# Patient Record
Sex: MALE | Race: WHITE | NOT HISPANIC OR LATINO | Employment: OTHER | URBAN - METROPOLITAN AREA
[De-identification: names, ages, dates, MRNs, and addresses within clinical notes are randomized per-mention and may not be internally consistent; named-entity substitution may affect disease eponyms.]

---

## 2017-01-06 PROBLEM — I10 HYPERTENSION: Status: ACTIVE | Noted: 2017-01-06

## 2017-03-31 ENCOUNTER — OFFICE VISIT (OUTPATIENT)
Dept: NEUROLOGY | Facility: CLINIC | Age: 82
End: 2017-03-31

## 2017-03-31 ENCOUNTER — DOCUMENTATION (OUTPATIENT)
Dept: NEUROLOGY | Facility: CLINIC | Age: 82
End: 2017-03-31

## 2017-03-31 VITALS
BODY MASS INDEX: 30.07 KG/M2 | WEIGHT: 222 LBS | SYSTOLIC BLOOD PRESSURE: 185 MMHG | HEIGHT: 72 IN | DIASTOLIC BLOOD PRESSURE: 86 MMHG

## 2017-03-31 DIAGNOSIS — G31.84 MILD COGNITIVE IMPAIRMENT: Primary | ICD-10-CM

## 2017-03-31 PROCEDURE — 99214 OFFICE O/P EST MOD 30 MIN: CPT | Performed by: PHYSICIAN ASSISTANT

## 2017-03-31 NOTE — PROGRESS NOTES
Faxed over a request to Dr. Goodman's office at [phone] for the pt's most recent labs to be faxed to our office

## 2017-03-31 NOTE — PROGRESS NOTES
"Subjective     History of Present Illness   Leobardo Araujo is a 82 y.o. male who returns to clinic today for evaluation of cognitive impairment. He was previously followed by Dr. Snell. He has noted symptoms for some time marked initially by mild forgetfulness  and word-finding difficulties. This has remained static  over time. He denies any additional impairments. There have been no associated  symptoms of anxiety  and depression . He denies  impairments in ADL's. He manages his medications  and finances. He continues to drive.      Prior evaluation has included screening blood work through his PCP. We do not currently have these results.     He has a history of YSABEL and currently sees Dr. Snell for management of his CPAP machine.     Since his last visit in 6/16, he feels essentially unchanged cognitively. It is noted that he is unaccompanied today.      The following portions of the patient's history were reviewed and updated as appropriate: allergies, current medications, past family history, past medical history, past social history, past surgical history and problem list.    Review of Systems   Constitutional: Negative.    HENT: Negative.    Eyes: Negative.    Respiratory: Negative.    Cardiovascular: Negative.    Gastrointestinal: Negative.    Endocrine: Negative.    Genitourinary: Negative.    Musculoskeletal: Negative.    Skin: Negative.    Allergic/Immunologic: Negative.    Hematological: Negative.    Psychiatric/Behavioral: Negative.        Objective     BP (!) 185/86  Ht 72\" (182.9 cm)  Wt 222 lb (101 kg)  BMI 30.11 kg/m2    General appearance today is normal.       Physical Exam   Constitutional: He is oriented to person, place, and time.   Neurological: He is oriented to person, place, and time. He has normal strength. He has a normal Finger-Nose-Finger Test.   Psychiatric: His speech is normal.        Neurologic Exam     Mental Status   Oriented to person, place, and time.   (Disoriented to city " and [facility]. )  Registration: recalls 3 of 3 objects. Recall of objects at 5 minutes: 0/3 recall. Follows 3 step commands.   Attention: normal.   Speech: speech is normal   Level of consciousness: alert  Able to name object. Able to read. Able to repeat. Able to write. Normal comprehension.     Cranial Nerves   Cranial nerves II through XII intact.     Motor Exam   Muscle bulk: normal  Overall muscle tone: normal    Strength   Strength 5/5 throughout.     Sensory Exam   Light touch normal.     Gait, Coordination, and Reflexes     Coordination   Finger to nose coordination: normal    Tremor   Resting tremor: absent        Results  MMSE=25 (28 in 6/16)      Assessment/Plan   Leobardo was seen today for neurologic problem.    Diagnoses and all orders for this visit:    Mild cognitive impairment  -     CT Head Without Contrast          Discussion/Summary   Leobardo Araujo returns to clinic today with a history and examination concerning for Mild Cognitive Impairment (MCI) . I again reviewed his current status and treatment options. It was elected to obtain his bloodwork results from his PCP. It was also elected to obtain a head CT to complete his workup. We discussed potential treatment options such as adding donepezil. However, after some discussion, it was elected to simply follow his course. He will then follow up in 6 months, or sooner if needed.       I spent 20 minutes out of 25 minutes face to face with the patient and discussing diagnosis, prognosis, diagnostic testing, evaluation, current status, treatment options and management.    As part of this visit I discussed the history with the patient .      Shelly Johnson PA-C

## 2017-05-08 RX ORDER — VILAZODONE HYDROCHLORIDE 40 MG/1
TABLET ORAL
Qty: 30 TABLET | Refills: 3 | Status: SHIPPED | OUTPATIENT
Start: 2017-05-08 | End: 2017-09-19 | Stop reason: SDUPTHER

## 2017-06-28 ENCOUNTER — OFFICE VISIT (OUTPATIENT)
Dept: NEUROLOGY | Facility: CLINIC | Age: 82
End: 2017-06-28

## 2017-06-28 ENCOUNTER — TELEPHONE (OUTPATIENT)
Dept: NEUROLOGY | Facility: CLINIC | Age: 82
End: 2017-06-28

## 2017-06-28 DIAGNOSIS — G31.84 MILD COGNITIVE IMPAIRMENT: ICD-10-CM

## 2017-06-28 DIAGNOSIS — G47.33 OBSTRUCTIVE SLEEP APNEA SYNDROME: Primary | ICD-10-CM

## 2017-06-28 PROCEDURE — 99213 OFFICE O/P EST LOW 20 MIN: CPT | Performed by: PSYCHIATRY & NEUROLOGY

## 2017-06-28 NOTE — PROGRESS NOTES
Subjective   Leobardo Araujo is a 82 y.o. male.     History of Present Illness     The following portions of the patient's history were reviewed and updated as appropriate:  allergies, current medications, past family history, past medical history, past social history, past surgical history and problem list.      Chief complaint is sleep problems, memory loss, the patient is seen today in follow-up.  the patient sleep apnea has controlled with CPAP and he is sleeping every night with it he has no complaints.  He is now followed in our memory disorders clinic for his cognitive impairment.  He has never had his machine serviced.  He uses his CPAP machine every night and he recently replaced the mask and that her current mask fits well      Review of Systems   Constitutional: Negative for appetite change, chills and fatigue.   HENT: Negative for congestion, ear pain, facial swelling and sinus pressure.    Eyes: Negative for pain and redness.   Respiratory: Negative for shortness of breath.    Cardiovascular: Negative for chest pain.   Gastrointestinal: Negative for abdominal pain.   Endocrine: Negative for cold intolerance and heat intolerance.   Genitourinary: Negative for dysuria.   Musculoskeletal: Negative for arthralgias.   Skin: Negative for rash.   Allergic/Immunologic: Negative for immunocompromised state.   Neurological:        Poor sleep and memory   Hematological: Negative for adenopathy.   Psychiatric/Behavioral: Negative for hallucinations.       Objective      vitals were not taken for this visit.    Carotid pulses were palpable bilaterally  The patient's general appearance today was normal  Ophthalmological exam was not performed today    Neurologic Exam     Mental Status   Oriented to person.   Oriented to place. Oriented to country, city, area and street.   Oriented to time. Oriented to year, month, date, day and season.   Registration: recalls 3 of 3 objects. Recall at 5 minutes: recalls 3 of 3 objects.  Follows 3 step commands.   Attention: normal.   Speech: speech is normal   Level of consciousness: alert  Knowledge: consistent with education.   Able to name object. Able to read. Able to repeat. Able to write. Normal comprehension.        MMSE is 28/30     Cranial Nerves     CN II   Visual fields full to confrontation.     CN III, IV, VI   Right pupil: Shape: regular. Consensual response: intact. Accommodation: intact.   Left pupil: Shape: regular. Consensual response: intact.   CN III: no CN III palsy  CN VI: no CN VI palsy  Nystagmus: none   Diplopia: none  Ophthalmoparesis: none  Upgaze: normal  Downgaze: normal  Conjugate gaze: present  Vestibulo-ocular reflex: present    CN V   Facial sensation intact.     CN VII   Facial expression full, symmetric.     CN VIII   CN VIII normal.     CN IX, X   CN IX normal.     CN XI   CN XI normal.     CN XII   CN XII normal.     Motor Exam   Muscle bulk: normal  Overall muscle tone: normal  Right arm pronator drift: absent  Left arm pronator drift: absent    Strength   Strength 5/5 throughout.     Sensory Exam   Light touch normal.     Gait, Coordination, and Reflexes     Gait  Gait: normal    Coordination   Romberg: negative  Finger to nose coordination: normal  Heel to shin coordination: normal  Tandem walking coordination: normal    Tremor   Resting tremor: absent  Intention tremor: absent  Action tremor: absent    Reflexes   Right brachioradialis: 2+  Left brachioradialis: 2+  Right biceps: 2+  Left biceps: 2+  Right triceps: 2+  Left triceps: 2+  Right patellar: 2+  Left patellar: 2+  Right achilles: 2+  Left achilles: 2+  Right : 2+  Left : 2+      Assessment/Plan     Leobardo was seen today for follow-up.    Diagnoses and all orders for this visit:    Obstructive sleep apnea syndrome    Mild cognitive impairment          Discussion/Summary:    He will follow-up in our memory disorder clinic for his mild cognitive impairment.  We will get the printout from his  CPAP machine to make sure that is still functioning optimally and I provided him with a prescription to get his CPAP machine serviced.

## 2017-07-21 RX ORDER — ATORVASTATIN CALCIUM 20 MG/1
TABLET, FILM COATED ORAL
Qty: 90 TABLET | Refills: 1 | Status: SHIPPED | OUTPATIENT
Start: 2017-07-21 | End: 2017-11-29 | Stop reason: SDUPTHER

## 2017-07-24 RX ORDER — HYDROCHLOROTHIAZIDE 25 MG/1
25 TABLET ORAL DAILY
Qty: 90 TABLET | Refills: 0 | Status: SHIPPED | OUTPATIENT
Start: 2017-07-24 | End: 2017-10-26 | Stop reason: SDUPTHER

## 2017-07-27 RX ORDER — LOSARTAN POTASSIUM 100 MG/1
100 TABLET ORAL DAILY
Qty: 90 TABLET | Refills: 1 | Status: SHIPPED | OUTPATIENT
Start: 2017-07-27 | End: 2017-11-29 | Stop reason: SDUPTHER

## 2017-09-20 RX ORDER — VILAZODONE HYDROCHLORIDE 40 MG/1
TABLET ORAL
Qty: 30 TABLET | Refills: 3 | Status: SHIPPED | OUTPATIENT
Start: 2017-09-20 | End: 2019-07-22 | Stop reason: SDUPTHER

## 2017-10-27 RX ORDER — HYDROCHLOROTHIAZIDE 25 MG/1
TABLET ORAL
Qty: 90 TABLET | Refills: 1 | Status: SHIPPED | OUTPATIENT
Start: 2017-10-27 | End: 2017-11-29 | Stop reason: SDUPTHER

## 2017-11-29 ENCOUNTER — OFFICE VISIT (OUTPATIENT)
Dept: CARDIOLOGY | Facility: CLINIC | Age: 82
End: 2017-11-29

## 2017-11-29 VITALS
WEIGHT: 231.8 LBS | SYSTOLIC BLOOD PRESSURE: 140 MMHG | BODY MASS INDEX: 31.4 KG/M2 | DIASTOLIC BLOOD PRESSURE: 80 MMHG | HEIGHT: 72 IN | HEART RATE: 71 BPM

## 2017-11-29 DIAGNOSIS — E78.00 HYPERCHOLESTEREMIA: ICD-10-CM

## 2017-11-29 DIAGNOSIS — Z86.79 HISTORY OF AORTIC VALVE STENOSIS: ICD-10-CM

## 2017-11-29 DIAGNOSIS — I38 VALVULAR HEART DISEASE: ICD-10-CM

## 2017-11-29 DIAGNOSIS — I48.91 ATRIAL FIBRILLATION, UNSPECIFIED TYPE (HCC): Primary | ICD-10-CM

## 2017-11-29 DIAGNOSIS — I10 HYPERTENSION, UNSPECIFIED TYPE: ICD-10-CM

## 2017-11-29 PROCEDURE — 93000 ELECTROCARDIOGRAM COMPLETE: CPT | Performed by: INTERNAL MEDICINE

## 2017-11-29 PROCEDURE — 99214 OFFICE O/P EST MOD 30 MIN: CPT | Performed by: INTERNAL MEDICINE

## 2017-11-29 RX ORDER — TAMSULOSIN HYDROCHLORIDE 0.4 MG/1
1 CAPSULE ORAL NIGHTLY
COMMUNITY
End: 2017-11-29

## 2017-11-29 RX ORDER — AMIODARONE HYDROCHLORIDE 200 MG/1
200 TABLET ORAL DAILY
Qty: 90 TABLET | Refills: 1 | Status: SHIPPED | OUTPATIENT
Start: 2017-11-29 | End: 2018-05-23 | Stop reason: SDUPTHER

## 2017-11-29 RX ORDER — TERAZOSIN 1 MG/1
1 CAPSULE ORAL NIGHTLY
Qty: 90 CAPSULE | Refills: 3 | Status: SHIPPED | OUTPATIENT
Start: 2017-11-29 | End: 2018-10-03

## 2017-11-29 RX ORDER — HYDROCHLOROTHIAZIDE 25 MG/1
25 TABLET ORAL DAILY
Qty: 90 TABLET | Refills: 1 | Status: SHIPPED | OUTPATIENT
Start: 2017-11-29 | End: 2018-12-03 | Stop reason: SDUPTHER

## 2017-11-29 RX ORDER — LOSARTAN POTASSIUM 100 MG/1
100 TABLET ORAL DAILY
Qty: 90 TABLET | Refills: 1 | Status: SHIPPED | OUTPATIENT
Start: 2017-11-29 | End: 2018-10-25 | Stop reason: SDUPTHER

## 2017-11-29 RX ORDER — ATORVASTATIN CALCIUM 20 MG/1
20 TABLET, FILM COATED ORAL NIGHTLY
Qty: 90 TABLET | Refills: 1 | Status: SHIPPED | OUTPATIENT
Start: 2017-11-29 | End: 2019-02-22 | Stop reason: SDUPTHER

## 2017-12-26 ENCOUNTER — APPOINTMENT (OUTPATIENT)
Dept: CARDIOLOGY | Facility: HOSPITAL | Age: 82
End: 2017-12-26

## 2017-12-29 ENCOUNTER — HOSPITAL ENCOUNTER (OUTPATIENT)
Dept: CARDIOLOGY | Facility: HOSPITAL | Age: 82
Discharge: HOME | End: 2017-12-29
Admitting: NURSE PRACTITIONER

## 2017-12-29 VITALS — BODY MASS INDEX: 31.29 KG/M2 | WEIGHT: 231 LBS | HEIGHT: 72 IN

## 2017-12-29 DIAGNOSIS — I48.91 ATRIAL FIBRILLATION, UNSPECIFIED TYPE (HCC): ICD-10-CM

## 2017-12-29 DIAGNOSIS — I38 VALVULAR HEART DISEASE: ICD-10-CM

## 2017-12-29 PROCEDURE — 93306 TTE W/DOPPLER COMPLETE: CPT

## 2017-12-29 PROCEDURE — 93306 TTE W/DOPPLER COMPLETE: CPT | Performed by: INTERNAL MEDICINE

## 2018-01-03 LAB
BH CV ECHO MEAS - AO MAX PG (FULL): 10.4 MMHG
BH CV ECHO MEAS - AO MAX PG: 17 MMHG
BH CV ECHO MEAS - AO MEAN PG (FULL): 5.5 MMHG
BH CV ECHO MEAS - AO MEAN PG: 8.8 MMHG
BH CV ECHO MEAS - AO ROOT AREA (BSA CORRECTED): 1.1
BH CV ECHO MEAS - AO ROOT AREA: 5.3 CM^2
BH CV ECHO MEAS - AO ROOT DIAM: 2.6 CM
BH CV ECHO MEAS - AO V2 MAX: 204.4 CM/SEC
BH CV ECHO MEAS - AO V2 MEAN: 135.6 CM/SEC
BH CV ECHO MEAS - AO V2 VTI: 41.5 CM
BH CV ECHO MEAS - AVA(I,A): 2.1 CM^2
BH CV ECHO MEAS - AVA(I,D): 2.1 CM^2
BH CV ECHO MEAS - AVA(V,A): 1.8 CM^2
BH CV ECHO MEAS - AVA(V,D): 1.8 CM^2
BH CV ECHO MEAS - BSA(HAYCOCK): 2.3 M^2
BH CV ECHO MEAS - BSA: 2.3 M^2
BH CV ECHO MEAS - BZI_BMI: 31.3 KILOGRAMS/M^2
BH CV ECHO MEAS - BZI_METRIC_HEIGHT: 182.9 CM
BH CV ECHO MEAS - BZI_METRIC_WEIGHT: 104.8 KG
BH CV ECHO MEAS - CONTRAST EF (2CH): 68.3 ML/M^2
BH CV ECHO MEAS - CONTRAST EF 4CH: 69.3 ML/M^2
BH CV ECHO MEAS - EDV(CUBED): 121.3 ML
BH CV ECHO MEAS - EDV(MOD-SP2): 82 ML
BH CV ECHO MEAS - EDV(MOD-SP4): 88 ML
BH CV ECHO MEAS - EDV(TEICH): 115.5 ML
BH CV ECHO MEAS - EF(CUBED): 68.2 %
BH CV ECHO MEAS - EF(MOD-SP2): 68.3 %
BH CV ECHO MEAS - EF(MOD-SP4): 69.3 %
BH CV ECHO MEAS - EF(TEICH): 59.5 %
BH CV ECHO MEAS - ESV(CUBED): 38.6 ML
BH CV ECHO MEAS - ESV(MOD-SP2): 26 ML
BH CV ECHO MEAS - ESV(MOD-SP4): 27 ML
BH CV ECHO MEAS - ESV(TEICH): 46.8 ML
BH CV ECHO MEAS - FS: 31.7 %
BH CV ECHO MEAS - IVS/LVPW: 1.1
BH CV ECHO MEAS - IVSD: 1.3 CM
BH CV ECHO MEAS - LA DIMENSION: 4 CM
BH CV ECHO MEAS - LA/AO: 1.5
BH CV ECHO MEAS - LAT PEAK E' VEL: 8.4 CM/SEC
BH CV ECHO MEAS - LV DIASTOLIC VOL/BSA (35-75): 38.9 ML/M^2
BH CV ECHO MEAS - LV MASS(C)D: 275.1 GRAMS
BH CV ECHO MEAS - LV MASS(C)DI: 121.5 GRAMS/M^2
BH CV ECHO MEAS - LV MAX PG: 6.6 MMHG
BH CV ECHO MEAS - LV MEAN PG: 3.3 MMHG
BH CV ECHO MEAS - LV SYSTOLIC VOL/BSA (12-30): 11.9 ML/M^2
BH CV ECHO MEAS - LV V1 MAX: 128.7 CM/SEC
BH CV ECHO MEAS - LV V1 MEAN: 84.1 CM/SEC
BH CV ECHO MEAS - LV V1 VTI: 30.2 CM
BH CV ECHO MEAS - LVIDD: 5 CM
BH CV ECHO MEAS - LVIDS: 3.4 CM
BH CV ECHO MEAS - LVLD AP2: 7.7 CM
BH CV ECHO MEAS - LVLD AP4: 7.8 CM
BH CV ECHO MEAS - LVLS AP2: 5.8 CM
BH CV ECHO MEAS - LVLS AP4: 6 CM
BH CV ECHO MEAS - LVOT AREA (M): 2.8 CM^2
BH CV ECHO MEAS - LVOT AREA: 2.8 CM^2
BH CV ECHO MEAS - LVOT DIAM: 1.9 CM
BH CV ECHO MEAS - LVPWD: 1.3 CM
BH CV ECHO MEAS - MED PEAK E' VEL: 6.84 CM/SEC
BH CV ECHO MEAS - MV A MAX VEL: 143 CM/SEC
BH CV ECHO MEAS - MV DEC SLOPE: 337.3 CM/SEC^2
BH CV ECHO MEAS - MV E MAX VEL: 123 CM/SEC
BH CV ECHO MEAS - MV E/A: 0.86
BH CV ECHO MEAS - MV MAX PG: 8.5 MMHG
BH CV ECHO MEAS - MV MEAN PG: 4 MMHG
BH CV ECHO MEAS - MV P1/2T MAX VEL: 141.8 CM/SEC
BH CV ECHO MEAS - MV P1/2T: 123.1 MSEC
BH CV ECHO MEAS - MV V2 MAX: 145.5 CM/SEC
BH CV ECHO MEAS - MV V2 MEAN: 92.8 CM/SEC
BH CV ECHO MEAS - MV V2 VTI: 51.1 CM
BH CV ECHO MEAS - MVA P1/2T LCG: 1.6 CM^2
BH CV ECHO MEAS - MVA(P1/2T): 1.8 CM^2
BH CV ECHO MEAS - MVA(VTI): 1.7 CM^2
BH CV ECHO MEAS - PA ACC SLOPE: 1060 CM/SEC^2
BH CV ECHO MEAS - PA ACC TIME: 0.09 SEC
BH CV ECHO MEAS - PA PR(ACCEL): 39.4 MMHG
BH CV ECHO MEAS - PI END-D VEL: 124 CM/SEC
BH CV ECHO MEAS - RAP SYSTOLE: 3 MMHG
BH CV ECHO MEAS - RVSP: 30 MMHG
BH CV ECHO MEAS - SI(AO): 97.2 ML/M^2
BH CV ECHO MEAS - SI(CUBED): 36.5 ML/M^2
BH CV ECHO MEAS - SI(LVOT): 37.8 ML/M^2
BH CV ECHO MEAS - SI(MOD-SP2): 24.7 ML/M^2
BH CV ECHO MEAS - SI(MOD-SP4): 26.9 ML/M^2
BH CV ECHO MEAS - SI(TEICH): 30.4 ML/M^2
BH CV ECHO MEAS - SV(AO): 220.1 ML
BH CV ECHO MEAS - SV(CUBED): 82.7 ML
BH CV ECHO MEAS - SV(LVOT): 85.7 ML
BH CV ECHO MEAS - SV(MOD-SP2): 56 ML
BH CV ECHO MEAS - SV(MOD-SP4): 61 ML
BH CV ECHO MEAS - SV(TEICH): 68.7 ML
BH CV ECHO MEAS - TAPSE (>1.6): 1.9 CM2
BH CV ECHO MEAS - TR MAX VEL: 259 CM/SEC
BH CV VAS BP LEFT ARM: NORMAL MMHG
BH CV XLRA - RV BASE: 4 CM
BH CV XLRA - RV LENGTH: 7.9 CM
BH CV XLRA - RV MID: 3.3 CM
BH CV XLRA - TDI S': 13.8 CM/SEC
E/E' RATIO: 16.3
LEFT ATRIUM VOLUME INDEX: 37.2 ML/M2
LEFT ATRIUM VOLUME: 84 CM3
LV EF 2D ECHO EST: 60 %
MAXIMAL PREDICTED HEART RATE: 137 BPM
STRESS TARGET HR: 116 BPM

## 2018-04-23 ENCOUNTER — APPOINTMENT (OUTPATIENT)
Dept: GENERAL RADIOLOGY | Facility: HOSPITAL | Age: 83
End: 2018-04-23

## 2018-04-23 ENCOUNTER — APPOINTMENT (OUTPATIENT)
Dept: CT IMAGING | Facility: HOSPITAL | Age: 83
End: 2018-04-23

## 2018-04-23 ENCOUNTER — HOSPITAL ENCOUNTER (INPATIENT)
Facility: HOSPITAL | Age: 83
LOS: 13 days | Discharge: HOME | End: 2018-05-07
Attending: EMERGENCY MEDICINE | Admitting: HOSPITALIST

## 2018-04-23 DIAGNOSIS — R29.6 FALLS FREQUENTLY: ICD-10-CM

## 2018-04-23 DIAGNOSIS — N39.0 URINARY TRACT INFECTION WITHOUT HEMATURIA, SITE UNSPECIFIED: ICD-10-CM

## 2018-04-23 DIAGNOSIS — R19.7 DIARRHEA, UNSPECIFIED TYPE: ICD-10-CM

## 2018-04-23 DIAGNOSIS — R41.82 ALTERED MENTAL STATUS, UNSPECIFIED ALTERED MENTAL STATUS TYPE: Primary | ICD-10-CM

## 2018-04-23 DIAGNOSIS — E87.6 HYPOKALEMIA: ICD-10-CM

## 2018-04-23 DIAGNOSIS — N39.0 URINARY TRACT INFECTION IN MALE: ICD-10-CM

## 2018-04-23 LAB
ALBUMIN SERPL-MCNC: 4.1 G/DL (ref 3.2–4.8)
ALBUMIN/GLOB SERPL: 1.4 G/DL (ref 1.5–2.5)
ALP SERPL-CCNC: 49 U/L (ref 25–100)
ALT SERPL W P-5'-P-CCNC: 44 U/L (ref 7–40)
ANION GAP SERPL CALCULATED.3IONS-SCNC: 10 MMOL/L (ref 3–11)
AST SERPL-CCNC: 42 U/L (ref 0–33)
BACTERIA UR QL AUTO: ABNORMAL /HPF
BASOPHILS # BLD AUTO: 0.01 10*3/MM3 (ref 0–0.2)
BASOPHILS NFR BLD AUTO: 0.1 % (ref 0–1)
BILIRUB SERPL-MCNC: 0.4 MG/DL (ref 0.3–1.2)
BILIRUB UR QL STRIP: NEGATIVE
BUN BLD-MCNC: 26 MG/DL (ref 9–23)
BUN/CREAT SERPL: 26 (ref 7–25)
CALCIUM SPEC-SCNC: 7.9 MG/DL (ref 8.7–10.4)
CHLORIDE SERPL-SCNC: 102 MMOL/L (ref 99–109)
CLARITY UR: CLEAR
CO2 SERPL-SCNC: 24 MMOL/L (ref 20–31)
COLOR UR: YELLOW
CREAT BLD-MCNC: 1 MG/DL (ref 0.6–1.3)
D-LACTATE SERPL-SCNC: 1.1 MMOL/L (ref 0.5–2)
DEPRECATED RDW RBC AUTO: 49.6 FL (ref 37–54)
EOSINOPHIL # BLD AUTO: 0 10*3/MM3 (ref 0–0.3)
EOSINOPHIL NFR BLD AUTO: 0 % (ref 0–3)
ERYTHROCYTE [DISTWIDTH] IN BLOOD BY AUTOMATED COUNT: 15.2 % (ref 11.3–14.5)
GFR SERPL CREATININE-BSD FRML MDRD: 71 ML/MIN/1.73
GLOBULIN UR ELPH-MCNC: 2.9 GM/DL
GLUCOSE BLD-MCNC: 148 MG/DL (ref 70–100)
GLUCOSE UR STRIP-MCNC: NEGATIVE MG/DL
HCT VFR BLD AUTO: 39.7 % (ref 38.9–50.9)
HGB BLD-MCNC: 13.1 G/DL (ref 13.1–17.5)
HGB UR QL STRIP.AUTO: ABNORMAL
HOLD SPECIMEN: NORMAL
HYALINE CASTS UR QL AUTO: ABNORMAL /LPF
IMM GRANULOCYTES # BLD: 0.04 10*3/MM3 (ref 0–0.03)
IMM GRANULOCYTES NFR BLD: 0.3 % (ref 0–0.6)
KETONES UR QL STRIP: NEGATIVE
LEUKOCYTE ESTERASE UR QL STRIP.AUTO: ABNORMAL
LIPASE SERPL-CCNC: 24 U/L (ref 6–51)
LYMPHOCYTES # BLD AUTO: 0.85 10*3/MM3 (ref 0.6–4.8)
LYMPHOCYTES NFR BLD AUTO: 5.4 % (ref 24–44)
MCH RBC QN AUTO: 29 PG (ref 27–31)
MCHC RBC AUTO-ENTMCNC: 33 G/DL (ref 32–36)
MCV RBC AUTO: 87.8 FL (ref 80–99)
MONOCYTES # BLD AUTO: 0.95 10*3/MM3 (ref 0–1)
MONOCYTES NFR BLD AUTO: 6 % (ref 0–12)
NEUTROPHILS # BLD AUTO: 13.87 10*3/MM3 (ref 1.5–8.3)
NEUTROPHILS NFR BLD AUTO: 88.2 % (ref 41–71)
NITRITE UR QL STRIP: NEGATIVE
PH UR STRIP.AUTO: 5.5 [PH] (ref 5–8)
PLATELET # BLD AUTO: 212 10*3/MM3 (ref 150–450)
PMV BLD AUTO: 10.8 FL (ref 6–12)
POTASSIUM BLD-SCNC: 3 MMOL/L (ref 3.5–5.5)
PROCALCITONIN SERPL-MCNC: 0.05 NG/ML
PROT SERPL-MCNC: 7 G/DL (ref 5.7–8.2)
PROT UR QL STRIP: ABNORMAL
RBC # BLD AUTO: 4.52 10*6/MM3 (ref 4.2–5.76)
RBC # UR: ABNORMAL /HPF
REF LAB TEST METHOD: ABNORMAL
SODIUM BLD-SCNC: 136 MMOL/L (ref 132–146)
SP GR UR STRIP: 1.06 (ref 1–1.03)
SQUAMOUS #/AREA URNS HPF: ABNORMAL /HPF
TROPONIN I SERPL-MCNC: 0 NG/ML (ref 0–0.07)
UROBILINOGEN UR QL STRIP: ABNORMAL
WBC NRBC COR # BLD: 15.72 10*3/MM3 (ref 3.5–10.8)
WBC UR QL AUTO: ABNORMAL /HPF
WHOLE BLOOD HOLD SPECIMEN: NORMAL
WHOLE BLOOD HOLD SPECIMEN: NORMAL

## 2018-04-23 PROCEDURE — 87040 BLOOD CULTURE FOR BACTERIA: CPT | Performed by: NURSE PRACTITIONER

## 2018-04-23 PROCEDURE — 93005 ELECTROCARDIOGRAM TRACING: CPT | Performed by: EMERGENCY MEDICINE

## 2018-04-23 PROCEDURE — 71045 X-RAY EXAM CHEST 1 VIEW: CPT

## 2018-04-23 PROCEDURE — 83605 ASSAY OF LACTIC ACID: CPT | Performed by: NURSE PRACTITIONER

## 2018-04-23 PROCEDURE — 87077 CULTURE AEROBIC IDENTIFY: CPT | Performed by: INTERNAL MEDICINE

## 2018-04-23 PROCEDURE — 87186 SC STD MICRODIL/AGAR DIL: CPT | Performed by: INTERNAL MEDICINE

## 2018-04-23 PROCEDURE — 99285 EMERGENCY DEPT VISIT HI MDM: CPT

## 2018-04-23 PROCEDURE — 80053 COMPREHEN METABOLIC PANEL: CPT

## 2018-04-23 PROCEDURE — 84484 ASSAY OF TROPONIN QUANT: CPT

## 2018-04-23 PROCEDURE — 70450 CT HEAD/BRAIN W/O DYE: CPT

## 2018-04-23 PROCEDURE — 74177 CT ABD & PELVIS W/CONTRAST: CPT

## 2018-04-23 PROCEDURE — 85025 COMPLETE CBC W/AUTO DIFF WBC: CPT

## 2018-04-23 PROCEDURE — 83690 ASSAY OF LIPASE: CPT

## 2018-04-23 PROCEDURE — 84145 PROCALCITONIN (PCT): CPT | Performed by: NURSE PRACTITIONER

## 2018-04-23 PROCEDURE — 25010000002 IOPAMIDOL 61 % SOLUTION: Performed by: EMERGENCY MEDICINE

## 2018-04-23 PROCEDURE — 87086 URINE CULTURE/COLONY COUNT: CPT | Performed by: INTERNAL MEDICINE

## 2018-04-23 PROCEDURE — 81001 URINALYSIS AUTO W/SCOPE: CPT | Performed by: EMERGENCY MEDICINE

## 2018-04-23 PROCEDURE — 93005 ELECTROCARDIOGRAM TRACING: CPT

## 2018-04-23 RX ORDER — ACETAMINOPHEN 500 MG
1000 TABLET ORAL ONCE
Status: COMPLETED | OUTPATIENT
Start: 2018-04-23 | End: 2018-04-23

## 2018-04-23 RX ORDER — POTASSIUM CHLORIDE 750 MG/1
40 CAPSULE, EXTENDED RELEASE ORAL ONCE
Status: COMPLETED | OUTPATIENT
Start: 2018-04-23 | End: 2018-04-23

## 2018-04-23 RX ORDER — SODIUM CHLORIDE 0.9 % (FLUSH) 0.9 %
10 SYRINGE (ML) INJECTION AS NEEDED
Status: DISCONTINUED | OUTPATIENT
Start: 2018-04-23 | End: 2018-05-07 | Stop reason: HOSPADM

## 2018-04-23 RX ORDER — CEFTRIAXONE SODIUM 1 G/50ML
1 INJECTION, SOLUTION INTRAVENOUS ONCE
Status: COMPLETED | OUTPATIENT
Start: 2018-04-23 | End: 2018-04-24

## 2018-04-23 RX ADMIN — ACETAMINOPHEN 1000 MG: 500 TABLET, FILM COATED ORAL at 20:59

## 2018-04-23 RX ADMIN — POTASSIUM CHLORIDE 40 MEQ: 750 CAPSULE, EXTENDED RELEASE ORAL at 21:03

## 2018-04-23 RX ADMIN — SODIUM CHLORIDE 1000 ML: 9 INJECTION, SOLUTION INTRAVENOUS at 20:59

## 2018-04-23 RX ADMIN — IOPAMIDOL 95 ML: 612 INJECTION, SOLUTION INTRAVENOUS at 21:18

## 2018-04-24 PROBLEM — R82.90 URINE ABNORMALITY: Status: ACTIVE | Noted: 2018-04-24

## 2018-04-24 PROBLEM — R19.7 NAUSEA, VOMITING, AND DIARRHEA: Status: ACTIVE | Noted: 2018-04-24

## 2018-04-24 PROBLEM — A41.9 SEPSIS (HCC): Status: ACTIVE | Noted: 2018-04-24

## 2018-04-24 PROBLEM — N39.0 UTI (URINARY TRACT INFECTION): Status: ACTIVE | Noted: 2018-04-24

## 2018-04-24 PROBLEM — R91.8 ABNORMAL FINDINGS ON DIAGNOSTIC IMAGING OF LUNG: Status: ACTIVE | Noted: 2018-04-24

## 2018-04-24 PROBLEM — N28.9 RENAL INSUFFICIENCY: Status: ACTIVE | Noted: 2018-04-24

## 2018-04-24 PROBLEM — R41.82 ALTERED MENTAL STATUS: Status: ACTIVE | Noted: 2018-04-24

## 2018-04-24 PROBLEM — G93.41 METABOLIC ENCEPHALOPATHY: Status: ACTIVE | Noted: 2018-04-24

## 2018-04-24 PROBLEM — I38 VALVULAR HEART DISEASE: Chronic | Status: ACTIVE | Noted: 2018-04-24

## 2018-04-24 PROBLEM — R11.2 NAUSEA, VOMITING, AND DIARRHEA: Status: ACTIVE | Noted: 2018-04-24

## 2018-04-24 PROBLEM — E87.6 HYPOKALEMIA: Status: ACTIVE | Noted: 2018-04-24

## 2018-04-24 PROBLEM — G31.84 MILD COGNITIVE IMPAIRMENT: Chronic | Status: ACTIVE | Noted: 2017-03-31

## 2018-04-24 LAB
ALBUMIN SERPL-MCNC: 4.1 G/DL (ref 3.2–4.8)
ALBUMIN/GLOB SERPL: 1.5 G/DL (ref 1.5–2.5)
ALP SERPL-CCNC: 48 U/L (ref 25–100)
ALT SERPL W P-5'-P-CCNC: 44 U/L (ref 7–40)
AMMONIA BLD-SCNC: 30 UMOL/L (ref 19–60)
ANION GAP SERPL CALCULATED.3IONS-SCNC: 11 MMOL/L (ref 3–11)
AST SERPL-CCNC: 40 U/L (ref 0–33)
BILIRUB SERPL-MCNC: 0.4 MG/DL (ref 0.3–1.2)
BNP SERPL-MCNC: 85 PG/ML (ref 0–100)
BUN BLD-MCNC: 23 MG/DL (ref 9–23)
BUN/CREAT SERPL: 25.6 (ref 7–25)
C DIFF TOX GENS STL QL NAA+PROBE: NOT DETECTED
CALCIUM SPEC-SCNC: 7.6 MG/DL (ref 8.7–10.4)
CHLORIDE SERPL-SCNC: 103 MMOL/L (ref 99–109)
CO2 SERPL-SCNC: 23 MMOL/L (ref 20–31)
CREAT BLD-MCNC: 0.9 MG/DL (ref 0.6–1.3)
DEPRECATED RDW RBC AUTO: 50.7 FL (ref 37–54)
ERYTHROCYTE [DISTWIDTH] IN BLOOD BY AUTOMATED COUNT: 15.7 % (ref 11.3–14.5)
GFR SERPL CREATININE-BSD FRML MDRD: 81 ML/MIN/1.73
GLOBULIN UR ELPH-MCNC: 2.8 GM/DL
GLUCOSE BLD-MCNC: 150 MG/DL (ref 70–100)
GLUCOSE BLDC GLUCOMTR-MCNC: 102 MG/DL (ref 70–130)
GLUCOSE BLDC GLUCOMTR-MCNC: 117 MG/DL (ref 70–130)
GLUCOSE BLDC GLUCOMTR-MCNC: 150 MG/DL (ref 70–130)
GLUCOSE BLDC GLUCOMTR-MCNC: 221 MG/DL (ref 70–130)
HAV IGM SERPL QL IA: NORMAL
HBA1C MFR BLD: 7.8 % (ref 4.8–5.6)
HBV CORE IGM SERPL QL IA: NORMAL
HBV SURFACE AG SERPL QL IA: NORMAL
HCT VFR BLD AUTO: 40.5 % (ref 38.9–50.9)
HCV AB SER DONR QL: NORMAL
HEMOCCULT STL QL: NEGATIVE
HGB BLD-MCNC: 13.1 G/DL (ref 13.1–17.5)
MAGNESIUM SERPL-MCNC: 1.9 MG/DL (ref 1.3–2.7)
MCH RBC QN AUTO: 28.9 PG (ref 27–31)
MCHC RBC AUTO-ENTMCNC: 32.3 G/DL (ref 32–36)
MCV RBC AUTO: 89.4 FL (ref 80–99)
PLATELET # BLD AUTO: 231 10*3/MM3 (ref 150–450)
PMV BLD AUTO: 11.6 FL (ref 6–12)
POTASSIUM BLD-SCNC: 2.8 MMOL/L (ref 3.5–5.5)
PROT SERPL-MCNC: 6.9 G/DL (ref 5.7–8.2)
RBC # BLD AUTO: 4.53 10*6/MM3 (ref 4.2–5.76)
SODIUM BLD-SCNC: 137 MMOL/L (ref 132–146)
TROPONIN I SERPL-MCNC: 0.02 NG/ML
TSH SERPL DL<=0.05 MIU/L-ACNC: 2.56 MIU/ML (ref 0.35–5.35)
WBC NRBC COR # BLD: 12.6 10*3/MM3 (ref 3.5–10.8)

## 2018-04-24 PROCEDURE — 84484 ASSAY OF TROPONIN QUANT: CPT | Performed by: NURSE PRACTITIONER

## 2018-04-24 PROCEDURE — 93005 ELECTROCARDIOGRAM TRACING: CPT | Performed by: NURSE PRACTITIONER

## 2018-04-24 PROCEDURE — 87046 STOOL CULTR AEROBIC BACT EA: CPT | Performed by: NURSE PRACTITIONER

## 2018-04-24 PROCEDURE — 25810000003 SODIUM CHLORIDE 0.9 % WITH KCL 20 MEQ 20-0.9 MEQ/L-% SOLUTION: Performed by: NURSE PRACTITIONER

## 2018-04-24 PROCEDURE — 84443 ASSAY THYROID STIM HORMONE: CPT | Performed by: NURSE PRACTITIONER

## 2018-04-24 PROCEDURE — 80074 ACUTE HEPATITIS PANEL: CPT | Performed by: NURSE PRACTITIONER

## 2018-04-24 PROCEDURE — 63710000001 PROMETHAZINE PER 12.5 MG: Performed by: NURSE PRACTITIONER

## 2018-04-24 PROCEDURE — 87493 C DIFF AMPLIFIED PROBE: CPT | Performed by: NURSE PRACTITIONER

## 2018-04-24 PROCEDURE — 87045 FECES CULTURE AEROBIC BACT: CPT | Performed by: NURSE PRACTITIONER

## 2018-04-24 PROCEDURE — 97161 PT EVAL LOW COMPLEX 20 MIN: CPT

## 2018-04-24 PROCEDURE — 25010000002 CEFTRIAXONE PER 250 MG: Performed by: NURSE PRACTITIONER

## 2018-04-24 PROCEDURE — 82140 ASSAY OF AMMONIA: CPT | Performed by: NURSE PRACTITIONER

## 2018-04-24 PROCEDURE — 82962 GLUCOSE BLOOD TEST: CPT

## 2018-04-24 PROCEDURE — 80053 COMPREHEN METABOLIC PANEL: CPT | Performed by: NURSE PRACTITIONER

## 2018-04-24 PROCEDURE — 83735 ASSAY OF MAGNESIUM: CPT | Performed by: NURSE PRACTITIONER

## 2018-04-24 PROCEDURE — 85027 COMPLETE CBC AUTOMATED: CPT | Performed by: NURSE PRACTITIONER

## 2018-04-24 PROCEDURE — 97165 OT EVAL LOW COMPLEX 30 MIN: CPT

## 2018-04-24 PROCEDURE — 93010 ELECTROCARDIOGRAM REPORT: CPT | Performed by: INTERNAL MEDICINE

## 2018-04-24 PROCEDURE — 25010000002 HEPARIN (PORCINE) PER 1000 UNITS: Performed by: NURSE PRACTITIONER

## 2018-04-24 PROCEDURE — 83036 HEMOGLOBIN GLYCOSYLATED A1C: CPT | Performed by: NURSE PRACTITIONER

## 2018-04-24 PROCEDURE — 83880 ASSAY OF NATRIURETIC PEPTIDE: CPT | Performed by: NURSE PRACTITIONER

## 2018-04-24 PROCEDURE — 63710000001 INSULIN LISPRO (HUMAN) PER 5 UNITS: Performed by: NURSE PRACTITIONER

## 2018-04-24 PROCEDURE — 63710000001 INSULIN DETEMIR PER 5 UNITS: Performed by: INTERNAL MEDICINE

## 2018-04-24 PROCEDURE — 82272 OCCULT BLD FECES 1-3 TESTS: CPT | Performed by: NURSE PRACTITIONER

## 2018-04-24 PROCEDURE — 99223 1ST HOSP IP/OBS HIGH 75: CPT | Performed by: INTERNAL MEDICINE

## 2018-04-24 RX ORDER — MAGNESIUM SULFATE HEPTAHYDRATE 40 MG/ML
2 INJECTION, SOLUTION INTRAVENOUS AS NEEDED
Status: DISCONTINUED | OUTPATIENT
Start: 2018-04-24 | End: 2018-05-07 | Stop reason: HOSPADM

## 2018-04-24 RX ORDER — POTASSIUM CHLORIDE 7.45 MG/ML
10 INJECTION INTRAVENOUS
Status: DISCONTINUED | OUTPATIENT
Start: 2018-04-24 | End: 2018-05-07 | Stop reason: HOSPADM

## 2018-04-24 RX ORDER — CEFTRIAXONE SODIUM 1 G/50ML
1 INJECTION, SOLUTION INTRAVENOUS EVERY 24 HOURS
Status: DISCONTINUED | OUTPATIENT
Start: 2018-04-24 | End: 2018-04-26

## 2018-04-24 RX ORDER — ASPIRIN 325 MG
325 TABLET ORAL DAILY
Status: DISCONTINUED | OUTPATIENT
Start: 2018-04-24 | End: 2018-05-07 | Stop reason: HOSPADM

## 2018-04-24 RX ORDER — AMIODARONE HYDROCHLORIDE 200 MG/1
200 TABLET ORAL DAILY
Status: DISCONTINUED | OUTPATIENT
Start: 2018-04-24 | End: 2018-05-07 | Stop reason: HOSPADM

## 2018-04-24 RX ORDER — MAGNESIUM SULFATE HEPTAHYDRATE 40 MG/ML
4 INJECTION, SOLUTION INTRAVENOUS AS NEEDED
Status: DISCONTINUED | OUTPATIENT
Start: 2018-04-24 | End: 2018-05-07 | Stop reason: HOSPADM

## 2018-04-24 RX ORDER — ATORVASTATIN CALCIUM 20 MG/1
20 TABLET, FILM COATED ORAL NIGHTLY
Status: DISCONTINUED | OUTPATIENT
Start: 2018-04-24 | End: 2018-05-07 | Stop reason: HOSPADM

## 2018-04-24 RX ORDER — NICOTINE POLACRILEX 4 MG
15 LOZENGE BUCCAL
Status: DISCONTINUED | OUTPATIENT
Start: 2018-04-24 | End: 2018-05-07 | Stop reason: HOSPADM

## 2018-04-24 RX ORDER — LOSARTAN POTASSIUM 50 MG/1
100 TABLET ORAL DAILY
Status: DISCONTINUED | OUTPATIENT
Start: 2018-04-24 | End: 2018-05-07 | Stop reason: HOSPADM

## 2018-04-24 RX ORDER — TERAZOSIN 1 MG/1
1 CAPSULE ORAL NIGHTLY
Status: DISCONTINUED | OUTPATIENT
Start: 2018-04-24 | End: 2018-05-07 | Stop reason: HOSPADM

## 2018-04-24 RX ORDER — HEPARIN SODIUM 5000 [USP'U]/ML
5000 INJECTION, SOLUTION INTRAVENOUS; SUBCUTANEOUS EVERY 12 HOURS SCHEDULED
Status: DISCONTINUED | OUTPATIENT
Start: 2018-04-24 | End: 2018-05-07 | Stop reason: HOSPADM

## 2018-04-24 RX ORDER — PROMETHAZINE HYDROCHLORIDE 12.5 MG/1
12.5 TABLET ORAL EVERY 4 HOURS PRN
Status: DISCONTINUED | OUTPATIENT
Start: 2018-04-24 | End: 2018-05-07 | Stop reason: HOSPADM

## 2018-04-24 RX ORDER — PROMETHAZINE HYDROCHLORIDE 25 MG/ML
12.5 INJECTION, SOLUTION INTRAMUSCULAR; INTRAVENOUS EVERY 4 HOURS PRN
Status: DISCONTINUED | OUTPATIENT
Start: 2018-04-24 | End: 2018-05-07 | Stop reason: HOSPADM

## 2018-04-24 RX ORDER — DEXTROSE MONOHYDRATE 25 G/50ML
25 INJECTION, SOLUTION INTRAVENOUS
Status: DISCONTINUED | OUTPATIENT
Start: 2018-04-24 | End: 2018-05-07 | Stop reason: HOSPADM

## 2018-04-24 RX ORDER — POTASSIUM CHLORIDE 1.5 G/1.77G
40 POWDER, FOR SOLUTION ORAL AS NEEDED
Status: DISCONTINUED | OUTPATIENT
Start: 2018-04-24 | End: 2018-05-07 | Stop reason: HOSPADM

## 2018-04-24 RX ORDER — SODIUM CHLORIDE AND POTASSIUM CHLORIDE 150; 900 MG/100ML; MG/100ML
75 INJECTION, SOLUTION INTRAVENOUS CONTINUOUS
Status: DISCONTINUED | OUTPATIENT
Start: 2018-04-24 | End: 2018-04-25

## 2018-04-24 RX ORDER — PROMETHAZINE HYDROCHLORIDE 12.5 MG/1
12.5 SUPPOSITORY RECTAL EVERY 4 HOURS PRN
Status: DISCONTINUED | OUTPATIENT
Start: 2018-04-24 | End: 2018-05-07 | Stop reason: HOSPADM

## 2018-04-24 RX ORDER — L.ACID,PARA/B.BIFIDUM/S.THERM 8B CELL
1 CAPSULE ORAL DAILY
Status: DISCONTINUED | OUTPATIENT
Start: 2018-04-24 | End: 2018-05-07 | Stop reason: HOSPADM

## 2018-04-24 RX ORDER — POTASSIUM CHLORIDE 750 MG/1
40 CAPSULE, EXTENDED RELEASE ORAL AS NEEDED
Status: DISCONTINUED | OUTPATIENT
Start: 2018-04-24 | End: 2018-05-07 | Stop reason: HOSPADM

## 2018-04-24 RX ORDER — ACETAMINOPHEN 500 MG
500 TABLET ORAL 4 TIMES DAILY PRN
Status: DISCONTINUED | OUTPATIENT
Start: 2018-04-24 | End: 2018-05-07 | Stop reason: HOSPADM

## 2018-04-24 RX ORDER — SODIUM CHLORIDE 0.9 % (FLUSH) 0.9 %
1-10 SYRINGE (ML) INJECTION AS NEEDED
Status: DISCONTINUED | OUTPATIENT
Start: 2018-04-24 | End: 2018-05-07 | Stop reason: HOSPADM

## 2018-04-24 RX ORDER — LEVOTHYROXINE SODIUM 0.05 MG/1
50 TABLET ORAL
Status: DISCONTINUED | OUTPATIENT
Start: 2018-04-24 | End: 2018-05-07 | Stop reason: HOSPADM

## 2018-04-24 RX ADMIN — Medication 1 CAPSULE: at 09:32

## 2018-04-24 RX ADMIN — HEPARIN SODIUM 5000 UNITS: 5000 INJECTION, SOLUTION INTRAVENOUS; SUBCUTANEOUS at 22:39

## 2018-04-24 RX ADMIN — VANCOMYCIN 250 MG: KIT at 09:33

## 2018-04-24 RX ADMIN — INSULIN LISPRO 3 UNITS: 100 INJECTION, SOLUTION INTRAVENOUS; SUBCUTANEOUS at 12:11

## 2018-04-24 RX ADMIN — POTASSIUM CHLORIDE AND SODIUM CHLORIDE 75 ML/HR: 900; 150 INJECTION, SOLUTION INTRAVENOUS at 04:51

## 2018-04-24 RX ADMIN — LEVOTHYROXINE SODIUM 50 MCG: 50 TABLET ORAL at 04:50

## 2018-04-24 RX ADMIN — LOSARTAN POTASSIUM 100 MG: 50 TABLET ORAL at 09:32

## 2018-04-24 RX ADMIN — ATORVASTATIN CALCIUM 20 MG: 20 TABLET, FILM COATED ORAL at 22:38

## 2018-04-24 RX ADMIN — ASPIRIN 325 MG ORAL TABLET 325 MG: 325 PILL ORAL at 09:32

## 2018-04-24 RX ADMIN — INSULIN DETEMIR 10 UNITS: 100 INJECTION, SOLUTION SUBCUTANEOUS at 22:40

## 2018-04-24 RX ADMIN — CEFTRIAXONE SODIUM 1 G: 1 INJECTION, SOLUTION INTRAVENOUS at 00:37

## 2018-04-24 RX ADMIN — AMIODARONE HYDROCHLORIDE 200 MG: 200 TABLET ORAL at 09:32

## 2018-04-24 RX ADMIN — INSULIN LISPRO 2 UNITS: 100 INJECTION, SOLUTION INTRAVENOUS; SUBCUTANEOUS at 09:31

## 2018-04-24 RX ADMIN — VANCOMYCIN 250 MG: KIT at 12:12

## 2018-04-24 RX ADMIN — VANCOMYCIN 250 MG: KIT at 16:57

## 2018-04-24 RX ADMIN — INSULIN DETEMIR 10 UNITS: 100 INJECTION, SOLUTION SUBCUTANEOUS at 09:35

## 2018-04-24 RX ADMIN — POTASSIUM CHLORIDE AND SODIUM CHLORIDE 75 ML/HR: 900; 150 INJECTION, SOLUTION INTRAVENOUS at 16:57

## 2018-04-24 RX ADMIN — HEPARIN SODIUM 5000 UNITS: 5000 INJECTION, SOLUTION INTRAVENOUS; SUBCUTANEOUS at 09:31

## 2018-04-24 RX ADMIN — TERAZOSIN HYDROCHLORIDE 1 MG: 1 CAPSULE ORAL at 22:38

## 2018-04-24 RX ADMIN — PROMETHAZINE HYDROCHLORIDE 12.5 MG: 12.5 TABLET ORAL at 04:50

## 2018-04-25 LAB
GLUCOSE BLDC GLUCOMTR-MCNC: 110 MG/DL (ref 70–130)
GLUCOSE BLDC GLUCOMTR-MCNC: 113 MG/DL (ref 70–130)
GLUCOSE BLDC GLUCOMTR-MCNC: 131 MG/DL (ref 70–130)
GLUCOSE BLDC GLUCOMTR-MCNC: 134 MG/DL (ref 70–130)
POTASSIUM BLD-SCNC: 3.2 MMOL/L (ref 3.5–5.5)

## 2018-04-25 PROCEDURE — 25010000002 CEFTRIAXONE PER 250 MG: Performed by: INTERNAL MEDICINE

## 2018-04-25 PROCEDURE — 94799 UNLISTED PULMONARY SVC/PX: CPT

## 2018-04-25 PROCEDURE — 25010000002 HEPARIN (PORCINE) PER 1000 UNITS: Performed by: NURSE PRACTITIONER

## 2018-04-25 PROCEDURE — 63710000001 INSULIN DETEMIR PER 5 UNITS: Performed by: INTERNAL MEDICINE

## 2018-04-25 PROCEDURE — 97116 GAIT TRAINING THERAPY: CPT

## 2018-04-25 PROCEDURE — 25810000003 SODIUM CHLORIDE 0.9 % WITH KCL 20 MEQ 20-0.9 MEQ/L-% SOLUTION: Performed by: NURSE PRACTITIONER

## 2018-04-25 PROCEDURE — 84132 ASSAY OF SERUM POTASSIUM: CPT | Performed by: INTERNAL MEDICINE

## 2018-04-25 PROCEDURE — 82962 GLUCOSE BLOOD TEST: CPT

## 2018-04-25 PROCEDURE — 63710000001 PROMETHAZINE PER 12.5 MG: Performed by: NURSE PRACTITIONER

## 2018-04-25 PROCEDURE — 97110 THERAPEUTIC EXERCISES: CPT

## 2018-04-25 PROCEDURE — 94660 CPAP INITIATION&MGMT: CPT

## 2018-04-25 PROCEDURE — 99233 SBSQ HOSP IP/OBS HIGH 50: CPT | Performed by: INTERNAL MEDICINE

## 2018-04-25 RX ADMIN — ASPIRIN 325 MG ORAL TABLET 325 MG: 325 PILL ORAL at 09:37

## 2018-04-25 RX ADMIN — CEFTRIAXONE SODIUM 1 G: 1 INJECTION, SOLUTION INTRAVENOUS at 21:25

## 2018-04-25 RX ADMIN — PROMETHAZINE HYDROCHLORIDE 12.5 MG: 12.5 TABLET ORAL at 21:30

## 2018-04-25 RX ADMIN — INSULIN DETEMIR 10 UNITS: 100 INJECTION, SOLUTION SUBCUTANEOUS at 21:26

## 2018-04-25 RX ADMIN — HEPARIN SODIUM 5000 UNITS: 5000 INJECTION, SOLUTION INTRAVENOUS; SUBCUTANEOUS at 21:25

## 2018-04-25 RX ADMIN — Medication 1 CAPSULE: at 09:36

## 2018-04-25 RX ADMIN — POTASSIUM CHLORIDE AND SODIUM CHLORIDE 75 ML/HR: 900; 150 INJECTION, SOLUTION INTRAVENOUS at 04:04

## 2018-04-25 RX ADMIN — INSULIN DETEMIR 10 UNITS: 100 INJECTION, SOLUTION SUBCUTANEOUS at 09:37

## 2018-04-25 RX ADMIN — HEPARIN SODIUM 5000 UNITS: 5000 INJECTION, SOLUTION INTRAVENOUS; SUBCUTANEOUS at 09:37

## 2018-04-25 RX ADMIN — CEFTRIAXONE SODIUM 1 G: 1 INJECTION, SOLUTION INTRAVENOUS at 04:02

## 2018-04-25 RX ADMIN — AMIODARONE HYDROCHLORIDE 200 MG: 200 TABLET ORAL at 09:37

## 2018-04-25 RX ADMIN — LEVOTHYROXINE SODIUM 50 MCG: 50 TABLET ORAL at 05:59

## 2018-04-25 RX ADMIN — POTASSIUM CHLORIDE 40 MEQ: 750 CAPSULE, EXTENDED RELEASE ORAL at 09:37

## 2018-04-25 RX ADMIN — TERAZOSIN HYDROCHLORIDE 1 MG: 1 CAPSULE ORAL at 21:25

## 2018-04-25 RX ADMIN — ATORVASTATIN CALCIUM 20 MG: 20 TABLET, FILM COATED ORAL at 21:25

## 2018-04-25 RX ADMIN — LOSARTAN POTASSIUM 100 MG: 50 TABLET ORAL at 09:36

## 2018-04-26 LAB
BACTERIA STL CULT: NORMAL
GLUCOSE BLDC GLUCOMTR-MCNC: 178 MG/DL (ref 70–130)
GLUCOSE BLDC GLUCOMTR-MCNC: 188 MG/DL (ref 70–130)
GLUCOSE BLDC GLUCOMTR-MCNC: 227 MG/DL (ref 70–130)
GLUCOSE BLDC GLUCOMTR-MCNC: 93 MG/DL (ref 70–130)
POTASSIUM BLD-SCNC: 3.2 MMOL/L (ref 3.5–5.5)

## 2018-04-26 PROCEDURE — 82962 GLUCOSE BLOOD TEST: CPT

## 2018-04-26 PROCEDURE — 94799 UNLISTED PULMONARY SVC/PX: CPT

## 2018-04-26 PROCEDURE — 94660 CPAP INITIATION&MGMT: CPT

## 2018-04-26 PROCEDURE — 99233 SBSQ HOSP IP/OBS HIGH 50: CPT | Performed by: INTERNAL MEDICINE

## 2018-04-26 PROCEDURE — 63710000001 INSULIN DETEMIR PER 5 UNITS: Performed by: INTERNAL MEDICINE

## 2018-04-26 PROCEDURE — 84132 ASSAY OF SERUM POTASSIUM: CPT | Performed by: INTERNAL MEDICINE

## 2018-04-26 PROCEDURE — 25010000002 HEPARIN (PORCINE) PER 1000 UNITS: Performed by: NURSE PRACTITIONER

## 2018-04-26 PROCEDURE — 25010000002 CEFEPIME: Performed by: INTERNAL MEDICINE

## 2018-04-26 RX ORDER — QUETIAPINE FUMARATE 25 MG/1
25 TABLET, FILM COATED ORAL EVERY 8 HOURS SCHEDULED
Status: DISCONTINUED | OUTPATIENT
Start: 2018-04-26 | End: 2018-05-01

## 2018-04-26 RX ORDER — AMOXICILLIN AND CLAVULANATE POTASSIUM 875; 125 MG/1; MG/1
1 TABLET, FILM COATED ORAL EVERY 12 HOURS SCHEDULED
Qty: 4 TABLET | Refills: 0 | Status: SHIPPED | OUTPATIENT
Start: 2018-04-26 | End: 2018-05-07 | Stop reason: HOSPADM

## 2018-04-26 RX ORDER — VILAZODONE HYDROCHLORIDE 10 MG/1
40 TABLET ORAL DAILY
Status: DISCONTINUED | OUTPATIENT
Start: 2018-04-26 | End: 2018-04-27

## 2018-04-26 RX ADMIN — POTASSIUM CHLORIDE 40 MEQ: 750 CAPSULE, EXTENDED RELEASE ORAL at 13:16

## 2018-04-26 RX ADMIN — INSULIN LISPRO 2 UNITS: 100 INJECTION, SOLUTION INTRAVENOUS; SUBCUTANEOUS at 21:32

## 2018-04-26 RX ADMIN — VILAZODONE HYDROCHLORIDE 40 MG: 10 TABLET ORAL at 11:40

## 2018-04-26 RX ADMIN — LOSARTAN POTASSIUM 100 MG: 50 TABLET ORAL at 08:45

## 2018-04-26 RX ADMIN — HEPARIN SODIUM 5000 UNITS: 5000 INJECTION, SOLUTION INTRAVENOUS; SUBCUTANEOUS at 08:45

## 2018-04-26 RX ADMIN — QUETIAPINE FUMARATE 25 MG: 25 TABLET ORAL at 16:33

## 2018-04-26 RX ADMIN — ATORVASTATIN CALCIUM 20 MG: 20 TABLET, FILM COATED ORAL at 21:31

## 2018-04-26 RX ADMIN — INSULIN LISPRO 3 UNITS: 100 INJECTION, SOLUTION INTRAVENOUS; SUBCUTANEOUS at 16:34

## 2018-04-26 RX ADMIN — AMIODARONE HYDROCHLORIDE 200 MG: 200 TABLET ORAL at 08:46

## 2018-04-26 RX ADMIN — TERAZOSIN HYDROCHLORIDE 1 MG: 1 CAPSULE ORAL at 21:31

## 2018-04-26 RX ADMIN — QUETIAPINE FUMARATE 25 MG: 25 TABLET ORAL at 21:31

## 2018-04-26 RX ADMIN — CEFEPIME 2 G: 2 INJECTION, POWDER, FOR SOLUTION INTRAVENOUS at 18:02

## 2018-04-26 RX ADMIN — HEPARIN SODIUM 5000 UNITS: 5000 INJECTION, SOLUTION INTRAVENOUS; SUBCUTANEOUS at 21:31

## 2018-04-26 RX ADMIN — INSULIN DETEMIR 10 UNITS: 100 INJECTION, SOLUTION SUBCUTANEOUS at 21:32

## 2018-04-26 RX ADMIN — POTASSIUM CHLORIDE 40 MEQ: 750 CAPSULE, EXTENDED RELEASE ORAL at 08:45

## 2018-04-26 RX ADMIN — INSULIN DETEMIR 10 UNITS: 100 INJECTION, SOLUTION SUBCUTANEOUS at 08:44

## 2018-04-26 RX ADMIN — Medication 1 CAPSULE: at 08:45

## 2018-04-26 RX ADMIN — ASPIRIN 325 MG ORAL TABLET 325 MG: 325 PILL ORAL at 08:46

## 2018-04-26 RX ADMIN — LEVOTHYROXINE SODIUM 50 MCG: 50 TABLET ORAL at 05:36

## 2018-04-26 RX ADMIN — INSULIN LISPRO 2 UNITS: 100 INJECTION, SOLUTION INTRAVENOUS; SUBCUTANEOUS at 13:16

## 2018-04-27 LAB
BACTERIA SPEC AEROBE CULT: ABNORMAL
GLUCOSE BLDC GLUCOMTR-MCNC: 119 MG/DL (ref 70–130)
GLUCOSE BLDC GLUCOMTR-MCNC: 169 MG/DL (ref 70–130)
GLUCOSE BLDC GLUCOMTR-MCNC: 203 MG/DL (ref 70–130)
GLUCOSE BLDC GLUCOMTR-MCNC: 220 MG/DL (ref 70–130)

## 2018-04-27 PROCEDURE — 94799 UNLISTED PULMONARY SVC/PX: CPT

## 2018-04-27 PROCEDURE — 99233 SBSQ HOSP IP/OBS HIGH 50: CPT | Performed by: INTERNAL MEDICINE

## 2018-04-27 PROCEDURE — 25010000002 CEFEPIME: Performed by: INTERNAL MEDICINE

## 2018-04-27 PROCEDURE — 63710000001 INSULIN DETEMIR PER 5 UNITS: Performed by: INTERNAL MEDICINE

## 2018-04-27 PROCEDURE — 25010000002 HEPARIN (PORCINE) PER 1000 UNITS: Performed by: NURSE PRACTITIONER

## 2018-04-27 PROCEDURE — 25010000002 MEROPENEM: Performed by: INTERNAL MEDICINE

## 2018-04-27 PROCEDURE — 94660 CPAP INITIATION&MGMT: CPT

## 2018-04-27 PROCEDURE — 82962 GLUCOSE BLOOD TEST: CPT

## 2018-04-27 RX ORDER — VILAZODONE HYDROCHLORIDE 40 MG/1
40 TABLET ORAL NIGHTLY
Status: DISCONTINUED | OUTPATIENT
Start: 2018-04-27 | End: 2018-05-07 | Stop reason: HOSPADM

## 2018-04-27 RX ADMIN — LOSARTAN POTASSIUM 100 MG: 50 TABLET ORAL at 09:18

## 2018-04-27 RX ADMIN — INSULIN DETEMIR 10 UNITS: 100 INJECTION, SOLUTION SUBCUTANEOUS at 09:36

## 2018-04-27 RX ADMIN — MEROPENEM 1 G: 1 INJECTION, POWDER, FOR SOLUTION INTRAVENOUS at 16:30

## 2018-04-27 RX ADMIN — VILAZODONE HYDROCHLORIDE 40 MG: 40 TABLET ORAL at 20:52

## 2018-04-27 RX ADMIN — INSULIN DETEMIR 10 UNITS: 100 INJECTION, SOLUTION SUBCUTANEOUS at 20:49

## 2018-04-27 RX ADMIN — LEVOTHYROXINE SODIUM 50 MCG: 50 TABLET ORAL at 06:21

## 2018-04-27 RX ADMIN — HEPARIN SODIUM 5000 UNITS: 5000 INJECTION, SOLUTION INTRAVENOUS; SUBCUTANEOUS at 20:48

## 2018-04-27 RX ADMIN — AMIODARONE HYDROCHLORIDE 200 MG: 200 TABLET ORAL at 09:18

## 2018-04-27 RX ADMIN — INSULIN LISPRO 2 UNITS: 100 INJECTION, SOLUTION INTRAVENOUS; SUBCUTANEOUS at 20:48

## 2018-04-27 RX ADMIN — CEFEPIME 2 G: 2 INJECTION, POWDER, FOR SOLUTION INTRAVENOUS at 09:36

## 2018-04-27 RX ADMIN — ASPIRIN 325 MG ORAL TABLET 325 MG: 325 PILL ORAL at 09:18

## 2018-04-27 RX ADMIN — VILAZODONE HYDROCHLORIDE 40 MG: 10 TABLET ORAL at 09:40

## 2018-04-27 RX ADMIN — Medication 1 CAPSULE: at 09:18

## 2018-04-27 RX ADMIN — CEFEPIME 2 G: 2 INJECTION, POWDER, FOR SOLUTION INTRAVENOUS at 02:19

## 2018-04-27 RX ADMIN — HEPARIN SODIUM 5000 UNITS: 5000 INJECTION, SOLUTION INTRAVENOUS; SUBCUTANEOUS at 09:20

## 2018-04-27 RX ADMIN — ATORVASTATIN CALCIUM 20 MG: 20 TABLET, FILM COATED ORAL at 20:46

## 2018-04-27 RX ADMIN — QUETIAPINE FUMARATE 25 MG: 25 TABLET ORAL at 20:46

## 2018-04-27 RX ADMIN — QUETIAPINE FUMARATE 25 MG: 25 TABLET ORAL at 16:29

## 2018-04-27 RX ADMIN — MEROPENEM 1 G: 1 INJECTION, POWDER, FOR SOLUTION INTRAVENOUS at 21:30

## 2018-04-27 RX ADMIN — TERAZOSIN HYDROCHLORIDE 1 MG: 1 CAPSULE ORAL at 20:45

## 2018-04-27 RX ADMIN — INSULIN LISPRO 3 UNITS: 100 INJECTION, SOLUTION INTRAVENOUS; SUBCUTANEOUS at 16:23

## 2018-04-28 LAB
ALBUMIN SERPL-MCNC: 3.6 G/DL (ref 3.2–4.8)
ALBUMIN/GLOB SERPL: 1.4 G/DL (ref 1.5–2.5)
ALP SERPL-CCNC: 59 U/L (ref 25–100)
ALT SERPL W P-5'-P-CCNC: 53 U/L (ref 7–40)
ANION GAP SERPL CALCULATED.3IONS-SCNC: 5 MMOL/L (ref 3–11)
AST SERPL-CCNC: 32 U/L (ref 0–33)
BACTERIA SPEC AEROBE CULT: NORMAL
BACTERIA SPEC AEROBE CULT: NORMAL
BILIRUB SERPL-MCNC: 0.4 MG/DL (ref 0.3–1.2)
BUN BLD-MCNC: 13 MG/DL (ref 9–23)
BUN/CREAT SERPL: 14.4 (ref 7–25)
CALCIUM SPEC-SCNC: 8.3 MG/DL (ref 8.7–10.4)
CHLORIDE SERPL-SCNC: 103 MMOL/L (ref 99–109)
CO2 SERPL-SCNC: 29 MMOL/L (ref 20–31)
CREAT BLD-MCNC: 0.9 MG/DL (ref 0.6–1.3)
DEPRECATED RDW RBC AUTO: 46.1 FL (ref 37–54)
ERYTHROCYTE [DISTWIDTH] IN BLOOD BY AUTOMATED COUNT: 14.4 % (ref 11.3–14.5)
GFR SERPL CREATININE-BSD FRML MDRD: 81 ML/MIN/1.73
GLOBULIN UR ELPH-MCNC: 2.6 GM/DL
GLUCOSE BLD-MCNC: 170 MG/DL (ref 70–100)
GLUCOSE BLDC GLUCOMTR-MCNC: 156 MG/DL (ref 70–130)
GLUCOSE BLDC GLUCOMTR-MCNC: 180 MG/DL (ref 70–130)
GLUCOSE BLDC GLUCOMTR-MCNC: 213 MG/DL (ref 70–130)
GLUCOSE BLDC GLUCOMTR-MCNC: 234 MG/DL (ref 70–130)
HCT VFR BLD AUTO: 34.4 % (ref 38.9–50.9)
HGB BLD-MCNC: 11.4 G/DL (ref 13.1–17.5)
MCH RBC QN AUTO: 29 PG (ref 27–31)
MCHC RBC AUTO-ENTMCNC: 33.1 G/DL (ref 32–36)
MCV RBC AUTO: 87.5 FL (ref 80–99)
PLATELET # BLD AUTO: 236 10*3/MM3 (ref 150–450)
PMV BLD AUTO: 11.5 FL (ref 6–12)
POTASSIUM BLD-SCNC: 3.7 MMOL/L (ref 3.5–5.5)
PROT SERPL-MCNC: 6.2 G/DL (ref 5.7–8.2)
RBC # BLD AUTO: 3.93 10*6/MM3 (ref 4.2–5.76)
SODIUM BLD-SCNC: 137 MMOL/L (ref 132–146)
WBC NRBC COR # BLD: 8.03 10*3/MM3 (ref 3.5–10.8)

## 2018-04-28 PROCEDURE — 82962 GLUCOSE BLOOD TEST: CPT

## 2018-04-28 PROCEDURE — 85027 COMPLETE CBC AUTOMATED: CPT | Performed by: INTERNAL MEDICINE

## 2018-04-28 PROCEDURE — 94660 CPAP INITIATION&MGMT: CPT

## 2018-04-28 PROCEDURE — 25010000002 HEPARIN (PORCINE) PER 1000 UNITS: Performed by: NURSE PRACTITIONER

## 2018-04-28 PROCEDURE — 63710000001 INSULIN DETEMIR PER 5 UNITS: Performed by: INTERNAL MEDICINE

## 2018-04-28 PROCEDURE — 99232 SBSQ HOSP IP/OBS MODERATE 35: CPT | Performed by: INTERNAL MEDICINE

## 2018-04-28 PROCEDURE — 94799 UNLISTED PULMONARY SVC/PX: CPT

## 2018-04-28 PROCEDURE — 25010000002 MEROPENEM: Performed by: INTERNAL MEDICINE

## 2018-04-28 PROCEDURE — 80053 COMPREHEN METABOLIC PANEL: CPT | Performed by: INTERNAL MEDICINE

## 2018-04-28 RX ADMIN — INSULIN LISPRO 3 UNITS: 100 INJECTION, SOLUTION INTRAVENOUS; SUBCUTANEOUS at 20:31

## 2018-04-28 RX ADMIN — INSULIN DETEMIR 10 UNITS: 100 INJECTION, SOLUTION SUBCUTANEOUS at 20:35

## 2018-04-28 RX ADMIN — HEPARIN SODIUM 5000 UNITS: 5000 INJECTION, SOLUTION INTRAVENOUS; SUBCUTANEOUS at 20:30

## 2018-04-28 RX ADMIN — QUETIAPINE FUMARATE 25 MG: 25 TABLET ORAL at 20:29

## 2018-04-28 RX ADMIN — ATORVASTATIN CALCIUM 20 MG: 20 TABLET, FILM COATED ORAL at 20:28

## 2018-04-28 RX ADMIN — LOSARTAN POTASSIUM 100 MG: 50 TABLET ORAL at 08:10

## 2018-04-28 RX ADMIN — HEPARIN SODIUM 5000 UNITS: 5000 INJECTION, SOLUTION INTRAVENOUS; SUBCUTANEOUS at 08:09

## 2018-04-28 RX ADMIN — LEVOTHYROXINE SODIUM 50 MCG: 50 TABLET ORAL at 05:15

## 2018-04-28 RX ADMIN — MEROPENEM 1 G: 1 INJECTION, POWDER, FOR SOLUTION INTRAVENOUS at 20:33

## 2018-04-28 RX ADMIN — TERAZOSIN HYDROCHLORIDE 1 MG: 1 CAPSULE ORAL at 20:29

## 2018-04-28 RX ADMIN — Medication 1 CAPSULE: at 08:10

## 2018-04-28 RX ADMIN — VILAZODONE HYDROCHLORIDE 40 MG: 40 TABLET ORAL at 20:40

## 2018-04-28 RX ADMIN — ASPIRIN 325 MG ORAL TABLET 325 MG: 325 PILL ORAL at 08:10

## 2018-04-28 RX ADMIN — INSULIN LISPRO 2 UNITS: 100 INJECTION, SOLUTION INTRAVENOUS; SUBCUTANEOUS at 17:19

## 2018-04-28 RX ADMIN — INSULIN DETEMIR 10 UNITS: 100 INJECTION, SOLUTION SUBCUTANEOUS at 08:09

## 2018-04-28 RX ADMIN — MEROPENEM 1 G: 1 INJECTION, POWDER, FOR SOLUTION INTRAVENOUS at 03:26

## 2018-04-28 RX ADMIN — MEROPENEM 1 G: 1 INJECTION, POWDER, FOR SOLUTION INTRAVENOUS at 12:10

## 2018-04-28 RX ADMIN — AMIODARONE HYDROCHLORIDE 200 MG: 200 TABLET ORAL at 08:11

## 2018-04-28 RX ADMIN — QUETIAPINE FUMARATE 25 MG: 25 TABLET ORAL at 05:15

## 2018-04-28 RX ADMIN — QUETIAPINE FUMARATE 25 MG: 25 TABLET ORAL at 17:19

## 2018-04-28 RX ADMIN — INSULIN LISPRO 2 UNITS: 100 INJECTION, SOLUTION INTRAVENOUS; SUBCUTANEOUS at 08:10

## 2018-04-28 RX ADMIN — INSULIN LISPRO 3 UNITS: 100 INJECTION, SOLUTION INTRAVENOUS; SUBCUTANEOUS at 12:10

## 2018-04-29 LAB
GLUCOSE BLDC GLUCOMTR-MCNC: 193 MG/DL (ref 70–130)
GLUCOSE BLDC GLUCOMTR-MCNC: 226 MG/DL (ref 70–130)
GLUCOSE BLDC GLUCOMTR-MCNC: 256 MG/DL (ref 70–130)
GLUCOSE BLDC GLUCOMTR-MCNC: 263 MG/DL (ref 70–130)

## 2018-04-29 PROCEDURE — 94799 UNLISTED PULMONARY SVC/PX: CPT

## 2018-04-29 PROCEDURE — 82962 GLUCOSE BLOOD TEST: CPT

## 2018-04-29 PROCEDURE — 94660 CPAP INITIATION&MGMT: CPT

## 2018-04-29 PROCEDURE — 63710000001 INSULIN DETEMIR PER 5 UNITS: Performed by: INTERNAL MEDICINE

## 2018-04-29 PROCEDURE — 99233 SBSQ HOSP IP/OBS HIGH 50: CPT | Performed by: INTERNAL MEDICINE

## 2018-04-29 PROCEDURE — 25010000002 HEPARIN (PORCINE) PER 1000 UNITS: Performed by: NURSE PRACTITIONER

## 2018-04-29 PROCEDURE — 97530 THERAPEUTIC ACTIVITIES: CPT

## 2018-04-29 PROCEDURE — 25010000002 MEROPENEM: Performed by: INTERNAL MEDICINE

## 2018-04-29 RX ORDER — AMLODIPINE BESYLATE 5 MG/1
5 TABLET ORAL
Status: DISCONTINUED | OUTPATIENT
Start: 2018-04-29 | End: 2018-04-30

## 2018-04-29 RX ADMIN — MEROPENEM 1 G: 1 INJECTION, POWDER, FOR SOLUTION INTRAVENOUS at 02:49

## 2018-04-29 RX ADMIN — AMLODIPINE BESYLATE 5 MG: 5 TABLET ORAL at 20:50

## 2018-04-29 RX ADMIN — INSULIN LISPRO 3 UNITS: 100 INJECTION, SOLUTION INTRAVENOUS; SUBCUTANEOUS at 20:51

## 2018-04-29 RX ADMIN — QUETIAPINE FUMARATE 25 MG: 25 TABLET ORAL at 05:37

## 2018-04-29 RX ADMIN — HEPARIN SODIUM 5000 UNITS: 5000 INJECTION, SOLUTION INTRAVENOUS; SUBCUTANEOUS at 20:51

## 2018-04-29 RX ADMIN — INSULIN LISPRO 4 UNITS: 100 INJECTION, SOLUTION INTRAVENOUS; SUBCUTANEOUS at 11:42

## 2018-04-29 RX ADMIN — AMIODARONE HYDROCHLORIDE 200 MG: 200 TABLET ORAL at 09:51

## 2018-04-29 RX ADMIN — VILAZODONE HYDROCHLORIDE 40 MG: 40 TABLET ORAL at 21:00

## 2018-04-29 RX ADMIN — ATORVASTATIN CALCIUM 20 MG: 20 TABLET, FILM COATED ORAL at 20:50

## 2018-04-29 RX ADMIN — QUETIAPINE FUMARATE 25 MG: 25 TABLET ORAL at 20:51

## 2018-04-29 RX ADMIN — Medication 1 CAPSULE: at 09:51

## 2018-04-29 RX ADMIN — LOSARTAN POTASSIUM 100 MG: 50 TABLET ORAL at 09:50

## 2018-04-29 RX ADMIN — MEROPENEM 1 G: 1 INJECTION, POWDER, FOR SOLUTION INTRAVENOUS at 19:42

## 2018-04-29 RX ADMIN — INSULIN DETEMIR 10 UNITS: 100 INJECTION, SOLUTION SUBCUTANEOUS at 09:50

## 2018-04-29 RX ADMIN — INSULIN LISPRO 2 UNITS: 100 INJECTION, SOLUTION INTRAVENOUS; SUBCUTANEOUS at 09:50

## 2018-04-29 RX ADMIN — INSULIN LISPRO 4 UNITS: 100 INJECTION, SOLUTION INTRAVENOUS; SUBCUTANEOUS at 16:46

## 2018-04-29 RX ADMIN — ASPIRIN 325 MG ORAL TABLET 325 MG: 325 PILL ORAL at 09:50

## 2018-04-29 RX ADMIN — HEPARIN SODIUM 5000 UNITS: 5000 INJECTION, SOLUTION INTRAVENOUS; SUBCUTANEOUS at 09:50

## 2018-04-29 RX ADMIN — MEROPENEM 1 G: 1 INJECTION, POWDER, FOR SOLUTION INTRAVENOUS at 11:43

## 2018-04-29 RX ADMIN — INSULIN DETEMIR 10 UNITS: 100 INJECTION, SOLUTION SUBCUTANEOUS at 20:51

## 2018-04-29 RX ADMIN — TERAZOSIN HYDROCHLORIDE 1 MG: 1 CAPSULE ORAL at 20:50

## 2018-04-29 RX ADMIN — LEVOTHYROXINE SODIUM 50 MCG: 50 TABLET ORAL at 05:37

## 2018-04-30 LAB
GLUCOSE BLDC GLUCOMTR-MCNC: 130 MG/DL (ref 70–130)
GLUCOSE BLDC GLUCOMTR-MCNC: 192 MG/DL (ref 70–130)
GLUCOSE BLDC GLUCOMTR-MCNC: 216 MG/DL (ref 70–130)
GLUCOSE BLDC GLUCOMTR-MCNC: 257 MG/DL (ref 70–130)
GLUCOSE BLDC GLUCOMTR-MCNC: 487 MG/DL (ref 70–130)

## 2018-04-30 PROCEDURE — 94660 CPAP INITIATION&MGMT: CPT

## 2018-04-30 PROCEDURE — 63710000001 INSULIN DETEMIR PER 5 UNITS: Performed by: NURSE PRACTITIONER

## 2018-04-30 PROCEDURE — 99233 SBSQ HOSP IP/OBS HIGH 50: CPT | Performed by: NURSE PRACTITIONER

## 2018-04-30 PROCEDURE — 25010000002 MEROPENEM: Performed by: INTERNAL MEDICINE

## 2018-04-30 PROCEDURE — 25010000002 HEPARIN (PORCINE) PER 1000 UNITS: Performed by: NURSE PRACTITIONER

## 2018-04-30 PROCEDURE — 94799 UNLISTED PULMONARY SVC/PX: CPT

## 2018-04-30 PROCEDURE — 82962 GLUCOSE BLOOD TEST: CPT

## 2018-04-30 RX ORDER — AMLODIPINE BESYLATE 10 MG/1
10 TABLET ORAL
Status: DISCONTINUED | OUTPATIENT
Start: 2018-04-30 | End: 2018-05-07 | Stop reason: HOSPADM

## 2018-04-30 RX ADMIN — QUETIAPINE FUMARATE 25 MG: 25 TABLET ORAL at 04:34

## 2018-04-30 RX ADMIN — INSULIN DETEMIR 14 UNITS: 100 INJECTION, SOLUTION SUBCUTANEOUS at 20:33

## 2018-04-30 RX ADMIN — INSULIN LISPRO 3 UNITS: 100 INJECTION, SOLUTION INTRAVENOUS; SUBCUTANEOUS at 09:07

## 2018-04-30 RX ADMIN — MEROPENEM 1 G: 1 INJECTION, POWDER, FOR SOLUTION INTRAVENOUS at 20:30

## 2018-04-30 RX ADMIN — INSULIN LISPRO 2 UNITS: 100 INJECTION, SOLUTION INTRAVENOUS; SUBCUTANEOUS at 20:32

## 2018-04-30 RX ADMIN — LOSARTAN POTASSIUM 100 MG: 50 TABLET ORAL at 09:08

## 2018-04-30 RX ADMIN — LEVOTHYROXINE SODIUM 50 MCG: 50 TABLET ORAL at 04:34

## 2018-04-30 RX ADMIN — HEPARIN SODIUM 5000 UNITS: 5000 INJECTION, SOLUTION INTRAVENOUS; SUBCUTANEOUS at 20:30

## 2018-04-30 RX ADMIN — TERAZOSIN HYDROCHLORIDE 1 MG: 1 CAPSULE ORAL at 20:30

## 2018-04-30 RX ADMIN — AMLODIPINE BESYLATE 10 MG: 10 TABLET ORAL at 09:08

## 2018-04-30 RX ADMIN — MEROPENEM 1 G: 1 INJECTION, POWDER, FOR SOLUTION INTRAVENOUS at 11:48

## 2018-04-30 RX ADMIN — QUETIAPINE FUMARATE 25 MG: 25 TABLET ORAL at 20:30

## 2018-04-30 RX ADMIN — HEPARIN SODIUM 5000 UNITS: 5000 INJECTION, SOLUTION INTRAVENOUS; SUBCUTANEOUS at 09:07

## 2018-04-30 RX ADMIN — ATORVASTATIN CALCIUM 20 MG: 20 TABLET, FILM COATED ORAL at 20:30

## 2018-04-30 RX ADMIN — Medication 1 CAPSULE: at 09:07

## 2018-04-30 RX ADMIN — AMIODARONE HYDROCHLORIDE 200 MG: 200 TABLET ORAL at 09:08

## 2018-04-30 RX ADMIN — ASPIRIN 325 MG ORAL TABLET 325 MG: 325 PILL ORAL at 09:08

## 2018-04-30 RX ADMIN — INSULIN LISPRO 4 UNITS: 100 INJECTION, SOLUTION INTRAVENOUS; SUBCUTANEOUS at 11:48

## 2018-04-30 RX ADMIN — VILAZODONE HYDROCHLORIDE 40 MG: 40 TABLET ORAL at 22:00

## 2018-04-30 RX ADMIN — QUETIAPINE FUMARATE 25 MG: 25 TABLET ORAL at 15:40

## 2018-04-30 RX ADMIN — INSULIN DETEMIR 14 UNITS: 100 INJECTION, SOLUTION SUBCUTANEOUS at 10:04

## 2018-04-30 RX ADMIN — MEROPENEM 1 G: 1 INJECTION, POWDER, FOR SOLUTION INTRAVENOUS at 04:34

## 2018-05-01 LAB
GLUCOSE BLDC GLUCOMTR-MCNC: 165 MG/DL (ref 70–130)
GLUCOSE BLDC GLUCOMTR-MCNC: 203 MG/DL (ref 70–130)
GLUCOSE BLDC GLUCOMTR-MCNC: 221 MG/DL (ref 70–130)

## 2018-05-01 PROCEDURE — 99233 SBSQ HOSP IP/OBS HIGH 50: CPT | Performed by: NURSE PRACTITIONER

## 2018-05-01 PROCEDURE — 25010000002 MEROPENEM: Performed by: INTERNAL MEDICINE

## 2018-05-01 PROCEDURE — 94660 CPAP INITIATION&MGMT: CPT

## 2018-05-01 PROCEDURE — 25010000002 HEPARIN (PORCINE) PER 1000 UNITS: Performed by: NURSE PRACTITIONER

## 2018-05-01 PROCEDURE — 63710000001 INSULIN DETEMIR PER 5 UNITS: Performed by: NURSE PRACTITIONER

## 2018-05-01 PROCEDURE — 94799 UNLISTED PULMONARY SVC/PX: CPT

## 2018-05-01 PROCEDURE — 82962 GLUCOSE BLOOD TEST: CPT

## 2018-05-01 RX ORDER — QUETIAPINE FUMARATE 25 MG/1
12.5 TABLET, FILM COATED ORAL 2 TIMES DAILY PRN
Status: DISCONTINUED | OUTPATIENT
Start: 2018-05-01 | End: 2018-05-07 | Stop reason: HOSPADM

## 2018-05-01 RX ORDER — QUETIAPINE FUMARATE 25 MG/1
25 TABLET, FILM COATED ORAL NIGHTLY
Status: DISCONTINUED | OUTPATIENT
Start: 2018-05-01 | End: 2018-05-07 | Stop reason: HOSPADM

## 2018-05-01 RX ADMIN — HEPARIN SODIUM 5000 UNITS: 5000 INJECTION, SOLUTION INTRAVENOUS; SUBCUTANEOUS at 19:56

## 2018-05-01 RX ADMIN — MEROPENEM 1 G: 1 INJECTION, POWDER, FOR SOLUTION INTRAVENOUS at 14:38

## 2018-05-01 RX ADMIN — ACETAMINOPHEN 500 MG: 500 TABLET, FILM COATED ORAL at 14:38

## 2018-05-01 RX ADMIN — ASPIRIN 325 MG ORAL TABLET 325 MG: 325 PILL ORAL at 08:53

## 2018-05-01 RX ADMIN — INSULIN DETEMIR 14 UNITS: 100 INJECTION, SOLUTION SUBCUTANEOUS at 20:58

## 2018-05-01 RX ADMIN — INSULIN LISPRO 2 UNITS: 100 INJECTION, SOLUTION INTRAVENOUS; SUBCUTANEOUS at 12:30

## 2018-05-01 RX ADMIN — VILAZODONE HYDROCHLORIDE 40 MG: 40 TABLET ORAL at 20:57

## 2018-05-01 RX ADMIN — INSULIN LISPRO 3 UNITS: 100 INJECTION, SOLUTION INTRAVENOUS; SUBCUTANEOUS at 20:56

## 2018-05-01 RX ADMIN — QUETIAPINE FUMARATE 25 MG: 25 TABLET ORAL at 05:24

## 2018-05-01 RX ADMIN — Medication 5 MG: at 19:56

## 2018-05-01 RX ADMIN — AMLODIPINE BESYLATE 10 MG: 10 TABLET ORAL at 08:53

## 2018-05-01 RX ADMIN — MEROPENEM 1 G: 1 INJECTION, POWDER, FOR SOLUTION INTRAVENOUS at 02:45

## 2018-05-01 RX ADMIN — INSULIN LISPRO 2 UNITS: 100 INJECTION, SOLUTION INTRAVENOUS; SUBCUTANEOUS at 17:36

## 2018-05-01 RX ADMIN — MEROPENEM 1 G: 1 INJECTION, POWDER, FOR SOLUTION INTRAVENOUS at 19:55

## 2018-05-01 RX ADMIN — TERAZOSIN HYDROCHLORIDE 1 MG: 1 CAPSULE ORAL at 19:55

## 2018-05-01 RX ADMIN — INSULIN LISPRO 2 UNITS: 100 INJECTION, SOLUTION INTRAVENOUS; SUBCUTANEOUS at 08:54

## 2018-05-01 RX ADMIN — ATORVASTATIN CALCIUM 20 MG: 20 TABLET, FILM COATED ORAL at 19:56

## 2018-05-01 RX ADMIN — QUETIAPINE FUMARATE 25 MG: 25 TABLET ORAL at 19:56

## 2018-05-01 RX ADMIN — Medication 1 CAPSULE: at 08:53

## 2018-05-01 RX ADMIN — AMIODARONE HYDROCHLORIDE 200 MG: 200 TABLET ORAL at 08:53

## 2018-05-01 RX ADMIN — ACETAMINOPHEN 500 MG: 500 TABLET, FILM COATED ORAL at 19:55

## 2018-05-01 RX ADMIN — LOSARTAN POTASSIUM 100 MG: 50 TABLET ORAL at 08:53

## 2018-05-01 RX ADMIN — LEVOTHYROXINE SODIUM 50 MCG: 50 TABLET ORAL at 05:24

## 2018-05-01 RX ADMIN — INSULIN DETEMIR 14 UNITS: 100 INJECTION, SOLUTION SUBCUTANEOUS at 08:55

## 2018-05-01 RX ADMIN — HEPARIN SODIUM 5000 UNITS: 5000 INJECTION, SOLUTION INTRAVENOUS; SUBCUTANEOUS at 08:53

## 2018-05-01 NOTE — PROGRESS NOTES
Continued Stay Note  [facility]     Patient Name: Leobardo Araujo  MRN: [id number]  Today's Date: 5/1/2018    Admit Date: 4/23/2018          Discharge Plan     Row Name 05/01/18 1410       Plan    Plan Transfer to [facility] pending insurance pre-cert    Plan Comments Spoke with Opal at [facility] this morning and insurance pre-cert is still pending. Anticipate decision Wednesday or Thursday. Darlin Cottrell RN [phone]              Discharge Codes    No documentation.       Expected Discharge Date and Time     Expected Discharge Date Expected Discharge Time    May 3, 2018             Darlin Cottrell RN

## 2018-05-01 NOTE — PROGRESS NOTES
Continued Stay Note  [facility]     Patient Name: Leboardo Araujo  MRN: [id number]  Today's Date: 5/1/2018    Admit Date: 4/23/2018    Consent obtained for the participation in the [facility] Transitions Program.  Kristi Mata RN        Discharge Plan    No documentation.             Discharge Codes    No documentation.       Expected Discharge Date and Time     Expected Discharge Date Expected Discharge Time    May 3, 2018             Kristi Mata RN

## 2018-05-01 NOTE — PROGRESS NOTES
[facility] Progress Note        Antibiotics:  Anti-Infectives     Ordered     Dose/Rate Route Frequency Start Stop    04/27/18 1153  meropenem (MERREM) 1 g/100 mL 0.9% NS VTB (mbp)     Ordering Provider:  Hamzah Broussard MD    1 g  over 3 Hours Intravenous Every 8 Hours 04/27/18 1930 05/07/18 1929    04/27/18 1153  meropenem (MERREM) 1 g/100 mL 0.9% NS VTB (mbp)     Ordering Provider:  Hamzah Broussard MD    1 g  over 30 Minutes Intravenous Once 04/27/18 1315 04/27/18 1700    04/26/18 0814  amoxicillin-clavulanate (AUGMENTIN) 875-125 MG per tablet     Ordering Provider:  Jas Newby MD    1 tablet Oral Every 12 Hours Scheduled 04/26/18 0000 04/28/18 2359    04/23/18 2333  cefTRIAXone (ROCEPHIN) IVPB 1 g     Ordering Provider:  SOLOMON Delatorre    1 g  100 mL/hr over 30 Minutes Intravenous Once 04/23/18 2335 04/24/18 0109          CC: UTI    HPI:  Patient is a 83 y.o. male, known to Dr. Yaniv Jules, with h/o Abnormal LFTs, afib, T2DM, HTN, obesity, and VHD/AVR who presented to [facility] 4/23 by EMS for altered mental status for 2 to 3 days prior to admission.  Per chart, his daughter noted that his face seemed swollen and abdomen bloated along with confusion and anorexia.  He had a fever 101.  Blood cultures and Cdiff are negative.  He had urinary incontinence/frequency and Urine culture is positive for Pseudomonas aeruginosa x 2 sensitivities but both sensitive to Merrem. WBC 16,000 with 88% neutrophils, PCT 0.05, lactic acid 1.1, Hgb  A1C 7.8, and K 3.2. His CT scan of abdomen and pelvis showed mild diffuse urinary bladder wall thickening which may be chronic given the associated urinary bladder diverticuli and a 2.4 cm oval area of soft tissue density in the right lung base just above the hemidiaphragm.  He was started on Cefepime.  ID was asked to evaluate and manage his antibiotic therapy.  Changed to Merrem on April 27.    5/1/18 Case management looking into options; generally fatigued; otherwise, no new  "focal symptoms per nursing;  No new pain or problems;  No new urinary symptoms     No headache photophobia or neck stiffness.  No shortness of breath cough or hemoptysis.  No nausea vomiting diarrhea.  No skin rash    ROS:      5/1/18 No f/c/s. No n/v/d. No rash. No new ADR to Abx.     PE:   /70 (BP Location: Right arm, Patient Position: Lying)   Pulse 74   Temp 99.8 °F (37.7 °C) (Oral)   Resp 18   Ht 182.9 cm (72\")   Wt 102 kg (224 lb)   SpO2 93%   BMI 30.38 kg/m²     GENERAL:  sleepy, in no acute distress.   HEENT: Normocephalic, atraumatic.  PERRL. EOMI. No conjunctival injection. No icterus. Oropharynx clear without evidence of thrush or exudate. No evidence of peridontal disease.    NECK: Supple without nuchal rigidity. No mass.  LYMPH: No cervical, axillary or inguinal lymphadenopathy.  HEART: RRR; No murmur, rubs, gallops.   LUNGS: Clear to auscultation bilaterally without wheezing, rales, rhonchi. Normal respiratory effort. Nonlabored. No dullness.  ABDOMEN: Soft, nontender, nondistended. Positive bowel sounds. No rebound or guarding. NO mass or HSM.  EXT:  No cyanosis, clubbing or edema. No cord.  MSK: FROM without joint effusions noted arms/legs.    SKIN: Warm and dry without cutaneous eruptions on Inspection/palpation.       Peripheral stigmata/phenomena of endocarditis     No CVA tenderness       Laboratory Data      Results from last 7 days  Lab Units 04/28/18  0532   WBC 10*3/mm3 8.03   HEMOGLOBIN g/dL 11.4*   HEMATOCRIT % 34.4*   PLATELETS 10*3/mm3 236       Results from last 7 days  Lab Units 04/28/18  0532   SODIUM mmol/L 137   POTASSIUM mmol/L 3.7   CHLORIDE mmol/L 103   CO2 mmol/L 29.0   BUN mg/dL 13   CREATININE mg/dL 0.90   GLUCOSE mg/dL 170*   CALCIUM mg/dL 8.3*       Results from last 7 days  Lab Units 04/28/18  0532   ALK PHOS U/L 59   BILIRUBIN mg/dL 0.4   ALT (SGPT) U/L 53*   AST (SGOT) U/L 32               Estimated Creatinine Clearance: 76.9 mL/min (by C-G formula based on " SCr of 0.9 mg/dL).      Microbiology:      Radiology:  Imaging Results (last 72 hours)     ** No results found for the last 72 hours. **            Impression:     --Acute sepsis ; likely urinary source  --Acute Complicated cystitis with recurrent MDR Pseudomonas x 2 UTI.  Last positive cultures was 7/15/17 with Pseudomonas fluorescens/putida sens to Cefepime, Zosyn, Levaquin, and carbapenems.   Cx now positive for Pseudomonas aeruginosa x 2 sens to merrem.   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  --Elevated liver transaminases, mild  --Fever, resolved  --Hypothyroidism  --T2DM, insulin dependent  --VHD/AVR  --Obesity  --Afib/on amiodarone/aspirin  --HTN  --Cipro allergy    PLAN:     --Monitor cultures, labs, PHYLICIA  --Continue Merrem IV  --Length of antibiotics will depend on clinical course, study results and response to therapy  --Monitor IV and IV lines for infections  --D/w patient that antibiotics do not guarantee a cure. Patient understands.  --D/w pharmacy/nursing/Dr. Burnett and with family and case management  --Highly complex set of issues with high risk for further serious morbidity and other serious sequela  --His insight is generally poor,he has seen Dr Pacheco before per Dr Newby and he is likely to benefit from further outpatient evaluation by them to optimize any treatment for functional/anatomic issues etc.  --Case management considering options to finish IV Merrem and rehab       Hamzah Broussard MD  5/1/2018

## 2018-05-01 NOTE — PROGRESS NOTES
[facility]  PROGRESS NOTE    Patient Name: Leobardo Araujo  : 1934  MRN: [id number]    Date of Admission: 2018  Length of Stay: 7  Primary Care Physician: Edy Goodman MD    Subjective   Subjective     CC:  Confusion, UTI    HPI:  Feeling well. Sitting up in bs chair. Ate well this morning. States he's ready to get to [facility] to complete antibiotics. No diarrhea and rested better last night.    Review of Systems  Unreliable due to poor historian    Otherwise ROS is negative except as mentioned in the HPI.    Objective   Objective     Vital Signs:   Temp:  [99.8 °F (37.7 °C)-99.9 °F (37.7 °C)] 99.8 °F (37.7 °C)  Heart Rate:  [68-79] 74  Resp:  [18] 18  BP: (142-154)/(70-74) 142/70  FiO2 (%):  [30 %] 30 %        Physical Exam:  Constitutional: No acute distress, awake, alert, sitting up in chair  HENT: NCAT, mucous membranes moist  Respiratory: Clear to auscultation bilaterally, respiratory effort normal   Cardiovascular: RRR, + systolic murmur, rubs, or gallops, palpable pedal pulses bilaterally  Gastrointestinal: obese, Positive bowel sounds, soft, nontender, nondistended  Musculoskeletal: No bilateral ankle edema  Psychiatric: Appropriate affect, cooperative  Neurologic: Oriented x himeself and being in hospital but not year or month, strength symmetric in all extremities, Cranial Nerves grossly intact to confrontation, speech clear  Skin: No rashes      Results Reviewed:  I have personally reviewed current lab, radiology, and data and agree.      Results from last 7 days  Lab Units 18  0532   WBC 10*3/mm3 8.03   HEMOGLOBIN g/dL 11.4*   HEMATOCRIT % 34.4*   PLATELETS 10*3/mm3 236       Results from last 7 days  Lab Units 18  0532 18  0535 18  0354   SODIUM mmol/L 137  --   --    POTASSIUM mmol/L 3.7 3.2* 3.2*   CHLORIDE mmol/L 103  --   --    CO2 mmol/L 29.0  --   --    BUN mg/dL 13  --   --    CREATININE mg/dL 0.90  --    --    GLUCOSE mg/dL 170*  --   --    CALCIUM mg/dL 8.3*  --   --    ALT (SGPT) U/L 53*  --   --    AST (SGOT) U/L 32  --   --      Estimated Creatinine Clearance: 76.9 mL/min (by C-G formula based on SCr of 0.9 mg/dL).  No results found for: BNP  No results found for: PHART    Microbiology Results Abnormal     Procedure Component Value - Date/Time    Blood Culture - Blood, [[id number]]  (Normal) Collected:  04/23/18 2050    Lab Status:  Final result Specimen:  Blood from Arm, Right Updated:  04/28/18 2116     Blood Culture No growth at 5 days    Blood Culture - Blood, [[id number]]  (Normal) Collected:  04/23/18 1939    Lab Status:  Final result Specimen:  Blood from Hand, Right Updated:  04/28/18 2116     Blood Culture No growth at 5 days    Urine Culture - Urine, Urine, Clean Catch [[id number]]  (Abnormal)  (Susceptibility) Collected:  04/23/18 2253    Lab Status:  Final result Specimen:  Urine from Urine, Clean Catch Updated:  04/27/18 1135     Urine Culture --      >100,000 CFU/mL Pseudomonas aeruginosa (A)     Comment: Morphological Type 1             >100,000 CFU/mL Pseudomonas aeruginosa (A)     Comment: Morphological Type 2         Susceptibility      Pseudomonas aeruginosa (1)    Pseudomonas aeruginosa (2)       FRENCH    FRENCH       Aztreonam <=8 ug/ml Susceptible    >16 ug/ml Resistant       Cefepime <=8 ug/ml Susceptible    16 ug/ml Intermediate       Ceftazidime 4 ug/ml Susceptible    16 ug/ml Intermediate       Gentamicin <=4 ug/ml Susceptible    <=4 ug/ml Susceptible       Levofloxacin <=2 ug/ml Susceptible    <=2 ug/ml Susceptible       Meropenem <=1 ug/ml Susceptible    <=1 ug/ml Susceptible       Piperacillin + Tazobactam <=16 ug/ml Susceptible    64 ug/ml Susceptible       Tobramycin <=4 ug/ml Susceptible    <=4 ug/ml Susceptible                      Stool Culture With Yersinia - Stool, Per Rectum [[id number]]  (Normal) Collected:  04/24/18 1350    Lab Status:  Final result Specimen:  Stool from Per Rectum  Updated:  04/26/18 1419     Stool Cx w/ Yersinia No Salmonella, Shigella, Campylobacter, E. coli O157, Vibrio, or Yersinia isolated    Clostridium Difficile Toxin - Stool, Per Rectum [[id number]] Collected:  04/24/18 1350    Lab Status:  Final result Specimen:  Stool from Per Rectum Updated:  04/24/18 1520    Narrative:       The following orders were created for panel order Clostridium Difficile Toxin - Stool, Per Rectum.  Procedure                               Abnormality         Status                     ---------                               -----------         ------                     Clostridium Difficile To...[[id number]]  Normal              Final result                 Please view results for these tests on the individual orders.    Clostridium Difficile Toxin, PCR - Stool, Per Rectum [[id number]]  (Normal) Collected:  04/24/18 1350    Lab Status:  Final result Specimen:  Stool from Per Rectum Updated:  04/24/18 1520     C. Difficile Toxins by PCR Not Detected    Narrative:         Performance characteristics of test not established for patients <2 years of age.  Negative for Toxigenic C. Difficile          Imaging Results (last 24 hours)     ** No results found for the last 24 hours. **        Results for orders placed during the hospital encounter of 12/29/17   Adult Transthoracic Echo Complete W/ Cont if Necessary Per Protocol    Addendum · Left ventricular systolic function is normal. Estimated EF = 60%. · There is a Jay tissue aortic valve replacement 25 mm, 7/23/12.  Aortic valve mean pressure gradient is 8.8 mmHg. · Mild mitral valve stenosis is present · Trace mitral valve regurgitation is present · Trace to mild tricuspid valve regurgitation is present.        Linda Tom MD 1/3/2018  5:43 PM          Narrative PRELIMINARY TECH FINDINGS ONLY       I have reviewed the medications.    Assessment/Plan   Assessment / Plan     Hospital Problem List     * (Principal)Sepsis    YSABEL  (obstructive sleep apnea) (Chronic)    Atrial fibrillation (Chronic)    Overview Signed 1/6/2017  2:47 PM by Hamzah Casillas     1. Atrial fibrillation, October 2010; CHADS2-VASc = 3:  a. Amiodarone therapy, discontinued, November 2013.  b. Terumo TigerPaw left atrial appendage closure, Dr. Edson Roque, 07/23/2012.            Hypertension    History of aortic valve stenosis (Chronic)    Overview Signed 1/6/2017  3:01 PM by Hamzah Casillas     2. Aortic stenosis:   a. Tissue aortic valve replacement (Jay 25) and left atrial appendageal ablation         (Terumo TigerPaw device), Dr. Sandhu, 07/23/2012.          Diabetes mellitus, type II (Chronic)    Mild cognitive impairment (Chronic)    UTI (urinary tract infection)    Overview Signed 4/29/2018  7:31 PM by Randal Burnett MD     Pseudomonas species         Nausea, vomiting, and diarrhea    Hypokalemia    Metabolic encephalopathy    Valvular heart disease (Chronic)    Abnormal findings on diagnostic imaging of lung             Brief Hospital Course to date:  Leobardo Araujo is a 83 y.o. male presenting with confusion, fever, and nausea vomiting and diarrhea.  Urinalysis is abnormal and also has some bladder changes on CT scan with unknown chronicity.  C diff negative.      Assessment & Plan:  - urine culture has pseudomonas growing.    -mentation waxes and wanes, but improving over all    -Discussed w/ dr. Broussard on 4/29/18. Requires iv merrem 7-10 days. Plan to transfer to [facility] for completion of antibiotics when insurance has approved    -increase prandial    -increased norvasc 10mg daily- BP better, monitor  -continue losartan    - stop scheduled daytime seroquel due to drowsiness. Continue scheduled qhs. Change to low dose prn seroquel bid    Long discussion with Fadumo, daughter who was concerned about daytime drowsiness with seroquel/ increased agitation and confusion during hospitalization from home baseline. Patient is followed by Dr. Reece,  Neurology. Suspect patient has underlying dementia with acute hospital/ illness induced delirium. Advised outpatient follow up with Dr. Reece once home and has completed full antibiotic treatment for formal evaluation and diagnosis. Fadumo understands and agreeable.       DVT Prophylaxis:  heparin    CODE STATUS: Full Code    Disposition: I expect the patient to be discharged to [facility] 1-2 days once insurance approved    Electronically signed by SOLOMON Grove, 05/01/18, 9:29 AM.

## 2018-05-01 NOTE — PLAN OF CARE
Problem: Patient Care Overview  Goal: Plan of Care Review  Outcome: Ongoing (interventions implemented as appropriate)   05/01/18 0030   Coping/Psychosocial   Plan of Care Reviewed With patient   Plan of Care Review   Progress improving   OTHER   Outcome Summary vss await placement on wednesday       Problem: Sepsis/Septic Shock (Adult)  Goal: Signs and Symptoms of Listed Potential Problems Will be Absent, Minimized or Managed (Sepsis/Septic Shock)  Outcome: Ongoing (interventions implemented as appropriate)

## 2018-05-01 NOTE — PROGRESS NOTES
"Adult Nutrition  Assessment/PES    Patient Name:  Leobardo Araujo  YOB: 1934  MRN: [id number]  Admit Date:  4/23/2018    Assessment Date:  5/1/2018    Comments:            Adult Nutrition Assessment     Row Name 05/01/18 0746       Reason for Assessment    Reason For Assessment other (see comments)   LOS/5\"       Nutrition Prescription PO    Current PO Diet Regular    Common Modifiers Cardiac;Consistent Carbohydrate    Row Name 05/01/18 0600       Anthropometrics    Weight 102 kg (224 lb)          Problem/Interventions:        Problem 1     Row Name 05/01/18 0747       Nutrition Diagnoses Problem 1    Problem 1 Nutrition Appropriate for Condition at this Time    Signs/Symptoms (evidenced by) PO Intake    Percent (%) intake recorded 92 %    Over number of meals 3                          Education/Evaluation     Row Name 05/01/18 0747       Monitor/Evaluation    Monitor Per protocol        Electronically signed by:  Loli Patiño RD  05/01/18 7:47 AM  "

## 2018-05-02 LAB
ALBUMIN SERPL-MCNC: 3.5 G/DL (ref 3.2–4.8)
ALBUMIN/GLOB SERPL: 1.2 G/DL (ref 1.5–2.5)
ALP SERPL-CCNC: 79 U/L (ref 25–100)
ALT SERPL W P-5'-P-CCNC: 28 U/L (ref 7–40)
ANION GAP SERPL CALCULATED.3IONS-SCNC: 6 MMOL/L (ref 3–11)
AST SERPL-CCNC: 19 U/L (ref 0–33)
BILIRUB SERPL-MCNC: 0.5 MG/DL (ref 0.3–1.2)
BUN BLD-MCNC: 17 MG/DL (ref 9–23)
BUN/CREAT SERPL: 18.9 (ref 7–25)
CALCIUM SPEC-SCNC: 8.1 MG/DL (ref 8.7–10.4)
CHLORIDE SERPL-SCNC: 99 MMOL/L (ref 99–109)
CO2 SERPL-SCNC: 30 MMOL/L (ref 20–31)
CREAT BLD-MCNC: 0.9 MG/DL (ref 0.6–1.3)
DEPRECATED RDW RBC AUTO: 46.8 FL (ref 37–54)
ERYTHROCYTE [DISTWIDTH] IN BLOOD BY AUTOMATED COUNT: 14.4 % (ref 11.3–14.5)
GFR SERPL CREATININE-BSD FRML MDRD: 81 ML/MIN/1.73
GLOBULIN UR ELPH-MCNC: 2.9 GM/DL
GLUCOSE BLD-MCNC: 109 MG/DL (ref 70–100)
GLUCOSE BLDC GLUCOMTR-MCNC: 103 MG/DL (ref 70–130)
GLUCOSE BLDC GLUCOMTR-MCNC: 115 MG/DL (ref 70–130)
GLUCOSE BLDC GLUCOMTR-MCNC: 166 MG/DL (ref 70–130)
GLUCOSE BLDC GLUCOMTR-MCNC: 166 MG/DL (ref 70–130)
HCT VFR BLD AUTO: 35.6 % (ref 38.9–50.9)
HGB BLD-MCNC: 11.6 G/DL (ref 13.1–17.5)
MCH RBC QN AUTO: 28.9 PG (ref 27–31)
MCHC RBC AUTO-ENTMCNC: 32.6 G/DL (ref 32–36)
MCV RBC AUTO: 88.8 FL (ref 80–99)
PLATELET # BLD AUTO: 363 10*3/MM3 (ref 150–450)
PMV BLD AUTO: 11.1 FL (ref 6–12)
POTASSIUM BLD-SCNC: 3.5 MMOL/L (ref 3.5–5.5)
PROT SERPL-MCNC: 6.4 G/DL (ref 5.7–8.2)
RBC # BLD AUTO: 4.01 10*6/MM3 (ref 4.2–5.76)
SODIUM BLD-SCNC: 135 MMOL/L (ref 132–146)
WBC NRBC COR # BLD: 18.88 10*3/MM3 (ref 3.5–10.8)

## 2018-05-02 PROCEDURE — 80053 COMPREHEN METABOLIC PANEL: CPT | Performed by: INTERNAL MEDICINE

## 2018-05-02 PROCEDURE — 99232 SBSQ HOSP IP/OBS MODERATE 35: CPT | Performed by: NURSE PRACTITIONER

## 2018-05-02 PROCEDURE — 63710000001 INSULIN DETEMIR PER 5 UNITS: Performed by: NURSE PRACTITIONER

## 2018-05-02 PROCEDURE — 82962 GLUCOSE BLOOD TEST: CPT

## 2018-05-02 PROCEDURE — 25010000002 HEPARIN (PORCINE) PER 1000 UNITS: Performed by: NURSE PRACTITIONER

## 2018-05-02 PROCEDURE — 85027 COMPLETE CBC AUTOMATED: CPT | Performed by: INTERNAL MEDICINE

## 2018-05-02 PROCEDURE — 25010000002 DAPTOMYCIN PER 1 MG: Performed by: INTERNAL MEDICINE

## 2018-05-02 PROCEDURE — 87040 BLOOD CULTURE FOR BACTERIA: CPT | Performed by: INTERNAL MEDICINE

## 2018-05-02 PROCEDURE — 25010000002 MEROPENEM: Performed by: INTERNAL MEDICINE

## 2018-05-02 RX ORDER — METRONIDAZOLE 500 MG/1
500 TABLET ORAL EVERY 8 HOURS SCHEDULED
Status: DISCONTINUED | OUTPATIENT
Start: 2018-05-02 | End: 2018-05-07 | Stop reason: HOSPADM

## 2018-05-02 RX ADMIN — MEROPENEM 1 G: 1 INJECTION, POWDER, FOR SOLUTION INTRAVENOUS at 21:10

## 2018-05-02 RX ADMIN — METRONIDAZOLE 500 MG: 500 TABLET ORAL at 15:27

## 2018-05-02 RX ADMIN — DAPTOMYCIN 600 MG: 500 INJECTION, POWDER, LYOPHILIZED, FOR SOLUTION INTRAVENOUS at 15:27

## 2018-05-02 RX ADMIN — Medication 1 CAPSULE: at 08:46

## 2018-05-02 RX ADMIN — HEPARIN SODIUM 5000 UNITS: 5000 INJECTION, SOLUTION INTRAVENOUS; SUBCUTANEOUS at 20:38

## 2018-05-02 RX ADMIN — ATORVASTATIN CALCIUM 20 MG: 20 TABLET, FILM COATED ORAL at 20:38

## 2018-05-02 RX ADMIN — MEROPENEM 1 G: 1 INJECTION, POWDER, FOR SOLUTION INTRAVENOUS at 04:12

## 2018-05-02 RX ADMIN — ASPIRIN 325 MG ORAL TABLET 325 MG: 325 PILL ORAL at 08:46

## 2018-05-02 RX ADMIN — LOSARTAN POTASSIUM 100 MG: 50 TABLET ORAL at 08:46

## 2018-05-02 RX ADMIN — LEVOTHYROXINE SODIUM 50 MCG: 50 TABLET ORAL at 06:08

## 2018-05-02 RX ADMIN — MEROPENEM 1 G: 1 INJECTION, POWDER, FOR SOLUTION INTRAVENOUS at 11:43

## 2018-05-02 RX ADMIN — TERAZOSIN HYDROCHLORIDE 1 MG: 1 CAPSULE ORAL at 21:10

## 2018-05-02 RX ADMIN — METRONIDAZOLE 500 MG: 500 TABLET ORAL at 20:38

## 2018-05-02 RX ADMIN — INSULIN DETEMIR 14 UNITS: 100 INJECTION, SOLUTION SUBCUTANEOUS at 21:10

## 2018-05-02 RX ADMIN — MICAFUNGIN SODIUM 100 MG: 20 INJECTION, POWDER, LYOPHILIZED, FOR SOLUTION INTRAVENOUS at 11:43

## 2018-05-02 RX ADMIN — QUETIAPINE FUMARATE 25 MG: 25 TABLET ORAL at 20:38

## 2018-05-02 RX ADMIN — INSULIN DETEMIR 14 UNITS: 100 INJECTION, SOLUTION SUBCUTANEOUS at 08:54

## 2018-05-02 RX ADMIN — AMIODARONE HYDROCHLORIDE 200 MG: 200 TABLET ORAL at 08:46

## 2018-05-02 RX ADMIN — HEPARIN SODIUM 5000 UNITS: 5000 INJECTION, SOLUTION INTRAVENOUS; SUBCUTANEOUS at 08:48

## 2018-05-02 RX ADMIN — AMLODIPINE BESYLATE 10 MG: 10 TABLET ORAL at 08:46

## 2018-05-02 RX ADMIN — INSULIN LISPRO 2 UNITS: 100 INJECTION, SOLUTION INTRAVENOUS; SUBCUTANEOUS at 11:47

## 2018-05-02 RX ADMIN — INSULIN LISPRO 2 UNITS: 100 INJECTION, SOLUTION INTRAVENOUS; SUBCUTANEOUS at 08:47

## 2018-05-02 NOTE — PLAN OF CARE
Problem: Patient Care Overview  Goal: Plan of Care Review  Outcome: Ongoing (interventions implemented as appropriate)   05/02/18 0201   Coping/Psychosocial   Plan of Care Reviewed With patient   Plan of Care Review   Progress improving   OTHER   Outcome Summary vss, continue to monitor       Problem: Sepsis/Septic Shock (Adult)  Goal: Signs and Symptoms of Listed Potential Problems Will be Absent, Minimized or Managed (Sepsis/Septic Shock)  Outcome: Ongoing (interventions implemented as appropriate)

## 2018-05-02 NOTE — PROGRESS NOTES
[facility] Progress Note        Antibiotics:  Anti-Infectives     Ordered     Dose/Rate Route Frequency Start Stop    04/27/18 1153  meropenem (MERREM) 1 g/100 mL 0.9% NS VTB (mbp)     Ordering Provider:  Hamzah Broussard MD    1 g  over 3 Hours Intravenous Every 8 Hours 04/27/18 1930 05/07/18 1959    04/27/18 1153  meropenem (MERREM) 1 g/100 mL 0.9% NS VTB (mbp)     Ordering Provider:  Hamzah Broussard MD    1 g  over 30 Minutes Intravenous Once 04/27/18 1315 04/27/18 1700    04/26/18 0814  amoxicillin-clavulanate (AUGMENTIN) 875-125 MG per tablet     Ordering Provider:  Jas Newby MD    1 tablet Oral Every 12 Hours Scheduled 04/26/18 0000 04/28/18 2359    04/23/18 2333  cefTRIAXone (ROCEPHIN) IVPB 1 g     Ordering Provider:  SOLOMON Delatorre    1 g  100 mL/hr over 30 Minutes Intravenous Once 04/23/18 2335 04/24/18 0109          CC: UTI    HPI:  Patient is a 83 y.o. male, known to Dr. Yaniv Jules, with h/o Abnormal LFTs, afib, T2DM, HTN, obesity, and VHD/AVR who presented to [facility] 4/23 by EMS for altered mental status for 2 to 3 days prior to admission.  Per chart, his daughter noted that his face seemed swollen and abdomen bloated along with confusion and anorexia.  He had a fever 101.  Blood cultures and Cdiff are negative.  He had urinary incontinence/frequency and Urine culture is positive for Pseudomonas aeruginosa x 2 sensitivities but both sensitive to Merrem. WBC 16,000 with 88% neutrophils, PCT 0.05, lactic acid 1.1, Hgb  A1C 7.8, and K 3.2. His CT scan of abdomen and pelvis showed mild diffuse urinary bladder wall thickening which may be chronic given the associated urinary bladder diverticuli and a 2.4 cm oval area of soft tissue density in the right lung base just above the hemidiaphragm.  He was started on Cefepime.  ID was asked to evaluate and manage his antibiotic therapy.  Changed to Merrem on April 27.    5/2/18 WBC increased per nursing;  Some loose stools, < 3 per patient in last 24 hours  "and recent CDT negativef;  He denies new dysuria/hematuria/pyuria/incontinance, etc..  generally fatigued; otherwise, no new focal symptoms per nursing;  No new pain or problems;  No new urinary symptoms     No headache photophobia or neck stiffness.  No shortness of breath cough or hemoptysis.  No nausea vomiting.  No skin rash    ROS:      5/2/18 No f/c/s. No n/v/d. No rash. No new ADR to Abx.     All other systems negative for acute complaints on a 12 system ROS    PE:   /69 (BP Location: Left arm, Patient Position: Lying)   Pulse 76   Temp 98.7 °F (37.1 °C) (Oral)   Resp 20   Ht 182.9 cm (72\")   Wt 102 kg (225 lb)   SpO2 92%   BMI 30.52 kg/m²     GENERAL: awake, in no acute distress.   HEENT: Normocephalic, atraumatic.  PERRL. EOMI. No conjunctival injection. No icterus. Oropharynx clear without evidence of thrush or exudate. No evidence of peridontal disease.    NECK: Supple without nuchal rigidity. No mass.  LYMPH: No cervical, axillary or inguinal lymphadenopathy.  HEART: RRR; No murmur, rubs, gallops.   LUNGS:diminished at bases bilaterally without wheezing, rales, rhonchi. Normal respiratory effort. Nonlabored. No dullness.  ABDOMEN: Soft, nontender, nondistended. Positive bowel sounds. No rebound or guarding. NO mass or HSM.  EXT:  No cyanosis, clubbing or edema. No cord.  MSK: FROM without joint effusions noted arms/legs.    SKIN: Warm and dry without cutaneous eruptions on Inspection/palpation.       Peripheral stigmata/phenomena of endocarditis     No CVA tenderness       Laboratory Data      Results from last 7 days  Lab Units 05/02/18  0447 04/28/18  0532   WBC 10*3/mm3 18.88* 8.03   HEMOGLOBIN g/dL 11.6* 11.4*   HEMATOCRIT % 35.6* 34.4*   PLATELETS 10*3/mm3 363 236       Results from last 7 days  Lab Units 05/02/18  0447   SODIUM mmol/L 135   POTASSIUM mmol/L 3.5   CHLORIDE mmol/L 99   CO2 mmol/L 30.0   BUN mg/dL 17   CREATININE mg/dL 0.90   GLUCOSE mg/dL 109*   CALCIUM mg/dL 8.1* "       Results from last 7 days  Lab Units 05/02/18  0447   ALK PHOS U/L 79   BILIRUBIN mg/dL 0.5   ALT (SGPT) U/L 28   AST (SGOT) U/L 19               Estimated Creatinine Clearance: 76.9 mL/min (by C-G formula based on SCr of 0.9 mg/dL).      Microbiology:      Radiology:  Imaging Results (last 72 hours)     ** No results found for the last 72 hours. **            Impression:     --Leuokocytosis/LG temps at 99;  Increased 5/1-5/2 but without new focal symptoms at least as of my visit;  CDT negative recently, no new focal pain and resp exam nonfocal; recheck cultures;  Daptomycin/mycamine x 1 while cultures pending and monitor for new process or pathogen  --Acute sepsis ; likely urinary source  --Acute Complicated cystitis with recurrent MDR Pseudomonas x 2 UTI.  Last positive cultures was 7/15/17 with Pseudomonas fluorescens/putida sens to Cefepime, Zosyn, Levaquin, and carbapenems.   Cx now positive for Pseudomonas aeruginosa x 2 sens to merrem.   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  --Elevated liver transaminases, mild  --Fever, resolved  --Hypothyroidism  --T2DM, insulin dependent  --VHD/AVR  --Obesity  --Afib/on amiodarone/aspirin  --HTN  --Cipro allergy    PLAN:     --Monitor cultures, labs, PHYLICIA  --Continue Merrem IV;  Daptomycin/mycamine x 1 5/2 and recheck cultures; flagyl po  --Length of antibiotics will depend on clinical course, study results and response to therapy  --Monitor IV and IV lines for infections  --D/w patient that antibiotics do not guarantee a cure. Patient understands.  --D/w pharmacy/nursing/Dr. Rios and with family and case management  --Highly complex set of issues with high risk for further serious morbidity and other serious sequela  --His insight is generally poor,he has seen Dr Pacheco before per Dr Newby and he is likely to benefit from further outpatient evaluation by them to optimize any treatment for functional/anatomic issues etc.  --Case  management considering options to finish IV Merrem and rehab       Hamzah Broussard MD  5/2/2018

## 2018-05-02 NOTE — PROGRESS NOTES
Continued Stay Note  [facility]     Patient Name: Leobardo Araujo  MRN: [id number]  Today's Date: 5/2/2018    Admit Date: 4/23/2018          Discharge Plan     Row Name 05/02/18 1135       Plan    Plan Transfer to [facility] SNF pending medical readiness?    Patient/Family in Agreement with Plan yes    Plan Comments Per Opal at [facility], [facility] has approved transfer to their facility. However, authorization is only good for 48 hours, which means he would have to transfer there today or tomorrow (Thursday). Spoke with patient at bedside and he is agreeable to transfer today; however, he is now not medically ready for transfer because his WBC count is significantly elevated despite several days of antibiotics. Discussed with Dr. Broussard and he is doubtful that patient will be ready for transfer tomorrow, but states it is possible.     Updated patient's daughter Fadumo and Opal with [facility]. Opal will be out tomorrow so Massiel will be covering and her phone # is [phone]. If patient can't be transferred tomorrow, will have to check with [facility] to see if they will still have a bed for patient. If they do, when he is medically ready they will have to submit further clinicals to [facility]ie in hopes that they will continue to approve the pre-cert. Darlin Cottrell RN [phone]    Final Discharge Disposition Code 03 - skilled nursing facility (SNF)              Discharge Codes    No documentation.       Expected Discharge Date and Time     Expected Discharge Date Expected Discharge Time    May 4, 2018             Darlin Cottrell RN

## 2018-05-02 NOTE — PROGRESS NOTES
[facility] Medicine Services  PROGRESS NOTE    Patient Name: Leobardo Araujo  : 1934  MRN: [id number]    Date of Admission: 2018  Length of Stay: 8  Primary Care Physician: Edy Goodman MD    Subjective   Subjective     CC:  Confusion, UTI    HPI:  Feeling well, but states he never feels poorly. Anxious to leave. No overnight complaints    Review of Systems  Unreliable due to poor historian    Otherwise ROS is negative except as mentioned in the HPI.    Objective   Objective     Vital Signs:   Temp:  [98.7 °F (37.1 °C)-99.1 °F (37.3 °C)] 98.7 °F (37.1 °C)  Heart Rate:  [59-81] 76  Resp:  [18-20] 20  BP: (137-153)/(67-69) 137/69        Physical Exam:  Constitutional: No acute distress, awake, alert, sitting up in chair  HENT: NCAT, mucous membranes moist  Respiratory: Clear to auscultation bilaterally, respiratory effort normal   Cardiovascular: RRR, + systolic murmur, rubs, or gallops, palpable pedal pulses bilaterally  Gastrointestinal: obese, Positive bowel sounds, soft, nontender, nondistended  Musculoskeletal: No bilateral ankle edema  Psychiatric: Appropriate affect, cooperative  Neurologic: Oriented x himeself and being in hospital but not year or month, strength symmetric in all extremities, Cranial Nerves grossly intact to confrontation, speech clear  Skin: No rashes      Results Reviewed:  I have personally reviewed current lab, radiology, and data and agree.      Results from last 7 days  Lab Units 18  0532   WBC 10*3/mm3 18.88* 8.03   HEMOGLOBIN g/dL 11.6* 11.4*   HEMATOCRIT % 35.6* 34.4*   PLATELETS 10*3/mm3 363 236       Results from last 7 days  Lab Units 18  0447 18  0532 18  0535   SODIUM mmol/L 135 137  --    POTASSIUM mmol/L 3.5 3.7 3.2*   CHLORIDE mmol/L 99 103  --    CO2 mmol/L 30.0 29.0  --    BUN mg/dL 17 13  --    CREATININE mg/dL 0.90 0.90  --    GLUCOSE mg/dL 109* 170*  --    CALCIUM mg/dL 8.1* 8.3*  --    ALT  (SGPT) U/L 28 53*  --    AST (SGOT) U/L 19 32  --      Estimated Creatinine Clearance: 76.9 mL/min (by C-G formula based on SCr of 0.9 mg/dL).  No results found for: BNP  No results found for: PHART    Microbiology Results Abnormal     Procedure Component Value - Date/Time    Blood Culture - Blood, [[id number]]  (Normal) Collected:  04/23/18 2050    Lab Status:  Final result Specimen:  Blood from Arm, Right Updated:  04/28/18 2116     Blood Culture No growth at 5 days    Blood Culture - Blood, [[id number]]  (Normal) Collected:  04/23/18 1939    Lab Status:  Final result Specimen:  Blood from Hand, Right Updated:  04/28/18 2116     Blood Culture No growth at 5 days    Urine Culture - Urine, Urine, Clean Catch [[id number]]  (Abnormal)  (Susceptibility) Collected:  04/23/18 2253    Lab Status:  Final result Specimen:  Urine from Urine, Clean Catch Updated:  04/27/18 1135     Urine Culture --      >100,000 CFU/mL Pseudomonas aeruginosa (A)     Comment: Morphological Type 1             >100,000 CFU/mL Pseudomonas aeruginosa (A)     Comment: Morphological Type 2         Susceptibility      Pseudomonas aeruginosa (1)    Pseudomonas aeruginosa (2)       FRENCH    FRENCH       Aztreonam <=8 ug/ml Susceptible    >16 ug/ml Resistant       Cefepime <=8 ug/ml Susceptible    16 ug/ml Intermediate       Ceftazidime 4 ug/ml Susceptible    16 ug/ml Intermediate       Gentamicin <=4 ug/ml Susceptible    <=4 ug/ml Susceptible       Levofloxacin <=2 ug/ml Susceptible    <=2 ug/ml Susceptible       Meropenem <=1 ug/ml Susceptible    <=1 ug/ml Susceptible       Piperacillin + Tazobactam <=16 ug/ml Susceptible    64 ug/ml Susceptible       Tobramycin <=4 ug/ml Susceptible    <=4 ug/ml Susceptible                      Stool Culture With Yersinia - Stool, Per Rectum [[id number]]  (Normal) Collected:  04/24/18 1350    Lab Status:  Final result Specimen:  Stool from Per Rectum Updated:  04/26/18 1419     Stool Cx w/ Yersinia No Salmonella, Shigella,  Campylobacter, E. coli O157, Vibrio, or Yersinia isolated    Clostridium Difficile Toxin - Stool, Per Rectum [[id number]] Collected:  04/24/18 1350    Lab Status:  Final result Specimen:  Stool from Per Rectum Updated:  04/24/18 1520    Narrative:       The following orders were created for panel order Clostridium Difficile Toxin - Stool, Per Rectum.  Procedure                               Abnormality         Status                     ---------                               -----------         ------                     Clostridium Difficile To...[[id number]]  Normal              Final result                 Please view results for these tests on the individual orders.    Clostridium Difficile Toxin, PCR - Stool, Per Rectum [[id number]]  (Normal) Collected:  04/24/18 1350    Lab Status:  Final result Specimen:  Stool from Per Rectum Updated:  04/24/18 1520     C. Difficile Toxins by PCR Not Detected    Narrative:         Performance characteristics of test not established for patients <2 years of age.  Negative for Toxigenic C. Difficile          Imaging Results (last 24 hours)     ** No results found for the last 24 hours. **        Results for orders placed during the hospital encounter of 12/29/17   Adult Transthoracic Echo Complete W/ Cont if Necessary Per Protocol    Addendum · Left ventricular systolic function is normal. Estimated EF = 60%. · There is a Jay tissue aortic valve replacement 25 mm, 7/23/12.  Aortic valve mean pressure gradient is 8.8 mmHg. · Mild mitral valve stenosis is present · Trace mitral valve regurgitation is present · Trace to mild tricuspid valve regurgitation is present.        Linda Tom MD 1/3/2018  5:43 PM          Narrative PRELIMINARY TECH FINDINGS ONLY       I have reviewed the medications.    Assessment/Plan   Assessment / Plan     Hospital Problem List     * (Principal)Sepsis    YSABEL (obstructive sleep apnea) (Chronic)    Atrial fibrillation (Chronic)    Overview  Signed 1/6/2017  2:47 PM by Hamzah Casillas     1. Atrial fibrillation, October 2010; CHADS2-VASc = 3:  a. Amiodarone therapy, discontinued, November 2013.  b. Terumo TigerPaw left atrial appendage closure, Dr. Edson Roque, 07/23/2012.            Hypertension    History of aortic valve stenosis (Chronic)    Overview Signed 1/6/2017  3:01 PM by Hamzah Casillas     2. Aortic stenosis:   a. Tissue aortic valve replacement (Jay 25) and left atrial appendageal ablation         (Terumo TigerPaw device), Dr. Sandhu, 07/23/2012.          Diabetes mellitus, type II (Chronic)    Mild cognitive impairment (Chronic)    UTI (urinary tract infection)    Overview Signed 4/29/2018  7:31 PM by Randal Burnett MD     Pseudomonas species         Nausea, vomiting, and diarrhea    Hypokalemia    Metabolic encephalopathy    Valvular heart disease (Chronic)    Abnormal findings on diagnostic imaging of lung             Brief Hospital Course to date:  Leobardo Araujo is a 83 y.o. male presenting with confusion, fever, and nausea vomiting and diarrhea.  Urinalysis is abnormal and also has some bladder changes on CT scan with unknown chronicity.  C diff negative.      Assessment & Plan:  - urine culture has pseudomonas growing.      -Discussed w/ dr. Broussard on 4/29/18. Requires iv merrem 7-10 days. Increase in WBC today. Tmax 99.9. Dapto and mycamine x1. Flagyl po added 5/2/18.  - Plan to transfer to [facility] for completion of antibiotics TBD  - mental status improving  -increase prandial    -increased norvasc 10mg daily- BP better, monitor  -continue losartan    - stop scheduled daytime seroquel due to drowsiness. Continue scheduled qhs. Change to low dose prn seroquel bid    Long discussion with Fadumo, daughter who was concerned about daytime drowsiness with seroquel/ increased agitation and confusion during hospitalization from home baseline. Patient is followed by Dr. Reece, Neurology. Suspect patient has underlying  dementia with acute hospital/ illness induced delirium. Advised outpatient follow up with Dr. Reece once home and has completed full antibiotic treatment for formal evaluation and diagnosis. Fadumo understands and agreeable.       DVT Prophylaxis:  heparin    CODE STATUS: Full Code    Disposition: I expect the patient to be discharged to [facility] 1-2 days when ok with ID    Electronically signed by SOLOMON Grove, 05/02/18, 10:35 AM.

## 2018-05-03 LAB
ALBUMIN SERPL-MCNC: 3.5 G/DL (ref 3.2–4.8)
ALBUMIN/GLOB SERPL: 1.2 G/DL (ref 1.5–2.5)
ALP SERPL-CCNC: 84 U/L (ref 25–100)
ALT SERPL W P-5'-P-CCNC: 29 U/L (ref 7–40)
ANION GAP SERPL CALCULATED.3IONS-SCNC: 6 MMOL/L (ref 3–11)
AST SERPL-CCNC: 23 U/L (ref 0–33)
BILIRUB SERPL-MCNC: 0.4 MG/DL (ref 0.3–1.2)
BILIRUB UR QL STRIP: NEGATIVE
BUN BLD-MCNC: 17 MG/DL (ref 9–23)
BUN/CREAT SERPL: 21.3 (ref 7–25)
CALCIUM SPEC-SCNC: 8.3 MG/DL (ref 8.7–10.4)
CHLORIDE SERPL-SCNC: 100 MMOL/L (ref 99–109)
CLARITY UR: CLEAR
CO2 SERPL-SCNC: 31 MMOL/L (ref 20–31)
COLOR UR: YELLOW
CREAT BLD-MCNC: 0.8 MG/DL (ref 0.6–1.3)
DEPRECATED RDW RBC AUTO: 46.3 FL (ref 37–54)
ERYTHROCYTE [DISTWIDTH] IN BLOOD BY AUTOMATED COUNT: 14.3 % (ref 11.3–14.5)
GFR SERPL CREATININE-BSD FRML MDRD: 92 ML/MIN/1.73
GLOBULIN UR ELPH-MCNC: 2.9 GM/DL
GLUCOSE BLD-MCNC: 110 MG/DL (ref 70–100)
GLUCOSE BLDC GLUCOMTR-MCNC: 127 MG/DL (ref 70–130)
GLUCOSE BLDC GLUCOMTR-MCNC: 130 MG/DL (ref 70–130)
GLUCOSE BLDC GLUCOMTR-MCNC: 136 MG/DL (ref 70–130)
GLUCOSE BLDC GLUCOMTR-MCNC: 213 MG/DL (ref 70–130)
GLUCOSE UR STRIP-MCNC: NEGATIVE MG/DL
HCT VFR BLD AUTO: 36.1 % (ref 38.9–50.9)
HGB BLD-MCNC: 11.8 G/DL (ref 13.1–17.5)
HGB UR QL STRIP.AUTO: ABNORMAL
KETONES UR QL STRIP: ABNORMAL
LEUKOCYTE ESTERASE UR QL STRIP.AUTO: ABNORMAL
MCH RBC QN AUTO: 28.9 PG (ref 27–31)
MCHC RBC AUTO-ENTMCNC: 32.7 G/DL (ref 32–36)
MCV RBC AUTO: 88.5 FL (ref 80–99)
NITRITE UR QL STRIP: NEGATIVE
PH UR STRIP.AUTO: 5.5 [PH] (ref 5–8)
PLATELET # BLD AUTO: 373 10*3/MM3 (ref 150–450)
PMV BLD AUTO: 11 FL (ref 6–12)
POTASSIUM BLD-SCNC: 3.4 MMOL/L (ref 3.5–5.5)
POTASSIUM BLD-SCNC: 4.2 MMOL/L (ref 3.5–5.5)
PROT SERPL-MCNC: 6.4 G/DL (ref 5.7–8.2)
PROT UR QL STRIP: ABNORMAL
RBC # BLD AUTO: 4.08 10*6/MM3 (ref 4.2–5.76)
SODIUM BLD-SCNC: 137 MMOL/L (ref 132–146)
SP GR UR STRIP: 1.03 (ref 1–1.03)
UROBILINOGEN UR QL STRIP: ABNORMAL
WBC NRBC COR # BLD: 12.27 10*3/MM3 (ref 3.5–10.8)

## 2018-05-03 PROCEDURE — 63710000001 INSULIN DETEMIR PER 5 UNITS: Performed by: NURSE PRACTITIONER

## 2018-05-03 PROCEDURE — 84132 ASSAY OF SERUM POTASSIUM: CPT | Performed by: NURSE PRACTITIONER

## 2018-05-03 PROCEDURE — 82962 GLUCOSE BLOOD TEST: CPT

## 2018-05-03 PROCEDURE — 80053 COMPREHEN METABOLIC PANEL: CPT | Performed by: INTERNAL MEDICINE

## 2018-05-03 PROCEDURE — 25010000002 MEROPENEM: Performed by: INTERNAL MEDICINE

## 2018-05-03 PROCEDURE — 25010000002 HEPARIN (PORCINE) PER 1000 UNITS: Performed by: NURSE PRACTITIONER

## 2018-05-03 PROCEDURE — 87086 URINE CULTURE/COLONY COUNT: CPT | Performed by: INTERNAL MEDICINE

## 2018-05-03 PROCEDURE — 94660 CPAP INITIATION&MGMT: CPT

## 2018-05-03 PROCEDURE — 81003 URINALYSIS AUTO W/O SCOPE: CPT | Performed by: INTERNAL MEDICINE

## 2018-05-03 PROCEDURE — 94799 UNLISTED PULMONARY SVC/PX: CPT

## 2018-05-03 PROCEDURE — 99233 SBSQ HOSP IP/OBS HIGH 50: CPT | Performed by: NURSE PRACTITIONER

## 2018-05-03 PROCEDURE — 85027 COMPLETE CBC AUTOMATED: CPT | Performed by: INTERNAL MEDICINE

## 2018-05-03 RX ADMIN — VILAZODONE HYDROCHLORIDE 40 MG: 40 TABLET ORAL at 05:40

## 2018-05-03 RX ADMIN — LOSARTAN POTASSIUM 100 MG: 50 TABLET ORAL at 09:37

## 2018-05-03 RX ADMIN — INSULIN DETEMIR 14 UNITS: 100 INJECTION, SOLUTION SUBCUTANEOUS at 20:32

## 2018-05-03 RX ADMIN — HEPARIN SODIUM 5000 UNITS: 5000 INJECTION, SOLUTION INTRAVENOUS; SUBCUTANEOUS at 05:36

## 2018-05-03 RX ADMIN — INSULIN DETEMIR 14 UNITS: 100 INJECTION, SOLUTION SUBCUTANEOUS at 09:36

## 2018-05-03 RX ADMIN — QUETIAPINE FUMARATE 25 MG: 25 TABLET ORAL at 20:33

## 2018-05-03 RX ADMIN — INSULIN LISPRO 3 UNITS: 100 INJECTION, SOLUTION INTRAVENOUS; SUBCUTANEOUS at 11:54

## 2018-05-03 RX ADMIN — MEROPENEM 1 G: 1 INJECTION, POWDER, FOR SOLUTION INTRAVENOUS at 11:54

## 2018-05-03 RX ADMIN — AMLODIPINE BESYLATE 10 MG: 10 TABLET ORAL at 09:37

## 2018-05-03 RX ADMIN — METRONIDAZOLE 500 MG: 500 TABLET ORAL at 20:33

## 2018-05-03 RX ADMIN — QUETIAPINE FUMARATE 12.5 MG: 25 TABLET ORAL at 09:37

## 2018-05-03 RX ADMIN — TERAZOSIN HYDROCHLORIDE 1 MG: 1 CAPSULE ORAL at 20:33

## 2018-05-03 RX ADMIN — METRONIDAZOLE 500 MG: 500 TABLET ORAL at 14:41

## 2018-05-03 RX ADMIN — Medication 1 CAPSULE: at 09:38

## 2018-05-03 RX ADMIN — AMIODARONE HYDROCHLORIDE 200 MG: 200 TABLET ORAL at 09:37

## 2018-05-03 RX ADMIN — POTASSIUM CHLORIDE 40 MEQ: 750 CAPSULE, EXTENDED RELEASE ORAL at 14:41

## 2018-05-03 RX ADMIN — POTASSIUM CHLORIDE 40 MEQ: 750 CAPSULE, EXTENDED RELEASE ORAL at 09:38

## 2018-05-03 RX ADMIN — HEPARIN SODIUM 5000 UNITS: 5000 INJECTION, SOLUTION INTRAVENOUS; SUBCUTANEOUS at 20:33

## 2018-05-03 RX ADMIN — ASPIRIN 325 MG ORAL TABLET 325 MG: 325 PILL ORAL at 09:38

## 2018-05-03 RX ADMIN — MEROPENEM 1 G: 1 INJECTION, POWDER, FOR SOLUTION INTRAVENOUS at 20:32

## 2018-05-03 RX ADMIN — METRONIDAZOLE 500 MG: 500 TABLET ORAL at 09:37

## 2018-05-03 RX ADMIN — HEPARIN SODIUM 5000 UNITS: 5000 INJECTION, SOLUTION INTRAVENOUS; SUBCUTANEOUS at 09:37

## 2018-05-03 RX ADMIN — LEVOTHYROXINE SODIUM 50 MCG: 50 TABLET ORAL at 05:36

## 2018-05-03 RX ADMIN — ATORVASTATIN CALCIUM 20 MG: 20 TABLET, FILM COATED ORAL at 20:33

## 2018-05-03 RX ADMIN — MEROPENEM 1 G: 1 INJECTION, POWDER, FOR SOLUTION INTRAVENOUS at 05:36

## 2018-05-03 NOTE — PROGRESS NOTES
"    [facility] Medicine Services  PROGRESS NOTE    Patient Name: Leobardo Araujo  : 1934  MRN: [id number]    Date of Admission: 2018  Length of Stay: 9  Primary Care Physician: Edy Goodman MD    Subjective   Subjective     CC:  Confusion, UTI    HPI:  No issues overnight  Frustrating that he is still here.  This is all a \"hoax\".      Review of Systems   Unable to perform ROS: Mental status change   Unreliable due to poor historian    Otherwise ROS is negative except as mentioned in the HPI.    Objective   Objective     Vital Signs:   Temp:  [97.5 °F (36.4 °C)-97.9 °F (36.6 °C)] 97.9 °F (36.6 °C)  Heart Rate:  [64-66] 64  Resp:  [18] 18  BP: (129-164)/(64-83) 129/64        Physical Exam:  Constitutional: No acute distress, awake, alert,  In bed  HENT: NCAT, mucous membranes moist  Respiratory: Clear to auscultation bilaterally, respiratory effort normal   Cardiovascular: RRR, no murmurs, rubs, or gallops, palpable pedal pulses bilaterally  Gastrointestinal: Positive bowel sounds, soft, nontender, nondistended  Musculoskeletal: No bilateral ankle edema  Psychiatric: Appropriate affect  Neurologic: Oriented x 1, strength symmetric in all extremities, speech clear  Skin: No rashes    Results Reviewed:  I have personally reviewed current lab, radiology, and data and agree.      Results from last 7 days  Lab Units 18  0532   WBC 10*3/mm3 12.27* 18.88* 8.03   HEMOGLOBIN g/dL 11.8* 11.6* 11.4*   HEMATOCRIT % 36.1* 35.6* 34.4*   PLATELETS 10*3/mm3 373 363 236       Results from last 7 days  Lab Units 18  0532   SODIUM mmol/L 137 135 137   POTASSIUM mmol/L 3.4* 3.5 3.7   CHLORIDE mmol/L 100 99 103   CO2 mmol/L 31.0 30.0 29.0   BUN mg/dL 17 17 13   CREATININE mg/dL 0.80 0.90 0.90   GLUCOSE mg/dL 110* 109* 170*   CALCIUM mg/dL 8.3* 8.1* 8.3*   ALT (SGPT) U/L 29 28 53*   AST (SGOT) U/L 23 19 32     Estimated " Creatinine Clearance: 86.5 mL/min (by C-G formula based on SCr of 0.8 mg/dL).      Microbiology Results Abnormal     Procedure Component Value - Date/Time    Blood Culture - Blood, [[id number]]  (Normal) Collected:  05/02/18 1011    Lab Status:  Preliminary result Specimen:  Blood from Arm, Left Updated:  05/03/18 1045     Blood Culture No growth at 24 hours    Blood Culture - Blood, [[id number]]  (Normal) Collected:  05/02/18 1011    Lab Status:  Preliminary result Specimen:  Blood from Hand, Right Updated:  05/03/18 1045     Blood Culture No growth at 24 hours    Blood Culture - Blood, [[id number]]  (Normal) Collected:  04/23/18 2050    Lab Status:  Final result Specimen:  Blood from Arm, Right Updated:  04/28/18 2116     Blood Culture No growth at 5 days    Blood Culture - Blood, [[id number]]  (Normal) Collected:  04/23/18 1939    Lab Status:  Final result Specimen:  Blood from Hand, Right Updated:  04/28/18 2116     Blood Culture No growth at 5 days    Urine Culture - Urine, Urine, Clean Catch [[id number]]  (Abnormal)  (Susceptibility) Collected:  04/23/18 2253    Lab Status:  Final result Specimen:  Urine from Urine, Clean Catch Updated:  04/27/18 1135     Urine Culture --      >100,000 CFU/mL Pseudomonas aeruginosa (A)     Comment: Morphological Type 1             >100,000 CFU/mL Pseudomonas aeruginosa (A)     Comment: Morphological Type 2         Susceptibility      Pseudomonas aeruginosa (1)    Pseudomonas aeruginosa (2)       FRENCH    FRENCH       Aztreonam <=8 ug/ml Susceptible    >16 ug/ml Resistant       Cefepime <=8 ug/ml Susceptible    16 ug/ml Intermediate       Ceftazidime 4 ug/ml Susceptible    16 ug/ml Intermediate       Gentamicin <=4 ug/ml Susceptible    <=4 ug/ml Susceptible       Levofloxacin <=2 ug/ml Susceptible    <=2 ug/ml Susceptible       Meropenem <=1 ug/ml Susceptible    <=1 ug/ml Susceptible       Piperacillin + Tazobactam <=16 ug/ml Susceptible    64 ug/ml Susceptible       Tobramycin <=4 ug/ml  Susceptible    <=4 ug/ml Susceptible                      Stool Culture With Yersinia - Stool, Per Rectum [[id number]]  (Normal) Collected:  04/24/18 1350    Lab Status:  Final result Specimen:  Stool from Per Rectum Updated:  04/26/18 1419     Stool Cx w/ Yersinia No Salmonella, Shigella, Campylobacter, E. coli O157, Vibrio, or Yersinia isolated    Clostridium Difficile Toxin - Stool, Per Rectum [[id number]] Collected:  04/24/18 1350    Lab Status:  Final result Specimen:  Stool from Per Rectum Updated:  04/24/18 1520    Narrative:       The following orders were created for panel order Clostridium Difficile Toxin - Stool, Per Rectum.  Procedure                               Abnormality         Status                     ---------                               -----------         ------                     Clostridium Difficile To...[[id number]]  Normal              Final result                 Please view results for these tests on the individual orders.    Clostridium Difficile Toxin, PCR - Stool, Per Rectum [[id number]]  (Normal) Collected:  04/24/18 1350    Lab Status:  Final result Specimen:  Stool from Per Rectum Updated:  04/24/18 1520     C. Difficile Toxins by PCR Not Detected    Narrative:         Performance characteristics of test not established for patients <2 years of age.  Negative for Toxigenic C. Difficile          Imaging Results (last 24 hours)     ** No results found for the last 24 hours. **        Results for orders placed during the hospital encounter of 12/29/17   Adult Transthoracic Echo Complete W/ Cont if Necessary Per Protocol    Addendum · Left ventricular systolic function is normal. Estimated EF = 60%. · There is a Jay tissue aortic valve replacement 25 mm, 7/23/12.  Aortic valve mean pressure gradient is 8.8 mmHg. · Mild mitral valve stenosis is present · Trace mitral valve regurgitation is present · Trace to mild tricuspid valve regurgitation is present.        Linda LLOYD  MD Vaishali 1/3/2018  5:43 PM          Narrative PRELIMINARY TECH FINDINGS ONLY       I have reviewed the medications.    Assessment/Plan   Assessment / Plan     Hospital Problem List     * (Principal)Sepsis    YSABEL (obstructive sleep apnea) (Chronic)    Atrial fibrillation (Chronic)    Overview Signed 1/6/2017  2:47 PM by Hamzah Casillas     1. Atrial fibrillation, October 2010; CHADS2-VASc = 3:  a. Amiodarone therapy, discontinued, November 2013.  b. Terumo TigerPaw left atrial appendage closure, Dr. Edson Roque, 07/23/2012.            Hypertension    History of aortic valve stenosis (Chronic)    Overview Signed 1/6/2017  3:01 PM by Hamzah Casillas     2. Aortic stenosis:   a. Tissue aortic valve replacement (Jay 25) and left atrial appendageal ablation         (Terumo TigerPaw device), Dr. Sandhu, 07/23/2012.          Diabetes mellitus, type II (Chronic)    Mild cognitive impairment (Chronic)    UTI (urinary tract infection)    Overview Signed 4/29/2018  7:31 PM by Randal Burnett MD     Pseudomonas species         Nausea, vomiting, and diarrhea    Hypokalemia    Metabolic encephalopathy    Valvular heart disease (Chronic)    Abnormal findings on diagnostic imaging of lung             Brief Hospital Course to date:  Leobardo Araujo is a 83 y.o. male presenting with confusion, fever, and nausea vomiting and diarrhea.  Urinalysis is abnormal and also has some bladder changes on CT scan with unknown chronicity.  C diff negative.      Assessment & Plan:    Pseudomonas UTI  --antibiotics adjusted  --ID following  --IV merrem for 7-10 days  --labs in am    Confusion  --improving  --? Underlying dementia  --daytime seroquel stopped due to drowsiness  --follow up with [facility]    T2DM  Component      Latest Ref Rng & Units 5/2/2018 5/3/2018 5/3/2018 5/3/2018           8:36 PM  4:46 AM  8:31 AM 11:48 AM   Glucose      70 - 130 mg/dL 103 110 (H) 127 213 (H)   --continue basal/bolus    HTN  --improved  --meds  adjusted    Hypokalemia  --replace    Afib  --continue amio    DVT Prophylaxis:  Heparin SQ    CODE STATUS: Full Code    Disposition: I expect the patient to be discharged to [facility] 1-2 days when ok with ID    Electronically signed by SOLOMON Ramos, 05/03/18, 3:26 PM.

## 2018-05-03 NOTE — PLAN OF CARE
Problem: Fall Risk (Adult)  Intervention: Monitor/Assist with Self Care   18 1600   Activity   Activity Assistance Provided assistance, 1 person     Intervention: Reduce Risk/Promote Restraint Free Environment   18 1600   Safety Management   Environmental Safety Modification assistive device/personal items within reach;clutter free environment maintained;lighting adjusted;mobility aid in reach;room near unit station;room organization consistent   Safety Promotion/Fall Prevention activity supervised;fall prevention program maintained;gait belt;muscle strengthening facilitated;nonskid shoes/slippers when out of bed   Prevent [place] Drop/Fall   Safety/Security Measures chair alarm set     Intervention: Review Medications/Identify Contributors to Fall Risk   18 1600   Safety Management   Medication Review/Management medications reviewed       Goal: Identify Related Risk Factors and Signs and Symptoms   18 0452   Fall Risk (Adult)   Related Risk Factors (Fall Risk) age-related changes;history of falls;fatigue/slow reaction   Signs and Symptoms (Fall Risk) presence of risk factors     Goal: Absence of Fall   18 1707   Fall Risk (Adult)   Absence of Fall making progress toward outcome       Problem: Sepsis/Septic Shock (Adult)  Intervention: Promote Rest/Minimize Oxygen Consumption   18 1200 18 1600   Activity   Activity Management --  ambulated in room   Coping/Psychosocial Interventions   Environmental Support calm environment promoted --      Intervention: Provide Oxygenation/Ventilation/Perfusion Support   18 1600   Activity   Activity Management ambulated in room   Positioning   Head of Bed (HOB) HOB at 90 degrees     Intervention: Support Psychosocial Response to Life-changing Event/Hospitalization   18 1600   Coping/Psychosocial Interventions   Supportive Measures active listening utilized;decision-making supported;positive reinforcement provided;problem solving  facilitated   Psychosocial Support   Family/Support System Care involvement promoted;presence promoted     Intervention: Prevent Infection Progression   05/03/18 1600   Safety Management   Infection Prevention environmental surveillance performed;equipment surfaces disinfected;personal protective equipment utilized     Intervention: Monitor/Manage Perfusion   05/03/18 1600   Safety Management   Medication Review/Management medications reviewed     Intervention: Prevent/Manage DVT/VTE Risk   05/03/18 0800   Interventions   VTE Prevention/Management bilateral;dorsiflexion/plantar flexion performed;sequential compression devices on     Intervention: Manage Bleeding Risk   05/03/18 1707   Safety Interventions   Bleeding Precautions blood pressure closely monitored     Intervention: Provide Initial Aggressive Fluid Resuscitation/Correction of Imbalance   05/03/18 1707   Nutrition Interventions   Fluid/Electrolyte Management electrolyte supplement initiated     Intervention: Optimize Glycemic Control   05/03/18 1707   Nutrition Interventions   Glycemic Management blood glucose monitoring       Goal: Signs and Symptoms of Listed Potential Problems Will be Absent, Minimized or Managed (Sepsis/Septic Shock)   05/03/18 1707   Goal/Outcome Evaluation   Problems Assessed (Sepsis) all   Problems Present (Sepsis) glycemic control impaired

## 2018-05-03 NOTE — PLAN OF CARE
Problem: Patient Care Overview  Goal: Plan of Care Review  Outcome: Ongoing (interventions implemented as appropriate)   05/03/18 6782   Coping/Psychosocial   Plan of Care Reviewed With patient   OTHER   Outcome Summary vss, will comntinue to monitor       Problem: Sepsis/Septic Shock (Adult)  Goal: Signs and Symptoms of Listed Potential Problems Will be Absent, Minimized or Managed (Sepsis/Septic Shock)  Outcome: Ongoing (interventions implemented as appropriate)

## 2018-05-03 NOTE — PROGRESS NOTES
Continued Stay Note  [facility]     Patient Name: Leobardo Araujo  MRN: [id number]  Today's Date: 5/3/2018    Admit Date: 4/23/2018          Discharge Plan     Row Name 05/03/18 1223       Plan    Plan Update    Plan Comments Spoke with Dr. Broussard and patient not medically ready for transfer today. Urine culture results still pending. Notified Massiel with [facility] (P: [phone]) that patient wouldn't be transferring today. She states they cannot hold a bed for patient since [facility] pre-cert expires today. Advises that we should call when patient is medically ready and they can re-submit to [facility] if they have a bed available. Updated patient and daughter Fadumo (by phone) with this information. Unsure of antibiotic plan until urine culture comes back. Patient states he wants to go home at discharge and is willing to do home IV antibiotic infusions. Darlin Cottrell RN [phone]              Discharge Codes    No documentation.       Expected Discharge Date and Time     Expected Discharge Date Expected Discharge Time    May 4, 2018             Darlin Cottrell RN

## 2018-05-03 NOTE — PROGRESS NOTES
[facility] Progress Note        Antibiotics:  Anti-Infectives     Ordered     Dose/Rate Route Frequency Start Stop    05/02/18 0933  metroNIDAZOLE (FLAGYL) tablet 500 mg     Ordering Provider:  Hamzah Broussard MD    500 mg Oral Every 8 Hours Scheduled 05/02/18 1400 05/09/18 1359    05/02/18 0930  DAPTOmycin (CUBICIN) 600 mg in sodium chloride 0.9 % 50 mL IVPB     Ordering Provider:  Hamzah Broussard MD    6 mg/kg × 102 kg  100 mL/hr over 30 Minutes Intravenous Once 05/02/18 1100 05/02/18 1557    05/02/18 0930  micafungin 100 mg/100 mL 0.9% NS IVPB (mbp)     Ordering Provider:  Hamzah Broussard MD    100 mg  over 60 Minutes Intravenous Once 05/02/18 1100 05/02/18 1243    04/27/18 1153  meropenem (MERREM) 1 g/100 mL 0.9% NS VTB (mbp)     Ordering Provider:  Hamzah Broussard MD    1 g  over 3 Hours Intravenous Every 8 Hours 04/27/18 1930 05/07/18 1959    04/27/18 1153  meropenem (MERREM) 1 g/100 mL 0.9% NS VTB (mbp)     Ordering Provider:  Hamzah Broussard MD    1 g  over 30 Minutes Intravenous Once 04/27/18 1315 04/27/18 1700    04/26/18 0814  amoxicillin-clavulanate (AUGMENTIN) 875-125 MG per tablet     Ordering Provider:  Jas Newby MD    1 tablet Oral Every 12 Hours Scheduled 04/26/18 0000 04/28/18 2359    04/23/18 2333  cefTRIAXone (ROCEPHIN) IVPB 1 g     Ordering Provider:  SOLOMON Delatorre    1 g  100 mL/hr over 30 Minutes Intravenous Once 04/23/18 2335 04/24/18 0109          CC: UTI    HPI:  Patient is a 83 y.o. male, known to Dr. Yaniv Jules, with h/o Abnormal LFTs, afib, T2DM, HTN, obesity, and VHD/AVR who presented to [facility] 4/23 by EMS for altered mental status for 2 to 3 days prior to admission.  Per chart, his daughter noted that his face seemed swollen and abdomen bloated along with confusion and anorexia.  He had a fever 101.  Blood cultures and Cdiff are negative.  He had urinary incontinence/frequency and Urine culture is positive for Pseudomonas aeruginosa x 2 sensitivities but both  "sensitive to Merrem. WBC 16,000 with 88% neutrophils, PCT 0.05, lactic acid 1.1, Hgb  A1C 7.8, and K 3.2. His CT scan of abdomen and pelvis showed mild diffuse urinary bladder wall thickening which may be chronic given the associated urinary bladder diverticuli and a 2.4 cm oval area of soft tissue density in the right lung base just above the hemidiaphragm.  He was started on Cefepime.  ID was asked to evaluate and manage his antibiotic therapy.  Changed to Merrem on April 27.    5/3/18 WBC less per nursing but urinalysis not collected May 2 as ordered; nursing reports that have collected at May 3;  Some loose stools, < 3 per patient in last 24 hours and recent CDT negative;  He denies new dysuria/hematuria/pyuria/incontinance, etc..  generally fatigued; otherwise, no new focal symptoms per nursing;  No new pain or problems;  No new urinary symptoms     No headache photophobia or neck stiffness.  No shortness of breath cough or hemoptysis.  No nausea vomiting.  No skin rash    ROS:      5/3/18 No f/c/s. No n/v/d. No rash. No new ADR to Abx.     All other systems negative for acute complaints on a 12 system ROS    PE:   /68 (BP Location: Left arm, Patient Position: Lying)   Pulse 66   Temp 97.9 °F (36.6 °C) (Oral)   Resp 18   Ht 182.9 cm (72\")   Wt 102 kg (225 lb)   SpO2 94%   BMI 30.52 kg/m²     GENERAL: awake, in no acute distress.   HEENT: Normocephalic, atraumatic.  PERRL. EOMI. No conjunctival injection. No icterus. Oropharynx clear without evidence of thrush or exudate. No evidence of peridontal disease.    NECK: Supple without nuchal rigidity. No mass.  LYMPH: No cervical, axillary or inguinal lymphadenopathy.  HEART: RRR; No murmur, rubs, gallops.   LUNGS:diminished at bases bilaterally without wheezing, rales, rhonchi. Normal respiratory effort. Nonlabored. No dullness.  ABDOMEN: Soft, nontender, nondistended. Positive bowel sounds. No rebound or guarding. NO mass or HSM.  EXT:  No cyanosis, " clubbing or edema. No cord.  MSK: FROM without joint effusions noted arms/legs.    SKIN: Warm and dry without cutaneous eruptions on Inspection/palpation.       Peripheral stigmata/phenomena of endocarditis     No CVA tenderness       Laboratory Data      Results from last 7 days  Lab Units 05/03/18  0446 05/02/18  0447 04/28/18  0532   WBC 10*3/mm3 12.27* 18.88* 8.03   HEMOGLOBIN g/dL 11.8* 11.6* 11.4*   HEMATOCRIT % 36.1* 35.6* 34.4*   PLATELETS 10*3/mm3 373 363 236       Results from last 7 days  Lab Units 05/03/18  0446   SODIUM mmol/L 137   POTASSIUM mmol/L 3.4*   CHLORIDE mmol/L 100   CO2 mmol/L 31.0   BUN mg/dL 17   CREATININE mg/dL 0.80   GLUCOSE mg/dL 110*   CALCIUM mg/dL 8.3*       Results from last 7 days  Lab Units 05/03/18  0446   ALK PHOS U/L 84   BILIRUBIN mg/dL 0.4   ALT (SGPT) U/L 29   AST (SGOT) U/L 23               Estimated Creatinine Clearance: 86.5 mL/min (by C-G formula based on SCr of 0.8 mg/dL).      Microbiology:      Radiology:  Imaging Results (last 72 hours)     ** No results found for the last 72 hours. **            Impression:     --Leuokocytosis/LG temps at 99 5/2;  Increased 5/1-5/2 but without new focal symptoms at least as of my visit;  CDT negative recently, no new focal pain and resp exam nonfocal; recheck cultures;  Daptomycin/mycamine x 1 5/2 while cultures pending and monitor for new process or pathogen  --Acute sepsis ; likely urinary source  --Acute Complicated cystitis with recurrent MDR Pseudomonas x 2 UTI.  Last positive cultures was 7/15/17 with Pseudomonas fluorescens/putida sens to Cefepime, Zosyn, Levaquin, and carbapenems.   Cx now positive for Pseudomonas aeruginosa x 2 sens to merrem.   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  --Elevated liver transaminases, mild  --Fever, resolved  --Hypothyroidism  --T2DM, insulin dependent  --VHD/AVR  --Obesity  --Afib/on amiodarone/aspirin  --HTN  --Cipro allergy    PLAN:     --Monitor cultures,  labs, PHYLICIA  --Continue Merrem IV;  Daptomycin/mycamine x 1 5/2 and rechecked cultures; flagyl po  --Length of antibiotics will depend on clinical course, study results and response to therapy  --Monitor IV and IV lines for infections  --D/w patient that antibiotics do not guarantee a cure. Patient understands.  --D/w pharmacy/nursing/Dr. Rios and with family and case management  --Highly complex set of issues with high risk for further serious morbidity and other serious sequela  --His insight is generally poor,he has seen Dr Pacheco before per Dr Newby and he is likely to benefit from further outpatient evaluation by them to optimize any treatment for functional/anatomic issues etc.  --Case management considering options to finish IV Merrem and rehab         Hamzah Broussard MD  5/3/2018

## 2018-05-04 LAB
ALBUMIN SERPL-MCNC: 3.3 G/DL (ref 3.2–4.8)
ALBUMIN/GLOB SERPL: 1.2 G/DL (ref 1.5–2.5)
ALP SERPL-CCNC: 83 U/L (ref 25–100)
ALT SERPL W P-5'-P-CCNC: 36 U/L (ref 7–40)
ANION GAP SERPL CALCULATED.3IONS-SCNC: 5 MMOL/L (ref 3–11)
AST SERPL-CCNC: 28 U/L (ref 0–33)
BASOPHILS # BLD AUTO: 0.03 10*3/MM3 (ref 0–0.2)
BASOPHILS NFR BLD AUTO: 0.4 % (ref 0–1)
BILIRUB SERPL-MCNC: 0.3 MG/DL (ref 0.3–1.2)
BUN BLD-MCNC: 14 MG/DL (ref 9–23)
BUN/CREAT SERPL: 17.5 (ref 7–25)
CALCIUM SPEC-SCNC: 8 MG/DL (ref 8.7–10.4)
CHLORIDE SERPL-SCNC: 102 MMOL/L (ref 99–109)
CO2 SERPL-SCNC: 28 MMOL/L (ref 20–31)
CREAT BLD-MCNC: 0.8 MG/DL (ref 0.6–1.3)
DEPRECATED RDW RBC AUTO: 46.1 FL (ref 37–54)
EOSINOPHIL # BLD AUTO: 0.28 10*3/MM3 (ref 0–0.3)
EOSINOPHIL NFR BLD AUTO: 3.4 % (ref 0–3)
ERYTHROCYTE [DISTWIDTH] IN BLOOD BY AUTOMATED COUNT: 14.2 % (ref 11.3–14.5)
GFR SERPL CREATININE-BSD FRML MDRD: 92 ML/MIN/1.73
GLOBULIN UR ELPH-MCNC: 2.8 GM/DL
GLUCOSE BLD-MCNC: 124 MG/DL (ref 70–100)
GLUCOSE BLDC GLUCOMTR-MCNC: 118 MG/DL (ref 70–130)
GLUCOSE BLDC GLUCOMTR-MCNC: 139 MG/DL (ref 70–130)
GLUCOSE BLDC GLUCOMTR-MCNC: 189 MG/DL (ref 70–130)
GLUCOSE BLDC GLUCOMTR-MCNC: 99 MG/DL (ref 70–130)
HCT VFR BLD AUTO: 33.7 % (ref 38.9–50.9)
HGB BLD-MCNC: 10.9 G/DL (ref 13.1–17.5)
IMM GRANULOCYTES # BLD: 0.02 10*3/MM3 (ref 0–0.03)
IMM GRANULOCYTES NFR BLD: 0.2 % (ref 0–0.6)
LYMPHOCYTES # BLD AUTO: 2.05 10*3/MM3 (ref 0.6–4.8)
LYMPHOCYTES NFR BLD AUTO: 24.7 % (ref 24–44)
MCH RBC QN AUTO: 28.5 PG (ref 27–31)
MCHC RBC AUTO-ENTMCNC: 32.3 G/DL (ref 32–36)
MCV RBC AUTO: 88.2 FL (ref 80–99)
MONOCYTES # BLD AUTO: 0.78 10*3/MM3 (ref 0–1)
MONOCYTES NFR BLD AUTO: 9.4 % (ref 0–12)
NEUTROPHILS # BLD AUTO: 5.16 10*3/MM3 (ref 1.5–8.3)
NEUTROPHILS NFR BLD AUTO: 62.1 % (ref 41–71)
PLATELET # BLD AUTO: 411 10*3/MM3 (ref 150–450)
PMV BLD AUTO: 10.9 FL (ref 6–12)
POTASSIUM BLD-SCNC: 3.8 MMOL/L (ref 3.5–5.5)
PROT SERPL-MCNC: 6.1 G/DL (ref 5.7–8.2)
RBC # BLD AUTO: 3.82 10*6/MM3 (ref 4.2–5.76)
SODIUM BLD-SCNC: 135 MMOL/L (ref 132–146)
WBC NRBC COR # BLD: 8.3 10*3/MM3 (ref 3.5–10.8)

## 2018-05-04 PROCEDURE — 85025 COMPLETE CBC W/AUTO DIFF WBC: CPT | Performed by: NURSE PRACTITIONER

## 2018-05-04 PROCEDURE — 25010000002 HEPARIN (PORCINE) PER 1000 UNITS: Performed by: NURSE PRACTITIONER

## 2018-05-04 PROCEDURE — 80053 COMPREHEN METABOLIC PANEL: CPT | Performed by: INTERNAL MEDICINE

## 2018-05-04 PROCEDURE — 63710000001 INSULIN DETEMIR PER 5 UNITS: Performed by: HOSPITALIST

## 2018-05-04 PROCEDURE — 94799 UNLISTED PULMONARY SVC/PX: CPT

## 2018-05-04 PROCEDURE — 94760 N-INVAS EAR/PLS OXIMETRY 1: CPT

## 2018-05-04 PROCEDURE — 63710000001 INSULIN DETEMIR PER 5 UNITS: Performed by: NURSE PRACTITIONER

## 2018-05-04 PROCEDURE — 25010000002 MEROPENEM: Performed by: INTERNAL MEDICINE

## 2018-05-04 PROCEDURE — 99233 SBSQ HOSP IP/OBS HIGH 50: CPT | Performed by: HOSPITALIST

## 2018-05-04 PROCEDURE — 82962 GLUCOSE BLOOD TEST: CPT

## 2018-05-04 RX ORDER — POLYVINYL ALCOHOL 14 MG/ML
1 SOLUTION/ DROPS OPHTHALMIC
Status: DISCONTINUED | OUTPATIENT
Start: 2018-05-04 | End: 2018-05-07 | Stop reason: HOSPADM

## 2018-05-04 RX ORDER — HYDROCHLOROTHIAZIDE 25 MG/1
25 TABLET ORAL DAILY
Status: DISCONTINUED | OUTPATIENT
Start: 2018-05-04 | End: 2018-05-07 | Stop reason: HOSPADM

## 2018-05-04 RX ADMIN — METRONIDAZOLE 500 MG: 500 TABLET ORAL at 20:02

## 2018-05-04 RX ADMIN — AMLODIPINE BESYLATE 10 MG: 10 TABLET ORAL at 08:12

## 2018-05-04 RX ADMIN — Medication: at 10:04

## 2018-05-04 RX ADMIN — INSULIN DETEMIR 14 UNITS: 100 INJECTION, SOLUTION SUBCUTANEOUS at 08:12

## 2018-05-04 RX ADMIN — ATORVASTATIN CALCIUM 20 MG: 20 TABLET, FILM COATED ORAL at 20:02

## 2018-05-04 RX ADMIN — LOSARTAN POTASSIUM 100 MG: 50 TABLET ORAL at 08:12

## 2018-05-04 RX ADMIN — AMIODARONE HYDROCHLORIDE 200 MG: 200 TABLET ORAL at 08:12

## 2018-05-04 RX ADMIN — METRONIDAZOLE 500 MG: 500 TABLET ORAL at 13:45

## 2018-05-04 RX ADMIN — MEROPENEM 1 G: 1 INJECTION, POWDER, FOR SOLUTION INTRAVENOUS at 20:01

## 2018-05-04 RX ADMIN — METRONIDAZOLE 500 MG: 500 TABLET ORAL at 05:54

## 2018-05-04 RX ADMIN — HYDROCHLOROTHIAZIDE 25 MG: 25 TABLET ORAL at 17:09

## 2018-05-04 RX ADMIN — INSULIN LISPRO 2 UNITS: 100 INJECTION, SOLUTION INTRAVENOUS; SUBCUTANEOUS at 11:48

## 2018-05-04 RX ADMIN — INSULIN DETEMIR 16 UNITS: 100 INJECTION, SOLUTION SUBCUTANEOUS at 21:19

## 2018-05-04 RX ADMIN — Medication 1 CAPSULE: at 08:12

## 2018-05-04 RX ADMIN — LEVOTHYROXINE SODIUM 50 MCG: 50 TABLET ORAL at 05:54

## 2018-05-04 RX ADMIN — MEROPENEM 1 G: 1 INJECTION, POWDER, FOR SOLUTION INTRAVENOUS at 11:48

## 2018-05-04 RX ADMIN — QUETIAPINE FUMARATE 25 MG: 25 TABLET ORAL at 20:02

## 2018-05-04 RX ADMIN — TERAZOSIN HYDROCHLORIDE 1 MG: 1 CAPSULE ORAL at 20:02

## 2018-05-04 RX ADMIN — HEPARIN SODIUM 5000 UNITS: 5000 INJECTION, SOLUTION INTRAVENOUS; SUBCUTANEOUS at 08:12

## 2018-05-04 RX ADMIN — ASPIRIN 325 MG ORAL TABLET 325 MG: 325 PILL ORAL at 08:12

## 2018-05-04 RX ADMIN — MEROPENEM 1 G: 1 INJECTION, POWDER, FOR SOLUTION INTRAVENOUS at 04:00

## 2018-05-04 RX ADMIN — HEPARIN SODIUM 5000 UNITS: 5000 INJECTION, SOLUTION INTRAVENOUS; SUBCUTANEOUS at 20:03

## 2018-05-04 NOTE — PROGRESS NOTES
[facility] Medicine Services  PROGRESS NOTE    Patient Name: Leobardo Araujo  : 1934  MRN: [id number]    Date of Admission: 2018  Length of Stay: 10  Primary Care Physician: Edy Goodman MD    Subjective   Subjective     CC:  Confusion, UTI    HPI:  Up in chair, pleasantly confused. Denies any pain or discomfort.  No family present. Afebrile.    Review of Systems  Limited as above, pt is confused and rarely able to complete his sentence because he cannot remember what he was trying to say.    Otherwise ROS is negative except as mentioned in the HPI.    Objective   Objective     Vital Signs:   Temp:  [97 °F (36.1 °C)-97.9 °F (36.6 °C)] 97 °F (36.1 °C)  Heart Rate:  [62-69] 65  Resp:  [16-18] 18  BP: (121-172)/(54-72) 172/66  FiO2 (%):  [28 %] 28 %        Physical Exam:  Constitutional: No acute distress, awake, alert  HENT: NCAT, mucous membranes moist  Respiratory: Clear to auscultation bilaterally, respiratory effort normal   Cardiovascular: RRR, no murmurs, rubs, or gallops, palpable pedal pulses bilaterally  Gastrointestinal: Positive bowel sounds, soft, nontender, nondistended  Musculoskeletal: No bilateral ankle edema  Psychiatric: Appropriate affect  Neurologic: Oriented x 1, strength symmetric in all extremities, speech clear  Skin: No rashes    Results Reviewed:  I have personally reviewed current lab, radiology, and data and agree.      Results from last 7 days  Lab Units 18   WBC 10*3/mm3 8.30 12.27* 18.88*   HEMOGLOBIN g/dL 10.9* 11.8* 11.6*   HEMATOCRIT % 33.7* 36.1* 35.6*   PLATELETS 10*3/mm3 411 373 363       Results from last 7 days  Lab Units 18  0530 18  1821 186 18   SODIUM mmol/L 135  --  137 135   POTASSIUM mmol/L 3.8 4.2 3.4* 3.5   CHLORIDE mmol/L 102  --  100 99   CO2 mmol/L 28.0  --  31.0 30.0   BUN mg/dL 14  --  17 17   CREATININE mg/dL 0.80  --  0.80 0.90   GLUCOSE mg/dL  124*  --  110* 109*   CALCIUM mg/dL 8.0*  --  8.3* 8.1*   ALT (SGPT) U/L 36  --  29 28   AST (SGOT) U/L 28  --  23 19     Estimated Creatinine Clearance: 86.9 mL/min (by C-G formula based on SCr of 0.8 mg/dL).  No results found for: BNP  No results found for: PHART    Microbiology Results Abnormal     Procedure Component Value - Date/Time    Blood Culture - Blood, [[id number]]  (Normal) Collected:  05/02/18 1011    Lab Status:  Preliminary result Specimen:  Blood from Arm, Left Updated:  05/04/18 1045     Blood Culture No growth at 2 days    Blood Culture - Blood, [[id number]]  (Normal) Collected:  05/02/18 1011    Lab Status:  Preliminary result Specimen:  Blood from Hand, Right Updated:  05/04/18 1045     Blood Culture No growth at 2 days    Urine Culture - Urine, Urine, Clean Catch [[id number]]  (Normal) Collected:  05/03/18 0805    Lab Status:  Preliminary result Specimen:  Urine from Urine, Clean Catch Updated:  05/04/18 0730     Urine Culture No growth    Blood Culture - Blood, [[id number]]  (Normal) Collected:  04/23/18 2050    Lab Status:  Final result Specimen:  Blood from Arm, Right Updated:  04/28/18 2116     Blood Culture No growth at 5 days    Blood Culture - Blood, [[id number]]  (Normal) Collected:  04/23/18 1939    Lab Status:  Final result Specimen:  Blood from Hand, Right Updated:  04/28/18 2116     Blood Culture No growth at 5 days    Urine Culture - Urine, Urine, Clean Catch [[id number]]  (Abnormal)  (Susceptibility) Collected:  04/23/18 2253    Lab Status:  Final result Specimen:  Urine from Urine, Clean Catch Updated:  04/27/18 1135     Urine Culture --      >100,000 CFU/mL Pseudomonas aeruginosa (A)     Comment: Morphological Type 1             >100,000 CFU/mL Pseudomonas aeruginosa (A)     Comment: Morphological Type 2         Susceptibility      Pseudomonas aeruginosa (1)    Pseudomonas aeruginosa (2)       FRENCH    FRENCH       Aztreonam <=8 ug/ml Susceptible    >16 ug/ml Resistant       Cefepime <=8  ug/ml Susceptible    16 ug/ml Intermediate       Ceftazidime 4 ug/ml Susceptible    16 ug/ml Intermediate       Gentamicin <=4 ug/ml Susceptible    <=4 ug/ml Susceptible       Levofloxacin <=2 ug/ml Susceptible    <=2 ug/ml Susceptible       Meropenem <=1 ug/ml Susceptible    <=1 ug/ml Susceptible       Piperacillin + Tazobactam <=16 ug/ml Susceptible    64 ug/ml Susceptible       Tobramycin <=4 ug/ml Susceptible    <=4 ug/ml Susceptible                      Stool Culture With Yersinia - Stool, Per Rectum [[id number]]  (Normal) Collected:  04/24/18 1350    Lab Status:  Final result Specimen:  Stool from Per Rectum Updated:  04/26/18 1419     Stool Cx w/ Yersinia No Salmonella, Shigella, Campylobacter, E. coli O157, Vibrio, or Yersinia isolated    Clostridium Difficile Toxin - Stool, Per Rectum [[id number]] Collected:  04/24/18 1350    Lab Status:  Final result Specimen:  Stool from Per Rectum Updated:  04/24/18 1520    Narrative:       The following orders were created for panel order Clostridium Difficile Toxin - Stool, Per Rectum.  Procedure                               Abnormality         Status                     ---------                               -----------         ------                     Clostridium Difficile To...[[id number]]  Normal              Final result                 Please view results for these tests on the individual orders.    Clostridium Difficile Toxin, PCR - Stool, Per Rectum [[id number]]  (Normal) Collected:  04/24/18 1350    Lab Status:  Final result Specimen:  Stool from Per Rectum Updated:  04/24/18 1520     C. Difficile Toxins by PCR Not Detected    Narrative:         Performance characteristics of test not established for patients <2 years of age.  Negative for Toxigenic C. Difficile          Imaging Results (last 24 hours)     ** No results found for the last 24 hours. **        Results for orders placed during the hospital encounter of 12/29/17   Adult Transthoracic Echo Complete  W/ Cont if Necessary Per Protocol    Addendum · Left ventricular systolic function is normal. Estimated EF = 60%. · There is a Jay tissue aortic valve replacement 25 mm, 7/23/12.  Aortic valve mean pressure gradient is 8.8 mmHg. · Mild mitral valve stenosis is present · Trace mitral valve regurgitation is present · Trace to mild tricuspid valve regurgitation is present.        Linda Tom MD 1/3/2018  5:43 PM          Narrative PRELIMINARY TECH FINDINGS ONLY       I have reviewed the medications.    Assessment/Plan   Assessment / Plan     Hospital Problem List     * (Principal)Sepsis    YSABEL (obstructive sleep apnea) (Chronic)    Atrial fibrillation (Chronic)    Overview Signed 1/6/2017  2:47 PM by Hamzah Casillas     1. Atrial fibrillation, October 2010; CHADS2-VASc = 3:  a. Amiodarone therapy, discontinued, November 2013.  b. Terumo TigerPaw left atrial appendage closure, Dr. Edson Roque, 07/23/2012.            Hypertension    History of aortic valve stenosis (Chronic)    Overview Signed 1/6/2017  3:01 PM by Hamzah Casillas     2. Aortic stenosis:   a. Tissue aortic valve replacement (Jay 25) and left atrial appendageal ablation         (Terumo TigerPaw device), Dr. Sandhu, 07/23/2012.          Diabetes mellitus, type II (Chronic)    Mild cognitive impairment (Chronic)    UTI (urinary tract infection)    Overview Signed 4/29/2018  7:31 PM by Randal Burnett MD     Pseudomonas species         Nausea, vomiting, and diarrhea    Hypokalemia    Metabolic encephalopathy    Valvular heart disease (Chronic)    Abnormal findings on diagnostic imaging of lung             Brief Hospital Course to date:  Leobardo Araujo is a 83 y.o. male presenting with confusion, fever, and nausea vomiting and diarrhea.  Urinalysis is abnormal and also has some bladder changes on CT scan with unknown chronicity.  C diff negative.        Assessment & Plan:     Pseudomonas UTI  --antibiotics adjusted  --ID following  --IV  merrem for 7-10 days  --had significant leukocytosis on 5/2, abx adjusted by Dr Broussard, repeat Ucx on 5/3 neg so far  --d/w Dr Broussard and CM, pt will likely be here over the weekend and we'll follow cultures. He should be medically stable by Mon. CM to restart precert. Per PT note, pt ambulated about 300ft, so not sure if he will qualifies for rehab. CM will d/w family.     Confusion, likely metabolic encephalopathy with poss underlying dementia  --baseline unclear  --daytime seroquel stopped due to drowsiness  --follow up with [facility]     T2DM  -continue basal/bolus, adjust PRN     HTN  --suboptimal  --meds adjusted, resume home Hydrodiuril     Hypokalemia  --replace     Afib  --continue amio     DVT Prophylaxis:  Heparin SQ     CODE STATUS: Full Code     Disposition: [facility] vs home with HH         Electronically signed by Anne Rios MD, 05/04/18, 1:56 PM.

## 2018-05-04 NOTE — PROGRESS NOTES
[facility] Progress Note        Antibiotics:  Anti-Infectives     Ordered     Dose/Rate Route Frequency Start Stop    05/02/18 0933  metroNIDAZOLE (FLAGYL) tablet 500 mg     Ordering Provider:  Hamzah Broussard MD    500 mg Oral Every 8 Hours Scheduled 05/02/18 1400 05/09/18 1359    05/02/18 0930  DAPTOmycin (CUBICIN) 600 mg in sodium chloride 0.9 % 50 mL IVPB     Ordering Provider:  Hamzah Broussard MD    6 mg/kg × 102 kg  100 mL/hr over 30 Minutes Intravenous Once 05/02/18 1100 05/02/18 1557    05/02/18 0930  micafungin 100 mg/100 mL 0.9% NS IVPB (mbp)     Ordering Provider:  Hamzah Broussard MD    100 mg  over 60 Minutes Intravenous Once 05/02/18 1100 05/02/18 1243    04/27/18 1153  meropenem (MERREM) 1 g/100 mL 0.9% NS VTB (mbp)     Ordering Provider:  Hamzah Broussard MD    1 g  over 3 Hours Intravenous Every 8 Hours 04/27/18 1930 05/07/18 1959    04/27/18 1153  meropenem (MERREM) 1 g/100 mL 0.9% NS VTB (mbp)     Ordering Provider:  Hamzah Broussard MD    1 g  over 30 Minutes Intravenous Once 04/27/18 1315 04/27/18 1700    04/26/18 0814  amoxicillin-clavulanate (AUGMENTIN) 875-125 MG per tablet     Ordering Provider:  Jas Newby MD    1 tablet Oral Every 12 Hours Scheduled 04/26/18 0000 04/28/18 2359    04/23/18 2333  cefTRIAXone (ROCEPHIN) IVPB 1 g     Ordering Provider:  SOLOMON Delatorre    1 g  100 mL/hr over 30 Minutes Intravenous Once 04/23/18 2335 04/24/18 0109          CC: UTI    HPI:  Patient is a 83 y.o. male, known to Dr. Yaniv Jules, with h/o Abnormal LFTs, afib, T2DM, HTN, obesity, and VHD/AVR who presented to [facility] 4/23 by EMS for altered mental status for 2 to 3 days prior to admission.  Per chart, his daughter noted that his face seemed swollen and abdomen bloated along with confusion and anorexia.  He had a fever 101.  Blood cultures and Cdiff are negative.  He had urinary incontinence/frequency and Urine culture is positive for Pseudomonas aeruginosa x 2 sensitivities but both  "sensitive to Merrem. WBC 16,000 with 88% neutrophils, PCT 0.05, lactic acid 1.1, Hgb  A1C 7.8, and K 3.2. His CT scan of abdomen and pelvis showed mild diffuse urinary bladder wall thickening which may be chronic given the associated urinary bladder diverticuli and a 2.4 cm oval area of soft tissue density in the right lung base just above the hemidiaphragm.  He was started on Cefepime.  ID was asked to evaluate and manage his antibiotic therapy.  Changed to Merrem on April 27.    5/4/18 WBC normalized and urinalysis with hematuria/pyuria but culture negative so far;   less loose stools, < 3 per patient in last 24 hours and recent CDT negative;  He denies new dysuria/hematuria/pyuria/incontinance, etc..  generally fatigued; otherwise, no new focal symptoms per nursing;  No new pain or problems;  No new urinary symptoms     No headache photophobia or neck stiffness.  No shortness of breath cough or hemoptysis.  No nausea vomiting.  No skin rash    ROS:      5/4/18 No f/c/s. No n/v/d. No rash. No new ADR to Abx.     All other systems negative for acute complaints on a 12 system ROS    PE:   /66 (BP Location: Left arm, Patient Position: Lying)   Pulse 65   Temp 97 °F (36.1 °C) (Oral)   Resp 18   Ht 182.9 cm (72\")   Wt 103 kg (227 lb 9.6 oz)   SpO2 95%   BMI 30.87 kg/m²     GENERAL: awake, in no acute distress.   HEENT: Normocephalic, atraumatic.  PERRL. EOMI. No conjunctival injection. No icterus. Oropharynx clear without evidence of thrush or exudate. No evidence of peridontal disease.    NECK: Supple without nuchal rigidity. No mass.  LYMPH: No cervical, axillary or inguinal lymphadenopathy.  HEART: RRR; No murmur, rubs, gallops.   LUNGS:diminished at bases bilaterally without wheezing, rales, rhonchi. Normal respiratory effort. Nonlabored. No dullness.  ABDOMEN: Soft, nontender, nondistended. Positive bowel sounds. No rebound or guarding. NO mass or HSM.  EXT:  No cyanosis, clubbing or edema. No " cord.  MSK: FROM without joint effusions noted arms/legs.    SKIN: Warm and dry without cutaneous eruptions on Inspection/palpation.       Peripheral stigmata/phenomena of endocarditis     No CVA tenderness       Laboratory Data      Results from last 7 days  Lab Units 05/04/18  0530 05/03/18  0446 05/02/18  0447   WBC 10*3/mm3 8.30 12.27* 18.88*   HEMOGLOBIN g/dL 10.9* 11.8* 11.6*   HEMATOCRIT % 33.7* 36.1* 35.6*   PLATELETS 10*3/mm3 411 373 363       Results from last 7 days  Lab Units 05/04/18  0530   SODIUM mmol/L 135   POTASSIUM mmol/L 3.8   CHLORIDE mmol/L 102   CO2 mmol/L 28.0   BUN mg/dL 14   CREATININE mg/dL 0.80   GLUCOSE mg/dL 124*   CALCIUM mg/dL 8.0*       Results from last 7 days  Lab Units 05/04/18  0530   ALK PHOS U/L 83   BILIRUBIN mg/dL 0.3   ALT (SGPT) U/L 36   AST (SGOT) U/L 28               Estimated Creatinine Clearance: 86.9 mL/min (by C-G formula based on SCr of 0.8 mg/dL).      Microbiology:      Radiology:  Imaging Results (last 72 hours)     ** No results found for the last 72 hours. **            Impression:     --Leuokocytosis/LG temps at 99 5/2;  Increased 5/1-5/2 but without new focal symptoms at least as of my visit;  CDT negative recently, no new focal pain and resp exam nonfocal; recheck cultures;  Daptomycin/mycamine x 1 5/2 while cultures pending and monitor for new process or pathogen  --Acute sepsis ; likely urinary source  --Acute Complicated cystitis with recurrent MDR Pseudomonas x 2 UTI.  Last positive cultures was 7/15/17 with Pseudomonas fluorescens/putida sens to Cefepime, Zosyn, Levaquin, and carbapenems.   Cx now positive for Pseudomonas aeruginosa x 2 sens to merrem.   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  --Elevated liver transaminases, mild  --Fever, resolved  --Hypothyroidism  --T2DM, insulin dependent  --VHD/AVR  --Obesity  --Afib/on amiodarone/aspirin  --HTN  --Cipro allergy    PLAN:     --Monitor cultures, labs, PHYLICIA  --Continue  Merrem IV;  Daptomycin/mycamine x 1 5/2 and rechecked cultures; flagyl po  --Length of antibiotics will depend on clinical course, study results and response to therapy  --Monitor IV and IV lines for infections  --D/w patient that antibiotics do not guarantee a cure. Patient understands.  --D/w pharmacy/nursing/Dr. Rios and with family and case management  --Highly complex set of issues with high risk for further serious morbidity and other serious sequela  --His insight is generally poor,he has seen Dr Pacheco before per Dr Newby and he is likely to benefit from further outpatient evaluation by them to optimize any treatment for functional/anatomic issues etc.  --Case management considering options to finish IV Merrem and rehab         Hamzah Broussard MD  5/4/2018

## 2018-05-04 NOTE — PROGRESS NOTES
Continued Stay Note  [facility]     Patient Name: Leobardo Araujo  MRN: [id number]  Today's Date: 5/4/2018    Admit Date: 4/23/2018          Discharge Plan     Row Name 05/04/18 1458       Plan    Plan Home with family support    Plan Comments Spoke with Dr. Rios and patient's daughter Fadumo. Anticipate that last dose of IV merrem will be on Monday, 5/7. [facility] unable to offer a bed as IV antibiotics are almost complete and patient is not requiring PT/OT. Discussed with daughter Fadumo and she will talk with her siblings over the weekend and formulate a plan for someone to stay with patient 24/7 at his home or have him stay in one of their homes for at least a few days after discharge. Patient somewhat confused today per Dr. Rios and Fadumo, but could clear up once he is out of the hospital. Also talked briefly about possible need for long-term care, but would like to give patient a chance to clear up S/P this 13-day hospitalization. CM will follow up on Monday. Darlin Cottrell RN [phone]               Discharge Codes    No documentation.       Expected Discharge Date and Time     Expected Discharge Date Expected Discharge Time    May 8, 2018             Darlin Cottrell RN

## 2018-05-05 LAB
BACTERIA SPEC AEROBE CULT: NORMAL
GLUCOSE BLDC GLUCOMTR-MCNC: 118 MG/DL (ref 70–130)
GLUCOSE BLDC GLUCOMTR-MCNC: 162 MG/DL (ref 70–130)
GLUCOSE BLDC GLUCOMTR-MCNC: 176 MG/DL (ref 70–130)
GLUCOSE BLDC GLUCOMTR-MCNC: 287 MG/DL (ref 70–130)

## 2018-05-05 PROCEDURE — 99233 SBSQ HOSP IP/OBS HIGH 50: CPT | Performed by: NURSE PRACTITIONER

## 2018-05-05 PROCEDURE — 25010000002 HEPARIN (PORCINE) PER 1000 UNITS: Performed by: NURSE PRACTITIONER

## 2018-05-05 PROCEDURE — 63710000001 INSULIN DETEMIR PER 5 UNITS: Performed by: HOSPITALIST

## 2018-05-05 PROCEDURE — 82962 GLUCOSE BLOOD TEST: CPT

## 2018-05-05 PROCEDURE — 25010000002 MEROPENEM: Performed by: INTERNAL MEDICINE

## 2018-05-05 RX ADMIN — MEROPENEM 1 G: 1 INJECTION, POWDER, FOR SOLUTION INTRAVENOUS at 22:02

## 2018-05-05 RX ADMIN — LOSARTAN POTASSIUM 100 MG: 50 TABLET ORAL at 08:52

## 2018-05-05 RX ADMIN — ATORVASTATIN CALCIUM 20 MG: 20 TABLET, FILM COATED ORAL at 21:31

## 2018-05-05 RX ADMIN — LEVOTHYROXINE SODIUM 50 MCG: 50 TABLET ORAL at 05:07

## 2018-05-05 RX ADMIN — HYDROCHLOROTHIAZIDE 25 MG: 25 TABLET ORAL at 08:53

## 2018-05-05 RX ADMIN — QUETIAPINE FUMARATE 25 MG: 25 TABLET ORAL at 21:32

## 2018-05-05 RX ADMIN — ASPIRIN 325 MG ORAL TABLET 325 MG: 325 PILL ORAL at 08:52

## 2018-05-05 RX ADMIN — Medication: at 08:54

## 2018-05-05 RX ADMIN — HEPARIN SODIUM 5000 UNITS: 5000 INJECTION, SOLUTION INTRAVENOUS; SUBCUTANEOUS at 08:53

## 2018-05-05 RX ADMIN — INSULIN DETEMIR 16 UNITS: 100 INJECTION, SOLUTION SUBCUTANEOUS at 21:31

## 2018-05-05 RX ADMIN — INSULIN LISPRO 3 UNITS: 100 INJECTION, SOLUTION INTRAVENOUS; SUBCUTANEOUS at 22:00

## 2018-05-05 RX ADMIN — HEPARIN SODIUM 5000 UNITS: 5000 INJECTION, SOLUTION INTRAVENOUS; SUBCUTANEOUS at 21:31

## 2018-05-05 RX ADMIN — TERAZOSIN HYDROCHLORIDE 1 MG: 1 CAPSULE ORAL at 21:31

## 2018-05-05 RX ADMIN — AMLODIPINE BESYLATE 10 MG: 10 TABLET ORAL at 08:53

## 2018-05-05 RX ADMIN — MEROPENEM 1 G: 1 INJECTION, POWDER, FOR SOLUTION INTRAVENOUS at 03:54

## 2018-05-05 RX ADMIN — INSULIN LISPRO 2 UNITS: 100 INJECTION, SOLUTION INTRAVENOUS; SUBCUTANEOUS at 11:55

## 2018-05-05 RX ADMIN — AMIODARONE HYDROCHLORIDE 200 MG: 200 TABLET ORAL at 08:53

## 2018-05-05 RX ADMIN — Medication 1 CAPSULE: at 08:53

## 2018-05-05 RX ADMIN — METRONIDAZOLE 500 MG: 500 TABLET ORAL at 05:07

## 2018-05-05 RX ADMIN — METRONIDAZOLE 500 MG: 500 TABLET ORAL at 21:31

## 2018-05-05 RX ADMIN — METRONIDAZOLE 500 MG: 500 TABLET ORAL at 15:13

## 2018-05-05 RX ADMIN — INSULIN LISPRO 2 UNITS: 100 INJECTION, SOLUTION INTRAVENOUS; SUBCUTANEOUS at 17:10

## 2018-05-05 RX ADMIN — INSULIN DETEMIR 16 UNITS: 100 INJECTION, SOLUTION SUBCUTANEOUS at 08:53

## 2018-05-05 RX ADMIN — MEROPENEM 1 G: 1 INJECTION, POWDER, FOR SOLUTION INTRAVENOUS at 11:55

## 2018-05-05 NOTE — PROGRESS NOTES
"    [facility] Medicine Services  PROGRESS NOTE    Patient Name: Leobardo Araujo  : 1934  MRN: [id number]    Date of Admission: 2018  Length of Stay: 11  Primary Care Physician: Edy Goodman MD    Subjective   Subjective     CC:  Confusion, UTI    HPI:  No issues overnight per nursing  \"Guess I will be here for the rest of my life\"    Review of Systems   Unable to perform ROS: Mental status change   Unreliable due to poor historian    Otherwise ROS is negative except as mentioned in the HPI.    Objective   Objective     Vital Signs:   Temp:  [96.4 °F (35.8 °C)-98 °F (36.7 °C)] 98 °F (36.7 °C)  Heart Rate:  [60-66] 63  Resp:  [18] 18  BP: (136-147)/(77-80) 136/77        Physical Exam:  Constitutional: No acute distress, awake, alert, up in chair eating breakfast  HENT: NCAT, mucous membranes moist  Respiratory: Clear to auscultation bilaterally, respiratory effort normal   Cardiovascular: RRR, no murmurs, rubs, or gallops, palpable pedal pulses bilaterally  Gastrointestinal: Positive bowel sounds, soft, nontender, nondistended  Musculoskeletal: No bilateral ankle edema  Psychiatric: Appropriate affect  Neurologic: Oriented x 1, strength symmetric in all extremities, speech clear  Skin: No rashes    Results Reviewed:  I have personally reviewed current lab, radiology, and data and agree.      Results from last 7 days  Lab Units 18  0530 18   WBC 10*3/mm3 8.30 12.27* 18.88*   HEMOGLOBIN g/dL 10.9* 11.8* 11.6*   HEMATOCRIT % 33.7* 36.1* 35.6*   PLATELETS 10*3/mm3 411 373 363       Results from last 7 days  Lab Units 18  0530 18  1821 186 187   SODIUM mmol/L 135  --  137 135   POTASSIUM mmol/L 3.8 4.2 3.4* 3.5   CHLORIDE mmol/L 102  --  100 99   CO2 mmol/L 28.0  --  31.0 30.0   BUN mg/dL 14  --  17 17   CREATININE mg/dL 0.80  --  0.80 0.90   GLUCOSE mg/dL 124*  --  110* 109*   CALCIUM mg/dL 8.0*  --  8.3* 8.1*   ALT " (SGPT) U/L 36  --  29 28   AST (SGOT) U/L 28  --  23 19     Estimated Creatinine Clearance: 86.9 mL/min (by C-G formula based on SCr of 0.8 mg/dL).      Microbiology Results Abnormal     Procedure Component Value - Date/Time    Urine Culture - Urine, Urine, Clean Catch [[id number]]  (Normal) Collected:  05/03/18 0805    Lab Status:  Final result Specimen:  Urine from Urine, Clean Catch Updated:  05/05/18 0756     Urine Culture No growth at 2 days    Blood Culture - Blood, [[id number]]  (Normal) Collected:  05/02/18 1011    Lab Status:  Preliminary result Specimen:  Blood from Arm, Left Updated:  05/04/18 1045     Blood Culture No growth at 2 days    Blood Culture - Blood, [[id number]]  (Normal) Collected:  05/02/18 1011    Lab Status:  Preliminary result Specimen:  Blood from Hand, Right Updated:  05/04/18 1045     Blood Culture No growth at 2 days    Blood Culture - Blood, [[id number]]  (Normal) Collected:  04/23/18 2050    Lab Status:  Final result Specimen:  Blood from Arm, Right Updated:  04/28/18 2116     Blood Culture No growth at 5 days    Blood Culture - Blood, [[id number]]  (Normal) Collected:  04/23/18 1939    Lab Status:  Final result Specimen:  Blood from Hand, Right Updated:  04/28/18 2116     Blood Culture No growth at 5 days    Urine Culture - Urine, Urine, Clean Catch [[id number]]  (Abnormal)  (Susceptibility) Collected:  04/23/18 2253    Lab Status:  Final result Specimen:  Urine from Urine, Clean Catch Updated:  04/27/18 1135     Urine Culture --      >100,000 CFU/mL Pseudomonas aeruginosa (A)     Comment: Morphological Type 1             >100,000 CFU/mL Pseudomonas aeruginosa (A)     Comment: Morphological Type 2         Susceptibility      Pseudomonas aeruginosa (1)    Pseudomonas aeruginosa (2)       FRENCH    FRENCH       Aztreonam <=8 ug/ml Susceptible    >16 ug/ml Resistant       Cefepime <=8 ug/ml Susceptible    16 ug/ml Intermediate       Ceftazidime 4 ug/ml Susceptible    16 ug/ml Intermediate        Gentamicin <=4 ug/ml Susceptible    <=4 ug/ml Susceptible       Levofloxacin <=2 ug/ml Susceptible    <=2 ug/ml Susceptible       Meropenem <=1 ug/ml Susceptible    <=1 ug/ml Susceptible       Piperacillin + Tazobactam <=16 ug/ml Susceptible    64 ug/ml Susceptible       Tobramycin <=4 ug/ml Susceptible    <=4 ug/ml Susceptible                      Stool Culture With Yersinia - Stool, Per Rectum [[id number]]  (Normal) Collected:  04/24/18 1350    Lab Status:  Final result Specimen:  Stool from Per Rectum Updated:  04/26/18 1419     Stool Cx w/ Yersinia No Salmonella, Shigella, Campylobacter, E. coli O157, Vibrio, or Yersinia isolated    Clostridium Difficile Toxin - Stool, Per Rectum [[id number]] Collected:  04/24/18 1350    Lab Status:  Final result Specimen:  Stool from Per Rectum Updated:  04/24/18 1520    Narrative:       The following orders were created for panel order Clostridium Difficile Toxin - Stool, Per Rectum.  Procedure                               Abnormality         Status                     ---------                               -----------         ------                     Clostridium Difficile To...[[id number]]  Normal              Final result                 Please view results for these tests on the individual orders.    Clostridium Difficile Toxin, PCR - Stool, Per Rectum [[id number]]  (Normal) Collected:  04/24/18 1350    Lab Status:  Final result Specimen:  Stool from Per Rectum Updated:  04/24/18 1520     C. Difficile Toxins by PCR Not Detected    Narrative:         Performance characteristics of test not established for patients <2 years of age.  Negative for Toxigenic C. Difficile          Imaging Results (last 24 hours)     ** No results found for the last 24 hours. **        Results for orders placed during the hospital encounter of 12/29/17   Adult Transthoracic Echo Complete W/ Cont if Necessary Per Protocol    Addendum · Left ventricular systolic function is normal. Estimated EF =  60%. · There is a Jay tissue aortic valve replacement 25 mm, 7/23/12.  Aortic valve mean pressure gradient is 8.8 mmHg. · Mild mitral valve stenosis is present · Trace mitral valve regurgitation is present · Trace to mild tricuspid valve regurgitation is present.        Linda Tom MD 1/3/2018  5:43 PM          Narrative PRELIMINARY TECH FINDINGS ONLY       I have reviewed the medications.    Assessment/Plan   Assessment / Plan     Hospital Problem List     * (Principal)Sepsis    YSABEL (obstructive sleep apnea) (Chronic)    Atrial fibrillation (Chronic)    Overview Signed 1/6/2017  2:47 PM by Hamzah Casillas     1. Atrial fibrillation, October 2010; CHADS2-VASc = 3:  a. Amiodarone therapy, discontinued, November 2013.  b. Terumo TigerPaw left atrial appendage closure, Dr. Edson Roque, 07/23/2012.            Hypertension    History of aortic valve stenosis (Chronic)    Overview Signed 1/6/2017  3:01 PM by Hamzah Casillas     2. Aortic stenosis:   a. Tissue aortic valve replacement (Jay 25) and left atrial appendageal ablation         (Terumo TigerPaw device), Dr. Sandhu, 07/23/2012.          Diabetes mellitus, type II (Chronic)    Mild cognitive impairment (Chronic)    UTI (urinary tract infection)    Overview Signed 4/29/2018  7:31 PM by Randal Burnett MD     Pseudomonas species         Nausea, vomiting, and diarrhea    Hypokalemia    Metabolic encephalopathy    Valvular heart disease (Chronic)    Abnormal findings on diagnostic imaging of lung             Brief Hospital Course to date:  Leobardo Araujo is a 83 y.o. male presenting with confusion, fever, and nausea vomiting and diarrhea.  Urinalysis is abnormal and also has some bladder changes on CT scan with unknown chronicity.  C diff negative.      Assessment & Plan:    Pseudomonas UTI  --ID following  --IV merrem planned through weekend  --final on repeat urine culture is negative  --follow up with urology as outpt.  Has seen Junior in the  past    Confusion  --waxes and wanes, unknown baseline  --? Underlying dementia  --daytime seroquel stopped due to drowsiness  --follow up with Chevy    T2DM  Component      Latest Ref Rng & Units 5/4/2018 5/4/2018 5/4/2018 5/5/2018          11:30 AM  4:35 PM  8:48 PM    Glucose      70 - 130 mg/dL 189 (H) 99 139 (H) 118   --continue basal/bolus    HTN  --stable  --meds adjusted    Hypokalemia  --resolved    Afib  --continue amio    DVT Prophylaxis:  Heparin SQ    CODE STATUS: Full Code    Disposition: I expect the patient to be discharged to home with children/sitters Monday 5.7.18    Electronically signed by SOLOMON Ramos, 05/05/18, 9:37 AM.

## 2018-05-05 NOTE — PROGRESS NOTES
[facility] Progress Note        Antibiotics:  Anti-Infectives     Ordered     Dose/Rate Route Frequency Start Stop    05/02/18 0933  metroNIDAZOLE (FLAGYL) tablet 500 mg     Ordering Provider:  Hamzah Broussard MD    500 mg Oral Every 8 Hours Scheduled 05/02/18 1400 05/09/18 1359    05/02/18 0930  DAPTOmycin (CUBICIN) 600 mg in sodium chloride 0.9 % 50 mL IVPB     Ordering Provider:  Hamzah Broussard MD    6 mg/kg × 102 kg  100 mL/hr over 30 Minutes Intravenous Once 05/02/18 1100 05/02/18 1557    05/02/18 0930  micafungin 100 mg/100 mL 0.9% NS IVPB (mbp)     Ordering Provider:  Hamzah Broussard MD    100 mg  over 60 Minutes Intravenous Once 05/02/18 1100 05/02/18 1243    04/27/18 1153  meropenem (MERREM) 1 g/100 mL 0.9% NS VTB (mbp)     Hamzah Broussard MD reviewed the order on 05/05/18 0736.   Ordering Provider:  Hamzah Broussard MD    1 g  over 3 Hours Intravenous Every 8 Hours 04/27/18 1930 05/10/18 0359    04/27/18 1153  meropenem (MERREM) 1 g/100 mL 0.9% NS VTB (mbp)     Ordering Provider:  Hamzah Broussard MD    1 g  over 30 Minutes Intravenous Once 04/27/18 1315 04/27/18 1700    04/26/18 0814  amoxicillin-clavulanate (AUGMENTIN) 875-125 MG per tablet     Ordering Provider:  Jas Newby MD    1 tablet Oral Every 12 Hours Scheduled 04/26/18 0000 04/28/18 2359    04/23/18 2333  cefTRIAXone (ROCEPHIN) IVPB 1 g     Ordering Provider:  SOLOMON Dleatorre    1 g  100 mL/hr over 30 Minutes Intravenous Once 04/23/18 2335 04/24/18 0109          CC: UTI    HPI:  Patient is a 83 y.o. male, known to Dr. Yaniv Jules, with h/o Abnormal LFTs, afib, T2DM, HTN, obesity, and VHD/AVR who presented to [facility] 4/23 by EMS for altered mental status for 2 to 3 days prior to admission.  Per chart, his daughter noted that his face seemed swollen and abdomen bloated along with confusion and anorexia.  He had a fever 101.  Blood cultures and Cdiff are negative.  He had urinary incontinence/frequency and Urine culture is  "positive for Pseudomonas aeruginosa x 2 sensitivities but both sensitive to Merrem. WBC 16,000 with 88% neutrophils, PCT 0.05, lactic acid 1.1, Hgb  A1C 7.8, and K 3.2. His CT scan of abdomen and pelvis showed mild diffuse urinary bladder wall thickening which may be chronic given the associated urinary bladder diverticuli and a 2.4 cm oval area of soft tissue density in the right lung base just above the hemidiaphragm.  He was started on Cefepime.  ID was asked to evaluate and manage his antibiotic therapy.  Changed to Merrem on April 27.    5/5/18 WBC normalized and urinalysis with hematuria/pyuria but culture negative so far;  No diarrhea per pt and recent CDT negative;  He denies new dysuria/hematuria/pyuria/incontinance, etc..  generally fatigued; otherwise, no new focal symptoms per nursing;  No new pain or problems;  No new urinary symptoms     No headache photophobia or neck stiffness.  No shortness of breath cough or hemoptysis.  No nausea vomiting.  No skin rash    ROS:      5/5/18 No f/c/s. No n/v/d. No rash. No new ADR to Abx.     All other systems negative for acute complaints on a 12 system ROS    PE:   /77 (BP Location: Left arm, Patient Position: Lying)   Pulse 63   Temp 98 °F (36.7 °C) (Oral)   Resp 18   Ht 182.9 cm (72\")   Wt 103 kg (227 lb 9.6 oz)   SpO2 95%   BMI 30.87 kg/m²     GENERAL: sleepy, in no acute distress.   HEENT: Normocephalic, atraumatic.  PERRL. EOMI. No conjunctival injection. No icterus. Oropharynx clear without evidence of thrush or exudate. No evidence of peridontal disease.    NECK: Supple without nuchal rigidity. No mass.  LYMPH: No cervical, axillary or inguinal lymphadenopathy.  HEART: RRR; No murmur, rubs, gallops.   LUNGS:diminished at bases bilaterally without wheezing, rales, rhonchi. Normal respiratory effort. Nonlabored. No dullness.  ABDOMEN: Soft, nontender, nondistended. Positive bowel sounds. No rebound or guarding. NO mass or HSM.  EXT:  No cyanosis, " clubbing or edema. No cord.  MSK: FROM without joint effusions noted arms/legs.    SKIN: Warm and dry without cutaneous eruptions on Inspection/palpation.       Peripheral stigmata/phenomena of endocarditis     No CVA tenderness       Laboratory Data      Results from last 7 days  Lab Units 05/04/18  0530 05/03/18  0446 05/02/18  0447   WBC 10*3/mm3 8.30 12.27* 18.88*   HEMOGLOBIN g/dL 10.9* 11.8* 11.6*   HEMATOCRIT % 33.7* 36.1* 35.6*   PLATELETS 10*3/mm3 411 373 363       Results from last 7 days  Lab Units 05/04/18  0530   SODIUM mmol/L 135   POTASSIUM mmol/L 3.8   CHLORIDE mmol/L 102   CO2 mmol/L 28.0   BUN mg/dL 14   CREATININE mg/dL 0.80   GLUCOSE mg/dL 124*   CALCIUM mg/dL 8.0*       Results from last 7 days  Lab Units 05/04/18  0530   ALK PHOS U/L 83   BILIRUBIN mg/dL 0.3   ALT (SGPT) U/L 36   AST (SGOT) U/L 28               Estimated Creatinine Clearance: 86.9 mL/min (by C-G formula based on SCr of 0.8 mg/dL).      Microbiology:      Radiology:  Imaging Results (last 72 hours)     ** No results found for the last 72 hours. **            Impression:     --Leuokocytosis/LG temps at 99 5/2;  Increased 5/1-5/2 but without new focal symptoms at least as of my visit;  CDT negative recently, no new focal pain and resp exam nonfocal; recheck cultures;  Daptomycin/mycamine x 1 5/2 while cultures pending and monitor for new process or pathogen  --Acute sepsis ; likely urinary source  --Acute Complicated cystitis with recurrent MDR Pseudomonas x 2 UTI.  Last positive cultures was 7/15/17 with Pseudomonas fluorescens/putida sens to Cefepime, Zosyn, Levaquin, and carbapenems.   Cx now positive for Pseudomonas aeruginosa x 2 sens to merrem.   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  --Elevated liver transaminases, mild  --Fever, resolved  --Hypothyroidism  --T2DM, insulin dependent  --VHD/AVR  --Obesity  --Afib/on amiodarone/aspirin  --HTN  --Cipro allergy    PLAN:     --Monitor cultures,  labs, PHYLICIA  --Continue Merrem IV over weekend;  Daptomycin/mycamine x 1 5/2 and rechecked cultures; flagyl po  --Length of antibiotics will depend on clinical course, study results and response to therapy  --Monitor IV and IV lines for infections  --D/w patient that antibiotics do not guarantee a cure. Patient understands.  --D/w pharmacy/nursing/Dr. Rios and with family and case management  --Highly complex set of issues with high risk for further serious morbidity and other serious sequela  --His insight is generally poor,he has seen Dr Pacheco before per Dr Newby and he is likely to benefit from further outpatient evaluation by them to optimize any treatment for functional/anatomic issues etc.           Hamzah Broussard MD  5/5/2018

## 2018-05-06 LAB
GLUCOSE BLDC GLUCOMTR-MCNC: 114 MG/DL (ref 70–130)
GLUCOSE BLDC GLUCOMTR-MCNC: 132 MG/DL (ref 70–130)
GLUCOSE BLDC GLUCOMTR-MCNC: 146 MG/DL (ref 70–130)
GLUCOSE BLDC GLUCOMTR-MCNC: 193 MG/DL (ref 70–130)
GLUCOSE BLDC GLUCOMTR-MCNC: 437 MG/DL (ref 70–130)

## 2018-05-06 PROCEDURE — 99232 SBSQ HOSP IP/OBS MODERATE 35: CPT | Performed by: NURSE PRACTITIONER

## 2018-05-06 PROCEDURE — 63710000001 INSULIN DETEMIR PER 5 UNITS: Performed by: HOSPITALIST

## 2018-05-06 PROCEDURE — 25010000002 HEPARIN (PORCINE) PER 1000 UNITS: Performed by: NURSE PRACTITIONER

## 2018-05-06 PROCEDURE — 82962 GLUCOSE BLOOD TEST: CPT

## 2018-05-06 PROCEDURE — 25010000002 MEROPENEM: Performed by: INTERNAL MEDICINE

## 2018-05-06 RX ADMIN — INSULIN DETEMIR 16 UNITS: 100 INJECTION, SOLUTION SUBCUTANEOUS at 20:32

## 2018-05-06 RX ADMIN — MEROPENEM 1 G: 1 INJECTION, POWDER, FOR SOLUTION INTRAVENOUS at 20:29

## 2018-05-06 RX ADMIN — QUETIAPINE FUMARATE 25 MG: 25 TABLET ORAL at 20:31

## 2018-05-06 RX ADMIN — METRONIDAZOLE 500 MG: 500 TABLET ORAL at 15:31

## 2018-05-06 RX ADMIN — AMLODIPINE BESYLATE 10 MG: 10 TABLET ORAL at 08:12

## 2018-05-06 RX ADMIN — AMIODARONE HYDROCHLORIDE 200 MG: 200 TABLET ORAL at 08:12

## 2018-05-06 RX ADMIN — ATORVASTATIN CALCIUM 20 MG: 20 TABLET, FILM COATED ORAL at 20:29

## 2018-05-06 RX ADMIN — HEPARIN SODIUM 5000 UNITS: 5000 INJECTION, SOLUTION INTRAVENOUS; SUBCUTANEOUS at 20:29

## 2018-05-06 RX ADMIN — METRONIDAZOLE 500 MG: 500 TABLET ORAL at 21:23

## 2018-05-06 RX ADMIN — Medication 1 CAPSULE: at 08:12

## 2018-05-06 RX ADMIN — ASPIRIN 325 MG ORAL TABLET 325 MG: 325 PILL ORAL at 08:12

## 2018-05-06 RX ADMIN — INSULIN DETEMIR 16 UNITS: 100 INJECTION, SOLUTION SUBCUTANEOUS at 08:20

## 2018-05-06 RX ADMIN — MEROPENEM 1 G: 1 INJECTION, POWDER, FOR SOLUTION INTRAVENOUS at 12:12

## 2018-05-06 RX ADMIN — LOSARTAN POTASSIUM 100 MG: 50 TABLET ORAL at 08:12

## 2018-05-06 RX ADMIN — MEROPENEM 1 G: 1 INJECTION, POWDER, FOR SOLUTION INTRAVENOUS at 04:31

## 2018-05-06 RX ADMIN — INSULIN LISPRO 2 UNITS: 100 INJECTION, SOLUTION INTRAVENOUS; SUBCUTANEOUS at 12:13

## 2018-05-06 RX ADMIN — TERAZOSIN HYDROCHLORIDE 1 MG: 1 CAPSULE ORAL at 20:30

## 2018-05-06 RX ADMIN — HYDROCHLOROTHIAZIDE 25 MG: 25 TABLET ORAL at 08:12

## 2018-05-06 RX ADMIN — LEVOTHYROXINE SODIUM 50 MCG: 50 TABLET ORAL at 04:41

## 2018-05-06 RX ADMIN — HEPARIN SODIUM 5000 UNITS: 5000 INJECTION, SOLUTION INTRAVENOUS; SUBCUTANEOUS at 08:12

## 2018-05-06 RX ADMIN — METRONIDAZOLE 500 MG: 500 TABLET ORAL at 04:41

## 2018-05-06 NOTE — PROGRESS NOTES
[facility] Medicine Services  PROGRESS NOTE    Patient Name: Leobardo Araujo  : 1934  MRN: [id number]    Date of Admission: 2018  Length of Stay: 12  Primary Care Physician: Edy Goodman MD    Subjective   Subjective     CC:  Confusion, UTI    HPI:  No issues overnight per nursing  Did not wake patient     Review of Systems   Unable to perform ROS: Mental status change   Unreliable due to poor historian    Otherwise ROS is negative except as mentioned in the HPI.    Objective   Objective     Vital Signs:   Temp:  [97.8 °F (36.6 °C)-98 °F (36.7 °C)] 98 °F (36.7 °C)  Heart Rate:  [61-75] 75  Resp:  [18-20] 20  BP: (144-148)/(71-75) 144/71        Physical Exam:  Constitutional: No acute distress, sleeping  HENT: NCAT, mucous membranes moist  Respiratory: even and unlabored  Cardiovascular: regular, sinus on tele  Gastrointestinal: obese  Musculoskeletal: No bilateral ankle edema observed      Results Reviewed:  I have personally reviewed current lab, radiology, and data and agree.      Results from last 7 days  Lab Units 18  0530 18  0446 187   WBC 10*3/mm3 8.30 12.27* 18.88*   HEMOGLOBIN g/dL 10.9* 11.8* 11.6*   HEMATOCRIT % 33.7* 36.1* 35.6*   PLATELETS 10*3/mm3 411 373 363       Results from last 7 days  Lab Units 18  0530 18  1821 186 18  0447   SODIUM mmol/L 135  --  137 135   POTASSIUM mmol/L 3.8 4.2 3.4* 3.5   CHLORIDE mmol/L 102  --  100 99   CO2 mmol/L 28.0  --  31.0 30.0   BUN mg/dL 14  --  17 17   CREATININE mg/dL 0.80  --  0.80 0.90   GLUCOSE mg/dL 124*  --  110* 109*   CALCIUM mg/dL 8.0*  --  8.3* 8.1*   ALT (SGPT) U/L 36  --  29 28   AST (SGOT) U/L 28  --  23 19     Estimated Creatinine Clearance: 86.9 mL/min (by C-G formula based on SCr of 0.8 mg/dL).      Microbiology Results Abnormal     Procedure Component Value - Date/Time    Blood Culture - Blood, [[id number]]  (Normal) Collected:  18 1011    Lab  Status:  Preliminary result Specimen:  Blood from Arm, Left Updated:  05/05/18 1045     Blood Culture No growth at 3 days    Blood Culture - Blood, [[id number]]  (Normal) Collected:  05/02/18 1011    Lab Status:  Preliminary result Specimen:  Blood from Hand, Right Updated:  05/05/18 1045     Blood Culture No growth at 3 days    Urine Culture - Urine, Urine, Clean Catch [[id number]]  (Normal) Collected:  05/03/18 0805    Lab Status:  Final result Specimen:  Urine from Urine, Clean Catch Updated:  05/05/18 0756     Urine Culture No growth at 2 days    Blood Culture - Blood, [[id number]]  (Normal) Collected:  04/23/18 2050    Lab Status:  Final result Specimen:  Blood from Arm, Right Updated:  04/28/18 2116     Blood Culture No growth at 5 days    Blood Culture - Blood, [[id number]]  (Normal) Collected:  04/23/18 1939    Lab Status:  Final result Specimen:  Blood from Hand, Right Updated:  04/28/18 2116     Blood Culture No growth at 5 days    Urine Culture - Urine, Urine, Clean Catch [[id number]]  (Abnormal)  (Susceptibility) Collected:  04/23/18 2253    Lab Status:  Final result Specimen:  Urine from Urine, Clean Catch Updated:  04/27/18 1135     Urine Culture --      >100,000 CFU/mL Pseudomonas aeruginosa (A)     Comment: Morphological Type 1             >100,000 CFU/mL Pseudomonas aeruginosa (A)     Comment: Morphological Type 2         Susceptibility      Pseudomonas aeruginosa (1)    Pseudomonas aeruginosa (2)       FRENCH    FRENCH       Aztreonam <=8 ug/ml Susceptible    >16 ug/ml Resistant       Cefepime <=8 ug/ml Susceptible    16 ug/ml Intermediate       Ceftazidime 4 ug/ml Susceptible    16 ug/ml Intermediate       Gentamicin <=4 ug/ml Susceptible    <=4 ug/ml Susceptible       Levofloxacin <=2 ug/ml Susceptible    <=2 ug/ml Susceptible       Meropenem <=1 ug/ml Susceptible    <=1 ug/ml Susceptible       Piperacillin + Tazobactam <=16 ug/ml Susceptible    64 ug/ml Susceptible       Tobramycin <=4 ug/ml Susceptible     <=4 ug/ml Susceptible                      Stool Culture With Yersinia - Stool, Per Rectum [[id number]]  (Normal) Collected:  04/24/18 1350    Lab Status:  Final result Specimen:  Stool from Per Rectum Updated:  04/26/18 1419     Stool Cx w/ Yersinia No Salmonella, Shigella, Campylobacter, E. coli O157, Vibrio, or Yersinia isolated    Clostridium Difficile Toxin - Stool, Per Rectum [[id number]] Collected:  04/24/18 1350    Lab Status:  Final result Specimen:  Stool from Per Rectum Updated:  04/24/18 1520    Narrative:       The following orders were created for panel order Clostridium Difficile Toxin - Stool, Per Rectum.  Procedure                               Abnormality         Status                     ---------                               -----------         ------                     Clostridium Difficile To...[[id number]]  Normal              Final result                 Please view results for these tests on the individual orders.    Clostridium Difficile Toxin, PCR - Stool, Per Rectum [[id number]]  (Normal) Collected:  04/24/18 1350    Lab Status:  Final result Specimen:  Stool from Per Rectum Updated:  04/24/18 1520     C. Difficile Toxins by PCR Not Detected    Narrative:         Performance characteristics of test not established for patients <2 years of age.  Negative for Toxigenic C. Difficile          Imaging Results (last 24 hours)     ** No results found for the last 24 hours. **        Results for orders placed during the hospital encounter of 12/29/17   Adult Transthoracic Echo Complete W/ Cont if Necessary Per Protocol    Addendum · Left ventricular systolic function is normal. Estimated EF = 60%. · There is a Jay tissue aortic valve replacement 25 mm, 7/23/12.  Aortic valve mean pressure gradient is 8.8 mmHg. · Mild mitral valve stenosis is present · Trace mitral valve regurgitation is present · Trace to mild tricuspid valve regurgitation is present.        Linda Tom MD 1/3/2018   5:43 PM          Narrative PRELIMINARY TECH FINDINGS ONLY       I have reviewed the medications.    Assessment/Plan   Assessment / Plan     Hospital Problem List     * (Principal)Sepsis    YSABEL (obstructive sleep apnea) (Chronic)    Atrial fibrillation (Chronic)    Overview Signed 1/6/2017  2:47 PM by Hamzah Casillas     1. Atrial fibrillation, October 2010; CHADS2-VASc = 3:  a. Amiodarone therapy, discontinued, November 2013.  b. Terumo TigerPaw left atrial appendage closure, Dr. Edson Roque, 07/23/2012.            Hypertension    History of aortic valve stenosis (Chronic)    Overview Signed 1/6/2017  3:01 PM by Hamzah Casillas     2. Aortic stenosis:   a. Tissue aortic valve replacement (Jay 25) and left atrial appendageal ablation         (Terumo TigerPaw device), Dr. Sandhu, 07/23/2012.          Diabetes mellitus, type II (Chronic)    Mild cognitive impairment (Chronic)    UTI (urinary tract infection)    Overview Signed 4/29/2018  7:31 PM by Randal Burnett MD     Pseudomonas species         Nausea, vomiting, and diarrhea    Hypokalemia    Metabolic encephalopathy    Valvular heart disease (Chronic)    Abnormal findings on diagnostic imaging of lung             Brief Hospital Course to date:  Leobardo Araujo is a 83 y.o. male presenting with confusion, fever, and nausea vomiting and diarrhea.  Urinalysis is abnormal and also has some bladder changes on CT scan with unknown chronicity.  C diff negative.      Assessment & Plan:    Pseudomonas UTI  --ID following  --IV merrem planned through weekend  --final on repeat urine culture is negative  --follow up with urology as outpt.  Has seen Junior in the past    Confusion  --waxes and wanes, unknown baseline  --? Underlying dementia  --daytime seroquel stopped due to drowsiness  --follow up with Chevy    T2DM  Component      Latest Ref Rng & Units 5/5/2018 5/5/2018 5/5/2018 5/6/2018          11:55 AM  4:33 PM  8:51 PM    Glucose      70 - 130 mg/dL 176 (H)  162 (H) 287 (H) 114   --continue basal/bolus    HTN  --stable  --meds adjusted    Hypokalemia  --resolved    Afib  --continue amio    DVT Prophylaxis:  Heparin SQ    CODE STATUS: Full Code    Disposition: I expect the patient to be discharged to home with children/sitters Monday 5.7.18    Electronically signed by SOLOMON Ramos, 05/06/18, 7:30 AM.

## 2018-05-07 VITALS
RESPIRATION RATE: 18 BRPM | BODY MASS INDEX: 30.07 KG/M2 | HEART RATE: 62 BPM | HEIGHT: 72 IN | DIASTOLIC BLOOD PRESSURE: 75 MMHG | SYSTOLIC BLOOD PRESSURE: 133 MMHG | OXYGEN SATURATION: 95 % | WEIGHT: 222 LBS | TEMPERATURE: 98.1 F

## 2018-05-07 PROBLEM — A41.9 SEPSIS: Status: RESOLVED | Noted: 2018-04-24 | Resolved: 2018-05-07

## 2018-05-07 PROBLEM — N39.0 UTI (URINARY TRACT INFECTION): Status: RESOLVED | Noted: 2018-04-24 | Resolved: 2018-05-07

## 2018-05-07 LAB
ANION GAP SERPL CALCULATED.3IONS-SCNC: 5 MMOL/L (ref 3–11)
BACTERIA SPEC AEROBE CULT: NORMAL
BACTERIA SPEC AEROBE CULT: NORMAL
BASOPHILS # BLD AUTO: 0.05 10*3/MM3 (ref 0–0.2)
BASOPHILS NFR BLD AUTO: 0.6 % (ref 0–1)
BUN BLD-MCNC: 17 MG/DL (ref 9–23)
BUN/CREAT SERPL: 18.9 (ref 7–25)
CALCIUM SPEC-SCNC: 8.6 MG/DL (ref 8.7–10.4)
CHLORIDE SERPL-SCNC: 98 MMOL/L (ref 99–109)
CO2 SERPL-SCNC: 31 MMOL/L (ref 20–31)
CREAT BLD-MCNC: 0.9 MG/DL (ref 0.6–1.3)
DEPRECATED RDW RBC AUTO: 44.1 FL (ref 37–54)
EOSINOPHIL # BLD AUTO: 0.27 10*3/MM3 (ref 0–0.3)
EOSINOPHIL NFR BLD AUTO: 3.3 % (ref 0–3)
ERYTHROCYTE [DISTWIDTH] IN BLOOD BY AUTOMATED COUNT: 13.9 % (ref 11.3–14.5)
GFR SERPL CREATININE-BSD FRML MDRD: 81 ML/MIN/1.73
GLUCOSE BLD-MCNC: 131 MG/DL (ref 70–100)
GLUCOSE BLDC GLUCOMTR-MCNC: 156 MG/DL (ref 70–130)
GLUCOSE BLDC GLUCOMTR-MCNC: 210 MG/DL (ref 70–130)
HCT VFR BLD AUTO: 35.9 % (ref 38.9–50.9)
HGB BLD-MCNC: 11.9 G/DL (ref 13.1–17.5)
IMM GRANULOCYTES # BLD: 0.05 10*3/MM3 (ref 0–0.03)
IMM GRANULOCYTES NFR BLD: 0.6 % (ref 0–0.6)
LYMPHOCYTES # BLD AUTO: 2.44 10*3/MM3 (ref 0.6–4.8)
LYMPHOCYTES NFR BLD AUTO: 29.4 % (ref 24–44)
MCH RBC QN AUTO: 28.9 PG (ref 27–31)
MCHC RBC AUTO-ENTMCNC: 33.1 G/DL (ref 32–36)
MCV RBC AUTO: 87.1 FL (ref 80–99)
MONOCYTES # BLD AUTO: 0.96 10*3/MM3 (ref 0–1)
MONOCYTES NFR BLD AUTO: 11.6 % (ref 0–12)
NEUTROPHILS # BLD AUTO: 4.58 10*3/MM3 (ref 1.5–8.3)
NEUTROPHILS NFR BLD AUTO: 55.1 % (ref 41–71)
NRBC BLD MANUAL-RTO: 0 /100 WBC (ref 0–0)
PLATELET # BLD AUTO: 444 10*3/MM3 (ref 150–450)
PMV BLD AUTO: 10.5 FL (ref 6–12)
POTASSIUM BLD-SCNC: 4 MMOL/L (ref 3.5–5.5)
RBC # BLD AUTO: 4.12 10*6/MM3 (ref 4.2–5.76)
SODIUM BLD-SCNC: 134 MMOL/L (ref 132–146)
WBC NRBC COR # BLD: 8.3 10*3/MM3 (ref 3.5–10.8)

## 2018-05-07 PROCEDURE — 25010000002 HEPARIN (PORCINE) PER 1000 UNITS: Performed by: NURSE PRACTITIONER

## 2018-05-07 PROCEDURE — 99239 HOSP IP/OBS DSCHRG MGMT >30: CPT | Performed by: NURSE PRACTITIONER

## 2018-05-07 PROCEDURE — 25010000002 MEROPENEM: Performed by: INTERNAL MEDICINE

## 2018-05-07 PROCEDURE — 63710000001 INSULIN DETEMIR PER 5 UNITS: Performed by: HOSPITALIST

## 2018-05-07 PROCEDURE — 94660 CPAP INITIATION&MGMT: CPT

## 2018-05-07 PROCEDURE — 82962 GLUCOSE BLOOD TEST: CPT

## 2018-05-07 PROCEDURE — 94799 UNLISTED PULMONARY SVC/PX: CPT

## 2018-05-07 PROCEDURE — 80048 BASIC METABOLIC PNL TOTAL CA: CPT | Performed by: NURSE PRACTITIONER

## 2018-05-07 PROCEDURE — 85025 COMPLETE CBC W/AUTO DIFF WBC: CPT | Performed by: NURSE PRACTITIONER

## 2018-05-07 RX ORDER — AMLODIPINE BESYLATE 10 MG/1
10 TABLET ORAL
Qty: 30 TABLET | Refills: 0 | Status: SHIPPED | OUTPATIENT
Start: 2018-05-08 | End: 2020-03-19

## 2018-05-07 RX ORDER — QUETIAPINE FUMARATE 25 MG/1
25 TABLET, FILM COATED ORAL NIGHTLY
Qty: 30 TABLET | Refills: 0 | Status: SHIPPED | OUTPATIENT
Start: 2018-05-07 | End: 2020-04-13

## 2018-05-07 RX ADMIN — Medication 1 CAPSULE: at 09:26

## 2018-05-07 RX ADMIN — MEROPENEM 1 G: 1 INJECTION, POWDER, FOR SOLUTION INTRAVENOUS at 05:47

## 2018-05-07 RX ADMIN — LEVOTHYROXINE SODIUM 50 MCG: 50 TABLET ORAL at 05:47

## 2018-05-07 RX ADMIN — INSULIN DETEMIR 16 UNITS: 100 INJECTION, SOLUTION SUBCUTANEOUS at 09:29

## 2018-05-07 RX ADMIN — METRONIDAZOLE 500 MG: 500 TABLET ORAL at 14:42

## 2018-05-07 RX ADMIN — HYDROCHLOROTHIAZIDE 25 MG: 25 TABLET ORAL at 09:26

## 2018-05-07 RX ADMIN — HEPARIN SODIUM 5000 UNITS: 5000 INJECTION, SOLUTION INTRAVENOUS; SUBCUTANEOUS at 09:26

## 2018-05-07 RX ADMIN — AMIODARONE HYDROCHLORIDE 200 MG: 200 TABLET ORAL at 09:26

## 2018-05-07 RX ADMIN — METRONIDAZOLE 500 MG: 500 TABLET ORAL at 05:47

## 2018-05-07 RX ADMIN — ASPIRIN 325 MG ORAL TABLET 325 MG: 325 PILL ORAL at 09:26

## 2018-05-07 RX ADMIN — MEROPENEM 1 G: 1 INJECTION, POWDER, FOR SOLUTION INTRAVENOUS at 12:37

## 2018-05-07 RX ADMIN — LOSARTAN POTASSIUM 100 MG: 50 TABLET ORAL at 09:26

## 2018-05-07 RX ADMIN — AMLODIPINE BESYLATE 10 MG: 10 TABLET ORAL at 09:26

## 2018-05-07 RX ADMIN — INSULIN LISPRO 2 UNITS: 100 INJECTION, SOLUTION INTRAVENOUS; SUBCUTANEOUS at 09:27

## 2018-05-07 RX ADMIN — INSULIN LISPRO 3 UNITS: 100 INJECTION, SOLUTION INTRAVENOUS; SUBCUTANEOUS at 12:38

## 2018-05-07 NOTE — PROGRESS NOTES
[facility] Progress Note        Antibiotics:  Anti-Infectives     Ordered     Dose/Rate Route Frequency Start Stop    05/02/18 0933  metroNIDAZOLE (FLAGYL) tablet 500 mg     Ordering Provider:  Hamzah Broussard MD    500 mg Oral Every 8 Hours Scheduled 05/02/18 1400 05/09/18 1359    05/02/18 0930  DAPTOmycin (CUBICIN) 600 mg in sodium chloride 0.9 % 50 mL IVPB     Ordering Provider:  Hamzah Broussard MD    6 mg/kg × 102 kg  100 mL/hr over 30 Minutes Intravenous Once 05/02/18 1100 05/02/18 1557    05/02/18 0930  micafungin 100 mg/100 mL 0.9% NS IVPB (mbp)     Ordering Provider:  Hamzah Broussard MD    100 mg  over 60 Minutes Intravenous Once 05/02/18 1100 05/02/18 1243    04/27/18 1153  meropenem (MERREM) 1 g/100 mL 0.9% NS VTB (mbp)     Hamzah Broussard MD reviewed the order on 05/05/18 0736.   Ordering Provider:  Hamzah Broussard MD    1 g  over 3 Hours Intravenous Every 8 Hours 04/27/18 1930 05/10/18 0359    04/27/18 1153  meropenem (MERREM) 1 g/100 mL 0.9% NS VTB (mbp)     Ordering Provider:  Hamzah Broussard MD    1 g  over 30 Minutes Intravenous Once 04/27/18 1315 04/27/18 1700    04/26/18 0814  amoxicillin-clavulanate (AUGMENTIN) 875-125 MG per tablet     Ordering Provider:  Jas Newby MD    1 tablet Oral Every 12 Hours Scheduled 04/26/18 0000 04/28/18 2359    04/23/18 2333  cefTRIAXone (ROCEPHIN) IVPB 1 g     Ordering Provider:  SOLOMON Delatorre    1 g  100 mL/hr over 30 Minutes Intravenous Once 04/23/18 2335 04/24/18 0109          CC: UTI    HPI:  Patient is a 83 y.o. male, known to Dr. Yaniv Jules, with h/o Abnormal LFTs, afib, T2DM, HTN, obesity, and VHD/AVR who presented to [facility] 4/23 by EMS for altered mental status for 2 to 3 days prior to admission.  Per chart, his daughter noted that his face seemed swollen and abdomen bloated along with confusion and anorexia.  He had a fever 101.  Blood cultures and Cdiff are negative.  He had urinary incontinence/frequency and Urine culture is  "positive for Pseudomonas aeruginosa x 2 sensitivities but both sensitive to Merrem. WBC 16,000 with 88% neutrophils, PCT 0.05, lactic acid 1.1, Hgb  A1C 7.8, and K 3.2. His CT scan of abdomen and pelvis showed mild diffuse urinary bladder wall thickening which may be chronic given the associated urinary bladder diverticuli and a 2.4 cm oval area of soft tissue density in the right lung base just above the hemidiaphragm.  He was started on Cefepime.  ID was asked to evaluate and manage his antibiotic therapy.  Changed to Merrem on April 27.    5/7/18 seen early and sleepy;  Per nursing no new pain or problems; Repeat Urine culture negative so far;  No diarrhea per pt and recent CDT negative;  He denies new dysuria/hematuria/pyuria/incontinance, etc..  generally fatigued; otherwise, no new focal symptoms per nursing;  No new pain or problems;  No new urinary symptoms     No headache photophobia or neck stiffness.  No shortness of breath cough or hemoptysis.  No nausea vomiting.  No skin rash    ROS:      5/7/18 No f/c/s. No n/v/d. No rash. No new ADR to Abx.     All other systems negative for acute complaints on a 12 system ROS    PE:   /75 (BP Location: Right arm, Patient Position: Sitting)   Pulse 62   Temp 98.1 °F (36.7 °C) (Oral)   Resp 18   Ht 182.9 cm (72\")   Wt 101 kg (222 lb)   SpO2 95%   BMI 30.11 kg/m²     GENERAL: sleepy, in no acute distress.   HEENT: Normocephalic, atraumatic.  PERRL. EOMI. No conjunctival injection. No icterus. Oropharynx clear without evidence of thrush or exudate. No evidence of peridontal disease.    NECK: Supple without nuchal rigidity. No mass.  LYMPH: No cervical, axillary or inguinal lymphadenopathy.  HEART: RRR; No murmur, rubs, gallops.   LUNGS:diminished at bases bilaterally without wheezing, rales, rhonchi. Normal respiratory effort. Nonlabored. No dullness.  ABDOMEN: Soft, nontender, nondistended. Positive bowel sounds. No rebound or guarding. NO mass or " HSM.  EXT:  No cyanosis, clubbing or edema. No cord.  MSK: FROM without joint effusions noted arms/legs.    SKIN: Warm and dry without cutaneous eruptions on Inspection/palpation.       Peripheral stigmata/phenomena of endocarditis     No CVA tenderness       Laboratory Data      Results from last 7 days  Lab Units 05/07/18  0444 05/04/18  0530 05/03/18  0446   WBC 10*3/mm3 8.30 8.30 12.27*   HEMOGLOBIN g/dL 11.9* 10.9* 11.8*   HEMATOCRIT % 35.9* 33.7* 36.1*   PLATELETS 10*3/mm3 444 411 373       Results from last 7 days  Lab Units 05/07/18  0444   SODIUM mmol/L 134   POTASSIUM mmol/L 4.0   CHLORIDE mmol/L 98*   CO2 mmol/L 31.0   BUN mg/dL 17   CREATININE mg/dL 0.90   GLUCOSE mg/dL 131*   CALCIUM mg/dL 8.6*       Results from last 7 days  Lab Units 05/04/18  0530   ALK PHOS U/L 83   BILIRUBIN mg/dL 0.3   ALT (SGPT) U/L 36   AST (SGOT) U/L 28               Estimated Creatinine Clearance: 76.5 mL/min (by C-G formula based on SCr of 0.9 mg/dL).      Microbiology:      Radiology:  Imaging Results (last 72 hours)     ** No results found for the last 72 hours. **            Impression:     --Leuokocytosis/LG temps at 99 5/2;  Increased 5/1-5/2 but without new focal symptoms at least as of my visit;  CDT negative recently, no new focal pain and resp exam nonfocal; recheck cultures;  Daptomycin/mycamine x 1 5/2 while cultures pending and monitor for new process or pathogen  --Acute sepsis ; likely urinary source  --Acute Complicated cystitis with recurrent MDR Pseudomonas x 2 UTI.  Last positive cultures was 7/15/17 with Pseudomonas fluorescens/putida sens to Cefepime, Zosyn, Levaquin, and carbapenems.   Cx now positive for Pseudomonas aeruginosa x 2 sens to merrem.   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  --Elevated liver transaminases, mild  --Fever, resolved  --Hypothyroidism  --T2DM, insulin dependent  --VHD/AVR  --Obesity  --Afib/on amiodarone/aspirin  --HTN  --Cipro allergy    PLAN:      --Monitor cultures, labs, PHYLICIA  --Continue Merrem IV while here but stop at discharge;  Daptomycin/mycamine x 1 5/2 and rechecked cultures, and have remained negative;  flagyl po to stop at discharge also  --Length of antibiotics will depend on clinical course, study results and response to therapy  --Monitor IV and IV lines for infections  --D/w patient that antibiotics do not guarantee a cure. Patient understands.  --D/w pharmacy/nursing/Dr. Rios  and case management  --Highly complex set of issues with high risk for further serious morbidity and other serious sequela  --His insight is generally poor,he has seen Dr Pacheco before per Dr Newby and he is likely to benefit from further outpatient evaluation by them to optimize any treatment for functional/anatomic issues etc.           Hamzah Broussard MD  5/7/2018

## 2018-05-07 NOTE — PROGRESS NOTES
Continued Stay Note   [facility]     Patient Name: Leobardo Araujo  MRN: [id number]  Today's Date: 5/7/2018    Admit Date: 4/23/2018          Discharge Plan     Row Name 05/07/18 1207       Plan    Plan Home with family support    Patient/Family in Agreement with Plan yes    Plan Comments Confirmed with Dr. Broussard this morning that patient has completed his IV merrem course today and is ok for discharge today without any further IV antibiotics. Met with patient and he is eager to go home today and states family will stay with him at home. Spoke with daughter Fadumo and she states plan is for patient to stay with his daughter Addie for a few days post-discharge and she will pick patient up this afternoon. Spoke with Addie (P: [phone]) and she confirmed that this is the plan. Darlin Cottrell RN [phone]              Discharge Codes    No documentation.       Expected Discharge Date and Time     Expected Discharge Date Expected Discharge Time    May 7, 2018             Darlin Cottrell RN

## 2018-05-07 NOTE — PLAN OF CARE
Problem: Patient Care Overview  Goal: Plan of Care Review  Outcome: Ongoing (interventions implemented as appropriate)   05/07/18 3909   Coping/Psychosocial   Plan of Care Reviewed With patient   Plan of Care Review   Progress improving   OTHER   Outcome Summary no changes overnight, pt. rested well, ambulating in hallways, compliant with cpap, VSS, looking forward to discharge in am
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
## 2018-05-07 NOTE — DISCHARGE SUMMARY
[facility] Medicine Services  DISCHARGE SUMMARY    Patient Name: Leobardo Araujo  : 1934  MRN: [id number]    Date of Admission: 2018  Date of Discharge:  2018  Primary Care Physician: Edy Goodman MD    Consults     Date and Time Order Name Status Description    2018 0030 Inpatient Infectious Diseases Consult Completed         Hospital Course     Presenting Problem:   Altered mental status, unspecified altered mental status type [R41.82]    Active Hospital Problems (** Indicates Principal Problem)    Diagnosis Date Noted   • Nausea, vomiting, and diarrhea [R11.2, R19.7] 2018   • Hypokalemia [E87.6] 2018   • Metabolic encephalopathy [G93.41] 2018   • Valvular heart disease [I38] 2018   • Abnormal findings on diagnostic imaging of lung [R91.8] 2018   • Mild cognitive impairment [G31.84] 2017   • Hypertension [I10] 2017   • Atrial fibrillation [I48.91]    • Diabetes mellitus, type II [E11.9]    • History of aortic valve stenosis [Z86.79]    • YSABEL (obstructive sleep apnea) [G47.33] 2016      Resolved Hospital Problems    Diagnosis Date Noted Date Resolved   • **Sepsis [A41.9] 2018   • UTI (urinary tract infection) [N39.0] 2018          Hospital Course:  Leobardo Araujo is a 83 y.o. male with past medical history of proximal atrial fibrillation, diabetes, hypertension who remains fairly functional baseline resented to the emergency department with complaints of confusion.  Patient started having nausea and vomiting with fever.  He was found to have a possible urinary tract infection.  He was started on Rocephin and admitted to the hospital service.    Urinalysis came back positive for Pseudomonas.  Infectious disease was consulted.  Has completed course of meropenem.  He did have a repeat urine culture that was negative.  He had a CT scan of his abdomen and pelvis that showed bladder  changes with unknown chronicity.  He should follow up with urology as an outpatient.  He is followed with Dr. Dobbs in the past.    He has had confusion that has waxed and waned this admission.  Unknown baseline.  Looks like questionable dementia.  He can continue nightly Seroquel.  Follow-up with Dr. Reece as outpatient.  Recommend he stay with his daughter post discharge until he can be further followed up with his primary care physician.               Day of Discharge     HPI:   He tells me his perfectly fine and nothing is wrong with him    Review of Systems  Gen- No fevers, chills  CV- No chest pain, palpitations  Resp- No cough, dyspnea  GI- No N/V/D, abd pain      Otherwise ROS is negative except as mentioned in the HPI.    Vital Signs:   Temp:  [98 °F (36.7 °C)-98.2 °F (36.8 °C)] 98.1 °F (36.7 °C)  Heart Rate:  [60-62] 62  Resp:  [17-22] 18  BP: (129-133)/(71-75) 133/75     Physical Exam:  Gen-no acute distress, sitting up in chair  CV-RRR, S1 S2 normal, no m/r/g  Resp-CTAB, normal WOB  Abd-soft, NT, ND, +BS  Ext-no edema, PPP  Neuro-alert, grumpy, confused conversation at times, no focal deficits   Psych-grumpy      Pertinent  and/or Most Recent Results       Results from last 7 days  Lab Units 05/07/18  0444 05/04/18  0530 05/03/18  1821 05/03/18  0446 05/02/18  0447   WBC 10*3/mm3 8.30 8.30  --  12.27* 18.88*   HEMOGLOBIN g/dL 11.9* 10.9*  --  11.8* 11.6*   HEMATOCRIT % 35.9* 33.7*  --  36.1* 35.6*   PLATELETS 10*3/mm3 444 411  --  373 363   SODIUM mmol/L 134 135  --  137 135   POTASSIUM mmol/L 4.0 3.8 4.2 3.4* 3.5   CHLORIDE mmol/L 98* 102  --  100 99   CO2 mmol/L 31.0 28.0  --  31.0 30.0   BUN mg/dL 17 14  --  17 17   CREATININE mg/dL 0.90 0.80  --  0.80 0.90   GLUCOSE mg/dL 131* 124*  --  110* 109*   CALCIUM mg/dL 8.6* 8.0*  --  8.3* 8.1*       Results from last 7 days  Lab Units 05/04/18  0530 05/03/18  0446 05/02/18  0447   BILIRUBIN mg/dL 0.3 0.4 0.5   ALK PHOS U/L 83 84 79   ALT (SGPT) U/L 36 29  28   AST (SGOT) U/L 28 23 19         Brief Urine Lab Results  (Last result in the past 365 days)      Color   Clarity   Blood   Leuk Est   Nitrite   Protein   CREAT   Urine HCG        05/03/18 0805 Yellow Clear Moderate (2+)(A) Moderate (2+)(A) Negative 30 mg/dL (1+)(A)               Microbiology Results Abnormal     Procedure Component Value - Date/Time    Blood Culture - Blood, [[id number]]  (Normal) Collected:  05/02/18 1011    Lab Status:  Final result Specimen:  Blood from Arm, Left Updated:  05/07/18 1045     Blood Culture No growth at 5 days    Blood Culture - Blood, [[id number]]  (Normal) Collected:  05/02/18 1011    Lab Status:  Final result Specimen:  Blood from Hand, Right Updated:  05/07/18 1045     Blood Culture No growth at 5 days    Urine Culture - Urine, Urine, Clean Catch [[id number]]  (Normal) Collected:  05/03/18 0805    Lab Status:  Final result Specimen:  Urine from Urine, Clean Catch Updated:  05/05/18 0756     Urine Culture No growth at 2 days    Blood Culture - Blood, [[id number]]  (Normal) Collected:  04/23/18 2050    Lab Status:  Final result Specimen:  Blood from Arm, Right Updated:  04/28/18 2116     Blood Culture No growth at 5 days    Blood Culture - Blood, [[id number]]  (Normal) Collected:  04/23/18 1939    Lab Status:  Final result Specimen:  Blood from Hand, Right Updated:  04/28/18 2116     Blood Culture No growth at 5 days    Urine Culture - Urine, Urine, Clean Catch [[id number]]  (Abnormal)  (Susceptibility) Collected:  04/23/18 2253    Lab Status:  Final result Specimen:  Urine from Urine, Clean Catch Updated:  04/27/18 1135     Urine Culture --      >100,000 CFU/mL Pseudomonas aeruginosa (A)     Comment: Morphological Type 1             >100,000 CFU/mL Pseudomonas aeruginosa (A)     Comment: Morphological Type 2         Susceptibility      Pseudomonas aeruginosa (1)    Pseudomonas aeruginosa (2)       FRENCH    FRENCH       Aztreonam <=8 ug/ml Susceptible    >16 ug/ml Resistant        Cefepime <=8 ug/ml Susceptible    16 ug/ml Intermediate       Ceftazidime 4 ug/ml Susceptible    16 ug/ml Intermediate       Gentamicin <=4 ug/ml Susceptible    <=4 ug/ml Susceptible       Levofloxacin <=2 ug/ml Susceptible    <=2 ug/ml Susceptible       Meropenem <=1 ug/ml Susceptible    <=1 ug/ml Susceptible       Piperacillin + Tazobactam <=16 ug/ml Susceptible    64 ug/ml Susceptible       Tobramycin <=4 ug/ml Susceptible    <=4 ug/ml Susceptible                      Stool Culture With Yersinia - Stool, Per Rectum [[id number]]  (Normal) Collected:  04/24/18 1350    Lab Status:  Final result Specimen:  Stool from Per Rectum Updated:  04/26/18 1419     Stool Cx w/ Yersinia No Salmonella, Shigella, Campylobacter, E. coli O157, Vibrio, or Yersinia isolated    Clostridium Difficile Toxin - Stool, Per Rectum [[id number]] Collected:  04/24/18 1350    Lab Status:  Final result Specimen:  Stool from Per Rectum Updated:  04/24/18 1520    Narrative:       The following orders were created for panel order Clostridium Difficile Toxin - Stool, Per Rectum.  Procedure                               Abnormality         Status                     ---------                               -----------         ------                     Clostridium Difficile To...[[id number]]  Normal              Final result                 Please view results for these tests on the individual orders.    Clostridium Difficile Toxin, PCR - Stool, Per Rectum [[id number]]  (Normal) Collected:  04/24/18 1350    Lab Status:  Final result Specimen:  Stool from Per Rectum Updated:  04/24/18 1520     C. Difficile Toxins by PCR Not Detected    Narrative:         Performance characteristics of test not established for patients <2 years of age.  Negative for Toxigenic C. Difficile          Imaging Results (all)     Procedure Component Value Units Date/Time    CT Abdomen Pelvis With Contrast [[id number]] Collected:  04/23/18 2038     Updated:  04/23/18 2143     Narrative:       EXAM:    CT Abdomen and Pelvis With Intravenous Contrast    CLINICAL HISTORY:    83 years, male; Signs and symptoms; Other: See notes; Additional info: Abd   pain, nausea, vomiting    TECHNIQUE:    Axial computed tomography images of the abdomen and pelvis with intravenous   contrast.  All CT scans at this facility use one or more dose reduction   techniques, viz.: automated exposure control; ma/kV adjustment per patient size   (including targeted exams where dose is matched to indication; i.e. head); or   iterative reconstruction technique.  Delayed images were obtained.    Coronal reformatted images were created and reviewed.    CONTRAST:    95 mL of isouve 300 administered intravenously.    COMPARISON:    CR - OX SPINE LUMBAR MIN 4 VIEW 11/12/2015 1:15:23 PM    FINDINGS:    Lung bases:  Minimal dependent densities in the lung bases.  There is an oval   area of soft tissue density in the right lung base, just above the   hemidiaphragm. This measures approximately 2.4 x 2.4 x 1.6 cm.    Mediastinum:  Small hiatal hernia.     ABDOMEN:    Liver:  Unremarkable.  No obvious mass.    Gallbladder and bile ducts:  Unremarkable.  No calcified stones.  No   significant biliary ductal dilatation.    Pancreas:  Unremarkable.  No ductal dilation.  No obvious mass.    Spleen:  Calcified granulomas noted in the spleen. No splenomegaly.    Adrenals:  Unremarkable.  No adrenal nodules or masses identified.    Kidneys and ureters:  There is a left renal cyst. Apparent small chronic   subcapsular collection along the inferior aspect of the right kidney. Bilateral   kidneys also demonstrate areas of cortical thinning. There is no obvious solid   renal mass. No hydronephrosis.  There is mild bilateral perinephric stranding   which is nonspecific and may be chronic.    Stomach and bowel:  There is a small umbilical hernia. A loop of small bowel   extends just into the mouth of the hernia. There is no associated  obstruction   or strangulation.  Duodenal diverticuli noted.     PELVIS:    Appendix:  No findings to suggest acute appendicitis.    Bladder:  There is urinary bladder wall thickening and mild perivesical   stranding. There are also multiple small urinary bladder diverticuli with   apparent bladder wall trabeculation.  No bladder stones.    Reproductive:  The prostate is mildly enlarged.  Calcifications noted in the   prostate.     ABDOMEN and PELVIS:    Intraperitoneal space:  Unremarkable.  No free air.  No significant fluid   collection.    Bones/joints:  There are compression fractures of T12 and L1. Severe   associated vertebral body height loss at T12, with mild height loss at L1.   Minimal retropulsion. Findings are similar to the prior lumbar radiographs. No   obvious acute fractures.  No dislocation.    Soft tissues:  See above.    Vasculature:  Atherosclerotic calcifications are noted. No aortic aneurysm.    Lymph nodes:  No significant lymph node enlargement.      Impression:       1. Mild diffuse urinary bladder wall thickening, which may be chronic given the   associated urinary bladder diverticuli. However, superimposed cystitis is not   excluded. Recommend clinical correlation.  2. Oval area of soft tissue density in the right lung base, just above the   hemidiaphragm. This measures approximately 2.4 cm. This may represent an area   of scarring or other benign finding. However, followup CT is recommended in 3-6   months.    THIS DOCUMENT HAS BEEN ELECTRONICALLY SIGNED BY ARABELLA PAINTER MD    CT Head Without Contrast [[id number]] Collected:  04/23/18 2039     Updated:  04/23/18 2130    Narrative:       EXAM:    CT Head Without Intravenous Contrast    CLINICAL HISTORY:    83 years, male; Signs and symptoms; Other: See notes; Additional info: AMS    TECHNIQUE:    Axial computed tomography images of the head/brain without intravenous   contrast.  All CT scans at this facility use one or more dose reduction    techniques, viz.: automated exposure control; ma/kV adjustment per patient size   (including targeted exams where dose is matched to indication; i.e. head); or   iterative reconstruction technique.    COMPARISON:    CT HEAD WO CONTRAST 2012-07-26 17:47    FINDINGS:    Brain:  Diffuse cerebral atrophy is noted. There are periventricular white   matter changes, which are likely related to chronic small vessel disease. No   obvious signs of acute infarct. No intracranial hemorrhage.    Ventricles:  There is diffuse prominence of the ventricles, which is likely   related to central atrophy.    Bones/joints:  Unremarkable.  No acute fracture.    Soft tissues:  Unremarkable.    Sinuses:  There is scattered mucosal thickening in the paranasal sinuses.  No   air fluid levels visualized.    Mastoid air cells:  Unremarkable as visualized.  No mastoid effusion.      Impression:       1.  No acute intracranial findings visualized.  2.  Diffuse cerebral atrophy.  3.  Periventricular white matter changes, likely related to chronic small   vessel disease.    THIS DOCUMENT HAS BEEN ELECTRONICALLY SIGNED BY ARABELLA PAINTER MD    XR Chest 1 View [[id number]] Collected:  04/23/18 1922     Updated:  04/23/18 2125    Narrative:       EXAM:    XR Chest, 1 View    CLINICAL HISTORY:    83 years, male; Pain; Other: Abdominal pain; Additional info: Upper abdominal   pain triage protocol    TECHNIQUE:    Frontal view of the chest.    COMPARISON:    CR - OX CHEST PA AND LATERAL 2014-01-17 16:06    FINDINGS:    Lungs:  Unremarkable.  No consolidation.    Pleural space:  Unremarkable.  No pneumothorax.    Heart:  Stable mild enlargement of the cardiac silhouette.    Mediastinum:  Unremarkable.    Bones/joints:  Post-sternotomy changes are noted. No acute fracture.    Vasculature:  Atherosclerotic calcifications are visualized within the aorta.      Impression:         No acute findings visualized in the chest.    THIS DOCUMENT HAS BEEN ELECTRONICALLY  SIGNED BY ARABELLA PAINTER MD                      Discharge Details      Leobardo Araujo   Home Medication Instructions ATUL:[id number]    Printed on:05/07/18 1500   Medication Information                      amiodarone (PACERONE) 200 MG tablet  Take 1 tablet by mouth Daily.             amLODIPine (NORVASC) 10 MG tablet  Take 1 tablet by mouth Daily.             aspirin 81 MG tablet  Take 325 mg by mouth Daily.             atorvastatin (LIPITOR) 20 MG tablet  Take 1 tablet by mouth Every Night.             hydrochlorothiazide (HYDRODIURIL) 25 MG tablet  Take 1 tablet by mouth Daily.             insulin NPH-insulin regular (humuLIN 70/30,novoLIN 70/30) (70-30) 100 UNIT/ML injection  Inject  under the skin 2 (Two) Times a Day With Meals.             Insulin Pen Needle (BD PEN NEEDLE DEBORAH U/F) 32G X 4 MM misc               levothyroxine (SYNTHROID, LEVOTHROID) 50 MCG tablet  Take  by mouth daily.             losartan (COZAAR) 100 MG tablet  Take 1 tablet by mouth Daily.             metFORMIN (GLUCOPHAGE) 500 MG tablet  Take 1,000 mg by mouth 2 (Two) Times a Day With Meals.             QUEtiapine (SEROquel) 25 MG tablet  Take 1 tablet by mouth Every Night.             terazosin (HYTRIN) 1 MG capsule  Take 1 capsule by mouth Every Night.             VIIBRYD 40 MG tablet tablet  TAKE ONE TABLET BY MOUTH ONCE DAILY                   Discharge Disposition:  Home or Self Care    Discharge Diet:  Diet Instructions     Diet: Regular, Consistent Carbohydrate, Cardiac       Discharge Diet:   Regular  Consistent Carbohydrate  Cardiac             Discharge Activity:   Activity Instructions     Activity as Tolerated             Future Appointments  Date Time Provider Department Center   12/5/2018 11:45 AM Linda Tom MD [facility] None       Additional Instructions for the Follow-ups that You Need to Schedule     Discharge Follow-up with PCP    As directed      Follow Up Details:  [facility] within 1 week         Discharge  Follow-up with PCP    As directed      Follow Up Details:  PCP in 1 week         Discharge Follow-up with Specialty: Urology ([facility]) in 2-4 weeks    As directed      Specialty:  Urology ([facility]) in 2-4 weeks               Time Spent on Discharge:  40 minutes    Electronically signed by SOLOMON Bedoya, 05/07/18, 3:00 PM.

## 2018-05-24 RX ORDER — AMIODARONE HYDROCHLORIDE 200 MG/1
TABLET ORAL
Qty: 90 TABLET | Refills: 3 | Status: SHIPPED | OUTPATIENT
Start: 2018-05-24 | End: 2019-06-24 | Stop reason: SDUPTHER

## 2018-06-18 ENCOUNTER — APPOINTMENT (OUTPATIENT)
Dept: LAB | Facility: HOSPITAL | Age: 83
End: 2018-06-18

## 2018-06-18 ENCOUNTER — TRANSCRIBE ORDERS (OUTPATIENT)
Dept: LAB | Facility: HOSPITAL | Age: 83
End: 2018-06-18

## 2018-06-18 DIAGNOSIS — N30.00 ACUTE CYSTITIS WITHOUT HEMATURIA: Primary | ICD-10-CM

## 2018-06-18 DIAGNOSIS — A49.8 BACTERIAL INFECTION DUE TO PSEUDOMONAS: ICD-10-CM

## 2018-06-18 DIAGNOSIS — N13.8 ENLARGED PROSTATE WITH URINARY OBSTRUCTION: ICD-10-CM

## 2018-06-18 DIAGNOSIS — N40.1 ENLARGED PROSTATE WITH URINARY OBSTRUCTION: ICD-10-CM

## 2018-06-18 DIAGNOSIS — N39.0 URINARY TRACT INFECTION WITHOUT HEMATURIA, SITE UNSPECIFIED: ICD-10-CM

## 2018-06-18 LAB
BACTERIA UR QL AUTO: ABNORMAL /HPF
BILIRUB UR QL STRIP: NEGATIVE
CLARITY UR: CLEAR
COLOR UR: YELLOW
GLUCOSE UR STRIP-MCNC: NEGATIVE MG/DL
HGB UR QL STRIP.AUTO: ABNORMAL
HYALINE CASTS UR QL AUTO: ABNORMAL /LPF
KETONES UR QL STRIP: ABNORMAL
LEUKOCYTE ESTERASE UR QL STRIP.AUTO: ABNORMAL
MUCOUS THREADS URNS QL MICRO: ABNORMAL /HPF
NITRITE UR QL STRIP: NEGATIVE
PH UR STRIP.AUTO: <=5 [PH] (ref 5–8)
PROT UR QL STRIP: NEGATIVE
RBC # UR: ABNORMAL /HPF
REF LAB TEST METHOD: ABNORMAL
SP GR UR STRIP: 1.02 (ref 1–1.03)
SQUAMOUS #/AREA URNS HPF: ABNORMAL /HPF
UROBILINOGEN UR QL STRIP: ABNORMAL
WBC UR QL AUTO: ABNORMAL /HPF
YEAST URNS QL MICRO: ABNORMAL /HPF

## 2018-06-18 PROCEDURE — 81001 URINALYSIS AUTO W/SCOPE: CPT | Performed by: INTERNAL MEDICINE

## 2018-06-18 PROCEDURE — 87086 URINE CULTURE/COLONY COUNT: CPT | Performed by: INTERNAL MEDICINE

## 2018-06-20 ENCOUNTER — TRANSCRIBE ORDERS (OUTPATIENT)
Dept: LAB | Facility: HOSPITAL | Age: 83
End: 2018-06-20

## 2018-06-20 ENCOUNTER — LAB (OUTPATIENT)
Dept: LAB | Facility: HOSPITAL | Age: 83
End: 2018-06-20

## 2018-06-20 DIAGNOSIS — N39.0 URINARY TRACT INFECTION WITHOUT HEMATURIA, SITE UNSPECIFIED: ICD-10-CM

## 2018-06-20 DIAGNOSIS — N13.8 ENLARGED PROSTATE WITH URINARY OBSTRUCTION: ICD-10-CM

## 2018-06-20 DIAGNOSIS — N40.1 ENLARGED PROSTATE WITH URINARY OBSTRUCTION: ICD-10-CM

## 2018-06-20 DIAGNOSIS — N30.00 ACUTE CYSTITIS WITHOUT HEMATURIA: ICD-10-CM

## 2018-06-20 DIAGNOSIS — N30.00 ACUTE CYSTITIS WITHOUT HEMATURIA: Primary | ICD-10-CM

## 2018-06-20 LAB
ANION GAP SERPL CALCULATED.3IONS-SCNC: 10 MMOL/L (ref 3–11)
BACTERIA SPEC AEROBE CULT: NORMAL
BASOPHILS # BLD AUTO: 0.04 10*3/MM3 (ref 0–0.2)
BASOPHILS NFR BLD AUTO: 0.4 % (ref 0–1)
BUN BLD-MCNC: 21 MG/DL (ref 9–23)
BUN/CREAT SERPL: 21.4 (ref 7–25)
CALCIUM SPEC-SCNC: 8.8 MG/DL (ref 8.7–10.4)
CHLORIDE SERPL-SCNC: 102 MMOL/L (ref 99–109)
CO2 SERPL-SCNC: 28 MMOL/L (ref 20–31)
CREAT BLD-MCNC: 0.98 MG/DL (ref 0.6–1.3)
CRP SERPL-MCNC: 0.56 MG/DL (ref 0–1)
DEPRECATED RDW RBC AUTO: 48.3 FL (ref 37–54)
EOSINOPHIL # BLD AUTO: 0.28 10*3/MM3 (ref 0–0.3)
EOSINOPHIL NFR BLD AUTO: 2.6 % (ref 0–3)
ERYTHROCYTE [DISTWIDTH] IN BLOOD BY AUTOMATED COUNT: 15.1 % (ref 11.3–14.5)
GFR SERPL CREATININE-BSD FRML MDRD: 73 ML/MIN/1.73
GLUCOSE BLD-MCNC: 151 MG/DL (ref 70–100)
HCT VFR BLD AUTO: 35.9 % (ref 38.9–50.9)
HGB BLD-MCNC: 11.7 G/DL (ref 13.1–17.5)
IMM GRANULOCYTES # BLD: 0.03 10*3/MM3 (ref 0–0.03)
IMM GRANULOCYTES NFR BLD: 0.3 % (ref 0–0.6)
LYMPHOCYTES # BLD AUTO: 2.72 10*3/MM3 (ref 0.6–4.8)
LYMPHOCYTES NFR BLD AUTO: 25.1 % (ref 24–44)
MCH RBC QN AUTO: 28.5 PG (ref 27–31)
MCHC RBC AUTO-ENTMCNC: 32.6 G/DL (ref 32–36)
MCV RBC AUTO: 87.6 FL (ref 80–99)
MONOCYTES # BLD AUTO: 0.59 10*3/MM3 (ref 0–1)
MONOCYTES NFR BLD AUTO: 5.4 % (ref 0–12)
NEUTROPHILS # BLD AUTO: 7.22 10*3/MM3 (ref 1.5–8.3)
NEUTROPHILS NFR BLD AUTO: 66.5 % (ref 41–71)
PLATELET # BLD AUTO: 328 10*3/MM3 (ref 150–450)
PMV BLD AUTO: 10.8 FL (ref 6–12)
POTASSIUM BLD-SCNC: 4.1 MMOL/L (ref 3.5–5.5)
RBC # BLD AUTO: 4.1 10*6/MM3 (ref 4.2–5.76)
SODIUM BLD-SCNC: 140 MMOL/L (ref 132–146)
WBC NRBC COR # BLD: 10.85 10*3/MM3 (ref 3.5–10.8)

## 2018-06-20 PROCEDURE — 36415 COLL VENOUS BLD VENIPUNCTURE: CPT

## 2018-06-20 PROCEDURE — 86140 C-REACTIVE PROTEIN: CPT

## 2018-06-20 PROCEDURE — 80048 BASIC METABOLIC PNL TOTAL CA: CPT

## 2018-06-20 PROCEDURE — 85025 COMPLETE CBC W/AUTO DIFF WBC: CPT

## 2018-07-03 ENCOUNTER — LAB (OUTPATIENT)
Dept: LAB | Facility: HOSPITAL | Age: 83
End: 2018-07-03

## 2018-07-03 ENCOUNTER — TRANSCRIBE ORDERS (OUTPATIENT)
Dept: LAB | Facility: HOSPITAL | Age: 83
End: 2018-07-03

## 2018-07-03 ENCOUNTER — OFFICE VISIT (OUTPATIENT)
Dept: NEUROLOGY | Facility: CLINIC | Age: 83
End: 2018-07-03

## 2018-07-03 VITALS
BODY MASS INDEX: 30.16 KG/M2 | WEIGHT: 222.66 LBS | HEIGHT: 72 IN | SYSTOLIC BLOOD PRESSURE: 138 MMHG | DIASTOLIC BLOOD PRESSURE: 72 MMHG

## 2018-07-03 DIAGNOSIS — A49.8 BACTERIAL INFECTION DUE TO PSEUDOMONAS: ICD-10-CM

## 2018-07-03 DIAGNOSIS — N13.8 ENLARGED PROSTATE WITH URINARY OBSTRUCTION: ICD-10-CM

## 2018-07-03 DIAGNOSIS — G31.84 MILD COGNITIVE IMPAIRMENT: Primary | Chronic | ICD-10-CM

## 2018-07-03 DIAGNOSIS — N39.0 URINARY TRACT INFECTION WITHOUT HEMATURIA, SITE UNSPECIFIED: ICD-10-CM

## 2018-07-03 DIAGNOSIS — N30.00 ACUTE CYSTITIS WITHOUT HEMATURIA: ICD-10-CM

## 2018-07-03 DIAGNOSIS — N30.00 ACUTE CYSTITIS WITHOUT HEMATURIA: Primary | ICD-10-CM

## 2018-07-03 DIAGNOSIS — N40.1 ENLARGED PROSTATE WITH URINARY OBSTRUCTION: ICD-10-CM

## 2018-07-03 LAB
BACTERIA UR QL AUTO: ABNORMAL /HPF
BILIRUB UR QL STRIP: NEGATIVE
CLARITY UR: ABNORMAL
COLOR UR: YELLOW
GLUCOSE UR STRIP-MCNC: ABNORMAL MG/DL
HGB UR QL STRIP.AUTO: ABNORMAL
HYALINE CASTS UR QL AUTO: ABNORMAL /LPF
KETONES UR QL STRIP: NEGATIVE
LEUKOCYTE ESTERASE UR QL STRIP.AUTO: ABNORMAL
NITRITE UR QL STRIP: NEGATIVE
PH UR STRIP.AUTO: 5.5 [PH] (ref 5–8)
PROT UR QL STRIP: ABNORMAL
RBC # UR: ABNORMAL /HPF
REF LAB TEST METHOD: ABNORMAL
SP GR UR STRIP: 1.02 (ref 1–1.03)
SQUAMOUS #/AREA URNS HPF: ABNORMAL /HPF
UROBILINOGEN UR QL STRIP: ABNORMAL
WBC UR QL AUTO: ABNORMAL /HPF

## 2018-07-03 PROCEDURE — 87186 SC STD MICRODIL/AGAR DIL: CPT

## 2018-07-03 PROCEDURE — 81001 URINALYSIS AUTO W/SCOPE: CPT

## 2018-07-03 PROCEDURE — 87086 URINE CULTURE/COLONY COUNT: CPT

## 2018-07-03 PROCEDURE — 87077 CULTURE AEROBIC IDENTIFY: CPT

## 2018-07-03 PROCEDURE — 99214 OFFICE O/P EST MOD 30 MIN: CPT | Performed by: PHYSICIAN ASSISTANT

## 2018-07-03 RX ORDER — HYDROCHLOROTHIAZIDE 12.5 MG/1
CAPSULE, GELATIN COATED ORAL
COMMUNITY
Start: 2014-10-28 | End: 2018-07-03 | Stop reason: SDUPTHER

## 2018-07-03 RX ORDER — DONEPEZIL HYDROCHLORIDE 5 MG/1
5 TABLET, FILM COATED ORAL DAILY
Qty: 30 TABLET | Refills: 11 | Status: SHIPPED | OUTPATIENT
Start: 2018-07-03 | End: 2018-10-03

## 2018-07-03 NOTE — PROGRESS NOTES
"Subjective     Chief Complaint: memory loss     History of Present Illness   Leobardo Araujo is a 83 y.o. male who returns to clinic today for evaluation of cognitive impairment. He was previously followed by Dr. Snell. He has noted symptoms for some time marked initially by mild forgetfulness  and word-finding difficulties. This has remained static  over time. He denies any additional impairments. There have been no associated  symptoms of anxiety  and depression . He denies  impairments in ADL's. He manages his medications  and finances. He continues to drive.      He has a history of YSABEL and is treated with a CPAP.     Prior evaluation has included screening blood work through his PCP. A head CT was unremarkable.     Today: Since his last visit in 6/17, he feels essentially unchanged. He denies any questions or concerns.      I have reviewed and confirmed the past family, social and medical history as accurate on 7/3/18.     Review of Systems   Constitutional: Negative.    HENT: Negative.    Eyes: Negative.    Respiratory: Negative.    Cardiovascular: Negative.    Gastrointestinal: Negative.    Endocrine: Negative.    Genitourinary: Negative.    Musculoskeletal: Negative.    Skin: Negative.    Allergic/Immunologic: Negative.    Neurological:        Memory loss    Hematological: Negative.    Psychiatric/Behavioral: Negative.        Objective     /72   Ht 182.9 cm (72.01\")   Wt 101 kg (222 lb 10.6 oz)   BMI 30.19 kg/m²     General appearance today is normal.       Physical Exam   Constitutional: He is oriented to person, place, and time.   Neurological: He is oriented to person, place, and time. He has normal strength. He has a normal Finger-Nose-Finger Test.   Psychiatric: His speech is normal.        Neurologic Exam     Mental Status   Oriented to person, place, and time.   Registration: recalls 3 of 3 objects. Recall of objects at 5 minutes: 0/3 recall. Follows 3 step commands.   Attention: 3/5 " sequencing.   Speech: speech is normal   Level of consciousness: alert  Able to name object. Able to read. Able to repeat. Able to write. Normal comprehension.     Cranial Nerves   Cranial nerves II through XII intact.     Motor Exam   Muscle bulk: normal  Overall muscle tone: normal    Strength   Strength 5/5 throughout.     Sensory Exam   Light touch normal.     Gait, Coordination, and Reflexes     Coordination   Finger to nose coordination: normal    Tremor   Resting tremor: absent        Results  MMSE=25 (unchanged)      Assessment/Plan   Leobardo was seen today for memory loss.    Diagnoses and all orders for this visit:    Mild cognitive impairment    Other orders  -     donepezil (ARICEPT) 5 MG tablet; Take 1 tablet by mouth Daily.          Discussion/Summary   Leobardo Araujo returns to clinic today with a history most consistent with Mild Cognitive Impairment (MCI) . I again reviewed his current status and treatment options. After discussing potential treatment options, it was elected to add  donepezil 5mg daily. He will then follow up in 3 months, or sooner if needed.   I spent 15 minutes out of 25 minutes face to face with the patient and discussing diagnosis, evaluation, current status, treatment options and management as discussed above.       As part of this visit I reviewed radiology results.      Shelly Johnson PA-C

## 2018-07-05 LAB — BACTERIA SPEC AEROBE CULT: ABNORMAL

## 2018-07-26 ENCOUNTER — TRANSCRIBE ORDERS (OUTPATIENT)
Dept: LAB | Facility: HOSPITAL | Age: 83
End: 2018-07-26

## 2018-07-26 ENCOUNTER — LAB (OUTPATIENT)
Dept: LAB | Facility: HOSPITAL | Age: 83
End: 2018-07-26

## 2018-07-26 DIAGNOSIS — N30.00 ACUTE CYSTITIS WITHOUT HEMATURIA: Primary | ICD-10-CM

## 2018-07-26 DIAGNOSIS — N30.00 ACUTE CYSTITIS WITHOUT HEMATURIA: ICD-10-CM

## 2018-07-26 LAB
BACTERIA UR QL AUTO: ABNORMAL /HPF
BILIRUB UR QL STRIP: NEGATIVE
CLARITY UR: ABNORMAL
COLOR UR: ABNORMAL
GLUCOSE UR STRIP-MCNC: NEGATIVE MG/DL
HGB UR QL STRIP.AUTO: ABNORMAL
HYALINE CASTS UR QL AUTO: ABNORMAL /LPF
KETONES UR QL STRIP: NEGATIVE
LEUKOCYTE ESTERASE UR QL STRIP.AUTO: ABNORMAL
NITRITE UR QL STRIP: NEGATIVE
PH UR STRIP.AUTO: <=5 [PH] (ref 5–8)
PROT UR QL STRIP: ABNORMAL
RBC # UR: ABNORMAL /HPF
REF LAB TEST METHOD: ABNORMAL
SP GR UR STRIP: 1.03 (ref 1–1.03)
SQUAMOUS #/AREA URNS HPF: ABNORMAL /HPF
UROBILINOGEN UR QL STRIP: ABNORMAL
WBC UR QL AUTO: ABNORMAL /HPF

## 2018-07-26 PROCEDURE — 87077 CULTURE AEROBIC IDENTIFY: CPT

## 2018-07-26 PROCEDURE — 87186 SC STD MICRODIL/AGAR DIL: CPT

## 2018-07-26 PROCEDURE — 81001 URINALYSIS AUTO W/SCOPE: CPT

## 2018-07-26 PROCEDURE — 87086 URINE CULTURE/COLONY COUNT: CPT

## 2018-07-28 LAB — BACTERIA SPEC AEROBE CULT: ABNORMAL

## 2018-08-03 ENCOUNTER — TRANSCRIBE ORDERS (OUTPATIENT)
Dept: ADMINISTRATIVE | Facility: HOSPITAL | Age: 83
End: 2018-08-03

## 2018-08-03 DIAGNOSIS — N39.0 URINARY TRACT INFECTION WITHOUT HEMATURIA, SITE UNSPECIFIED: Primary | ICD-10-CM

## 2018-08-06 ENCOUNTER — APPOINTMENT (OUTPATIENT)
Dept: INFUSION THERAPY | Facility: HOSPITAL | Age: 83
End: 2018-08-06
Attending: INTERNAL MEDICINE

## 2018-08-09 ENCOUNTER — HOSPITAL ENCOUNTER (OUTPATIENT)
Dept: INFUSION THERAPY | Facility: HOSPITAL | Age: 83
Discharge: HOME | End: 2018-08-09
Attending: INTERNAL MEDICINE | Admitting: INTERNAL MEDICINE

## 2018-08-09 VITALS
HEIGHT: 72 IN | WEIGHT: 222 LBS | OXYGEN SATURATION: 96 % | HEART RATE: 70 BPM | TEMPERATURE: 97.8 F | SYSTOLIC BLOOD PRESSURE: 146 MMHG | RESPIRATION RATE: 18 BRPM | DIASTOLIC BLOOD PRESSURE: 70 MMHG | BODY MASS INDEX: 30.07 KG/M2

## 2018-08-09 DIAGNOSIS — N39.0 URINARY TRACT INFECTION WITHOUT HEMATURIA, SITE UNSPECIFIED: ICD-10-CM

## 2018-08-09 PROCEDURE — C1751 CATH, INF, PER/CENT/MIDLINE: HCPCS

## 2018-08-09 PROCEDURE — C1894 INTRO/SHEATH, NON-LASER: HCPCS

## 2018-08-09 RX ORDER — SODIUM CHLORIDE 0.9 % (FLUSH) 0.9 %
10 SYRINGE (ML) INJECTION AS NEEDED
Status: DISCONTINUED | OUTPATIENT
Start: 2018-08-09 | End: 2018-08-11 | Stop reason: HOSPADM

## 2018-08-14 ENCOUNTER — LAB (OUTPATIENT)
Dept: LAB | Facility: HOSPITAL | Age: 83
End: 2018-08-14

## 2018-08-14 ENCOUNTER — TRANSCRIBE ORDERS (OUTPATIENT)
Dept: LAB | Facility: HOSPITAL | Age: 83
End: 2018-08-14

## 2018-08-14 DIAGNOSIS — N39.0 URINARY TRACT INFECTION WITHOUT HEMATURIA, SITE UNSPECIFIED: Primary | ICD-10-CM

## 2018-08-14 DIAGNOSIS — N39.0 URINARY TRACT INFECTION WITHOUT HEMATURIA, SITE UNSPECIFIED: ICD-10-CM

## 2018-08-14 LAB
BACTERIA UR QL AUTO: ABNORMAL /HPF
BILIRUB UR QL STRIP: NEGATIVE
CLARITY UR: ABNORMAL
COD CRY URNS QL: ABNORMAL /HPF
COLOR UR: YELLOW
GLUCOSE UR STRIP-MCNC: ABNORMAL MG/DL
HGB UR QL STRIP.AUTO: ABNORMAL
HYALINE CASTS UR QL AUTO: ABNORMAL /LPF
KETONES UR QL STRIP: NEGATIVE
LEUKOCYTE ESTERASE UR QL STRIP.AUTO: ABNORMAL
NITRITE UR QL STRIP: NEGATIVE
PH UR STRIP.AUTO: <=5 [PH] (ref 5–8)
PROT UR QL STRIP: ABNORMAL
RBC # UR: ABNORMAL /HPF
REF LAB TEST METHOD: ABNORMAL
SP GR UR STRIP: 1.02 (ref 1–1.03)
SQUAMOUS #/AREA URNS HPF: ABNORMAL /HPF
URATE CRY URNS QL MICRO: ABNORMAL /HPF
UROBILINOGEN UR QL STRIP: ABNORMAL
WBC UR QL AUTO: ABNORMAL /HPF

## 2018-08-14 PROCEDURE — 81001 URINALYSIS AUTO W/SCOPE: CPT

## 2018-08-14 PROCEDURE — 87147 CULTURE TYPE IMMUNOLOGIC: CPT

## 2018-08-14 PROCEDURE — 87077 CULTURE AEROBIC IDENTIFY: CPT

## 2018-08-14 PROCEDURE — 87186 SC STD MICRODIL/AGAR DIL: CPT

## 2018-08-14 PROCEDURE — 87086 URINE CULTURE/COLONY COUNT: CPT

## 2018-08-17 LAB — BACTERIA SPEC AEROBE CULT: ABNORMAL

## 2018-08-21 ENCOUNTER — LAB (OUTPATIENT)
Dept: LAB | Facility: HOSPITAL | Age: 83
End: 2018-08-21

## 2018-08-21 ENCOUNTER — TRANSCRIBE ORDERS (OUTPATIENT)
Dept: LAB | Facility: HOSPITAL | Age: 83
End: 2018-08-21

## 2018-08-21 DIAGNOSIS — Z90.79 ACQUIRED ABSENCE OF GENITAL ORGANS: ICD-10-CM

## 2018-08-21 DIAGNOSIS — N39.0 URINARY TRACT INFECTION WITHOUT HEMATURIA, SITE UNSPECIFIED: ICD-10-CM

## 2018-08-21 DIAGNOSIS — N41.0 ACUTE PROSTATITIS: ICD-10-CM

## 2018-08-21 DIAGNOSIS — N40.1 ENLARGED PROSTATE WITH URINARY OBSTRUCTION: ICD-10-CM

## 2018-08-21 DIAGNOSIS — D72.823 NEUTROPHILIC LEUKEMOID REACTION: ICD-10-CM

## 2018-08-21 DIAGNOSIS — A49.8 BACTERIAL INFECTION DUE TO PSEUDOMONAS: ICD-10-CM

## 2018-08-21 DIAGNOSIS — N13.8 ENLARGED PROSTATE WITH URINARY OBSTRUCTION: ICD-10-CM

## 2018-08-21 DIAGNOSIS — L50.0 ALLERGIC URTICARIA: ICD-10-CM

## 2018-08-21 DIAGNOSIS — N39.0 URINARY TRACT INFECTION WITHOUT HEMATURIA, SITE UNSPECIFIED: Primary | ICD-10-CM

## 2018-08-21 LAB
BACTERIA UR QL AUTO: ABNORMAL /HPF
BILIRUB UR QL STRIP: NEGATIVE
CLARITY UR: CLEAR
COLOR UR: YELLOW
GLUCOSE UR STRIP-MCNC: NEGATIVE MG/DL
HGB UR QL STRIP.AUTO: ABNORMAL
HYALINE CASTS UR QL AUTO: ABNORMAL /LPF
KETONES UR QL STRIP: NEGATIVE
LEUKOCYTE ESTERASE UR QL STRIP.AUTO: ABNORMAL
MUCOUS THREADS URNS QL MICRO: ABNORMAL /HPF
NITRITE UR QL STRIP: NEGATIVE
PH UR STRIP.AUTO: <=5 [PH] (ref 5–8)
PROT UR QL STRIP: ABNORMAL
RBC # UR: ABNORMAL /HPF
REF LAB TEST METHOD: ABNORMAL
SP GR UR STRIP: 1.02 (ref 1–1.03)
SQUAMOUS #/AREA URNS HPF: ABNORMAL /HPF
UROBILINOGEN UR QL STRIP: ABNORMAL
WBC UR QL AUTO: ABNORMAL /HPF

## 2018-08-21 PROCEDURE — 87086 URINE CULTURE/COLONY COUNT: CPT

## 2018-08-21 PROCEDURE — 87077 CULTURE AEROBIC IDENTIFY: CPT

## 2018-08-21 PROCEDURE — 87186 SC STD MICRODIL/AGAR DIL: CPT

## 2018-08-21 PROCEDURE — 81001 URINALYSIS AUTO W/SCOPE: CPT

## 2018-08-28 ENCOUNTER — LAB (OUTPATIENT)
Dept: LAB | Facility: HOSPITAL | Age: 83
End: 2018-08-28

## 2018-08-28 ENCOUNTER — TRANSCRIBE ORDERS (OUTPATIENT)
Dept: LAB | Facility: HOSPITAL | Age: 83
End: 2018-08-28

## 2018-08-28 DIAGNOSIS — N39.0 URINARY TRACT INFECTION WITHOUT HEMATURIA, SITE UNSPECIFIED: Primary | ICD-10-CM

## 2018-08-28 DIAGNOSIS — Z90.79 ACQUIRED ABSENCE OF GENITAL ORGANS: ICD-10-CM

## 2018-08-28 DIAGNOSIS — N39.0 URINARY TRACT INFECTION WITHOUT HEMATURIA, SITE UNSPECIFIED: ICD-10-CM

## 2018-08-28 DIAGNOSIS — N41.0 ACUTE PROSTATITIS: ICD-10-CM

## 2018-08-28 LAB
BACTERIA SPEC AEROBE CULT: ABNORMAL
BACTERIA SPEC AEROBE CULT: ABNORMAL
BACTERIA UR QL AUTO: ABNORMAL /HPF
BILIRUB UR QL STRIP: NEGATIVE
CLARITY UR: CLEAR
COLOR UR: YELLOW
GLUCOSE UR STRIP-MCNC: NEGATIVE MG/DL
HGB UR QL STRIP.AUTO: ABNORMAL
HYALINE CASTS UR QL AUTO: ABNORMAL /LPF
KETONES UR QL STRIP: NEGATIVE
LEUKOCYTE ESTERASE UR QL STRIP.AUTO: ABNORMAL
NITRITE UR QL STRIP: NEGATIVE
PH UR STRIP.AUTO: <=5 [PH] (ref 5–8)
PROT UR QL STRIP: NEGATIVE
RBC # UR: ABNORMAL /HPF
REF LAB TEST METHOD: ABNORMAL
SP GR UR STRIP: 1.02 (ref 1–1.03)
SQUAMOUS #/AREA URNS HPF: ABNORMAL /HPF
UROBILINOGEN UR QL STRIP: ABNORMAL
WBC UR QL AUTO: ABNORMAL /HPF
YEAST URNS QL MICRO: ABNORMAL /HPF

## 2018-08-28 PROCEDURE — 87086 URINE CULTURE/COLONY COUNT: CPT

## 2018-08-28 PROCEDURE — 81001 URINALYSIS AUTO W/SCOPE: CPT

## 2018-08-30 LAB — BACTERIA SPEC AEROBE CULT: NORMAL

## 2018-10-03 ENCOUNTER — OFFICE VISIT (OUTPATIENT)
Dept: NEUROLOGY | Facility: CLINIC | Age: 83
End: 2018-10-03

## 2018-10-03 VITALS
HEART RATE: 66 BPM | BODY MASS INDEX: 30.07 KG/M2 | HEIGHT: 72 IN | DIASTOLIC BLOOD PRESSURE: 80 MMHG | SYSTOLIC BLOOD PRESSURE: 170 MMHG | WEIGHT: 222 LBS

## 2018-10-03 DIAGNOSIS — R41.3 MEMORY LOSS: Primary | ICD-10-CM

## 2018-10-03 PROCEDURE — 99213 OFFICE O/P EST LOW 20 MIN: CPT | Performed by: PSYCHIATRY & NEUROLOGY

## 2018-10-03 RX ORDER — DONEPEZIL HYDROCHLORIDE 5 MG/1
5 TABLET, FILM COATED ORAL DAILY
Qty: 30 TABLET | Refills: 11 | Status: SHIPPED | OUTPATIENT
Start: 2018-10-03 | End: 2019-10-21 | Stop reason: SDUPTHER

## 2018-10-03 NOTE — PROGRESS NOTES
"Subjective     Chief Complaint: memory loss     History of Present Illness   Leobardo Araujo is a 83 y.o. male who returns to clinic today with a history of cognitive impairment. He was previously followed by Dr. Snell.     He has a history of YSABEL and is treated with a CPAP.     Prior evaluation has included screening blood work through his PCP. A head CT was unremarkable.     Since his last visit on 7/3/18, he denies any ongoing cognitive impairment, including problems with memory, language, orientation, etc. He continues to live independently, work and denies any impairments. He is not taking donepezil, but took this briefly in the past and does not feel that this is necessary.       I have reviewed and confirmed the past family, social and medical history as accurate on 10/3/18.     Review of Systems   Constitutional: Negative.        Objective     /80   Pulse 66   Ht 182.9 cm (72.01\")   Wt 101 kg (222 lb)   BMI 30.10 kg/m²     General appearance today is normal.       Physical Exam   Constitutional: He is oriented to person, place, and time.   Neurological: He is oriented to person, place, and time.   Psychiatric: His speech is normal.        Neurologic Exam     Mental Status   Oriented to person, place, and time.   Registration: recalls 3 of 3 objects. Recall at 5 minutes: recalls 3 of 3 objects. Follows 3 step commands.   Attention: normal.   Speech: speech is normal   Level of consciousness: alert  Knowledge: good.   Able to name object. Able to read. Able to repeat. Able to write. Normal comprehension.     Cranial Nerves   Cranial nerves II through XII intact.         Results  MMSE=28 (4/5 serial 7's, 4/5 free recall on a second memory task)      Assessment/Plan   Leobardo was seen today for memory loss.    Diagnoses and all orders for this visit:    Memory loss          Discussion/Summary   Leobardo Araujo returns to clinic today with a history of possible MCI diagnosed by Dr. Snell. However, at this " time is exam is reassuring and consistent with normal aging, as he also suspects. However, he would like to continue to follow-up at 6 month intervals.     I spent 20 minutes face to face with the patient. I spent 12 minutes counseling and discussing diagnosis, prognosis, diagnostic testing, current status and management.      Teodoro Donaldson MD

## 2018-10-08 ENCOUNTER — TELEPHONE (OUTPATIENT)
Dept: NEUROLOGY | Facility: CLINIC | Age: 83
End: 2018-10-08

## 2018-10-08 NOTE — TELEPHONE ENCOUNTER
Tried to call [employer] again. Received an answering machine for a business. I did not leave a message.

## 2018-10-08 NOTE — TELEPHONE ENCOUNTER
----- Message from Teodoro Lemon MD sent at 10/3/2018  4:31 PM EDT -----  Contact: [phone]  I was unable to reach Greta. If she calls back, he was prescribed donepezil by Dr. Snell for Mild Cognitive Impairment, a mild memory disorder. However, I was unable to confirm this diagnosis, and am not certain he suffers from a neurologic illness. But, to be cautious he has agreed to periodic follow-up to continue to monitor his memory for any evidence of a progressive disorder. Thanks.    ----- Message -----  From: Chadd Coker  Sent: 10/3/2018   1:36 PM  To: Teodoro Lemon MD, Shelly Johnson PA-C, #    DR. LEMON,    PTS DAUGHTER, GRETA, CALLED IN REGARDS TO HIS LAST TWO VISITS.GRETA STATES THAT SHE NORMALLY COMES WITH HER FATHER TO APPOINTMENTS BUT WAS NOT ABLE TO MAKE THE LAST TWO AND JUST WANTED TO GET A BIT OF INFORMATION ON WHAT IS CURRENTLY GOING ON WITH SANDRA. I NOTICED IN HIS CHART THAT HE STATED HE NO LONGER TAKES DONEPEZIL, BUT GRETA STATES THAT HE DOES, BECAUSE SHE GIVES HIM ALL HIS MEDICATIONS. I INFORMED SAQIB AND SHE ADDED DONEPEZIL BACK TO SANDRA'S MEDICATION LIST. SHE IS ALSO UNSURE OF  WHY HE TAKES DONEPEZIL, SO WAS ASKING FOR INFORMATION ON WHY IT IS PRESCRIBED, BUT WILL CONTINUE TO GIVE IT TO HIM. SHE JUST WANTS TO GET A GENERAL IDEA OF WHAT IS GOING ON, SO I READ HIS LAST TWO OFFICE VISITS TO HER, BUT SHE WOULD ALSO LIKE TO SPEAK TO A PROVIDER. PLEASE GIVE GRETA A CALL BACK. THANK YOU.    [phone]

## 2018-10-25 RX ORDER — LOSARTAN POTASSIUM 100 MG/1
100 TABLET ORAL DAILY
Qty: 90 TABLET | Refills: 0 | Status: SHIPPED | OUTPATIENT
Start: 2018-10-25 | End: 2019-04-17 | Stop reason: SDUPTHER

## 2018-12-03 ENCOUNTER — TRANSCRIBE ORDERS (OUTPATIENT)
Dept: LAB | Facility: HOSPITAL | Age: 83
End: 2018-12-03

## 2018-12-03 ENCOUNTER — APPOINTMENT (OUTPATIENT)
Dept: LAB | Facility: HOSPITAL | Age: 83
End: 2018-12-03

## 2018-12-03 DIAGNOSIS — N13.8 ENLARGED PROSTATE WITH URINARY OBSTRUCTION: ICD-10-CM

## 2018-12-03 DIAGNOSIS — N41.0 ACUTE PROSTATITIS: ICD-10-CM

## 2018-12-03 DIAGNOSIS — N40.1 ENLARGED PROSTATE WITH URINARY OBSTRUCTION: ICD-10-CM

## 2018-12-03 DIAGNOSIS — N39.0 URINARY TRACT INFECTION WITHOUT HEMATURIA, SITE UNSPECIFIED: Primary | ICD-10-CM

## 2018-12-03 DIAGNOSIS — A49.8 BACTERIAL INFECTION DUE TO PSEUDOMONAS: ICD-10-CM

## 2018-12-03 LAB
BACTERIA UR QL AUTO: ABNORMAL /HPF
BILIRUB UR QL STRIP: NEGATIVE
CLARITY UR: ABNORMAL
COD CRY URNS QL: ABNORMAL /HPF
COLOR UR: ABNORMAL
GLUCOSE UR STRIP-MCNC: NEGATIVE MG/DL
HGB UR QL STRIP.AUTO: ABNORMAL
HYALINE CASTS UR QL AUTO: ABNORMAL /LPF
KETONES UR QL STRIP: NEGATIVE
LEUKOCYTE ESTERASE UR QL STRIP.AUTO: ABNORMAL
NITRITE UR QL STRIP: NEGATIVE
PH UR STRIP.AUTO: <=5 [PH] (ref 5–8)
PROT UR QL STRIP: NEGATIVE
RBC # UR: ABNORMAL /HPF
REF LAB TEST METHOD: ABNORMAL
SP GR UR STRIP: 1.02 (ref 1–1.03)
SQUAMOUS #/AREA URNS HPF: ABNORMAL /HPF
UROBILINOGEN UR QL STRIP: ABNORMAL
WBC UR QL AUTO: ABNORMAL /HPF

## 2018-12-03 PROCEDURE — 87077 CULTURE AEROBIC IDENTIFY: CPT | Performed by: INTERNAL MEDICINE

## 2018-12-03 PROCEDURE — 87186 SC STD MICRODIL/AGAR DIL: CPT | Performed by: INTERNAL MEDICINE

## 2018-12-03 PROCEDURE — 87086 URINE CULTURE/COLONY COUNT: CPT | Performed by: INTERNAL MEDICINE

## 2018-12-03 PROCEDURE — 81001 URINALYSIS AUTO W/SCOPE: CPT | Performed by: INTERNAL MEDICINE

## 2018-12-04 RX ORDER — HYDROCHLOROTHIAZIDE 25 MG/1
TABLET ORAL
Qty: 90 TABLET | Refills: 0 | Status: SHIPPED | OUTPATIENT
Start: 2018-12-04 | End: 2019-05-17 | Stop reason: SDUPTHER

## 2018-12-05 ENCOUNTER — OFFICE VISIT (OUTPATIENT)
Dept: CARDIOLOGY | Facility: CLINIC | Age: 83
End: 2018-12-05

## 2018-12-05 VITALS
BODY MASS INDEX: 32 KG/M2 | SYSTOLIC BLOOD PRESSURE: 140 MMHG | DIASTOLIC BLOOD PRESSURE: 54 MMHG | HEART RATE: 75 BPM | WEIGHT: 236 LBS

## 2018-12-05 DIAGNOSIS — Z95.3 HISTORY OF AORTIC VALVE REPLACEMENT WITH BIOPROSTHETIC VALVE: Primary | ICD-10-CM

## 2018-12-05 DIAGNOSIS — I10 ESSENTIAL HYPERTENSION: ICD-10-CM

## 2018-12-05 DIAGNOSIS — I48.91 ATRIAL FIBRILLATION, UNSPECIFIED TYPE (HCC): Chronic | ICD-10-CM

## 2018-12-05 DIAGNOSIS — E78.00 HYPERCHOLESTEREMIA: ICD-10-CM

## 2018-12-05 LAB — BACTERIA SPEC AEROBE CULT: ABNORMAL

## 2018-12-05 PROCEDURE — 99213 OFFICE O/P EST LOW 20 MIN: CPT | Performed by: INTERNAL MEDICINE

## 2018-12-05 PROCEDURE — 93000 ELECTROCARDIOGRAM COMPLETE: CPT | Performed by: NURSE PRACTITIONER

## 2018-12-05 NOTE — PROGRESS NOTES
Leobardo Araujo  1934  84 y.o.  [phone]  [phone]      12/05/2018    Edy Goodman MD    Chief Complaint   Patient presents with   • Atrial Fibrillation       Problem List:  1. Atrial fibrillation, October 2010; CHADS2-VASc = 3:  a. Amiodarone therapy, discontinued, November 2013.  b. Terumo TigerPaw left atrial appendage closure, Dr. Edson Roque, 07/23/2012.   2. Hypertension.  3. Hyperlipidemia.  4. Aortic stenosis:   a. Tissue aortic valve replacement (Jay 25) and left atrial appendageal ablation (Terumo TigerPaw device), Dr. Sandhu, 07/23/2012.   b. Echocardiogram, 12/29/2017: EF 60%. Jay tissue aortic valve replacement 25 mm, 7/23/12. Aortic valve mean pressure gradient is 8.8 mmHg. Mild MS, trace MR, trace-to-mild TR.  5. Obesity, BMI 30.  6. Diabetes mellitus.  7. Remote tobacco abuse.  8. Erectile dysfunction.  9. History of abnormal LFTs.  10. Surgical history:  a. Bladder cancer resection, 2000.  b. Lithotripsy.  c. Orchiectomy, benign.  d. Prostatectomy 7/2018       Allergies   Allergen Reactions   • Ciprofloxacin Hives     Abdominal rash. No anaphylaxis.       Current Medications:      Current Outpatient Medications:   •  amiodarone (PACERONE) 200 MG tablet, TAKE ONE TABLET BY MOUTH ONCE DAILY, Disp: 90 tablet, Rfl: 3  •  amLODIPine (NORVASC) 10 MG tablet, Take 1 tablet by mouth Daily., Disp: 30 tablet, Rfl: 0  •  aspirin 81 MG tablet, Take  by mouth Daily., Disp: , Rfl:   •  atorvastatin (LIPITOR) 20 MG tablet, Take 1 tablet by mouth Every Night., Disp: 90 tablet, Rfl: 1  •  donepezil (ARICEPT) 5 MG tablet, Take 1 tablet by mouth Daily., Disp: 30 tablet, Rfl: 11  •  hydrochlorothiazide (HYDRODIURIL) 25 MG tablet, TAKE ONE TABLET BY MOUTH ONCE DAILY, Disp: 90 tablet, Rfl: 0  •  insulin NPH-insulin regular (humuLIN 70/30,novoLIN 70/30) (70-30) 100 UNIT/ML injection, Inject 50 Units under the skin into the appropriate area as directed 2 (Two) Times a Day With Meals., Disp: ,  Rfl:   •  Insulin Pen Needle (BD PEN NEEDLE DEBORAH U/F) 32G X 4 MM misc, , Disp: , Rfl:   •  levothyroxine (SYNTHROID, LEVOTHROID) 50 MCG tablet, Take  by mouth daily., Disp: , Rfl:   •  losartan (COZAAR) 100 MG tablet, Take 1 tablet by mouth Daily., Disp: 90 tablet, Rfl: 0  •  metFORMIN (GLUCOPHAGE) 500 MG tablet, Take 1,000 mg by mouth 2 (Two) Times a Day With Meals., Disp: , Rfl:   •  QUEtiapine (SEROquel) 25 MG tablet, Take 1 tablet by mouth Every Night., Disp: 30 tablet, Rfl: 0  •  VIIBRYD 40 MG tablet tablet, TAKE ONE TABLET BY MOUTH ONCE DAILY, Disp: 30 tablet, Rfl: 3    HPI    Leobardo Araujo is a 84 y.o. male who presents today for annual follow up of atrial fibrillation, aortic stenosis s/p valve replacement, hypertension, and hyperlipidemia. Since last visit, he has been doing well from a cardiac standpoint.  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  He denies having any current chest pain, shortness of breath, dyspnea on exertion, edema, fatigue, palpitations, dizziness and syncope.  He does not follow any routine regular exercise regimen.  He does not have a list of his medications with him today and states that he does not know what he is taking because his daughter manages his medications for him.  EKG today revealed sinus rhythm with acceptable QTc interval.  His last thyroid level was checked in April of this year.   The following portions of the patient's history were reviewed and updated as appropriate: allergies, current medications and problem list.    Pertinent positives as listed in the HPI.  All other systems reviewed are negative.    Vitals:    12/05/18 1154   BP: 140/54   BP Location: Right arm   Patient Position: Sitting   Pulse: 75   Weight: 107 kg (236 lb)       Physical Exam:    General: Alert and oriented  Neck: Jugular venous pressure is within normal limits. Carotids have normal upstrokes without bruits.   Cardiovascular: Heart has a  nondisplaced focal PMI. Regular rate and rhythm with 1-2/6 SE murmur with radiation to both carotids; no gallop or rub.  Lungs: Clear without rales or wheezes. Equal expansion is noted.   Extremities: Show no edema.  Skin: warm and dry.  Neurologic: nonfocal    Diagnostic Data:  Lab Results   Component Value Date    GLUCOSE 151 (H) 06/20/2018    BUN 21 06/20/2018    CREATININE 0.98 06/20/2018    EGFRIFNONA 73 06/20/2018    BCR 21.4 06/20/2018    K 4.1 06/20/2018    CO2 28.0 06/20/2018    CALCIUM 8.8 06/20/2018    ALBUMIN 3.30 05/04/2018    AST 28 05/04/2018    ALT 36 05/04/2018     Lab Results   Component Value Date    GLUCOSE 151 (H) 06/20/2018    CALCIUM 8.8 06/20/2018     06/20/2018    K 4.1 06/20/2018    CO2 28.0 06/20/2018     06/20/2018    BUN 21 06/20/2018    CREATININE 0.98 06/20/2018    EGFRIFNONA 73 06/20/2018    BCR 21.4 06/20/2018    ANIONGAP 10.0 06/20/2018     Lab Results   Component Value Date    WBC 10.85 (H) 06/20/2018    HGB 11.7 (L) 06/20/2018    HCT 35.9 (L) 06/20/2018    MCV 87.6 06/20/2018     06/20/2018     Lab Results   Component Value Date    TSH 2.555 04/24/2018         ECG 12 Lead  Date/Time: 12/5/2018 12:53 PM  Performed by: Yesi Pandey APRN  Authorized by: Yesi Pandey APRN   Comparison: compared with previous ECG   Similar to previous ECG  Rhythm: sinus rhythm  Rate: normal  BPM: 75  QRS axis: normal  Clinical impression: normal ECG        Assessment:      ICD-10-CM ICD-9-CM   1. History of aortic valve replacement with bioprosthetic valve Z95.3 V42.2   2. Atrial fibrillation, unspecified type (CMS/HCC) I48.91 427.31   3. Essential hypertension I10 401.9   4. Hypercholesteremia E78.00 272.0       Plan:    1. Encouraged routine exercise and dietary modifications  2. Continue current medications.  3. No NOAC due to KASANDRA closure  4. F/up in 12 months or sooner if needed.    Scribed for Linda Tom MD by Yesi Pandey, SOLOMON. 12/5/2018  12:07 PM     YUSUF Mckeon  Vaishali AN personally performed the services described in this documentation as scribed by the above individual in my presence, and it is both accurate and complete.    Linda Tom MD, FACC

## 2019-01-17 ENCOUNTER — TELEPHONE (OUTPATIENT)
Dept: INTERNAL MEDICINE | Facility: CLINIC | Age: 84
End: 2019-01-17

## 2019-01-17 NOTE — TELEPHONE ENCOUNTER
Called and left  for patient requesting that he arrive 30 min early for apt. To fill out paper work .

## 2019-01-31 RX ORDER — TERAZOSIN 1 MG/1
CAPSULE ORAL
Qty: 90 CAPSULE | Refills: 3 | Status: SHIPPED | OUTPATIENT
Start: 2019-01-31 | End: 2020-05-22

## 2019-02-13 PROBLEM — K63.5 POLYP OF COLON: Status: ACTIVE | Noted: 2019-02-13

## 2019-02-13 PROBLEM — N40.1 BPH WITH URINARY OBSTRUCTION: Status: ACTIVE | Noted: 2019-02-13

## 2019-02-13 PROBLEM — N13.8 BPH WITH URINARY OBSTRUCTION: Status: ACTIVE | Noted: 2019-02-13

## 2019-02-13 PROBLEM — J30.9 ALLERGIC RHINITIS: Status: ACTIVE | Noted: 2019-02-13

## 2019-02-13 PROBLEM — N20.0 KIDNEY STONE: Status: ACTIVE | Noted: 2019-02-13

## 2019-02-13 PROBLEM — I08.0 DISORDER OF MITRAL AND AORTIC VALVES: Status: ACTIVE | Noted: 2019-02-13

## 2019-02-13 PROBLEM — R41.0 DELIRIUM: Status: ACTIVE | Noted: 2019-02-13

## 2019-02-13 PROBLEM — N39.0 RECURRENT UTI (URINARY TRACT INFECTION): Status: ACTIVE | Noted: 2019-02-13

## 2019-02-13 PROBLEM — R00.2 PALPITATIONS: Status: ACTIVE | Noted: 2019-02-13

## 2019-02-13 PROBLEM — M54.2 NECK PAIN: Status: ACTIVE | Noted: 2019-02-13

## 2019-02-13 PROBLEM — R94.5 ABNORMAL LIVER FUNCTION: Status: ACTIVE | Noted: 2019-02-13

## 2019-02-13 PROBLEM — C67.9 MALIGNANT TUMOR OF URINARY BLADDER (HCC): Status: ACTIVE | Noted: 2019-02-13

## 2019-02-13 PROBLEM — R20.0 NUMBNESS: Status: ACTIVE | Noted: 2019-02-13

## 2019-02-13 PROBLEM — E03.9 HYPOTHYROIDISM: Status: ACTIVE | Noted: 2019-02-13

## 2019-02-19 ENCOUNTER — OFFICE VISIT (OUTPATIENT)
Dept: INTERNAL MEDICINE | Facility: CLINIC | Age: 84
End: 2019-02-19

## 2019-02-19 ENCOUNTER — TELEPHONE (OUTPATIENT)
Dept: INTERNAL MEDICINE | Facility: CLINIC | Age: 84
End: 2019-02-19

## 2019-02-19 VITALS
TEMPERATURE: 98.2 F | HEIGHT: 72 IN | HEART RATE: 74 BPM | SYSTOLIC BLOOD PRESSURE: 164 MMHG | BODY MASS INDEX: 29.66 KG/M2 | WEIGHT: 219 LBS | DIASTOLIC BLOOD PRESSURE: 88 MMHG

## 2019-02-19 DIAGNOSIS — R41.3 MEMORY LOSS: ICD-10-CM

## 2019-02-19 DIAGNOSIS — I48.0 PAROXYSMAL ATRIAL FIBRILLATION (HCC): ICD-10-CM

## 2019-02-19 DIAGNOSIS — N39.0 RECURRENT UTI (URINARY TRACT INFECTION): ICD-10-CM

## 2019-02-19 DIAGNOSIS — I73.9 PERIPHERAL VASCULAR DISEASE (HCC): ICD-10-CM

## 2019-02-19 DIAGNOSIS — C67.9 MALIGNANT NEOPLASM OF URINARY BLADDER, UNSPECIFIED SITE (HCC): ICD-10-CM

## 2019-02-19 DIAGNOSIS — E66.9 OBESITY, CLASS I, BMI 30-34.9: ICD-10-CM

## 2019-02-19 DIAGNOSIS — D22.9 MULTIPLE NEVI: ICD-10-CM

## 2019-02-19 DIAGNOSIS — E78.5 HYPERLIPIDEMIA LDL GOAL <100: ICD-10-CM

## 2019-02-19 DIAGNOSIS — I10 BENIGN ESSENTIAL HYPERTENSION: ICD-10-CM

## 2019-02-19 DIAGNOSIS — E53.8 B12 DEFICIENCY: ICD-10-CM

## 2019-02-19 DIAGNOSIS — F33.41 RECURRENT MAJOR DEPRESSIVE DISORDER, IN PARTIAL REMISSION (HCC): ICD-10-CM

## 2019-02-19 DIAGNOSIS — E11.59 TYPE 2 DIABETES MELLITUS WITH OTHER CIRCULATORY COMPLICATIONS (HCC): ICD-10-CM

## 2019-02-19 DIAGNOSIS — Z00.00 MEDICARE ANNUAL WELLNESS VISIT, SUBSEQUENT: Primary | ICD-10-CM

## 2019-02-19 PROBLEM — R41.0 DELIRIUM: Status: RESOLVED | Noted: 2019-02-13 | Resolved: 2019-02-19

## 2019-02-19 PROBLEM — R19.7 NAUSEA, VOMITING, AND DIARRHEA: Status: RESOLVED | Noted: 2018-04-24 | Resolved: 2019-02-19

## 2019-02-19 PROBLEM — R11.2 NAUSEA, VOMITING, AND DIARRHEA: Status: RESOLVED | Noted: 2018-04-24 | Resolved: 2019-02-19

## 2019-02-19 LAB
ALBUMIN SERPL-MCNC: 4.35 G/DL (ref 3.2–4.8)
ALBUMIN/GLOB SERPL: 1.9 G/DL (ref 1.5–2.5)
ALP SERPL-CCNC: 94 U/L (ref 25–100)
ALT SERPL W P-5'-P-CCNC: 48 U/L (ref 7–40)
ANION GAP SERPL CALCULATED.3IONS-SCNC: 11 MMOL/L (ref 3–11)
ARTICHOKE IGE QN: 183 MG/DL (ref 0–130)
AST SERPL-CCNC: 31 U/L (ref 0–33)
BASOPHILS # BLD AUTO: 0.02 10*3/MM3 (ref 0–0.2)
BASOPHILS NFR BLD AUTO: 0.2 % (ref 0–1)
BILIRUB SERPL-MCNC: 0.6 MG/DL (ref 0.3–1.2)
BUN BLD-MCNC: 20 MG/DL (ref 9–23)
BUN/CREAT SERPL: 19.2 (ref 7–25)
CALCIUM SPEC-SCNC: 9.4 MG/DL (ref 8.7–10.4)
CHLORIDE SERPL-SCNC: 92 MMOL/L (ref 99–109)
CHOLEST SERPL-MCNC: 235 MG/DL (ref 0–200)
CO2 SERPL-SCNC: 27 MMOL/L (ref 20–31)
CREAT BLD-MCNC: 1.04 MG/DL (ref 0.6–1.3)
DEPRECATED RDW RBC AUTO: 43.6 FL (ref 37–54)
EOSINOPHIL # BLD AUTO: 0.17 10*3/MM3 (ref 0–0.3)
EOSINOPHIL NFR BLD AUTO: 1.6 % (ref 0–3)
ERYTHROCYTE [DISTWIDTH] IN BLOOD BY AUTOMATED COUNT: 13.7 % (ref 11.3–14.5)
GFR SERPL CREATININE-BSD FRML MDRD: 68 ML/MIN/1.73
GLOBULIN UR ELPH-MCNC: 2.4 GM/DL
GLUCOSE BLD-MCNC: 454 MG/DL (ref 70–100)
HBA1C MFR BLD: 10.2 % (ref 4.8–5.6)
HCT VFR BLD AUTO: 42.8 % (ref 38.9–50.9)
HDLC SERPL-MCNC: 41 MG/DL (ref 40–60)
HGB BLD-MCNC: 14 G/DL (ref 13.1–17.5)
IMM GRANULOCYTES # BLD AUTO: 0.04 10*3/MM3 (ref 0–0.05)
IMM GRANULOCYTES NFR BLD AUTO: 0.4 % (ref 0–0.6)
LYMPHOCYTES # BLD AUTO: 2.74 10*3/MM3 (ref 0.6–4.8)
LYMPHOCYTES NFR BLD AUTO: 26.5 % (ref 24–44)
MCH RBC QN AUTO: 28.6 PG (ref 27–31)
MCHC RBC AUTO-ENTMCNC: 32.7 G/DL (ref 32–36)
MCV RBC AUTO: 87.5 FL (ref 80–99)
MONOCYTES # BLD AUTO: 0.67 10*3/MM3 (ref 0–1)
MONOCYTES NFR BLD AUTO: 6.5 % (ref 0–12)
NEUTROPHILS # BLD AUTO: 6.75 10*3/MM3 (ref 1.5–8.3)
NEUTROPHILS NFR BLD AUTO: 65.2 % (ref 41–71)
PLATELET # BLD AUTO: 268 10*3/MM3 (ref 150–450)
PMV BLD AUTO: 11.2 FL (ref 6–12)
POTASSIUM BLD-SCNC: 4.5 MMOL/L (ref 3.5–5.5)
PROT SERPL-MCNC: 6.7 G/DL (ref 5.7–8.2)
RBC # BLD AUTO: 4.89 10*6/MM3 (ref 4.2–5.76)
SODIUM BLD-SCNC: 130 MMOL/L (ref 132–146)
TRIGL SERPL-MCNC: 236 MG/DL (ref 0–150)
TSH SERPL DL<=0.05 MIU/L-ACNC: 3 MIU/ML (ref 0.35–5.35)
VIT B12 BLD-MCNC: 452 PG/ML (ref 211–911)
WBC NRBC COR # BLD: 10.35 10*3/MM3 (ref 3.5–10.8)

## 2019-02-19 PROCEDURE — 99397 PER PM REEVAL EST PAT 65+ YR: CPT | Performed by: INTERNAL MEDICINE

## 2019-02-19 PROCEDURE — 82607 VITAMIN B-12: CPT | Performed by: INTERNAL MEDICINE

## 2019-02-19 PROCEDURE — 83036 HEMOGLOBIN GLYCOSYLATED A1C: CPT | Performed by: INTERNAL MEDICINE

## 2019-02-19 PROCEDURE — 36415 COLL VENOUS BLD VENIPUNCTURE: CPT | Performed by: INTERNAL MEDICINE

## 2019-02-19 PROCEDURE — 84443 ASSAY THYROID STIM HORMONE: CPT | Performed by: INTERNAL MEDICINE

## 2019-02-19 PROCEDURE — 80061 LIPID PANEL: CPT | Performed by: INTERNAL MEDICINE

## 2019-02-19 PROCEDURE — 85025 COMPLETE CBC W/AUTO DIFF WBC: CPT | Performed by: INTERNAL MEDICINE

## 2019-02-19 PROCEDURE — G0439 PPPS, SUBSEQ VISIT: HCPCS | Performed by: INTERNAL MEDICINE

## 2019-02-19 PROCEDURE — 80053 COMPREHEN METABOLIC PANEL: CPT | Performed by: INTERNAL MEDICINE

## 2019-02-19 NOTE — PROGRESS NOTES
QUICK REFERENCE INFORMATION:  The ABCs of the Annual Wellness Visit    Subsequent Medicare Wellness Visit    HEALTH RISK ASSESSMENT    1934    Recent Hospitalizations:  Recently treated at the following:  Other: [facility].        Current Medical Providers:  Patient Care Team:  Edy Goodman MD as PCP - General  Edy Goodman MD as PCP - Family Medicine        Smoking Status:  Social History     Tobacco Use   Smoking Status Former Smoker   • Types: Cigarettes   Smokeless Tobacco Never Used       Alcohol Consumption:  Social History     Substance and Sexual Activity   Alcohol Use No       Depression Screen:   PHQ-2/PHQ-9 Depression Screening 2/19/2019   Little interest or pleasure in doing things 0   Feeling down, depressed, or hopeless 0   Total Score 0       Health Habits and Functional and Cognitive Screening:  Functional & Cognitive Status 2/19/2019   Do you have difficulty preparing food and eating? No   Do you have difficulty bathing yourself, getting dressed or grooming yourself? Yes   Do you have difficulty using the toilet? No   Do you have difficulty moving around from place to place? Yes   Do you have trouble with steps or getting out of a bed or a chair? Yes   In the past year have you fallen or experienced a near fall? Yes   Current Diet Frequent Junk Food   Dental Exam Unknown   Eye Exam Unknown   Exercise (times per week) 0 times per week   Current Exercise Activities Include None   Do you need help using the phone?  Yes   Are you deaf or do you have serious difficulty hearing?  Yes   Do you need help with transportation? Yes   Do you need help shopping? No   Do you need help preparing meals?  No   Do you need help with housework?  Yes   Do you need help with laundry? Yes   Do you need help taking your medications? Yes   Do you need help managing money? Yes   Do you ever drive or ride in a car without wearing a seat belt? Yes   Have you felt unusual stress, anger or loneliness in the  last month? No   Who do you live with? Alone   If you need help, do you have trouble finding someone available to you? No   Have you been bothered in the last four weeks by sexual problems? No   Do you have difficulty concentrating, remembering or making decisions? No           Does the patient have evidence of cognitive impairment? Yes    Aspirin use counseling: Taking ASA appropriately as indicated      Recent Lab Results:  CMP:  Lab Results   Component Value Date    BUN 21 06/20/2018    CREATININE 0.98 06/20/2018    EGFRIFNONA 73 06/20/2018    BCR 21.4 06/20/2018     06/20/2018    K 4.1 06/20/2018    CO2 28.0 06/20/2018    CALCIUM 8.8 06/20/2018    ALBUMIN 3.30 05/04/2018    BILITOT 0.3 05/04/2018    ALKPHOS 83 05/04/2018    AST 28 05/04/2018    ALT 36 05/04/2018     HbA1c:  Lab Results   Component Value Date    HGBA1C 7.80 (H) 04/24/2018     Microalbumin:  No results found for: MICROALBUR, POCMALB, POCCREAT  Lipid Panel  Lab Results   Component Value Date    AST 28 05/04/2018    ALT 36 05/04/2018       Visual Acuity:  No exam data present    Age-appropriate Screening Schedule:  Refer to the list below for future screening recommendations based on patient's age, sex and/or medical conditions. Orders for these recommended tests are listed in the plan section. The patient has been provided with a written plan.    Health Maintenance   Topic Date Due   • TDAP/TD VACCINES (1 - Tdap) 10/[birth year]/1953   • ZOSTER VACCINE (1 of 2) 10/18/1984   • LIPID PANEL  03/07/2018   • URINE MICROALBUMIN  07/05/2018   • INFLUENZA VACCINE  08/01/2018   • HEMOGLOBIN A1C  10/24/2018   • PNEUMOCOCCAL VACCINES (65+ LOW/MEDIUM RISK)  Completed        Subjective   History of Present Illness    Leobardo Araujo is a 84 y.o. male who presents for an Subsequent Wellness Visit.    The following portions of the patient's history were reviewed and updated as appropriate: allergies.    Outpatient Medications Prior to Visit   Medication Sig Dispense  Refill   • amiodarone (PACERONE) 200 MG tablet TAKE ONE TABLET BY MOUTH ONCE DAILY 90 tablet 3   • amLODIPine (NORVASC) 10 MG tablet Take 1 tablet by mouth Daily. 30 tablet 0   • aspirin 81 MG tablet Take  by mouth Daily.     • donepezil (ARICEPT) 5 MG tablet Take 1 tablet by mouth Daily. 30 tablet 11   • hydrochlorothiazide (HYDRODIURIL) 25 MG tablet TAKE ONE TABLET BY MOUTH ONCE DAILY 90 tablet 0   • insulin NPH-insulin regular (humuLIN 70/30,novoLIN 70/30) (70-30) 100 UNIT/ML injection Inject 50 Units under the skin into the appropriate area as directed 2 (Two) Times a Day With Meals.     • Insulin Pen Needle (BD PEN NEEDLE DEBORAH U/F) 32G X 4 MM misc      • losartan (COZAAR) 100 MG tablet Take 1 tablet by mouth Daily. 90 tablet 0   • metFORMIN (GLUCOPHAGE) 500 MG tablet Take 1,000 mg by mouth 2 (Two) Times a Day With Meals.     • terazosin (HYTRIN) 1 MG capsule TAKE ONE CAPSULE BY MOUTH ONCE DAILY AT BEDTIME 90 capsule 3   • VIIBRYD 40 MG tablet tablet TAKE ONE TABLET BY MOUTH ONCE DAILY 30 tablet 3   • atorvastatin (LIPITOR) 20 MG tablet Take 1 tablet by mouth Every Night. 90 tablet 1   • levothyroxine (SYNTHROID, LEVOTHROID) 50 MCG tablet Take  by mouth daily.     • QUEtiapine (SEROquel) 25 MG tablet Take 1 tablet by mouth Every Night. 30 tablet 0     No facility-administered medications prior to visit.        Patient Active Problem List   Diagnosis   • Memory loss   • YSABEL (obstructive sleep apnea)   • Depression   • Fatigue   • Sleep apnea   • Atrial fibrillation (CMS/HCC)   • Hypertension   • Hypercholesteremia   • History of aortic valve stenosis   • Obesity   • Diabetes mellitus, type II (CMS/HCC)   • Erectile dysfunction   • Nausea, vomiting, and diarrhea   • Hypokalemia   • Metabolic encephalopathy   • Valvular heart disease   • Abnormal findings on diagnostic imaging of lung   • History of aortic valve replacement with bioprosthetic valve   • Type 2 diabetes mellitus with other circulatory complications  "(CMS/[facility])   • Peripheral vascular disease (CMS/[facility])   • Benign essential hypertension   • Paroxysmal atrial fibrillation (CMS/[facility])   • Hyperlipidemia LDL goal <100   • B12 deficiency   • Obesity, Class I, BMI 30-34.9   • Central sleep apnea in conditions classified elsewhere   • Abnormal liver function   • Allergic rhinitis   • BPH with urinary obstruction   • Delirium   • Disorder of mitral and aortic valves   • Hypothyroidism   • Kidney stone   • Malignant tumor of urinary bladder (CMS/[facility])   • Neck pain   • Numbness   • Palpitations   • Polyp of colon   • Recurrent UTI (urinary tract infection)       Advance Care Planning:  power of  for healthcare on file    Identification of Risk Factors:  Risk factors include: weight , unhealthy diet, cardiovascular risk, increased fall risk, cognitive impairment, hearing limitations and polypharmacy.    Review of Systems    Compared to one year ago, the patient feels his physical health is the same.  Compared to one year ago, the patient feels his mental health is the same.    Objective     Physical Exam     Procedures     Vitals:    02/19/19 0908   BP: 164/88   Pulse: 74   Temp: 98.2 °F (36.8 °C)   Weight: 99.3 kg (219 lb)   Height: 182 cm (71.65\")       Patient's Body mass index is 29.99 kg/m². BMI is above normal parameters. Recommendations include: educational material, exercise counseling and nutrition counseling.      Assessment/Plan   Patient Self-Management and Personalized Health Advice  The patient has been provided with information about: diet, exercise, prevention of cardiac or vascular disease, fall prevention and mental health concerns and preventive services including:   · Diabetes screening, see lab orders, Exercise counseling provided, Fall Risk assessment done, Nutrition counseling provided.    Visit Diagnoses:  No diagnosis found.    No orders of the defined types were placed in this encounter.      Outpatient Encounter Medications as of 2/19/2019 "   Medication Sig Dispense Refill   • amiodarone (PACERONE) 200 MG tablet TAKE ONE TABLET BY MOUTH ONCE DAILY 90 tablet 3   • amLODIPine (NORVASC) 10 MG tablet Take 1 tablet by mouth Daily. 30 tablet 0   • aspirin 81 MG tablet Take  by mouth Daily.     • donepezil (ARICEPT) 5 MG tablet Take 1 tablet by mouth Daily. 30 tablet 11   • hydrochlorothiazide (HYDRODIURIL) 25 MG tablet TAKE ONE TABLET BY MOUTH ONCE DAILY 90 tablet 0   • insulin NPH-insulin regular (humuLIN 70/30,novoLIN 70/30) (70-30) 100 UNIT/ML injection Inject 50 Units under the skin into the appropriate area as directed 2 (Two) Times a Day With Meals.     • Insulin Pen Needle (BD PEN NEEDLE DEBORAH U/F) 32G X 4 MM misc      • losartan (COZAAR) 100 MG tablet Take 1 tablet by mouth Daily. 90 tablet 0   • metFORMIN (GLUCOPHAGE) 500 MG tablet Take 1,000 mg by mouth 2 (Two) Times a Day With Meals.     • terazosin (HYTRIN) 1 MG capsule TAKE ONE CAPSULE BY MOUTH ONCE DAILY AT BEDTIME 90 capsule 3   • VIIBRYD 40 MG tablet tablet TAKE ONE TABLET BY MOUTH ONCE DAILY 30 tablet 3   • atorvastatin (LIPITOR) 20 MG tablet Take 1 tablet by mouth Every Night. 90 tablet 1   • levothyroxine (SYNTHROID, LEVOTHROID) 50 MCG tablet Take  by mouth daily.     • QUEtiapine (SEROquel) 25 MG tablet Take 1 tablet by mouth Every Night. 30 tablet 0     No facility-administered encounter medications on file as of 2/19/2019.        Reviewed use of high risk medication in the elderly: yes  Reviewed for potential of harmful drug interactions in the elderly: yes    Follow Up:  No Follow-up on file.     An After Visit Summary and PPPS with all of these plans were given to the patient.

## 2019-02-19 NOTE — TELEPHONE ENCOUNTER
Please call and discuss with his daughter that his blood sugar is very elevated at 454 and to take the full 50 units of insulin tonight and follow with Dr Callahan as soon as possible and forward the labs to Dr Callahan./[place] from today

## 2019-02-19 NOTE — ASSESSMENT & PLAN NOTE
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 Age-appropriate Counseling:  Discussed preventative medicine issues with patient including regular exercise, healthy diet, stress reduction, adequate sleep and recommended age-appropriate screening studies.  Immunizations reviewed.   Encouraged him to use hearing aides and offered audiology appointment. Encouraged him to allow his daughters to hire a care giver to help ensure he do better with taking his medications and better with diet. He will follow with Dr holland and discuss with their  about options.

## 2019-02-19 NOTE — PROGRESS NOTES
Chief Complaint   Patient presents with   • Annual Exam       History of Present Illness  84 y.o. white male presents for for medicare wellness.He does have some fatigue and episodes of memory loss. His blood sugar is not under good control as he does not follow a good diet and not good about reliably taking his medications.He also has trouble with hearing loss. Overall his driving has been ok.    Review of Systems   Constitutional: Positive for fatigue. Negative for chills and fever.   HENT: Positive for hearing loss and postnasal drip.    Respiratory: Negative for cough and shortness of breath.    Cardiovascular: Negative for chest pain and palpitations.   Gastrointestinal: Negative for abdominal pain, nausea and vomiting.   Endocrine: Negative for cold intolerance and polydipsia.   Genitourinary: Negative for dysuria.   Musculoskeletal: Positive for gait problem.   Skin: Negative for rash.   Neurological: Positive for weakness. Negative for dizziness, light-headedness and headaches.        Memory loss   Hematological: Negative for adenopathy.   Psychiatric/Behavioral: Positive for confusion. Negative for suicidal ideas. The patient is not nervous/anxious.    All other systems reviewed and are negative.    All other ROS reviewed and negative.    PMSFH:  The following portions of the patient's history were reviewed and updated as appropriate: allergies, current medications, past family history, past medical history, past social history, past surgical history and problem list.      Current Outpatient Medications:   •  amiodarone (PACERONE) 200 MG tablet, TAKE ONE TABLET BY MOUTH ONCE DAILY, Disp: 90 tablet, Rfl: 3  •  amLODIPine (NORVASC) 10 MG tablet, Take 1 tablet by mouth Daily., Disp: 30 tablet, Rfl: 0  •  aspirin 81 MG tablet, Take  by mouth Daily., Disp: , Rfl:   •  donepezil (ARICEPT) 5 MG tablet, Take 1 tablet by mouth Daily., Disp: 30 tablet, Rfl: 11  •  hydrochlorothiazide (HYDRODIURIL) 25 MG tablet, TAKE  "ONE TABLET BY MOUTH ONCE DAILY, Disp: 90 tablet, Rfl: 0  •  insulin NPH-insulin regular (humuLIN 70/30,novoLIN 70/30) (70-30) 100 UNIT/ML injection, Inject 50 Units under the skin into the appropriate area as directed 2 (Two) Times a Day With Meals., Disp: , Rfl:   •  Insulin Pen Needle (BD PEN NEEDLE DEBORAH U/F) 32G X 4 MM misc, , Disp: , Rfl:   •  losartan (COZAAR) 100 MG tablet, Take 1 tablet by mouth Daily., Disp: 90 tablet, Rfl: 0  •  metFORMIN (GLUCOPHAGE) 500 MG tablet, Take 1,000 mg by mouth 2 (Two) Times a Day With Meals., Disp: , Rfl:   •  terazosin (HYTRIN) 1 MG capsule, TAKE ONE CAPSULE BY MOUTH ONCE DAILY AT BEDTIME, Disp: 90 capsule, Rfl: 3  •  VIIBRYD 40 MG tablet tablet, TAKE ONE TABLET BY MOUTH ONCE DAILY, Disp: 30 tablet, Rfl: 3  •  atorvastatin (LIPITOR) 20 MG tablet, Take 1 tablet by mouth Every Night., Disp: 90 tablet, Rfl: 1  •  levothyroxine (SYNTHROID, LEVOTHROID) 50 MCG tablet, Take  by mouth daily., Disp: , Rfl:   •  QUEtiapine (SEROquel) 25 MG tablet, Take 1 tablet by mouth Every Night., Disp: 30 tablet, Rfl: 0    VITALS:  /88   Pulse 74   Temp 98.2 °F (36.8 °C)   Ht 182 cm (71.65\")   Wt 99.3 kg (219 lb)   BMI 29.99 kg/m²     Physical Exam   Constitutional: He is oriented to person, place, and time. He appears well-developed and well-nourished.   HENT:   Head: Normocephalic.   Right Ear: External ear normal.   Left Ear: External ear normal.   Mouth/Throat: Oropharynx is clear and moist.   Decreased hearing bilaterally    Eyes: Conjunctivae and EOM are normal.   Neck: No JVD present. No thyromegaly present.   Cardiovascular: Normal rate and regular rhythm.   Murmur (II/VI SM) heard.  Decreased dorsal pedis pulses    Pulmonary/Chest: Effort normal and breath sounds normal. No respiratory distress.   Abdominal: Soft. Bowel sounds are normal. There is no tenderness.   Obese + BS    Musculoskeletal: He exhibits no edema.    Diabetic foot exam performed: decreased sensation " feet.  Neurological: He is alert and oriented to person, place, and time.   Wide based gait and decreased sensation of feet   Skin: Skin is warm and dry.   No foot ulcers and poor nails and numerous nevi   Psychiatric: He has a normal mood and affect. His behavior is normal.   Nursing note and vitals reviewed.      LABS:  Results for orders placed or performed in visit on 02/19/19   Comprehensive Metabolic Panel   Result Value Ref Range    Glucose 454 (C) 70 - 100 mg/dL    BUN 20 9 - 23 mg/dL    Creatinine 1.04 0.60 - 1.30 mg/dL    Sodium 130 (L) 132 - 146 mmol/L    Potassium 4.5 3.5 - 5.5 mmol/L    Chloride 92 (L) 99 - 109 mmol/L    CO2 27.0 20.0 - 31.0 mmol/L    Calcium 9.4 8.7 - 10.4 mg/dL    Total Protein 6.7 5.7 - 8.2 g/dL    Albumin 4.35 3.20 - 4.80 g/dL    ALT (SGPT) 48 (H) 7 - 40 U/L    AST (SGOT) 31 0 - 33 U/L    Alkaline Phosphatase 94 25 - 100 U/L    Total Bilirubin 0.6 0.3 - 1.2 mg/dL    eGFR Non African Amer 68 >60 mL/min/1.73    Globulin 2.4 gm/dL    A/G Ratio 1.9 1.5 - 2.5 g/dL    BUN/Creatinine Ratio 19.2 7.0 - 25.0    Anion Gap 11.0 3.0 - 11.0 mmol/L   Lipid Panel   Result Value Ref Range    Total Cholesterol 235 (H) 0 - 200 mg/dL    Triglycerides 236 (H) 0 - 150 mg/dL    HDL Cholesterol 41 40 - 60 mg/dL    LDL Cholesterol  183 (H) 0 - 130 mg/dL   TSH Rfx On Abnormal To Free T4   Result Value Ref Range    TSH 2.999 0.350 - 5.350 mIU/mL   Vitamin B12   Result Value Ref Range    Vitamin B-12 452 211 - 911 pg/mL   CBC Auto Differential   Result Value Ref Range    WBC 10.35 3.50 - 10.80 10*3/mm3    RBC 4.89 4.20 - 5.76 10*6/mm3    Hemoglobin 14.0 13.1 - 17.5 g/dL    Hematocrit 42.8 38.9 - 50.9 %    MCV 87.5 80.0 - 99.0 fL    MCH 28.6 27.0 - 31.0 pg    MCHC 32.7 32.0 - 36.0 g/dL    RDW 13.7 11.3 - 14.5 %    RDW-SD 43.6 37.0 - 54.0 fl    MPV 11.2 6.0 - 12.0 fL    Platelets 268 150 - 450 10*3/mm3    Neutrophil % 65.2 41.0 - 71.0 %    Lymphocyte % 26.5 24.0 - 44.0 %    Monocyte % 6.5 0.0 - 12.0 %     Eosinophil % 1.6 0.0 - 3.0 %    Basophil % 0.2 0.0 - 1.0 %    Immature Grans % 0.4 0.0 - 0.6 %    Neutrophils, Absolute 6.75 1.50 - 8.30 10*3/mm3    Lymphocytes, Absolute 2.74 0.60 - 4.80 10*3/mm3    Monocytes, Absolute 0.67 0.00 - 1.00 10*3/mm3    Eosinophils, Absolute 0.17 0.00 - 0.30 10*3/mm3    Basophils, Absolute 0.02 0.00 - 0.20 10*3/mm3    Immature Grans, Absolute 0.04 0.00 - 0.05 10*3/mm3   Hemoglobin A1c   Result Value Ref Range    Hemoglobin A1C 10.20 (H) 4.80 - 5.60 %       Procedures    ASSESSMENT/PLAN:  Problem List Items Addressed This Visit        Cardiovascular and Mediastinum    Peripheral vascular disease (CMS/HCC)     Encouraged to improve his diabetes control and resume exercise and follow with cardiology.          Benign essential hypertension    Paroxysmal atrial fibrillation (CMS/[facility])     Per Dr Tom and will follow with her.          Hyperlipidemia LDL goal <100    Relevant Orders    Comprehensive Metabolic Panel (Completed)    Lipid Panel (Completed)    TSH Rfx On Abnormal To Free T4 (Completed)    CBC Auto Differential (Completed)       Digestive    B12 deficiency    Relevant Orders    Vitamin B12 (Completed)       Endocrine    Type 2 diabetes mellitus with other circulatory complications (CMS/[facility])     Diabetes is worsening Discussed decreasing bad carbohydrates, specifically sweets, breads, potatoes, corn and high caloric drinks (juices, sodas, sweet tea).  Also recommend increasing physical activity, ideally 150 minutes aerobic exercise weekly and resistance exercises 2-3x/week.Encourage daughters and him to take steps to take his medications and insulin. He missed his last appointment with Dr Callahan/[facility] and they will make an appointment with them as soon as possible and will forward his labs to them. .            Relevant Orders    Hemoglobin A1c (Completed)       Genitourinary    Malignant tumor of urinary bladder (CMS/[facility])     History of bladder cancer and has followed  with Dr Pacheco and  Urology.          Recurrent UTI (urinary tract infection)     Get recent urinalysis results from .             Other    Memory loss     Check labs and encouraged to get regular walking and weight resistance and important to get better control of his diabetes and improve diet and suggest doing more stimulating activities and to use hearing aides. He will follow with Dr Donaldson. Stressed importance of taking medications.          Recurrent major depressive disorder (CMS/HCC)     Psychological condition is improving with lifestyle modifications Counseled patient regarding multimodal approach with healthy nutrition, healthy sleep, regular physical activity, social activities, and meditation.  .           Obesity, Class I, BMI 30-34.9     Goal to consume large amounts of vegetables and fruits,heart healthy fats and low carbohydrate choices. Encourage aerobic exercise of walking, jogging or biking gradually up to 150 minute a week and 2-3 times of weight resistance. Employe behavioral modifications such as daily meditation and stopping eating and drinking calories after 7 pm.          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 Age-appropriate Counseling:  Discussed preventative medicine issues with patient including regular exercise, healthy diet, stress reduction, adequate sleep and recommended age-appropriate screening studies.  Immunizations reviewed.   Encouraged him to use hearing aides and offered audiology appointment. Encouraged him to allow his daughters to hire a care giver to help ensure he do better with taking his medications and better with diet. He will follow  with Dr holland and discuss with their  about options.             Multiple nevi     Refer to Dr Stevenson Krause for full skin exam               FOLLOW-UP:  Return in about 3 months (around 5/19/2019) for Recheck, Labs this visit.      Electronically signed by:    Edy Goodman MD

## 2019-02-20 NOTE — ASSESSMENT & PLAN NOTE
Check labs and encouraged to get regular walking and weight resistance and important to get better control of his diabetes and improve diet and suggest doing more stimulating activities and to use hearing aides. He will follow with Dr Donaldson. Stressed importance of taking medications.

## 2019-02-20 NOTE — ASSESSMENT & PLAN NOTE
Psychological condition is improving with lifestyle modifications Counseled patient regarding multimodal approach with healthy nutrition, healthy sleep, regular physical activity, social activities, and meditation.  .

## 2019-02-20 NOTE — ASSESSMENT & PLAN NOTE
Diabetes is worsening Discussed decreasing bad carbohydrates, specifically sweets, breads, potatoes, corn and high caloric drinks (juices, sodas, sweet tea).  Also recommend increasing physical activity, ideally 150 minutes aerobic exercise weekly and resistance exercises 2-3x/week.Encourage daughters and him to take steps to take his medications and insulin. He missed his last appointment with Dr Callahan/[facility] and they will make an appointment with them as soon as possible and will forward his labs to them. .

## 2019-02-21 ENCOUNTER — TELEPHONE (OUTPATIENT)
Dept: INTERNAL MEDICINE | Facility: CLINIC | Age: 84
End: 2019-02-21

## 2019-02-21 NOTE — TELEPHONE ENCOUNTER
Reason for Call:   2 days ago had a visit with you and got a copy of medications  She states there are 3 medications on his list that he is currently not taking   Wants to know if he should be on them or not     Amlodipine   10 mg 1 qd  Atorvastatin   20 mg 1 qd  Levothyroxine   50 mcg 1 qd

## 2019-02-22 RX ORDER — ATORVASTATIN CALCIUM 20 MG/1
20 TABLET, FILM COATED ORAL NIGHTLY
Qty: 90 TABLET | Refills: 1 | Status: SHIPPED | OUTPATIENT
Start: 2019-02-22 | End: 2019-08-20 | Stop reason: SDUPTHER

## 2019-02-22 RX ORDER — LEVOTHYROXINE SODIUM 0.05 MG/1
50 TABLET ORAL DAILY
Qty: 30 TABLET | Refills: 5 | Status: SHIPPED | OUTPATIENT
Start: 2019-02-22 | End: 2019-09-22 | Stop reason: SDUPTHER

## 2019-02-22 NOTE — TELEPHONE ENCOUNTER
There has been some confusion with the patient's medications- his daughter is now managing his meds- he has not been taking Atorvastatin or Amlodipine- she does not know what his BP is running- he is currently taking:    amio 200 mg daily  HCTZ 25 mg daily  Losartan 100 mg daily     She will check his BP daily and call me next week with readings- we will decide whether or not to add back amlodipine- he is also not taking Hytrin as ordered- he was recently started on Flomax by [facility], but has not picked it up yet bc insurance will not cover, most likely due to the active prescription of Hytrin -    His daughter will contact [facility] to see if he can use Hytrin instead of Flomax, if so, then that will help with BP as well-     She will call me next week with an update on BP readings- I did send a script for Atorvastatin

## 2019-02-22 NOTE — TELEPHONE ENCOUNTER
FRANCO IS GOING TO CALL CARDIOLOGIST  DR. MEYER TO CHECK ON ATORVASTATIN AND AMLODIPINE BUT WANTS YOU TO SEND THE LEVOTHYROXINE.  WHICH I HAVE SENT IN FOR YOU TO CO-SIGN FOR   I

## 2019-02-22 NOTE — TELEPHONE ENCOUNTER
Please call daughter back and tell her he needs to be taking these medications and have him resume them if needed please send in the refills and I will cosign

## 2019-02-25 ENCOUNTER — TELEPHONE (OUTPATIENT)
Dept: NEUROLOGY | Facility: CLINIC | Age: 84
End: 2019-02-25

## 2019-02-25 NOTE — TELEPHONE ENCOUNTER
Phone call to daughter Addie per a message last week asking me to call her. She needed to cancel today's appt as he wasn't willing to come. Addie and her 3 siblings live in [place] but still about 10-25mi away and all try to visit as often as they can around jobs/family. They have been worried about increased confusion and found he was having high blood sugars, they say he is not compliant with diet/meds as it relates to his diabetes. For example last night he had a high blood sugar and had 2 bags of chips and [employer], seems not aware of his blood sugar levels. He has been refusing to have help, does not want to move to senior living but is agreeable to a caregiver agency being interviewed to help get him a good breakfast in mornings, oversee meds, etc. I asked her to talk with Dr. Goodman or Dr. Atkinson about setting family and patient up with a Diabetes Education consult so they can learn (and teach new caregiver) how to manage diabetes. I told daughter I would like to have a family meeting, and I emailed her several dates. I look forward to hearing from her on an appt time.

## 2019-03-15 ENCOUNTER — OFFICE VISIT (OUTPATIENT)
Dept: NEUROLOGY | Facility: CLINIC | Age: 84
End: 2019-03-15

## 2019-03-15 VITALS — HEIGHT: 72 IN | BODY MASS INDEX: 29.66 KG/M2 | RESPIRATION RATE: 18 BRPM | WEIGHT: 219 LBS

## 2019-03-15 DIAGNOSIS — R41.3 MEMORY LOSS: Primary | ICD-10-CM

## 2019-03-15 PROCEDURE — 99214 OFFICE O/P EST MOD 30 MIN: CPT | Performed by: PHYSICIAN ASSISTANT

## 2019-03-15 RX ORDER — MEMANTINE HYDROCHLORIDE 10 MG/1
10 TABLET ORAL 2 TIMES DAILY
Qty: 60 TABLET | Refills: 11 | Status: SHIPPED | OUTPATIENT
Start: 2019-03-15 | End: 2020-03-14

## 2019-03-15 NOTE — PROGRESS NOTES
"Subjective     Chief Complaint: memory loss      History of Present Illness   Leobardo Araujo is a 84 y.o. male who returns to clinic today for evaluation of cognitive impairment. He was previously followed by Dr. Snell. He has noted symptoms for at least several years marked initially by forgetfulness . This has gradually worsened  over time. Additional symptoms have included impairments in executive function. There have been no associated  symptoms of anxiety or depression. His family notes  impairments in ADL's. His family  manages his medications  and finances. He is currently residing independently.     He has a history of YSABEL and is treated with a CPAP.     Prior evaluation has included screening blood work through his PCP. A head CT was unremarkable.     Today: Since his last visit in 10/18, he and his family feel that his memory has worsened.       I have reviewed and confirmed the past family, social and medical history as accurate on 3/15/19.     Review of Systems   Constitutional: Negative.    HENT: Negative.    Eyes: Negative.    Respiratory: Negative.    Cardiovascular: Negative.    Gastrointestinal: Negative.    Endocrine: Negative.    Genitourinary: Negative.    Musculoskeletal: Negative.    Skin: Negative.    Allergic/Immunologic: Negative.    Neurological:        Memory loss    Hematological: Negative.    Psychiatric/Behavioral: Negative.        Objective     Resp 18   Ht 182 cm (71.65\")   Wt 99.3 kg (219 lb)   BMI 29.99 kg/m²     General appearance today is normal.     Physical Exam   Constitutional: He is oriented to person, place, and time.   Neurological: He is oriented to person, place, and time. He has normal strength. He has a normal Finger-Nose-Finger Test.   Psychiatric: His speech is normal.        Neurologic Exam     Mental Status   Oriented to person, place, and time.   Disoriented to year. Oriented to month, date, day and season.   Registration: recalls 3 of 3 objects. Recall at 5 " minutes: recalls 1 of 3 objects. Follows 3 step commands.   Attention: normal.   Speech: speech is normal   Level of consciousness: alert  Able to name object. Able to read. Able to repeat. Able to write. Normal comprehension.     Cranial Nerves   Cranial nerves II through XII intact.     Motor Exam   Muscle bulk: normal  Overall muscle tone: normal    Strength   Strength 5/5 throughout.     Sensory Exam   Light touch normal.     Gait, Coordination, and Reflexes     Coordination   Finger to nose coordination: normal    Tremor   Resting tremor: absent        Results  MMSE=26       Assessment/Plan   Leobardo was seen today for memory loss.    Diagnoses and all orders for this visit:    Memory loss          Discussion/Summary   Leobardo Araujo returns to clinic today for evaluation of memory loss . His history is concerning for an underlying dementia, most likely due to Alzheimer's Disease. This was discussed in detail with the patient and his family. I again reviewed his current status and treatment options. After discussing potential treatment options, it was elected to continue on  donepezil and add memantine. He will then follow up in 3 months, or sooner if needed.   I spent 25 minutes face to face with the patient and family with 20 minutes spent on discussing diagnosis, prognosis, evaluation, current status, treatment options and management as discussed above.       As part of this visit I obtained additional history from the family which is incorporated in the HPI.      Shelly Johnson PA-C

## 2019-04-09 ENCOUNTER — TELEPHONE (OUTPATIENT)
Dept: NEUROLOGY | Facility: CLINIC | Age: 84
End: 2019-04-09

## 2019-04-09 NOTE — TELEPHONE ENCOUNTER
----- Message from Ryann Mendoza sent at 4/9/2019 11:42 AM EDT -----  Contact: Daughter - Fadumo Donaldson:    Pt's daughter called saying that his CPAP machine that [employer] had set up for him years ago was no longer working. Pt's daughter said she was unsure of who to contact about it.    Call back : [phone] - Fadumo

## 2019-04-09 NOTE — TELEPHONE ENCOUNTER
In the short run I would recommend checking with DME company who provided the equipment. In the long run, I would recommend establishing care with the sleep clinic at [facility]. Thanks.

## 2019-04-17 RX ORDER — LOSARTAN POTASSIUM 100 MG/1
TABLET ORAL
Qty: 90 TABLET | Refills: 3 | Status: SHIPPED | OUTPATIENT
Start: 2019-04-17 | End: 2020-05-04 | Stop reason: SDUPTHER

## 2019-04-18 ENCOUNTER — CONSULT (OUTPATIENT)
Dept: SLEEP MEDICINE | Facility: HOSPITAL | Age: 84
End: 2019-04-18

## 2019-04-18 VITALS
SYSTOLIC BLOOD PRESSURE: 147 MMHG | HEART RATE: 60 BPM | BODY MASS INDEX: 30.96 KG/M2 | WEIGHT: 228.6 LBS | DIASTOLIC BLOOD PRESSURE: 74 MMHG | OXYGEN SATURATION: 96 % | HEIGHT: 72 IN

## 2019-04-18 DIAGNOSIS — E66.09 CLASS 1 OBESITY DUE TO EXCESS CALORIES WITH SERIOUS COMORBIDITY AND BODY MASS INDEX (BMI) OF 31.0 TO 31.9 IN ADULT: ICD-10-CM

## 2019-04-18 DIAGNOSIS — G47.33 OSA (OBSTRUCTIVE SLEEP APNEA): Primary | ICD-10-CM

## 2019-04-18 PROCEDURE — 99203 OFFICE O/P NEW LOW 30 MIN: CPT | Performed by: INTERNAL MEDICINE

## 2019-04-18 NOTE — PROGRESS NOTES
Subjective   Leobardo Araujo is a 84 y.o. male is being seen for consultation today at the request of Edy Goodman MD who is seen for the evaluation of snoring and obstructive sleep apnea.    History of Present Illness  Patient has a history of snoring for probably 20 years.  He had a previous evaluation in this lab by Dr. Reece in 2011.  He had moderate obstructive sleep apnea with an AHI of 29.4 and titrated to CPAP of 11 on titration study.  He says he is been using machine regularly.  He thinks his current machine is about 8 years old.  It quit working last month and he needs a new machine.  He says he has difficulty sleeping without the CPAP machine.  He denies awakening gasping but does awaken frequently since he does not have his machine.  He says he awakens at midnight to go to the bathroom and is sometimes difficulty getting back to sleep.  He also awakens about 4 AM.  He estimates he is only getting about 3 hours of sleep.  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    He denies falling asleep sitting quietly.  He does sometimes get sleepy driving back and forth to [place].  He often naps in afternoon for about an hour.  He has loud snoring and snores in all positions.  He denies ever breaking his nose.  He denies any reflux symptoms.  He denies kicking or jerking his legs at night.  He denies having chronic pain.  He denies any recent weight change.  He has gained about 25 pounds since his initial study.    He goes to bed about 10 PM fall asleep fairly quickly but does awaken frequently as noted above.  He thinks he is only getting about 3 hours of sleep without his machine arising at 8 AM he has had hypertension and for 20 years.  He has had diabetes known for 35 years.  He had previous valvular heart surgery.  No Known Allergies       Current Outpatient Medications:   •  amiodarone (PACERONE) 200 MG tablet, TAKE ONE TABLET BY MOUTH ONCE DAILY, Disp: 90 tablet, Rfl: 3  •   amLODIPine (NORVASC) 10 MG tablet, Take 1 tablet by mouth Daily., Disp: 30 tablet, Rfl: 0  •  aspirin 81 MG tablet, Take  by mouth Daily., Disp: , Rfl:   •  atorvastatin (LIPITOR) 20 MG tablet, Take 1 tablet by mouth Every Night., Disp: 90 tablet, Rfl: 1  •  donepezil (ARICEPT) 5 MG tablet, Take 1 tablet by mouth Daily., Disp: 30 tablet, Rfl: 11  •  hydrochlorothiazide (HYDRODIURIL) 25 MG tablet, TAKE ONE TABLET BY MOUTH ONCE DAILY, Disp: 90 tablet, Rfl: 0  •  Insulin Glargine (TOUJEO MAX SOLOSTAR) 300 UNIT/ML solution pen-injector, Inject  under the skin into the appropriate area as directed., Disp: , Rfl:   •  Insulin Pen Needle (BD PEN NEEDLE DEBORAH U/F) 32G X 4 MM misc, , Disp: , Rfl:   •  levothyroxine (SYNTHROID, LEVOTHROID) 50 MCG tablet, Take 1 tablet by mouth Daily., Disp: 30 tablet, Rfl: 5  •  losartan (COZAAR) 100 MG tablet, TAKE 1 TABLET BY MOUTH ONCE DAILY, Disp: 90 tablet, Rfl: 3  •  memantine (NAMENDA) 10 MG tablet, Take 1 tablet by mouth 2 (Two) Times a Day., Disp: 60 tablet, Rfl: 11  •  metFORMIN (GLUCOPHAGE) 500 MG tablet, Take 1,000 mg by mouth 2 (Two) Times a Day With Meals., Disp: , Rfl:   •  VIIBRYD 40 MG tablet tablet, TAKE ONE TABLET BY MOUTH ONCE DAILY, Disp: 30 tablet, Rfl: 3  •  insulin NPH-insulin regular (humuLIN 70/30,novoLIN 70/30) (70-30) 100 UNIT/ML injection, Inject 50 Units under the skin into the appropriate area as directed 2 (Two) Times a Day With Meals., Disp: , Rfl:   •  QUEtiapine (SEROquel) 25 MG tablet, Take 1 tablet by mouth Every Night., Disp: 30 tablet, Rfl: 0  •  terazosin (HYTRIN) 1 MG capsule, TAKE ONE CAPSULE BY MOUTH ONCE DAILY AT BEDTIME, Disp: 90 capsule, Rfl: 3    Social History    Tobacco Use      Smoking status: Former Smoker he estimates smoking 2 packs/day for 30 years but quit 20 years ago        Types: Cigarettes      Smokeless tobacco: Never Used       Social History     Substance and Sexual Activity   Alcohol Use No       Caffeine: He has 2 cups of coffee  per day    Past Medical History:   Diagnosis Date   • Abnormal findings on diagnostic imaging of lung 4/24/2018   • Abnormal LFTs     History of abnormal LFTs.   • Arthritis    • Atrial fibrillation (CMS/HCC)    • Atrial fibrillation (CMS/HCC)     Atrial fibrillation, October 2010; CHADS2-VASc = 3: Amiodarone therapy, discontinued, November 2013. Elke Sultana left atrial appendage closure, Dr. Edson Roque, 07/23/2012.     • B12 deficiency    • Basal cell carcinoma    • Benign essential hypertension    • Bladder cancer (CMS/HCC)    • Bladder cancer (CMS/HCC)    • Central sleep apnea in conditions classified elsewhere    • Cystitis 11/2017    pseudomonas cystitis resistant   • Delirium 2/13/2019   • Depression    • Diabetes (CMS/HCC)    • Diarrhea 4/24/2018   • Erectile dysfunction    • Fatigue    • History of anxiety    • History of aortic valve stenosis    • History of coronary atherosclerosis    • History of diabetes mellitus    • History of sexual dysfunction in male    • Hypercholesteremia    • Hyperlipidemia LDL goal <100    • Hypertension    • Hypertension 1/6/2017   • Hypokalemia 4/24/2018   • Loss of memory    • Memory loss    • Nausea, vomiting, and diarrhea 4/24/2018   • NGB (new growth of bladder) 2015   • Obesity     Obesity, BMI less than 30.   • Obesity, Class I, BMI 30-34.9    • Obstructive sleep apnea    • Paroxysmal atrial fibrillation (CMS/HCC)    • Peripheral vascular disease (CMS/HCC)    • Renal insufficiency 4/24/2018   • Sepsis (CMS/HCC) 04/24/2018    from UTI   • Testicular mass    • Tobacco abuse     Remote tobacco abuse.   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   •  "CATARACT EXTRACTION     • CYSTOSCOPY W/ LITHOLAPAXY / EHL     • EPIDIDYMECTOMY Right    • KIDNEY SURGERY     • ORCHIECTOMY      Orchiectomy, benign.       Family History   Problem Relation Age of Onset   • Hypertension Mother    • Heart disease Mother    • Stroke Mother    • Hypertension Father    • Heart disease Father    • Alcohol abuse Brother    • Stroke Other         Stroke syndrome   • Mental illness Other    • Hypertension Other    • Diabetes Other    • Alcohol abuse Other    • Coronary artery disease Other    • Hyperlipidemia Other        The following portions of the patient's history were reviewed and updated as appropriate: allergies, current medications, past family history, past medical history, past social history, past surgical history and problem list.    Review of Systems   Constitutional: Negative.    HENT: Negative.    Eyes: Negative.    Respiratory: Negative.    Gastrointestinal: Negative.    Endocrine: Negative.    Genitourinary: Positive for dysuria.   Musculoskeletal: Negative.    Skin: Negative.    Allergic/Immunologic: Negative.    Neurological: Negative.    Hematological: Negative.    Psychiatric/Behavioral: Negative.    [place] score is 12 or 24  Objective     /74   Pulse 60   Ht 182.9 cm (72\")   Wt 104 kg (228 lb 9.6 oz)   SpO2 96%   BMI 31.00 kg/m²      Physical Exam   Constitutional: He is oriented to person, place, and time. He appears well-developed and well-nourished.   He is obese.   HENT:   Head: Normocephalic and atraumatic.   He has nasal airway narrowing Mallampati class IV anatomy.   Eyes: EOM are normal. Pupils are equal, round, and reactive to light.   Neck: Normal range of motion. Neck supple.   Cardiovascular: Normal rate, regular rhythm and normal heart sounds.   Pulmonary/Chest: Effort normal and breath sounds normal.   Abdominal: Soft. Bowel sounds are normal.   Musculoskeletal: Normal range of motion. He exhibits no edema.   Neurological: He is alert and " oriented to person, place, and time.   Skin: Skin is warm and dry.   Psychiatric: He has a normal mood and affect. His behavior is normal.   Patient's AHI and 2011 was 29.4.  He titrated CPAP of 11.  He is gained 25 pounds since then.      Assessment/Plan   Leobardo was seen today for sleeping problem.    Diagnoses and all orders for this visit:    YSABEL (obstructive sleep apnea)  -     CPAP Therapy    Class 1 obesity due to excess calories with serious comorbidity and body mass index (BMI) of 31.0 to 31.9 in adult    Patient has a history of snoring and obstructive sleep apnea and is gained weight since his initial study and no doubt still has significant obstructive sleep apnea.  His previous CPAP machine is quit working.  We will order him a new machine with an AutoSet of 10-20 on full facemask.  We will plan to see him back in 2 months.  He is encouraged to lose weight.  He is encouraged to avoid alcohol and sedatives close to bedtime.  He is encouraged to practice lateral position sleep.  He is to contact us earlier if symptoms worsen.         Kamlesh Horn MD [facility]  Sleep Medicine  Pulmonary and Critical Care Medicine

## 2019-05-13 RX ORDER — RISEDRONATE SODIUM 150 MG/1
TABLET, FILM COATED ORAL
Qty: 1 TABLET | Refills: 11 | Status: SHIPPED | OUTPATIENT
Start: 2019-05-13 | End: 2019-05-28 | Stop reason: ALTCHOICE

## 2019-05-20 RX ORDER — HYDROCHLOROTHIAZIDE 25 MG/1
TABLET ORAL
Qty: 90 TABLET | Refills: 0 | Status: SHIPPED | OUTPATIENT
Start: 2019-05-20 | End: 2019-08-20 | Stop reason: SDUPTHER

## 2019-05-28 ENCOUNTER — OFFICE VISIT (OUTPATIENT)
Dept: INTERNAL MEDICINE | Facility: CLINIC | Age: 84
End: 2019-05-28

## 2019-05-28 VITALS
BODY MASS INDEX: 30.61 KG/M2 | TEMPERATURE: 98.4 F | SYSTOLIC BLOOD PRESSURE: 110 MMHG | HEIGHT: 72 IN | DIASTOLIC BLOOD PRESSURE: 62 MMHG | HEART RATE: 58 BPM | WEIGHT: 226 LBS

## 2019-05-28 DIAGNOSIS — M85.859 OSTEOPENIA OF HIP, UNSPECIFIED LATERALITY: ICD-10-CM

## 2019-05-28 DIAGNOSIS — I10 BENIGN ESSENTIAL HYPERTENSION: Primary | ICD-10-CM

## 2019-05-28 DIAGNOSIS — I48.0 PAROXYSMAL ATRIAL FIBRILLATION (HCC): ICD-10-CM

## 2019-05-28 DIAGNOSIS — E11.59 TYPE 2 DIABETES MELLITUS WITH OTHER CIRCULATORY COMPLICATIONS (HCC): ICD-10-CM

## 2019-05-28 PROCEDURE — 99214 OFFICE O/P EST MOD 30 MIN: CPT | Performed by: INTERNAL MEDICINE

## 2019-05-28 RX ORDER — ALENDRONATE SODIUM 70 MG/1
70 TABLET ORAL
Qty: 13 TABLET | Refills: 3 | Status: SHIPPED | OUTPATIENT
Start: 2019-05-28 | End: 2019-05-28 | Stop reason: DRUGHIGH

## 2019-05-28 RX ORDER — ALENDRONATE SODIUM 35 MG/1
35 TABLET ORAL
Qty: 13 TABLET | Refills: 3 | Status: SHIPPED | OUTPATIENT
Start: 2019-05-28 | End: 2020-06-23

## 2019-05-28 NOTE — ASSESSMENT & PLAN NOTE
Diabetes is improving with treatment.   Continue current treatment regimen.  Reminded to bring in blood sugar diary at next visit.  Dietary recommendations for ADA diet.  Regular aerobic exercise.  Discussed ways to avoid symptomatic hypoglycemia.  Discussed sick day management.  Discussed foot care.  Diabetes will be reassessed in 3 months.

## 2019-05-28 NOTE — PROGRESS NOTES
Chief Complaint   Patient presents with   • Follow-up   • medication question     Questions about his bone medicine  History of Present Illness  84 y.o. white male. presents for follow up on HTN and diabetes. His diabetes is much improved and will see Endo tomorrow. His blood pressure is ok. His memory is doing better and he is generally taking his medications.     Review of Systems   Constitutional: Negative for chills and fever.   HENT: Positive for postnasal drip.    Respiratory: Negative for cough and shortness of breath.    Cardiovascular: Negative for chest pain and palpitations.   Gastrointestinal: Negative for abdominal pain, nausea and vomiting.   Musculoskeletal: Negative for back pain.   Skin: Negative for rash.   Neurological: Negative for dizziness, light-headedness and headaches.   Psychiatric/Behavioral: Negative for decreased concentration and dysphoric mood. The patient is not nervous/anxious.    All other systems reviewed and are negative.    All other ROS reviewed and negative.    PMSFH:  The following portions of the patient's history were reviewed and updated as appropriate: allergies, current medications, past family history, past medical history, past social history, past surgical history and problem list.      Current Outpatient Medications:   •  alendronate (FOSAMAX) 35 MG tablet, Take 1 tablet by mouth Every 7 (Seven) Days., Disp: 13 tablet, Rfl: 3  •  amiodarone (PACERONE) 200 MG tablet, TAKE ONE TABLET BY MOUTH ONCE DAILY, Disp: 90 tablet, Rfl: 3  •  amLODIPine (NORVASC) 10 MG tablet, Take 1 tablet by mouth Daily., Disp: 30 tablet, Rfl: 0  •  aspirin 81 MG tablet, Take  by mouth Daily., Disp: , Rfl:   •  atorvastatin (LIPITOR) 20 MG tablet, Take 1 tablet by mouth Every Night., Disp: 90 tablet, Rfl: 1  •  donepezil (ARICEPT) 5 MG tablet, Take 1 tablet by mouth Daily., Disp: 30 tablet, Rfl: 11  •  hydrochlorothiazide (HYDRODIURIL) 25 MG tablet, TAKE 1 TABLET BY MOUTH ONCE DAILY, Disp: 90  "tablet, Rfl: 0  •  Insulin Glargine (TOUJEO MAX SOLOSTAR) 300 UNIT/ML solution pen-injector, Inject  under the skin into the appropriate area as directed., Disp: , Rfl:   •  insulin NPH-insulin regular (humuLIN 70/30,novoLIN 70/30) (70-30) 100 UNIT/ML injection, Inject 50 Units under the skin into the appropriate area as directed 2 (Two) Times a Day With Meals., Disp: , Rfl:   •  Insulin Pen Needle (BD PEN NEEDLE DEBORAH U/F) 32G X 4 MM misc, , Disp: , Rfl:   •  levothyroxine (SYNTHROID, LEVOTHROID) 50 MCG tablet, Take 1 tablet by mouth Daily., Disp: 30 tablet, Rfl: 5  •  losartan (COZAAR) 100 MG tablet, TAKE 1 TABLET BY MOUTH ONCE DAILY, Disp: 90 tablet, Rfl: 3  •  memantine (NAMENDA) 10 MG tablet, Take 1 tablet by mouth 2 (Two) Times a Day., Disp: 60 tablet, Rfl: 11  •  metFORMIN (GLUCOPHAGE) 500 MG tablet, Take 1,000 mg by mouth 2 (Two) Times a Day With Meals., Disp: , Rfl:   •  QUEtiapine (SEROquel) 25 MG tablet, Take 1 tablet by mouth Every Night., Disp: 30 tablet, Rfl: 0  •  terazosin (HYTRIN) 1 MG capsule, TAKE ONE CAPSULE BY MOUTH ONCE DAILY AT BEDTIME, Disp: 90 capsule, Rfl: 3  •  VIIBRYD 40 MG tablet tablet, TAKE ONE TABLET BY MOUTH ONCE DAILY, Disp: 30 tablet, Rfl: 3    VITALS:  /62   Pulse 58   Temp 98.4 °F (36.9 °C)   Ht 182.9 cm (72.01\")   Wt 103 kg (226 lb)   BMI 30.64 kg/m²     Physical Exam   Constitutional: He is oriented to person, place, and time. He appears well-developed and well-nourished.   HENT:   Head: Normocephalic.   Eyes: Conjunctivae and EOM are normal.   Cardiovascular: Normal rate and regular rhythm.   Murmur (II/VI SM) heard.  Pulmonary/Chest: Effort normal and breath sounds normal. No respiratory distress.   Abdominal: Soft. Bowel sounds are normal. There is no tenderness.   Musculoskeletal: He exhibits no tenderness.   Neurological: He is alert and oriented to person, place, and time.   Skin: Skin is warm and dry.   Psychiatric: He has a normal mood and affect. His behavior " is normal.   Nursing note and vitals reviewed.      LABS:  Results for orders placed or performed in visit on 02/19/19   Comprehensive Metabolic Panel   Result Value Ref Range    Glucose 454 (C) 70 - 100 mg/dL    BUN 20 9 - 23 mg/dL    Creatinine 1.04 0.60 - 1.30 mg/dL    Sodium 130 (L) 132 - 146 mmol/L    Potassium 4.5 3.5 - 5.5 mmol/L    Chloride 92 (L) 99 - 109 mmol/L    CO2 27.0 20.0 - 31.0 mmol/L    Calcium 9.4 8.7 - 10.4 mg/dL    Total Protein 6.7 5.7 - 8.2 g/dL    Albumin 4.35 3.20 - 4.80 g/dL    ALT (SGPT) 48 (H) 7 - 40 U/L    AST (SGOT) 31 0 - 33 U/L    Alkaline Phosphatase 94 25 - 100 U/L    Total Bilirubin 0.6 0.3 - 1.2 mg/dL    eGFR Non African Amer 68 >60 mL/min/1.73    Globulin 2.4 gm/dL    A/G Ratio 1.9 1.5 - 2.5 g/dL    BUN/Creatinine Ratio 19.2 7.0 - 25.0    Anion Gap 11.0 3.0 - 11.0 mmol/L   Lipid Panel   Result Value Ref Range    Total Cholesterol 235 (H) 0 - 200 mg/dL    Triglycerides 236 (H) 0 - 150 mg/dL    HDL Cholesterol 41 40 - 60 mg/dL    LDL Cholesterol  183 (H) 0 - 130 mg/dL   TSH Rfx On Abnormal To Free T4   Result Value Ref Range    TSH 2.999 0.350 - 5.350 mIU/mL   Vitamin B12   Result Value Ref Range    Vitamin B-12 452 211 - 911 pg/mL   CBC Auto Differential   Result Value Ref Range    WBC 10.35 3.50 - 10.80 10*3/mm3    RBC 4.89 4.20 - 5.76 10*6/mm3    Hemoglobin 14.0 13.1 - 17.5 g/dL    Hematocrit 42.8 38.9 - 50.9 %    MCV 87.5 80.0 - 99.0 fL    MCH 28.6 27.0 - 31.0 pg    MCHC 32.7 32.0 - 36.0 g/dL    RDW 13.7 11.3 - 14.5 %    RDW-SD 43.6 37.0 - 54.0 fl    MPV 11.2 6.0 - 12.0 fL    Platelets 268 150 - 450 10*3/mm3    Neutrophil % 65.2 41.0 - 71.0 %    Lymphocyte % 26.5 24.0 - 44.0 %    Monocyte % 6.5 0.0 - 12.0 %    Eosinophil % 1.6 0.0 - 3.0 %    Basophil % 0.2 0.0 - 1.0 %    Immature Grans % 0.4 0.0 - 0.6 %    Neutrophils, Absolute 6.75 1.50 - 8.30 10*3/mm3    Lymphocytes, Absolute 2.74 0.60 - 4.80 10*3/mm3    Monocytes, Absolute 0.67 0.00 - 1.00 10*3/mm3    Eosinophils, Absolute  0.17 0.00 - 0.30 10*3/mm3    Basophils, Absolute 0.02 0.00 - 0.20 10*3/mm3    Immature Grans, Absolute 0.04 0.00 - 0.05 10*3/mm3   Hemoglobin A1c   Result Value Ref Range    Hemoglobin A1C 10.20 (H) 4.80 - 5.60 %       Procedures         ASSESSMENT/PLAN:  Problem List Items Addressed This Visit        Cardiovascular and Mediastinum    Benign essential hypertension - Primary     Hypertension is improving with treatment.  Continue current treatment regimen.  Dietary sodium restriction.  Weight loss.  Regular aerobic exercise.  Blood pressure will be reassessed at the next regular appointment.         Paroxysmal atrial fibrillation (CMS/HCC)     Doing ok.             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   Continue current treatment regimen.  Reminded to bring in blood sugar diary at next visit.  Dietary recommendations for ADA diet.  Regular aerobic exercise.  Discussed ways to avoid symptomatic hypoglycemia.  Discussed sick day management.  Discussed foot care.  Diabetes will be reassessed in 3 months.            Musculoskeletal and Integument    Osteopenia of hip     Use fosamax. New medication added today, Benefits, and side effects were discussed.   Questions answered.  Patient verbalized understanding and no barriers to compliance identified.          Relevant Medications    alendronate (FOSAMAX) 35 MG tablet          FOLLOW-UP:  Return in about 4 months (around 9/28/2019).      Electronically signed by:    Edy Goodman MD

## 2019-05-28 NOTE — ASSESSMENT & PLAN NOTE
Use fosamax. New medication added today, Benefits, and side effects were discussed.   Questions answered.  Patient verbalized understanding and no barriers to compliance identified.

## 2019-06-25 DIAGNOSIS — Z79.899 ON AMIODARONE THERAPY: Primary | ICD-10-CM

## 2019-06-25 RX ORDER — AMIODARONE HYDROCHLORIDE 200 MG/1
TABLET ORAL
Qty: 90 TABLET | Refills: 0 | Status: SHIPPED | OUTPATIENT
Start: 2019-06-25 | End: 2019-08-20 | Stop reason: SDUPTHER

## 2019-07-15 ENCOUNTER — LAB (OUTPATIENT)
Dept: LAB | Facility: HOSPITAL | Age: 84
End: 2019-07-15

## 2019-07-15 ENCOUNTER — TRANSCRIBE ORDERS (OUTPATIENT)
Dept: LAB | Facility: HOSPITAL | Age: 84
End: 2019-07-15

## 2019-07-15 DIAGNOSIS — N13.8 ENLARGED PROSTATE WITH URINARY OBSTRUCTION: ICD-10-CM

## 2019-07-15 DIAGNOSIS — A49.8 BACTERIAL INFECTION DUE TO PSEUDOMONAS: ICD-10-CM

## 2019-07-15 DIAGNOSIS — N40.1 ENLARGED PROSTATE WITH URINARY OBSTRUCTION: ICD-10-CM

## 2019-07-15 DIAGNOSIS — Z90.79 ACQUIRED ABSENCE OF GENITAL ORGANS: ICD-10-CM

## 2019-07-15 DIAGNOSIS — N39.0 URINARY TRACT INFECTION WITHOUT HEMATURIA, SITE UNSPECIFIED: ICD-10-CM

## 2019-07-15 DIAGNOSIS — N39.0 URINARY TRACT INFECTION WITHOUT HEMATURIA, SITE UNSPECIFIED: Primary | ICD-10-CM

## 2019-07-15 LAB
ALBUMIN SERPL-MCNC: 3.8 G/DL (ref 3.5–5.2)
ALBUMIN/GLOB SERPL: 1.1 G/DL
ALP SERPL-CCNC: 65 U/L (ref 39–117)
ALT SERPL W P-5'-P-CCNC: 25 U/L (ref 1–41)
ANION GAP SERPL CALCULATED.3IONS-SCNC: 10 MMOL/L (ref 5–15)
AST SERPL-CCNC: 24 U/L (ref 1–40)
BACTERIA UR QL AUTO: ABNORMAL /HPF
BASOPHILS # BLD AUTO: 0.05 10*3/MM3 (ref 0–0.2)
BASOPHILS NFR BLD AUTO: 0.5 % (ref 0–1.5)
BILIRUB SERPL-MCNC: 0.3 MG/DL (ref 0.2–1.2)
BILIRUB UR QL STRIP: NEGATIVE
BUN BLD-MCNC: 21 MG/DL (ref 8–23)
BUN/CREAT SERPL: 26.9 (ref 7–25)
CALCIUM SPEC-SCNC: 9.2 MG/DL (ref 8.6–10.5)
CHLORIDE SERPL-SCNC: 98 MMOL/L (ref 98–107)
CLARITY UR: CLEAR
CO2 SERPL-SCNC: 32 MMOL/L (ref 22–29)
COLOR UR: YELLOW
CREAT BLD-MCNC: 0.78 MG/DL (ref 0.76–1.27)
CRP SERPL-MCNC: 0.46 MG/DL (ref 0–0.5)
DEPRECATED RDW RBC AUTO: 47.8 FL (ref 37–54)
EOSINOPHIL # BLD AUTO: 0.33 10*3/MM3 (ref 0–0.4)
EOSINOPHIL NFR BLD AUTO: 3.2 % (ref 0.3–6.2)
ERYTHROCYTE [DISTWIDTH] IN BLOOD BY AUTOMATED COUNT: 14.4 % (ref 12.3–15.4)
ERYTHROCYTE [SEDIMENTATION RATE] IN BLOOD: 28 MM/HR (ref 0–20)
GFR SERPL CREATININE-BSD FRML MDRD: 95 ML/MIN/1.73
GLOBULIN UR ELPH-MCNC: 3.5 GM/DL
GLUCOSE BLD-MCNC: 229 MG/DL (ref 65–99)
GLUCOSE UR STRIP-MCNC: NEGATIVE MG/DL
HCT VFR BLD AUTO: 37.4 % (ref 37.5–51)
HGB BLD-MCNC: 11.8 G/DL (ref 13–17.7)
HGB UR QL STRIP.AUTO: ABNORMAL
HYALINE CASTS UR QL AUTO: ABNORMAL /LPF
IMM GRANULOCYTES # BLD AUTO: 0.04 10*3/MM3 (ref 0–0.05)
IMM GRANULOCYTES NFR BLD AUTO: 0.4 % (ref 0–0.5)
KETONES UR QL STRIP: NEGATIVE
LEUKOCYTE ESTERASE UR QL STRIP.AUTO: ABNORMAL
LYMPHOCYTES # BLD AUTO: 2.09 10*3/MM3 (ref 0.7–3.1)
LYMPHOCYTES NFR BLD AUTO: 20.5 % (ref 19.6–45.3)
MCH RBC QN AUTO: 28.6 PG (ref 26.6–33)
MCHC RBC AUTO-ENTMCNC: 31.6 G/DL (ref 31.5–35.7)
MCV RBC AUTO: 90.8 FL (ref 79–97)
MONOCYTES # BLD AUTO: 0.62 10*3/MM3 (ref 0.1–0.9)
MONOCYTES NFR BLD AUTO: 6.1 % (ref 5–12)
NEUTROPHILS # BLD AUTO: 7.07 10*3/MM3 (ref 1.7–7)
NEUTROPHILS NFR BLD AUTO: 69.3 % (ref 42.7–76)
NITRITE UR QL STRIP: NEGATIVE
NRBC BLD AUTO-RTO: 0 /100 WBC (ref 0–0.2)
PH UR STRIP.AUTO: 5.5 [PH] (ref 5–8)
PLATELET # BLD AUTO: 250 10*3/MM3 (ref 140–450)
PMV BLD AUTO: 10.9 FL (ref 6–12)
POTASSIUM BLD-SCNC: 4.4 MMOL/L (ref 3.5–5.2)
PROT SERPL-MCNC: 7.3 G/DL (ref 6–8.5)
PROT UR QL STRIP: ABNORMAL
RBC # BLD AUTO: 4.12 10*6/MM3 (ref 4.14–5.8)
RBC # UR: ABNORMAL /HPF
REF LAB TEST METHOD: ABNORMAL
SODIUM BLD-SCNC: 140 MMOL/L (ref 136–145)
SP GR UR STRIP: 1.03 (ref 1–1.03)
SQUAMOUS #/AREA URNS HPF: ABNORMAL /HPF
UROBILINOGEN UR QL STRIP: ABNORMAL
WBC NRBC COR # BLD: 10.2 10*3/MM3 (ref 3.4–10.8)
WBC UR QL AUTO: ABNORMAL /HPF

## 2019-07-15 PROCEDURE — 86140 C-REACTIVE PROTEIN: CPT

## 2019-07-15 PROCEDURE — 80053 COMPREHEN METABOLIC PANEL: CPT

## 2019-07-15 PROCEDURE — 36415 COLL VENOUS BLD VENIPUNCTURE: CPT

## 2019-07-15 PROCEDURE — 87077 CULTURE AEROBIC IDENTIFY: CPT

## 2019-07-15 PROCEDURE — 87086 URINE CULTURE/COLONY COUNT: CPT

## 2019-07-15 PROCEDURE — 81001 URINALYSIS AUTO W/SCOPE: CPT

## 2019-07-15 PROCEDURE — 85652 RBC SED RATE AUTOMATED: CPT

## 2019-07-15 PROCEDURE — 87186 SC STD MICRODIL/AGAR DIL: CPT

## 2019-07-15 PROCEDURE — 85025 COMPLETE CBC W/AUTO DIFF WBC: CPT

## 2019-07-17 LAB — BACTERIA SPEC AEROBE CULT: ABNORMAL

## 2019-07-23 RX ORDER — VILAZODONE HYDROCHLORIDE 40 MG/1
TABLET ORAL
Qty: 30 TABLET | Refills: 5 | Status: SHIPPED | OUTPATIENT
Start: 2019-07-23 | End: 2020-01-20

## 2019-07-24 ENCOUNTER — OFFICE VISIT (OUTPATIENT)
Dept: SLEEP MEDICINE | Facility: HOSPITAL | Age: 84
End: 2019-07-24

## 2019-07-24 VITALS
HEART RATE: 54 BPM | WEIGHT: 223.4 LBS | BODY MASS INDEX: 31.27 KG/M2 | HEIGHT: 71 IN | OXYGEN SATURATION: 96 % | SYSTOLIC BLOOD PRESSURE: 138 MMHG | DIASTOLIC BLOOD PRESSURE: 62 MMHG

## 2019-07-24 DIAGNOSIS — G47.33 OSA (OBSTRUCTIVE SLEEP APNEA): Primary | ICD-10-CM

## 2019-07-24 PROCEDURE — 99212 OFFICE O/P EST SF 10 MIN: CPT | Performed by: NURSE PRACTITIONER

## 2019-07-24 NOTE — PROGRESS NOTES
Chief Complaint:   Chief Complaint   Patient presents with   • Sleep Apnea       HPI:    Leobardo Araujo is a 84 y.o. male here for follow-up of follow-up of sleep apnea.  Patient was last seen 4/18/2019.  Patient has been on CPAP therapy since 2011.  Patient states he is doing very well and cannot sleep without it.  Patient sleeps 7 hours nightly and feels refreshed upon awakening.  Patient has an [place] score of 1/24.  Patient does not nap.  Patient does not snore.  Patient continues to work full-time.  She has no concerns or complaints today and wishes to continue CPAP.        Current medications are:   Current Outpatient Medications:   •  alendronate (FOSAMAX) 35 MG tablet, Take 1 tablet by mouth Every 7 (Seven) Days., Disp: 13 tablet, Rfl: 3  •  amiodarone (PACERONE) 200 MG tablet, TAKE 1 TABLET BY MOUTH ONCE DAILY, Disp: 90 tablet, Rfl: 0  •  aspirin 81 MG tablet, Take  by mouth Daily., Disp: , Rfl:   •  atorvastatin (LIPITOR) 20 MG tablet, Take 1 tablet by mouth Every Night., Disp: 90 tablet, Rfl: 1  •  donepezil (ARICEPT) 5 MG tablet, Take 1 tablet by mouth Daily., Disp: 30 tablet, Rfl: 11  •  Insulin Glargine (TOUJEO MAX SOLOSTAR) 300 UNIT/ML solution pen-injector, Inject  under the skin into the appropriate area as directed., Disp: , Rfl:   •  insulin NPH-insulin regular (humuLIN 70/30,novoLIN 70/30) (70-30) 100 UNIT/ML injection, Inject 50 Units under the skin into the appropriate area as directed 2 (Two) Times a Day With Meals., Disp: , Rfl:   •  Insulin Pen Needle (BD PEN NEEDLE DEBORAH U/F) 32G X 4 MM misc, , Disp: , Rfl:   •  losartan (COZAAR) 100 MG tablet, TAKE 1 TABLET BY MOUTH ONCE DAILY, Disp: 90 tablet, Rfl: 3  •  memantine (NAMENDA) 10 MG tablet, Take 1 tablet by mouth 2 (Two) Times a Day., Disp: 60 tablet, Rfl: 11  •  metFORMIN (GLUCOPHAGE) 500 MG tablet, Take 1,000 mg by mouth 2 (Two) Times a Day With Meals., Disp: , Rfl:   •  amLODIPine (NORVASC) 10 MG tablet, Take 1 tablet by mouth Daily.,  Disp: 30 tablet, Rfl: 0  •  hydrochlorothiazide (HYDRODIURIL) 25 MG tablet, TAKE 1 TABLET BY MOUTH ONCE DAILY, Disp: 90 tablet, Rfl: 0  •  levothyroxine (SYNTHROID, LEVOTHROID) 50 MCG tablet, Take 1 tablet by mouth Daily., Disp: 30 tablet, Rfl: 5  •  QUEtiapine (SEROquel) 25 MG tablet, Take 1 tablet by mouth Every Night., Disp: 30 tablet, Rfl: 0  •  terazosin (HYTRIN) 1 MG capsule, TAKE ONE CAPSULE BY MOUTH ONCE DAILY AT BEDTIME, Disp: 90 capsule, Rfl: 3  •  VIIBRYD 40 MG tablet tablet, TAKE 1 TABLET BY MOUTH ONCE DAILY WITH FOOD, Disp: 30 tablet, Rfl: 5.      The patient's relevant past medical, surgical, family and social history were reviewed and updated in Epic as appropriate.       Review of Systems   Eyes: Positive for visual disturbance.   Respiratory: Positive for apnea.    Musculoskeletal: Positive for arthralgias and joint swelling.   Psychiatric/Behavioral: Positive for sleep disturbance.   All other systems reviewed and are negative.        Objective:    Physical Exam   Constitutional: He is oriented to person, place, and time. He appears well-developed and well-nourished.   HENT:   Head: Normocephalic and atraumatic.   Mouth/Throat: Oropharynx is clear and moist.   Mallampati 4 anatomy   Eyes: Conjunctivae are normal.   Neck: Neck supple. No thyromegaly present.   Cardiovascular: Normal rate and regular rhythm.   Pulmonary/Chest: Effort normal and breath sounds normal.   Lymphadenopathy:     He has no cervical adenopathy.   Neurological: He is alert and oriented to person, place, and time.   Skin: Skin is warm and dry.   Psychiatric: He has a normal mood and affect. His behavior is normal. Judgment and thought content normal.   Nursing note and vitals reviewed.  78/70 9 days of use.  Greater than 4-hour use 99%.  95th percentile pressure 12.6.  AHI 4.7.  Download reviewed with patient.      ASSESSMENT/PLAN    Leobardo was seen today for sleep apnea.    Diagnoses and all orders for this visit:    YSABEL  (obstructive sleep apnea)  -     CPAP Therapy            1. Counseled patient regarding multimodal approach with healthy nutrition, healthy sleep, regular physical activity, social activities, counseling, and medications. Encouraged to practice lateral sleep position. Avoid alcohol and sedatives close to bedtime.  2.   Refill supplies x1 year.  Return to clinic 1 year or sooner if symptoms warrant.  I have reviewed the results of my evaluation and impression and discussed my recommendations in detail with the patient.      Signed by  Mona Villalta, APRN    July 24, 2019      CC: Edy Goodman MD          No ref. provider found

## 2019-08-02 ENCOUNTER — OFFICE VISIT (OUTPATIENT)
Dept: NEUROLOGY | Facility: CLINIC | Age: 84
End: 2019-08-02

## 2019-08-02 VITALS
HEIGHT: 71 IN | DIASTOLIC BLOOD PRESSURE: 60 MMHG | BODY MASS INDEX: 31.5 KG/M2 | SYSTOLIC BLOOD PRESSURE: 138 MMHG | WEIGHT: 225 LBS | HEART RATE: 64 BPM

## 2019-08-02 DIAGNOSIS — R41.3 MEMORY LOSS: Primary | ICD-10-CM

## 2019-08-02 PROCEDURE — 99213 OFFICE O/P EST LOW 20 MIN: CPT | Performed by: PSYCHIATRY & NEUROLOGY

## 2019-08-02 RX ORDER — FINASTERIDE 1 MG/1
5 TABLET, FILM COATED ORAL DAILY
Refills: 3 | COMMUNITY
Start: 2019-06-19 | End: 2021-07-12 | Stop reason: SDUPTHER

## 2019-08-02 RX ORDER — TAMSULOSIN HYDROCHLORIDE 0.4 MG/1
CAPSULE ORAL DAILY
COMMUNITY
Start: 2019-05-17 | End: 2021-07-12 | Stop reason: SDUPTHER

## 2019-08-02 NOTE — PROGRESS NOTES
"Subjective     Chief Complaint: memory loss      History of Present Illness   Leobardo Araujo is a 84 y.o. male who returns to clinic today for evaluation of cognitive impairment. He was previously followed by Dr. Snell. He has noted symptoms for at least several years marked initially by forgetfulness . This has gradually worsened  over time. Additional symptoms have included impairments in executive function. There have been no associated  symptoms of anxiety or depression. His family notes  impairments in ADL's. His family  manages his medications  and finances. He is currently residing independently.     He has a history of YSABEL and is treated with a CPAP.     Prior evaluation has included screening blood work through his PCP. A head CT was unremarkable.     Since his last visit on 3/15/19 he feels well, denying any cognitive impairment or changes in his interval history.      I have reviewed and confirmed the past family, social and medical history as accurate on 8/2/19.     Review of Systems   Constitutional: Negative.    Respiratory: Negative.    Cardiovascular: Negative.    Gastrointestinal: Negative.    Genitourinary: Negative.    Musculoskeletal: Negative.        Objective     /60   Pulse 64   Ht 180.3 cm (71\")   Wt 102 kg (225 lb)   BMI 31.38 kg/m²     General appearance today is normal.     Physical Exam   Constitutional: He is oriented to person, place, and time.   Neurological: He is oriented to person, place, and time. He has normal strength.   Psychiatric: His speech is normal.        Neurologic Exam     Mental Status   Oriented to person, place, and time.   Disoriented to year. Oriented to month, date, day and season.   Registration: recalls 3 of 3 objects. Recall at 5 minutes: recalls 3 of 3 objects. Follows 3 step commands.   Attention: normal.   Speech: speech is normal   Level of consciousness: alert  Knowledge: good.   Able to name object. Able to read. Able to repeat. Able to write. " Normal comprehension.     Cranial Nerves   Cranial nerves II through XII intact.     Motor Exam   Muscle bulk: normal  Overall muscle tone: normal    Strength   Strength 5/5 throughout.         Results  MMSE=30       Assessment/Plan   Leobardo was seen today for memory loss.    Diagnoses and all orders for this visit:    Memory loss          Discussion/Summary   Leobardo Araujo returns to clinic today with a history of possible MCI (vs AD).  I again reviewed his current status and treatment options. After discussing potential treatment options, it was elected to continue on  donepezil and memantine unchanged. He will then follow up in 6 months, or sooner if needed.     I spent 15 minutes face to face with the patient. I spent 10 minutes counseling and discussing current status, treatment options and management.    As part of this visit I  .      Teodoro Donaldson MD

## 2019-08-05 ENCOUNTER — TELEPHONE (OUTPATIENT)
Dept: NEUROLOGY | Facility: CLINIC | Age: 84
End: 2019-08-05

## 2019-08-05 NOTE — TELEPHONE ENCOUNTER
----- Message from Chadd Coker sent at 8/2/2019  4:11 PM EDT -----  Contact: [phone]  Dr. Donaldson,    Pt's daughter, Fadumo, left a vm inquiring about her fathers appt today.

## 2019-08-05 NOTE — TELEPHONE ENCOUNTER
Called Fadumo and relayed Leobardo's MMSE score, as well as some of the points made in  office note, also notified of follow up appt. She stated understanding and apologized for not being able to make it.

## 2019-08-20 RX ORDER — ATORVASTATIN CALCIUM 20 MG/1
20 TABLET, FILM COATED ORAL NIGHTLY
Qty: 90 TABLET | Refills: 1 | Status: SHIPPED | OUTPATIENT
Start: 2019-08-20 | End: 2020-03-19

## 2019-08-20 RX ORDER — AMIODARONE HYDROCHLORIDE 200 MG/1
200 TABLET ORAL DAILY
Qty: 90 TABLET | Refills: 1 | Status: SHIPPED | OUTPATIENT
Start: 2019-08-20 | End: 2020-05-22

## 2019-08-20 RX ORDER — HYDROCHLOROTHIAZIDE 25 MG/1
25 TABLET ORAL DAILY
Qty: 90 TABLET | Refills: 1 | Status: SHIPPED | OUTPATIENT
Start: 2019-08-20 | End: 2020-05-22

## 2019-09-23 RX ORDER — LEVOTHYROXINE SODIUM 0.05 MG/1
TABLET ORAL
Qty: 90 TABLET | Refills: 1 | Status: SHIPPED | OUTPATIENT
Start: 2019-09-23 | End: 2020-04-27 | Stop reason: SDUPTHER

## 2019-10-21 ENCOUNTER — TELEPHONE (OUTPATIENT)
Dept: NEUROLOGY | Facility: CLINIC | Age: 84
End: 2019-10-21

## 2019-10-21 RX ORDER — DONEPEZIL HYDROCHLORIDE 5 MG/1
TABLET, FILM COATED ORAL
Qty: 90 TABLET | Refills: 1 | Status: SHIPPED | OUTPATIENT
Start: 2019-10-21 | End: 2020-04-13 | Stop reason: SDUPTHER

## 2019-10-21 NOTE — TELEPHONE ENCOUNTER
Called and left  for pt daughter Fadumo informing that refill Rx has been sent into pt pharmacy and if have any questions to please give office a call. Thanks.

## 2019-11-20 ENCOUNTER — TELEPHONE (OUTPATIENT)
Dept: NEUROLOGY | Facility: CLINIC | Age: 84
End: 2019-11-20

## 2019-11-20 NOTE — TELEPHONE ENCOUNTER
Opal contracted through [facility] called to verify pt medication list. Verify pt medications and dosage. Opal stated verbal understanding and appreciation for verification. Thanks.

## 2019-12-30 ENCOUNTER — TRANSCRIBE ORDERS (OUTPATIENT)
Dept: LAB | Facility: HOSPITAL | Age: 84
End: 2019-12-30

## 2019-12-30 ENCOUNTER — LAB (OUTPATIENT)
Dept: LAB | Facility: HOSPITAL | Age: 84
End: 2019-12-30

## 2019-12-30 DIAGNOSIS — L50.0 ALLERGIC URTICARIA: ICD-10-CM

## 2019-12-30 DIAGNOSIS — M00.80 ARTHRITIS DUE TO PSEUDOMONAS (HCC): ICD-10-CM

## 2019-12-30 DIAGNOSIS — B96.5 ARTHRITIS DUE TO PSEUDOMONAS (HCC): ICD-10-CM

## 2019-12-30 DIAGNOSIS — N41.0 ACUTE PROSTATITIS: ICD-10-CM

## 2019-12-30 DIAGNOSIS — Z90.79 ACQUIRED ABSENCE OF ORGAN, GENITAL ORGANS: ICD-10-CM

## 2019-12-30 DIAGNOSIS — N39.0 URINARY TRACT INFECTION WITHOUT HEMATURIA, SITE UNSPECIFIED: ICD-10-CM

## 2019-12-30 DIAGNOSIS — N39.0 URINARY TRACT INFECTION WITHOUT HEMATURIA, SITE UNSPECIFIED: Primary | ICD-10-CM

## 2019-12-30 LAB
BACTERIA UR QL AUTO: ABNORMAL /HPF
BILIRUB UR QL STRIP: NEGATIVE
CLARITY UR: ABNORMAL
COLOR UR: YELLOW
GLUCOSE UR STRIP-MCNC: ABNORMAL MG/DL
HGB UR QL STRIP.AUTO: ABNORMAL
HYALINE CASTS UR QL AUTO: ABNORMAL /LPF
KETONES UR QL STRIP: NEGATIVE
LEUKOCYTE ESTERASE UR QL STRIP.AUTO: ABNORMAL
NITRITE UR QL STRIP: NEGATIVE
PH UR STRIP.AUTO: <=5 [PH] (ref 5–8)
PROT UR QL STRIP: ABNORMAL
RBC # UR: ABNORMAL /HPF
REF LAB TEST METHOD: ABNORMAL
SP GR UR STRIP: 1.03 (ref 1–1.03)
SQUAMOUS #/AREA URNS HPF: ABNORMAL /HPF
UROBILINOGEN UR QL STRIP: ABNORMAL
WBC UR QL AUTO: ABNORMAL /HPF

## 2019-12-30 PROCEDURE — 87086 URINE CULTURE/COLONY COUNT: CPT

## 2019-12-30 PROCEDURE — 81001 URINALYSIS AUTO W/SCOPE: CPT

## 2019-12-30 PROCEDURE — 87077 CULTURE AEROBIC IDENTIFY: CPT

## 2019-12-30 PROCEDURE — 87186 SC STD MICRODIL/AGAR DIL: CPT

## 2020-01-01 LAB — BACTERIA SPEC AEROBE CULT: ABNORMAL

## 2020-01-20 RX ORDER — VILAZODONE HYDROCHLORIDE 40 MG/1
TABLET ORAL
Qty: 30 TABLET | Refills: 0 | Status: SHIPPED | OUTPATIENT
Start: 2020-01-20 | End: 2020-03-24

## 2020-01-23 ENCOUNTER — LAB (OUTPATIENT)
Dept: LAB | Facility: HOSPITAL | Age: 85
End: 2020-01-23

## 2020-01-23 ENCOUNTER — TRANSCRIBE ORDERS (OUTPATIENT)
Dept: LAB | Facility: HOSPITAL | Age: 85
End: 2020-01-23

## 2020-01-23 DIAGNOSIS — T36.1X5A CEPHALOSPORIN GROUP CAUSING ADVERSE EFFECT IN THERAPEUTIC USE: ICD-10-CM

## 2020-01-23 DIAGNOSIS — Z90.79 ACQUIRED ABSENCE OF ORGAN, GENITAL ORGANS: ICD-10-CM

## 2020-01-23 DIAGNOSIS — M00.80 ARTHRITIS DUE TO PSEUDOMONAS (HCC): ICD-10-CM

## 2020-01-23 DIAGNOSIS — B96.5 ARTHRITIS DUE TO PSEUDOMONAS (HCC): ICD-10-CM

## 2020-01-23 DIAGNOSIS — N39.0 URINARY TRACT INFECTION WITHOUT HEMATURIA, SITE UNSPECIFIED: ICD-10-CM

## 2020-01-23 DIAGNOSIS — N41.0 ACUTE PROSTATITIS: ICD-10-CM

## 2020-01-23 DIAGNOSIS — N39.0 URINARY TRACT INFECTION WITHOUT HEMATURIA, SITE UNSPECIFIED: Primary | ICD-10-CM

## 2020-01-23 DIAGNOSIS — L50.0 ALLERGIC URTICARIA: ICD-10-CM

## 2020-01-23 LAB
ANION GAP SERPL CALCULATED.3IONS-SCNC: 11 MMOL/L (ref 5–15)
BACTERIA UR QL AUTO: NORMAL /HPF
BASOPHILS # BLD AUTO: 0.04 10*3/MM3 (ref 0–0.2)
BASOPHILS NFR BLD AUTO: 0.5 % (ref 0–1.5)
BILIRUB UR QL STRIP: NEGATIVE
BUN BLD-MCNC: 17 MG/DL (ref 8–23)
BUN/CREAT SERPL: 22.1 (ref 7–25)
CALCIUM SPEC-SCNC: 8.9 MG/DL (ref 8.6–10.5)
CHLORIDE SERPL-SCNC: 102 MMOL/L (ref 98–107)
CLARITY UR: CLEAR
CO2 SERPL-SCNC: 27 MMOL/L (ref 22–29)
COLOR UR: ABNORMAL
CREAT BLD-MCNC: 0.77 MG/DL (ref 0.76–1.27)
CRP SERPL-MCNC: 0.21 MG/DL (ref 0–0.5)
DEPRECATED RDW RBC AUTO: 47.1 FL (ref 37–54)
EOSINOPHIL # BLD AUTO: 0.53 10*3/MM3 (ref 0–0.4)
EOSINOPHIL NFR BLD AUTO: 6.1 % (ref 0.3–6.2)
ERYTHROCYTE [DISTWIDTH] IN BLOOD BY AUTOMATED COUNT: 14.3 % (ref 12.3–15.4)
ERYTHROCYTE [SEDIMENTATION RATE] IN BLOOD: 26 MM/HR (ref 0–20)
GFR SERPL CREATININE-BSD FRML MDRD: 96 ML/MIN/1.73
GLUCOSE BLD-MCNC: 234 MG/DL (ref 65–99)
GLUCOSE UR STRIP-MCNC: ABNORMAL MG/DL
HCT VFR BLD AUTO: 38.6 % (ref 37.5–51)
HGB BLD-MCNC: 12.2 G/DL (ref 13–17.7)
HGB UR QL STRIP.AUTO: NEGATIVE
HYALINE CASTS UR QL AUTO: NORMAL /LPF
IMM GRANULOCYTES # BLD AUTO: 0.03 10*3/MM3 (ref 0–0.05)
IMM GRANULOCYTES NFR BLD AUTO: 0.3 % (ref 0–0.5)
KETONES UR QL STRIP: NEGATIVE
LEUKOCYTE ESTERASE UR QL STRIP.AUTO: NEGATIVE
LYMPHOCYTES # BLD AUTO: 3.52 10*3/MM3 (ref 0.7–3.1)
LYMPHOCYTES NFR BLD AUTO: 40.7 % (ref 19.6–45.3)
MCH RBC QN AUTO: 28.3 PG (ref 26.6–33)
MCHC RBC AUTO-ENTMCNC: 31.6 G/DL (ref 31.5–35.7)
MCV RBC AUTO: 89.6 FL (ref 79–97)
MONOCYTES # BLD AUTO: 0.72 10*3/MM3 (ref 0.1–0.9)
MONOCYTES NFR BLD AUTO: 8.3 % (ref 5–12)
NEUTROPHILS # BLD AUTO: 3.81 10*3/MM3 (ref 1.7–7)
NEUTROPHILS NFR BLD AUTO: 44.1 % (ref 42.7–76)
NITRITE UR QL STRIP: NEGATIVE
NRBC BLD AUTO-RTO: 0 /100 WBC (ref 0–0.2)
PH UR STRIP.AUTO: 5.5 [PH] (ref 5–8)
PLATELET # BLD AUTO: 229 10*3/MM3 (ref 140–450)
PMV BLD AUTO: 11.3 FL (ref 6–12)
POTASSIUM BLD-SCNC: 4.5 MMOL/L (ref 3.5–5.2)
PROT UR QL STRIP: NEGATIVE
RBC # BLD AUTO: 4.31 10*6/MM3 (ref 4.14–5.8)
RBC # UR: NORMAL /HPF
REF LAB TEST METHOD: NORMAL
SODIUM BLD-SCNC: 140 MMOL/L (ref 136–145)
SP GR UR STRIP: 1.01 (ref 1–1.03)
SQUAMOUS #/AREA URNS HPF: NORMAL /HPF
UROBILINOGEN UR QL STRIP: ABNORMAL
WBC NRBC COR # BLD: 8.65 10*3/MM3 (ref 3.4–10.8)
WBC UR QL AUTO: NORMAL /HPF

## 2020-01-23 PROCEDURE — 85652 RBC SED RATE AUTOMATED: CPT

## 2020-01-23 PROCEDURE — 86140 C-REACTIVE PROTEIN: CPT

## 2020-01-23 PROCEDURE — 87086 URINE CULTURE/COLONY COUNT: CPT

## 2020-01-23 PROCEDURE — 80048 BASIC METABOLIC PNL TOTAL CA: CPT

## 2020-01-23 PROCEDURE — 36415 COLL VENOUS BLD VENIPUNCTURE: CPT

## 2020-01-23 PROCEDURE — 85025 COMPLETE CBC W/AUTO DIFF WBC: CPT

## 2020-01-23 PROCEDURE — 81001 URINALYSIS AUTO W/SCOPE: CPT

## 2020-01-24 LAB — BACTERIA SPEC AEROBE CULT: ABNORMAL

## 2020-01-27 ENCOUNTER — TELEPHONE (OUTPATIENT)
Dept: INTERNAL MEDICINE | Facility: CLINIC | Age: 85
End: 2020-01-27

## 2020-01-27 NOTE — TELEPHONE ENCOUNTER
Pts daughter called and is needing an updated medication list so she can send it to [facility] because they are switching to that Pharmacy. If possible daughter would like this list emailed to [email or web address]. Please advise.    Daughter callback- [phone]

## 2020-01-28 ENCOUNTER — TELEPHONE (OUTPATIENT)
Dept: CARDIOLOGY | Facility: CLINIC | Age: 85
End: 2020-01-28

## 2020-01-28 NOTE — TELEPHONE ENCOUNTER
Explained to Jacey exactly what I told the daughter yesterday. That the med list is not up to date, pt has not been seen since May and has no future appointments.

## 2020-01-28 NOTE — TELEPHONE ENCOUNTER
Jacey with [facility] called requesting an updated med list-     I tried to call her back but had to leave a msg twice-     We have not seen this pt since 12/2018- he has missed his appointments- he has no scheduled appointments with us- I explained that I would be happy to go over what we sent in most recently for him- but I can not guarantee that it is an accurate list

## 2020-01-28 NOTE — TELEPHONE ENCOUNTER
[facility] needs to speak with a clinical staff member regarding the medications for this patient as patient has not been taking many of these same meds.    [phone] anaya Andrea

## 2020-02-06 ENCOUNTER — TELEPHONE (OUTPATIENT)
Dept: NEUROLOGY | Facility: CLINIC | Age: 85
End: 2020-02-06

## 2020-02-06 NOTE — TELEPHONE ENCOUNTER
----- Message from Rashmi Jeronimo CMA sent at 2/6/2020 10:12 AM EST -----  Regarding: NANY Leavitt's daughter states that Mr. Araujo is currently hospitalized at [facility] due to a stroke.    She will call for a f/u when he is dismissed.

## 2020-02-25 ENCOUNTER — TELEPHONE (OUTPATIENT)
Dept: INTERNAL MEDICINE | Facility: CLINIC | Age: 85
End: 2020-02-25

## 2020-02-25 NOTE — TELEPHONE ENCOUNTER
Pt is being released from [facility] rehab and Nurse would like a verbal order for Home health care     Rep Jessica   [phone]    Please call to advise

## 2020-02-26 ENCOUNTER — TRANSITIONAL CARE MANAGEMENT TELEPHONE ENCOUNTER (OUTPATIENT)
Dept: CASE MANAGEMENT | Facility: OTHER | Age: 85
End: 2020-02-26

## 2020-02-26 NOTE — OUTREACH NOTE
Called and talked with daughter Fadumo.  She states he is doing pretty good, but is at [facility] at present and getting home health with PT and ST.  Not sure how long he will be there as he would otherwise be living alone. Reports he is up walking, but has to be very careful, however mostly deficit was speech and swallowing.  He is eating and drinking now.   She was not sure about getting him to the office just yet, but states she will see how he is doing in a couple of weeks and call office to  make appt with Dr. Goodman.

## 2020-02-28 ENCOUNTER — TELEPHONE (OUTPATIENT)
Dept: INTERNAL MEDICINE | Facility: CLINIC | Age: 85
End: 2020-02-28

## 2020-02-28 NOTE — TELEPHONE ENCOUNTER
Cuca from home health called and wanted to let Dr. Goodman know that they started his home health today they will be doing visits this week and one or two next week he is just having adjustment issues at his new place so he will probably make his way back home after he is fully evaluated.

## 2020-02-28 NOTE — TELEPHONE ENCOUNTER
Please make sure we have records and he is here within 2 weeks to see me to continue his HH but for now can do HH

## 2020-03-05 ENCOUNTER — TELEPHONE (OUTPATIENT)
Dept: INTERNAL MEDICINE | Facility: CLINIC | Age: 85
End: 2020-03-05

## 2020-03-05 NOTE — TELEPHONE ENCOUNTER
April with  [facility] called and needed verbal orders for social work visit. This would be once a week for 2 weeks for community resources. Please call April back at [phone].
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
## 2020-03-19 ENCOUNTER — TELEPHONE (OUTPATIENT)
Dept: INTERNAL MEDICINE | Facility: CLINIC | Age: 85
End: 2020-03-19

## 2020-03-19 RX ORDER — ATORVASTATIN CALCIUM 40 MG/1
40 TABLET, FILM COATED ORAL DAILY
Qty: 30 TABLET | Refills: 3 | Status: SHIPPED | OUTPATIENT
Start: 2020-03-19 | End: 2020-07-21

## 2020-03-19 RX ORDER — AMLODIPINE BESYLATE 5 MG/1
5 TABLET ORAL 2 TIMES DAILY
Qty: 60 TABLET | Refills: 5 | Status: SHIPPED | OUTPATIENT
Start: 2020-03-19 | End: 2020-08-26

## 2020-03-19 NOTE — TELEPHONE ENCOUNTER
Pt was recently discharged from [facility] needs refills on     atorvastatin (LIPITOR) 40 MG tablet  eliquis 5mg  amLODIPine (NORVASC) 5 MG tablet in evening     Confirmed [facility]

## 2020-03-19 NOTE — TELEPHONE ENCOUNTER
D/C'd from [facility] 2/25/20.  Discharge summary is scanned under media tab.      Discharge summary has to following meds upon discharge.    Norvasc 5mg one BID  Atorvastatin 40mg one qhs  Eliquis 5mg one BID    These are not the meds you have him on currently.  Please send if you approve to [facility] pharmacy.

## 2020-03-24 RX ORDER — VILAZODONE HYDROCHLORIDE 40 MG/1
TABLET ORAL
Qty: 30 TABLET | Refills: 0 | Status: SHIPPED | OUTPATIENT
Start: 2020-03-24 | End: 2020-04-21 | Stop reason: SDUPTHER

## 2020-03-24 NOTE — TELEPHONE ENCOUNTER
Last refill: 1/20/20 #30/0  Last office visit:5/28/19  Next office visit:4/13/20  Fernie: in process

## 2020-03-30 ENCOUNTER — TELEPHONE (OUTPATIENT)
Dept: NEUROLOGY | Facility: CLINIC | Age: 85
End: 2020-03-30

## 2020-03-31 ENCOUNTER — TELEPHONE (OUTPATIENT)
Dept: INTERNAL MEDICINE | Facility: CLINIC | Age: 85
End: 2020-03-31

## 2020-03-31 NOTE — TELEPHONE ENCOUNTER
Tayler neely [facility] is calling to checking to see if the 3/5  order is ready to be faxed.  She can be reached at [phone] Fax No. [phone]

## 2020-04-03 RX ORDER — MEMANTINE HYDROCHLORIDE 10 MG/1
TABLET ORAL
Qty: 60 TABLET | Refills: 1 | Status: SHIPPED | OUTPATIENT
Start: 2020-04-03 | End: 2020-06-09

## 2020-04-06 ENCOUNTER — TELEPHONE (OUTPATIENT)
Dept: NEUROLOGY | Facility: CLINIC | Age: 85
End: 2020-04-06

## 2020-04-06 ENCOUNTER — TELEPHONE (OUTPATIENT)
Dept: INTERNAL MEDICINE | Facility: CLINIC | Age: 85
End: 2020-04-06

## 2020-04-06 NOTE — TELEPHONE ENCOUNTER
Called and spoke to pt daughter Fadumo and scheduled pt video visit. Fadumo stated that she will have to go to pt house day of visit but doesn't want pt to know that she is aware that his condition is worsening and would like to speak to Dr. Donaldson before the start of video visit prior to speaking with pt and follow-up separate from pt after video visit. Provided Fadumo information regarding activating MyChart and Fadumo stated verbal understanding. Thanks.

## 2020-04-06 NOTE — TELEPHONE ENCOUNTER
Patient's daughter and got patient on the phone for a 3-way call patient states he has a lot of blood in his urine it  pours .out when he goes to the bathroom there is  pain and he goes once a hour I asked Dr Lujan and informed them he recommends that they call patient's urologist or goes to ER  Patient's daughter said she would call his Urologist

## 2020-04-06 NOTE — TELEPHONE ENCOUNTER
Called and spoke to pt daughter Fadumo relaying message per Dr. Donaldson. Fadumo agreed to short video visit and verbalized appreciation. Thanks.

## 2020-04-13 ENCOUNTER — TELEMEDICINE (OUTPATIENT)
Dept: NEUROLOGY | Facility: CLINIC | Age: 85
End: 2020-04-13

## 2020-04-13 DIAGNOSIS — G30.1 LATE ONSET ALZHEIMER'S DISEASE WITH BEHAVIORAL DISTURBANCE (HCC): Primary | ICD-10-CM

## 2020-04-13 DIAGNOSIS — F02.818 LATE ONSET ALZHEIMER'S DISEASE WITH BEHAVIORAL DISTURBANCE (HCC): Primary | ICD-10-CM

## 2020-04-13 PROCEDURE — 99214 OFFICE O/P EST MOD 30 MIN: CPT | Performed by: PSYCHIATRY & NEUROLOGY

## 2020-04-13 RX ORDER — MIRTAZAPINE 15 MG/1
15 TABLET, FILM COATED ORAL NIGHTLY
Qty: 30 TABLET | Refills: 6 | Status: SHIPPED | OUTPATIENT
Start: 2020-04-13 | End: 2020-10-12

## 2020-04-13 RX ORDER — DONEPEZIL HYDROCHLORIDE 10 MG/1
10 TABLET, FILM COATED ORAL DAILY
Qty: 30 TABLET | Refills: 6 | Status: SHIPPED | OUTPATIENT
Start: 2020-04-13 | End: 2020-10-28

## 2020-04-13 NOTE — PROGRESS NOTES
Subjective     Chief Complaint: memory loss      Leobardo Araujo is a 85 y.o. male who returns to clinic today for ongoing evaluation and management of cognitive impairment. He was previously followed by Dr. Snell. He has noted symptoms for at least several years marked initially by forgetfulness. This has gradually worsened  over time. Additional symptoms have included impairments in executive function.     He has a history of YSABEL and is treated with a CPAP.     Prior evaluation has included screening blood work through his PCP. A head CT was unremarkable.     Since his last visit on 8/2/19 he has been admitted for stroke in [place] in 2/20 largely manifesting as severe dysarthria. He has had a continued gradual decline cognitively, with increasing irritability and anger, which worsened partially at the time of this stroke. He has also had paranoid delusions. He moved to assisted living temporarily, but left due to behavioral issues. He is currently living in his own home with a  helping.    I have reviewed and confirmed the past family, social and medical history as accurate on 4/13/20.     Review of Systems   Constitutional: Negative.        Objective     There were no vitals taken for this visit.    General appearance today is normal.     Physical Exam   Psychiatric: His speech is slurred.        Neurologic Exam     Mental Status   Oriented to person.   Oriented to place.   Disoriented to time.   Registration: recalls 3 of 3 objects. Recall at 5 minutes: recalls 1 of 3 objects.   Attention: normal.   Speech: slurred   Level of consciousness: alert  Knowledge: poor.   Able to name object. Able to read. Able to repeat. Able to write. Normal comprehension.         Results  MMSE=23       Assessment/Plan   Diagnoses and all orders for this visit:    Late onset Alzheimer's disease with behavioral disturbance (CMS/HCC)          Discussion/Summary   Leobardo Araujo returns to clinic today with a history of  suspected AD.  I again reviewed his current status and treatment options. After discussing potential treatment options, it was elected to continue on increase his donepezil to 10 mg daily and add mirtazapine for irritability. He will then follow up in 6 months, or sooner if needed.     I spent 30  minutes face to face with the patient. I spent 20 minutes counseling and discussing current status, treatment options and management.    As part of this visit I obtained additional history from the family which is incorporated in the HPI.      Teodoro Donaldson MD

## 2020-04-16 ENCOUNTER — TELEMEDICINE (OUTPATIENT)
Dept: INTERNAL MEDICINE | Facility: CLINIC | Age: 85
End: 2020-04-16

## 2020-04-16 DIAGNOSIS — I10 BENIGN ESSENTIAL HYPERTENSION: ICD-10-CM

## 2020-04-16 DIAGNOSIS — R45.1 RESTLESSNESS AND AGITATION: ICD-10-CM

## 2020-04-16 DIAGNOSIS — E11.59 TYPE 2 DIABETES MELLITUS WITH OTHER CIRCULATORY COMPLICATIONS (HCC): Primary | ICD-10-CM

## 2020-04-16 DIAGNOSIS — F33.41 RECURRENT MAJOR DEPRESSIVE DISORDER, IN PARTIAL REMISSION (HCC): ICD-10-CM

## 2020-04-16 PROCEDURE — 99214 OFFICE O/P EST MOD 30 MIN: CPT | Performed by: INTERNAL MEDICINE

## 2020-04-16 NOTE — PROGRESS NOTES
Chief Complaint   Patient presents with   • Diabetes   • Behavior Problem   • Hypertension     This was an audio and video enabled telemedicine encounter.He did zoom visit and he and his daughter gave consent  History of Present Illness  85 y.o. white male presents for follow up on HTN and diabetes and worsening dementia and behavioral issues. He has long standing HTN and his blood pressure has been under control. His blood pressure worsens with stress and improves with activity and deep breathing and taking the amlodipine. He denies associated headaches or chest pain.He is having more confusion in the evenings and has been kicked out of facilities and PT stopped coming because he was agitated.      Review of Systems   Constitutional: Negative for chills, diaphoresis and fever.   HENT: Negative for sore throat.    Eyes: Negative for visual disturbance.   Respiratory: Negative for cough and shortness of breath.    Cardiovascular: Negative for chest pain and palpitations.   Gastrointestinal: Negative for abdominal pain.   Genitourinary: Negative for dysuria.   Musculoskeletal: Positive for gait problem.   Skin: Negative for rash.   Neurological: Positive for weakness. Negative for dizziness and headaches.   Hematological: Negative for adenopathy.   Psychiatric/Behavioral: Positive for agitation, behavioral problems, confusion, decreased concentration and sleep disturbance. Negative for dysphoric mood (overall pretty good) and suicidal ideas. The patient is nervous/anxious.      All other ROS reviewed and negative.    PMSFH:  The following portions of the patient's history were reviewed and updated as appropriate: allergies, current medications, past family history, past medical history, past social history, past surgical history and problem list.      Current Outpatient Medications:   •  alendronate (FOSAMAX) 35 MG tablet, Take 1 tablet by mouth Every 7 (Seven) Days., Disp: 13 tablet, Rfl: 3  •  amiodarone (PACERONE)  200 MG tablet, Take 1 tablet by mouth Daily., Disp: 90 tablet, Rfl: 1  •  amLODIPine (NORVASC) 5 MG tablet, Take 1 tablet by mouth 2 (Two) Times a Day., Disp: 60 tablet, Rfl: 5  •  apixaban (ELIQUIS) 5 MG tablet tablet, Take 1 tablet by mouth Every 12 (Twelve) Hours., Disp: 60 tablet, Rfl: 3  •  aspirin 81 MG tablet, Take  by mouth Daily., Disp: , Rfl:   •  atorvastatin (LIPITOR) 40 MG tablet, Take 1 tablet by mouth Daily., Disp: 30 tablet, Rfl: 3  •  donepezil (ARICEPT) 10 MG tablet, Take 1 tablet by mouth Daily., Disp: 30 tablet, Rfl: 6  •  finasteride (PROPECIA) 1 MG tablet, TAKE 1 TABLET BY MOUTH ONCE DAILY AS DIRECTED. PATIENT NO LONGER TAKING TERAZOSIN., Disp: , Rfl: 3  •  hydrochlorothiazide (HYDRODIURIL) 25 MG tablet, Take 1 tablet by mouth Daily., Disp: 90 tablet, Rfl: 1  •  Insulin Glargine (TOUJEO MAX SOLOSTAR) 300 UNIT/ML solution pen-injector, Inject  under the skin into the appropriate area as directed., Disp: , Rfl:   •  insulin NPH-insulin regular (humuLIN 70/30,novoLIN 70/30) (70-30) 100 UNIT/ML injection, Inject 50 Units under the skin into the appropriate area as directed 2 (Two) Times a Day With Meals., Disp: , Rfl:   •  Insulin Pen Needle (BD PEN NEEDLE DEBORAH U/F) 32G X 4 MM misc, , Disp: , Rfl:   •  levothyroxine (SYNTHROID, LEVOTHROID) 50 MCG tablet, TAKE 1 TABLET BY MOUTH ONCE DAILY, Disp: 90 tablet, Rfl: 1  •  losartan (COZAAR) 100 MG tablet, TAKE 1 TABLET BY MOUTH ONCE DAILY, Disp: 90 tablet, Rfl: 3  •  memantine (NAMENDA) 10 MG tablet, TAKE ONE TABLET BY MOUTH TWO TIMES A DAY, Disp: 60 tablet, Rfl: 1  •  metFORMIN (GLUCOPHAGE) 500 MG tablet, TAKE 2 TABLETS BY MOUTH TWICE DAILY, Disp: 360 tablet, Rfl: 1  •  mirtazapine (REMERON) 15 MG tablet, Take 1 tablet by mouth Every Night., Disp: 30 tablet, Rfl: 6  •  tamsulosin (FLOMAX) 0.4 MG capsule 24 hr capsule, , Disp: , Rfl:   •  terazosin (HYTRIN) 1 MG capsule, TAKE ONE CAPSULE BY MOUTH ONCE DAILY AT BEDTIME, Disp: 90 capsule, Rfl: 3  •  VIIBRYD  40 MG tablet tablet, TAKE ONE TABLET BY MOUTH EVERY DAY WITH FOOD, Disp: 30 tablet, Rfl: 0    VITALS:  There were no vitals taken for this visit.    Physical Exam   Constitutional: He appears well-developed and well-nourished.   HENT:   Head: Normocephalic and atraumatic.   Eyes: Conjunctivae are normal.   Neurological: He is alert.   Psychiatric: He has a normal mood and affect. His behavior is normal.   He was mild confused but alert and conversational       LABS:  Results for orders placed or performed in visit on 01/23/20   Urine Culture - Urine, Urine, Clean Catch   Result Value Ref Range    Urine Culture Yeast isolated (A)    Basic Metabolic Panel   Result Value Ref Range    Glucose 234 (H) 65 - 99 mg/dL    BUN 17 8 - 23 mg/dL    Creatinine 0.77 0.76 - 1.27 mg/dL    Sodium 140 136 - 145 mmol/L    Potassium 4.5 3.5 - 5.2 mmol/L    Chloride 102 98 - 107 mmol/L    CO2 27.0 22.0 - 29.0 mmol/L    Calcium 8.9 8.6 - 10.5 mg/dL    eGFR Non African Amer 96 >60 mL/min/1.73    BUN/Creatinine Ratio 22.1 7.0 - 25.0    Anion Gap 11.0 5.0 - 15.0 mmol/L   Sedimentation Rate   Result Value Ref Range    Sed Rate 26 (H) 0 - 20 mm/hr   C-reactive Protein   Result Value Ref Range    C-Reactive Protein 0.21 0.00 - 0.50 mg/dL   Urinalysis without microscopic (no culture) - Urine, Clean Catch   Result Value Ref Range    Color, UA Orange (A) Yellow, Straw    Appearance, UA Clear Clear    pH, UA 5.5 5.0 - 8.0    Specific Gravity, UA 1.013 1.001 - 1.030    Glucose,  mg/dL (Trace) (A) Negative    Ketones, UA Negative Negative    Bilirubin, UA Negative Negative    Blood, UA Negative Negative    Protein, UA Negative Negative    Leuk Esterase, UA Negative Negative    Nitrite, UA Negative Negative    Urobilinogen, UA 0.2 E.U./dL 0.2 - 1.0 E.U./dL   Urinalysis, Microscopic Only - Urine, Clean Catch   Result Value Ref Range    RBC, UA 0-2 None Seen, 0-2 /HPF    WBC, UA 0-2 None Seen, 0-2 /HPF    Bacteria, UA None Seen None Seen, Trace  /HPF    Squamous Epithelial Cells, UA None Seen None Seen, 0-2 /HPF    Hyaline Casts, UA 0-6 0 - 6 /LPF    Methodology Automated Microscopy    CBC Auto Differential   Result Value Ref Range    WBC 8.65 3.40 - 10.80 10*3/mm3    RBC 4.31 4.14 - 5.80 10*6/mm3    Hemoglobin 12.2 (L) 13.0 - 17.7 g/dL    Hematocrit 38.6 37.5 - 51.0 %    MCV 89.6 79.0 - 97.0 fL    MCH 28.3 26.6 - 33.0 pg    MCHC 31.6 31.5 - 35.7 g/dL    RDW 14.3 12.3 - 15.4 %    RDW-SD 47.1 37.0 - 54.0 fl    MPV 11.3 6.0 - 12.0 fL    Platelets 229 140 - 450 10*3/mm3    Neutrophil % 44.1 42.7 - 76.0 %    Lymphocyte % 40.7 19.6 - 45.3 %    Monocyte % 8.3 5.0 - 12.0 %    Eosinophil % 6.1 0.3 - 6.2 %    Basophil % 0.5 0.0 - 1.5 %    Immature Grans % 0.3 0.0 - 0.5 %    Neutrophils, Absolute 3.81 1.70 - 7.00 10*3/mm3    Lymphocytes, Absolute 3.52 (H) 0.70 - 3.10 10*3/mm3    Monocytes, Absolute 0.72 0.10 - 0.90 10*3/mm3    Eosinophils, Absolute 0.53 (H) 0.00 - 0.40 10*3/mm3    Basophils, Absolute 0.04 0.00 - 0.20 10*3/mm3    Immature Grans, Absolute 0.03 0.00 - 0.05 10*3/mm3    nRBC 0.0 0.0 - 0.2 /100 WBC       Procedures         ASSESSMENT/PLAN:  Problem List Items Addressed This Visit        Cardiovascular and Mediastinum    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   Continue current treatment regimen.  Diabetes will be reassessed in 3 months.         Benign essential hypertension     Hypertension is unchanged.  Continue current treatment regimen.  Weight loss.  Regular aerobic exercise.  Blood pressure will be reassessed in 3 months.            Other    Recurrent major depressive disorder (CMS/HCC)     Psychological condition is improving with treatment.  Regular aerobic exercise.  Medication changes per orders.  Psychological condition  will be reassessed in 3 monthsHe will add mirtazapine and be more active.           Other Visit Diagnoses     Restlessness and agitation        Use the  namenda and aricept and remeron. He will do better at home.           FOLLOW-UP:  No follow-ups on file.      Electronically signed by:    Edy Goodman MD

## 2020-04-16 NOTE — ASSESSMENT & PLAN NOTE
Psychological condition is improving with treatment.  Regular aerobic exercise.  Medication changes per orders.  Psychological condition  will be reassessed in 3 monthsHe will add mirtazapine and be more active.

## 2020-04-16 NOTE — ASSESSMENT & PLAN NOTE
Hypertension is unchanged.  Continue current treatment regimen.  Weight loss.  Regular aerobic exercise.  Blood pressure will be reassessed in 3 months.

## 2020-04-20 ENCOUNTER — TELEPHONE (OUTPATIENT)
Dept: INTERNAL MEDICINE | Facility: CLINIC | Age: 85
End: 2020-04-20

## 2020-04-20 NOTE — TELEPHONE ENCOUNTER
PT DAUGHTER CALLED WANTING SOMETHING IN WRITING FROM  REGARDING PT DRIVING.  PT WOULD LIKE LETTER SENT TO EMAIL.  [email or web address]    PLEASE ADVISE.  CALL BACK:[phone]

## 2020-04-21 RX ORDER — VILAZODONE HYDROCHLORIDE 40 MG/1
40 TABLET ORAL DAILY
Qty: 30 TABLET | Refills: 5 | Status: SHIPPED | OUTPATIENT
Start: 2020-04-21 | End: 2020-10-22

## 2020-04-22 NOTE — TELEPHONE ENCOUNTER
We do not have a signed verbal release. I advised Addie she would need to contact neurology. She verbalized understanding.

## 2020-04-22 NOTE — TELEPHONE ENCOUNTER
I think it would be better coming from the Neurologist-please have her call them and if there is a concern I can reaffirm their decision   They recently did visit with Dr Donaldson

## 2020-04-22 NOTE — TELEPHONE ENCOUNTER
Pt's daughter called back stating that due to her father having a stroke and Alzheimer's disease, it is not a good idea for the patient to be driving.  Pt is adamant that he can drive but daughter is worried that he would get lost, hurt or confused.    Pt callback: [phone]    Please advise.

## 2020-04-27 RX ORDER — LEVOTHYROXINE SODIUM 0.05 MG/1
50 TABLET ORAL DAILY
Qty: 90 TABLET | Refills: 1 | Status: SHIPPED | OUTPATIENT
Start: 2020-04-27 | End: 2020-11-10

## 2020-04-27 NOTE — TELEPHONE ENCOUNTER
Pharmacy requesting refill on:  levothyroxine (SYNTHROID, LEVOTHROID) 50 MCG tablet    Send to:  [facility] - [place], [place] - [facility] [address]. - [phone] PH

## 2020-05-04 RX ORDER — LOSARTAN POTASSIUM 100 MG/1
100 TABLET ORAL DAILY
Qty: 90 TABLET | Refills: 3 | Status: SHIPPED | OUTPATIENT
Start: 2020-05-04 | End: 2021-04-14

## 2020-05-04 NOTE — TELEPHONE ENCOUNTER
PHARMACY CALLED REQUESTING A REFILL FOR:    losartan (COZAAR) 100 MG tablet    [facility] - [place], [place] - [facility] [address] - [phone]  - [phone] FX

## 2020-05-08 DIAGNOSIS — F02.818 LATE ONSET ALZHEIMER'S DISEASE WITH BEHAVIORAL DISTURBANCE (HCC): Primary | ICD-10-CM

## 2020-05-08 DIAGNOSIS — G30.1 LATE ONSET ALZHEIMER'S DISEASE WITH BEHAVIORAL DISTURBANCE (HCC): Primary | ICD-10-CM

## 2020-05-08 DIAGNOSIS — R41.3 MEMORY LOSS: ICD-10-CM

## 2020-05-08 NOTE — TELEPHONE ENCOUNTER
His diagnosis does not automatically exclude him from driving, but if there are concerns I would recommend that he not drive, or consider a driving evaluation through [facility]. Thanks.

## 2020-05-08 NOTE — TELEPHONE ENCOUNTER
Pt daughter, Addie, called about whether the patient should be driving or not since his diagnosis. She was referred to talk to Dr Donaldson by Dr Goodman, PCP.

## 2020-05-08 NOTE — TELEPHONE ENCOUNTER
Called to let Addie know the letter is on Mychart, however I got her sister, doroteo's other daughter, Fadumo walsh who states after speaking with Addie she believes the best option would be to go the [facility] driving eval route after all.    ,  Sorry for all the confusion. They are now wanting him to do the [facility] driving eval, if you could please put the order in, I will send it over. Thanks!

## 2020-05-08 NOTE — TELEPHONE ENCOUNTER
OK. I put the order in. Please feel free to delete the letter if the family doesn't want it. Thanks.
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
## 2020-05-11 ENCOUNTER — TELEPHONE (OUTPATIENT)
Dept: INTERNAL MEDICINE | Facility: CLINIC | Age: 85
End: 2020-05-11

## 2020-05-11 RX ORDER — FAMOTIDINE 20 MG/1
20 TABLET, FILM COATED ORAL 2 TIMES DAILY
Qty: 60 TABLET | Refills: 5 | Status: SHIPPED | OUTPATIENT
Start: 2020-05-11 | End: 2021-03-30

## 2020-05-11 NOTE — TELEPHONE ENCOUNTER
Pharmacy is calling requesting a refill for the patients Famotidine 20mg. Please advise     [facility] pharmacy

## 2020-05-13 ENCOUNTER — TRANSCRIBE ORDERS (OUTPATIENT)
Dept: LAB | Facility: HOSPITAL | Age: 85
End: 2020-05-13

## 2020-05-13 ENCOUNTER — LAB (OUTPATIENT)
Dept: LAB | Facility: HOSPITAL | Age: 85
End: 2020-05-13

## 2020-05-13 DIAGNOSIS — B96.5 ARTHRITIS DUE TO PSEUDOMONAS (HCC): ICD-10-CM

## 2020-05-13 DIAGNOSIS — N39.0 URINARY TRACT INFECTION WITHOUT HEMATURIA, SITE UNSPECIFIED: Primary | ICD-10-CM

## 2020-05-13 DIAGNOSIS — M00.80 ARTHRITIS DUE TO PSEUDOMONAS (HCC): ICD-10-CM

## 2020-05-13 DIAGNOSIS — N39.0 URINARY TRACT INFECTION WITHOUT HEMATURIA, SITE UNSPECIFIED: ICD-10-CM

## 2020-05-13 DIAGNOSIS — Z90.79 ACQUIRED ABSENCE OF ORGAN, GENITAL ORGANS: ICD-10-CM

## 2020-05-13 DIAGNOSIS — N41.0 ACUTE PROSTATITIS: ICD-10-CM

## 2020-05-13 LAB
BACTERIA UR QL AUTO: ABNORMAL /HPF
BILIRUB UR QL STRIP: NEGATIVE
CLARITY UR: CLEAR
COLOR UR: YELLOW
GLUCOSE UR STRIP-MCNC: NEGATIVE MG/DL
HGB UR QL STRIP.AUTO: ABNORMAL
HYALINE CASTS UR QL AUTO: ABNORMAL /LPF
KETONES UR QL STRIP: NEGATIVE
LEUKOCYTE ESTERASE UR QL STRIP.AUTO: ABNORMAL
NITRITE UR QL STRIP: NEGATIVE
PH UR STRIP.AUTO: 7 [PH] (ref 5–8)
PROT UR QL STRIP: ABNORMAL
RBC # UR: ABNORMAL /HPF
REF LAB TEST METHOD: ABNORMAL
SP GR UR STRIP: 1.02 (ref 1–1.03)
SQUAMOUS #/AREA URNS HPF: ABNORMAL /HPF
UROBILINOGEN UR QL STRIP: ABNORMAL
WBC UR QL AUTO: ABNORMAL /HPF

## 2020-05-13 PROCEDURE — 87086 URINE CULTURE/COLONY COUNT: CPT

## 2020-05-13 PROCEDURE — 81001 URINALYSIS AUTO W/SCOPE: CPT

## 2020-05-13 NOTE — TELEPHONE ENCOUNTER
Referral order, demographics and all pertinent info has been faxed over to [facility] as requested, Attn: Neva  Fx:[phone]

## 2020-05-15 LAB — BACTERIA SPEC AEROBE CULT: NO GROWTH

## 2020-05-18 ENCOUNTER — TELEPHONE (OUTPATIENT)
Dept: INTERNAL MEDICINE | Facility: CLINIC | Age: 85
End: 2020-05-18

## 2020-05-18 NOTE — TELEPHONE ENCOUNTER
Pt's grand daughter Fadumo called and said that pt's feet and ankles are swollen and red and he can't feel his feet.     Fadumo said that he has a blister on the bottom of one of his big toes and he did not know he had a blister because he cannot feel it. Fadumo did not know if she should call podiatry or endocrinology or PCP.    Fadumo also said that she does not know how long this has been going on because she has not seen him in about 2 weeks.

## 2020-05-22 ENCOUNTER — OFFICE VISIT (OUTPATIENT)
Dept: CARDIOLOGY | Facility: CLINIC | Age: 85
End: 2020-05-22

## 2020-05-22 VITALS
DIASTOLIC BLOOD PRESSURE: 60 MMHG | HEIGHT: 66 IN | BODY MASS INDEX: 34.39 KG/M2 | SYSTOLIC BLOOD PRESSURE: 138 MMHG | HEART RATE: 70 BPM | TEMPERATURE: 97.9 F | WEIGHT: 214 LBS

## 2020-05-22 DIAGNOSIS — I38 VALVULAR HEART DISEASE: Primary | Chronic | ICD-10-CM

## 2020-05-22 DIAGNOSIS — I48.0 PAROXYSMAL ATRIAL FIBRILLATION (HCC): ICD-10-CM

## 2020-05-22 PROCEDURE — 99213 OFFICE O/P EST LOW 20 MIN: CPT | Performed by: PHYSICIAN ASSISTANT

## 2020-05-22 PROCEDURE — 93000 ELECTROCARDIOGRAM COMPLETE: CPT | Performed by: PHYSICIAN ASSISTANT

## 2020-05-22 RX ORDER — BUMETANIDE 1 MG/1
1 TABLET ORAL DAILY
Qty: 30 TABLET | Refills: 0 | Status: SHIPPED | OUTPATIENT
Start: 2020-05-22 | End: 2021-06-04

## 2020-05-22 RX ORDER — GLIMEPIRIDE 1 MG/1
1 TABLET ORAL
COMMUNITY
End: 2020-05-22

## 2020-05-22 NOTE — PROGRESS NOTES
[facility]   OFFICE NOTE      Leobardo Araujo  1934  PCP: Edy Goodman MD    SUBJECTIVE:   Leobardo Araujo is a 85 y.o. male seen for a follow up visit regarding the following:     CC:Edema     HPI:   85-year-old gentleman presents today with his son who is a difficult historian because of moderate dementia and recent stroke regarding recent lower extremity edema.  He is a patient followed by Dr. Tom with known history of atrial fibrillation, tissue valve replacement and left atrial appendage closure by Dr. Roque 2012.  [facility] and [facility] to [facility] in February secondary to acute stroke on February 2, 2020.  He has recovered most of his faculties from the stroke which included some right-sided hemiparesis and dysarthria.  He is doing well from that standpoint however he reports the past 2 weeks he has had worsening lower extremity edema.  He denies any chest pain, palpitations dizziness near syncope or syncope.  He also recently stepped on a Tack on his left foot and saw a podiatrist for this.   The podiatrist noted he had significant edema and recommended a follow-up with cardiology today.    Cardiac PMH: (Old records have been reviewed and summarized below)  1. Atrial fibrillation, October 2010; CHADS2-VASc = 3:  a. Amiodarone therapy, discontinued, November 2013.  b. Terumo TigerPaw left atrial appendage closure, Dr. Edson Roque, 07/23/2012.   2. 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 [facility]mission [facility], Initiation of Elliquis therapy and followed by Dr. Donaldson  3. Hypertension.  4. Hyperlipidemia.  5. Aortic stenosis:   a. Tissue aortic valve replacement (Jay 25) and left atrial appendageal ablation (Terumo TigerPaw device), Dr. Sandhu, 07/23/2012.   b. Echocardiogram, 12/29/2017: EF 60%. Jay tissue aortic valve replacement 25 mm, 7/23/12. Aortic valve  mean pressure gradient is 8.8 mmHg. Mild MS, trace MR, trace-to-mild TR.  6. Obesity, BMI 30.  7. Diabetes mellitus.  8. Remote tobacco abuse.  9. Erectile dysfunction.  10. History of abnormal LFTs.  11. Surgical history:  a. Bladder cancer resection, 2000.  b. Lithotripsy.  c. Orchiectomy, benign.  d. Prostatectomy 7/2018       Past Medical History, Past Surgical History, Family history, Social History, and Medications were all reviewed with the patient today and updated as necessary.       Current Outpatient Medications:   •  alendronate (FOSAMAX) 35 MG tablet, Take 1 tablet by mouth Every 7 (Seven) Days., Disp: 13 tablet, Rfl: 3  •  amiodarone (PACERONE) 200 MG tablet, Take 1 tablet by mouth Daily., Disp: 90 tablet, Rfl: 1  •  amLODIPine (NORVASC) 5 MG tablet, Take 1 tablet by mouth 2 (Two) Times a Day. (Patient taking differently: Take 2.5 mg by mouth 2 (Two) Times a Day.), Disp: 60 tablet, Rfl: 5  •  apixaban (ELIQUIS) 5 MG tablet tablet, Take 1 tablet by mouth Every 12 (Twelve) Hours., Disp: 60 tablet, Rfl: 3  •  aspirin 81 MG tablet, Take  by mouth Daily., Disp: , Rfl:   •  atorvastatin (LIPITOR) 40 MG tablet, Take 1 tablet by mouth Daily., Disp: 30 tablet, Rfl: 3  •  donepezil (ARICEPT) 10 MG tablet, Take 1 tablet by mouth Daily., Disp: 30 tablet, Rfl: 6  •  famotidine (PEPCID) 20 MG tablet, Take 1 tablet by mouth 2 (Two) Times a Day., Disp: 60 tablet, Rfl: 5  •  finasteride (PROPECIA) 1 MG tablet, TAKE 1 TABLET BY MOUTH ONCE DAILY AS DIRECTED. PATIENT NO LONGER TAKING TERAZOSIN., Disp: , Rfl: 3  •  glimepiride (AMARYL) 1 MG tablet, Take 1 mg by mouth Every Morning Before Breakfast., Disp: , Rfl:   •  hydrochlorothiazide (HYDRODIURIL) 25 MG tablet, Take 1 tablet by mouth Daily., Disp: 90 tablet, Rfl: 1  •  Insulin Glargine (TOUJEO MAX SOLOSTAR) 300 UNIT/ML solution pen-injector, Inject  under the skin into the appropriate area as directed Daily., Disp: , Rfl:   •  insulin NPH-insulin regular (humuLIN  70/30,novoLIN 70/30) (70-30) 100 UNIT/ML injection, Inject 50 Units under the skin into the appropriate area as directed 2 (Two) Times a Day With Meals., Disp: , Rfl:   •  Insulin Pen Needle (BD PEN NEEDLE DEBORAH U/F) 32G X 4 MM misc, , Disp: , Rfl:   •  levothyroxine (SYNTHROID, LEVOTHROID) 50 MCG tablet, Take 1 tablet by mouth Daily., Disp: 90 tablet, Rfl: 1  •  losartan (COZAAR) 100 MG tablet, Take 1 tablet by mouth Daily., Disp: 90 tablet, Rfl: 3  •  memantine (NAMENDA) 10 MG tablet, TAKE ONE TABLET BY MOUTH TWO TIMES A DAY, Disp: 60 tablet, Rfl: 1  •  metFORMIN (GLUCOPHAGE) 500 MG tablet, TAKE 2 TABLETS BY MOUTH TWICE DAILY (Patient taking differently: 500 mg 2 (Two) Times a Day With Meals.), Disp: 360 tablet, Rfl: 1  •  mirtazapine (REMERON) 15 MG tablet, Take 1 tablet by mouth Every Night., Disp: 30 tablet, Rfl: 6  •  tamsulosin (FLOMAX) 0.4 MG capsule 24 hr capsule, , Disp: , Rfl:   •  terazosin (HYTRIN) 1 MG capsule, TAKE ONE CAPSULE BY MOUTH ONCE DAILY AT BEDTIME, Disp: 90 capsule, Rfl: 3  •  vilazodone (Viibryd) 40 MG tablet tablet, Take 1 tablet by mouth Daily. 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)   • Peripheral vascular disease (CMS/HCC)   • Benign essential hypertension   • Paroxysmal atrial fibrillation (CMS/HCC)   • Hyperlipidemia LDL goal <100   • B12 deficiency   • Obesity, Class I, BMI 30-34.9   • Abnormal liver function   • Allergic rhinitis   • BPH with urinary obstruction   • Disorder of  mitral and aortic valves   • Hypothyroidism   • Kidney stone   • Malignant tumor of urinary bladder (CMS/HCC)   • Neck pain   • Numbness   • Palpitations   • Polyp of colon   • Recurrent UTI (urinary tract infection)   • Medicare annual wellness visit, subsequent   • Multiple nevi   • Osteopenia of hip     Past Medical History:   Diagnosis Date   • Abnormal findings on diagnostic imaging of lung 4/24/2018   • Abnormal LFTs     History of abnormal LFTs.   • Arthritis    • Atrial fibrillation (CMS/HCC)    • Atrial fibrillation (CMS/HCC)     Atrial fibrillation, October 2010; CHADS2-VASc = 3: Amiodarone therapy, discontinued, November 2013. Terumo TigerPaw left atrial appendage closure, Dr. Edson Roque, 07/23/2012.     • B12 deficiency    • Basal cell carcinoma    • Benign essential hypertension    • Bladder cancer (CMS/HCC)    • Bladder cancer (CMS/HCC)    • Central sleep apnea in conditions classified elsewhere    • Cystitis 11/2017    pseudomonas cystitis resistant   • Delirium 2/13/2019   • Depression    • Diabetes (CMS/HCC)    • Diarrhea 4/24/2018   • Erectile dysfunction    • Fatigue    • History of anxiety    • History of aortic valve stenosis    • History of coronary atherosclerosis    • History of diabetes mellitus    • History of sexual dysfunction in male    • Hypercholesteremia    • Hyperlipidemia LDL goal <100    • Hypertension    • Hypertension 1/6/2017   • Hypokalemia 4/24/2018   • Loss of memory    • Memory loss    • Nausea, vomiting, and diarrhea 4/24/2018   • NGB (new growth of bladder) 2015   • Obesity     Obesity, BMI less than 30.   • Obesity, Class I, BMI 30-34.9    • Obstructive sleep apnea    • Paroxysmal atrial fibrillation (CMS/HCC)    • Peripheral vascular disease (CMS/HCC)    • Renal insufficiency 4/24/2018   • Sepsis (CMS/HCC) 04/24/2018    from UTI   • Stroke (CMS/HCC)    • Testicular mass    • Tobacco abuse     Remote tobacco abuse.   • Type 2 diabetes mellitus with other circulatory  "complications (CMS/HCC)    • Urine abnormality 4/24/2018   • UTI (urinary tract infection) 04/2018   • Valvular heart disease 4/24/2018     Past Surgical History:   Procedure Laterality Date   • AORTIC VALVE REPAIR/REPLACEMENT  2012   • BLADDER RESECTION LAPAROSCOPIC  2000    Bladder cancer resection, 2000.   • CATARACT EXTRACTION     • CYSTOSCOPY W/ LITHOLAPAXY / EHL     • EPIDIDYMECTOMY Right    • KIDNEY SURGERY     • ORCHIECTOMY      Orchiectomy, benign.     Family History   Problem Relation Age of Onset   • Hypertension Mother    • Heart disease Mother    • Stroke Mother    • Hypertension Father    • Heart disease Father    • Alcohol abuse Brother    • Stroke Other         Stroke syndrome   • Mental illness Other    • Hypertension Other    • Diabetes Other    • Alcohol abuse Other    • Coronary artery disease Other    • Hyperlipidemia Other      Social History     Tobacco Use   • Smoking status: Former Smoker     Types: Cigarettes   • Smokeless tobacco: Never Used   Substance Use Topics   • Alcohol use: No       ROS:  Review of Symptoms:  General: + fatigue  Skin: no rashes, lumps, or other skin changes  HEENT: no dizziness, lightheadedness, or vision changes  Respiratory: no cough or hemoptysis  Cardiovascular: no palpitations, and tachycardia  Gastrointestinal: no black/tarry stools or diarrhea  Urinary: no change in frequency or urgency  Peripheral Vascular: + edema   Musculoskeletal: no muscle or joint pain/stiffness  Psychiatric: no depression or excessive stress  Neurological: + recent CVA, dementia   Hematologic: no anemia, easy bruising or bleeding  Endocrine: no thyroid problems, nor heat or cold intolerance    PHYSICAL EXAM:    /60 (BP Location: Right arm, Patient Position: Sitting)   Pulse 70   Temp 97.9 °F (36.6 °C)   Ht 167.6 cm (66\")   Wt 97.1 kg (214 lb)   BMI 34.54 kg/m²        Wt Readings from Last 5 Encounters:   05/22/20 97.1 kg (214 lb)   08/02/19 102 kg (225 lb)   07/24/19 101 kg " (223 lb 6.4 oz)   05/28/19 103 kg (226 lb)   04/18/19 104 kg (228 lb 9.6 oz)       BP Readings from Last 5 Encounters:   05/22/20 138/60   08/02/19 138/60   07/24/19 138/62   05/28/19 110/62   04/18/19 147/74       General appearance - Alert, well appearing, and in no distress   Mental status - Affect appropriate to mood.  Eyes - Sclerae anicteric,  ENMT - Hearing grossly normal bilaterally, Dental hygiene good.  Neck - Carotids upstroke normal bilaterally, no bruits, no JVD.  Resp - Clear to auscultation, no wheezes, rales or rhonchi, symmetric air entry.  Heart - Normal rate, regular rhythm, normal S1, S2, no murmurs, rubs, clicks or gallops.  GI - Soft, nontender, nondistended, no masses or organomegaly.  Neurological - Grossly intact - normal speech, no focal findings. Mild dementia   Musculoskeletal - No joint tenderness, deformity or swelling, no muscular tenderness noted.  Extremities - Peripheral pulses normal, 3 plus edema,  no clubbing or cyanosis.  Skin - Normal coloration and turgor.  Psych -  oriented to person, place, and time.    Medical problems and test results were reviewed with the patient today.     Recent Results (from the past 672 hour(s))   Urine Culture - Urine, Urine, Clean Catch    Collection Time: 05/13/20 12:37 PM   Result Value Ref Range    Urine Culture No growth    Urinalysis without microscopic (no culture) - Urine, Clean Catch    Collection Time: 05/13/20 12:37 PM   Result Value Ref Range    Color, UA Yellow Yellow, Straw    Appearance, UA Clear Clear    pH, UA 7.0 5.0 - 8.0    Specific Gravity, UA 1.018 1.005 - 1.030    Glucose, UA Negative Negative    Ketones, UA Negative Negative    Bilirubin, UA Negative Negative    Blood, UA Large (3+) (A) Negative    Protein, UA Trace (A) Negative    Leuk Esterase, UA Moderate (2+) (A) Negative    Nitrite, UA Negative Negative    Urobilinogen, UA 0.2 E.U./dL 0.2 - 1.0 E.U./dL   Urinalysis, Microscopic Only - Urine, Clean Catch    Collection  Time: 05/13/20 12:37 PM   Result Value Ref Range    RBC, UA 21-30 (A) None Seen, 0-2 /HPF    WBC, UA 21-30 (A) None Seen, 0-2 /HPF    Bacteria, UA 1+ (A) None Seen /HPF    Squamous Epithelial Cells, UA 0-2 None Seen, 0-2 /HPF    Hyaline Casts, UA 3-6 None Seen /LPF    Methodology Manual Light Microscopy          EKG: (EKG has been independently visualized by me and summarized below)    ECG 12 Lead  Date/Time: 5/22/2020 3:36 PM  Performed by: Hugo Feldman PA  Authorized by: Hugo Feldman PA   Comparison: compared with previous ECG from 4/28/2018  Similar to previous ECG  Rhythm: sinus rhythm  Rate: normal  Conduction: conduction normal  ST Segments: ST segments normal  T Waves: T waves normal  QRS axis: normal  Other: no other findings    Clinical impression: normal ECG            ASSESSMENT   1.Lower extremity Edema x 2 weeks   2. VHD: Remote AVR(Tissue) with LA appendage closure 2012. Normal EF by Echocardiogram 2017.  3. HTN: Controlled.   4. Recent CVA 2/2/2020:  Right sided hemiparesis, Now resolved.  Eliquis started per [facility], Followed by Dr. Donaldson  5. PAF: Amiodarone discontinued , NSR on EKG today.     PLAN  · Will initiate Bumex 1 mg daily with follow-up BMP next week.  We have asked the patient to elevate his legs as some of the edema appears to be chronic venous insufficiency.  · Follow-up echocardiogram regarding history of valvular heart disease normal LV function.  · Return for follow-up in 1 month or sooner as needed.    5/22/2020  15:09    Will Gaston HORAN

## 2020-05-26 ENCOUNTER — TELEPHONE (OUTPATIENT)
Dept: INTERNAL MEDICINE | Facility: CLINIC | Age: 85
End: 2020-05-26

## 2020-05-26 NOTE — TELEPHONE ENCOUNTER
DAUGHTER STATES PT IS NOT SUPPOSE TO BE DRIVING AND WOULD LIKE TO BE ADVISED ON WHAT SHE COULD DO.      PLEASE ADVISE PT @ [phone]

## 2020-05-26 NOTE — TELEPHONE ENCOUNTER
Spoke with pt daughter. They have taken the keys and the car away. Pt found a set of spare keys and found out where the car was. He took a cab across town to get to his car. Daughter is wondering if you have any other advice since you were the one who said the pt shouldn't be driving.

## 2020-05-26 NOTE — TELEPHONE ENCOUNTER
I was agreeing with the hospital and Neurology that he should not drive but do not think I was the one person. I do not know of beyond that. He should get back to Neurology and have them see him and reaffirm he can not drive and maybe get the  involved

## 2020-06-01 ENCOUNTER — TELEPHONE (OUTPATIENT)
Dept: NEUROLOGY | Facility: CLINIC | Age: 85
End: 2020-06-01

## 2020-06-01 NOTE — TELEPHONE ENCOUNTER
Referral/order is needed at [facility]-fax [phone]. So they can bill insurance

## 2020-06-04 NOTE — TELEPHONE ENCOUNTER
Called and spoke to pt daughter Fadumo regarding message received concerning pt needing referral. Fadumo stated that pt has already been seen and had driving evaluation at [facility] and verbalized appreciation for calling. Thanks.

## 2020-06-09 RX ORDER — MEMANTINE HYDROCHLORIDE 10 MG/1
TABLET ORAL
Qty: 60 TABLET | Refills: 1 | Status: SHIPPED | OUTPATIENT
Start: 2020-06-09 | End: 2020-07-14

## 2020-06-23 DIAGNOSIS — M85.859 OSTEOPENIA OF HIP, UNSPECIFIED LATERALITY: ICD-10-CM

## 2020-06-23 RX ORDER — APIXABAN 5 MG/1
TABLET, FILM COATED ORAL
Qty: 60 TABLET | Refills: 3 | Status: SHIPPED | OUTPATIENT
Start: 2020-06-23 | End: 2020-11-24

## 2020-06-23 RX ORDER — ALENDRONATE SODIUM 35 MG/1
TABLET ORAL
Qty: 12 TABLET | Refills: 0 | Status: SHIPPED | OUTPATIENT
Start: 2020-06-23 | End: 2020-09-16

## 2020-06-25 ENCOUNTER — TELEPHONE (OUTPATIENT)
Dept: NEUROLOGY | Facility: CLINIC | Age: 85
End: 2020-06-25

## 2020-06-25 NOTE — TELEPHONE ENCOUNTER
ENRRIQUE FROM [facility] CALLED, THEY RECEIVED PAPERWORK FOR DRIVING EVAL FOR PT, BUT DID NOT RECEIVE ORDER.  THEY NEED OT ORDER FOR DRIVING EVALUATION.  FAMILY THOUGHT THEY HAD THE ORDER, BUT WERE MISTAKEN, WHICH IS THE REASON FOR THE LATE NOTICE.    PT IS SCHEDULED TOMORROW 6-26-20 AT 9 AM    FAX [phone]

## 2020-06-26 DIAGNOSIS — R41.3 MEMORY LOSS: Primary | ICD-10-CM

## 2020-06-30 ENCOUNTER — TELEPHONE (OUTPATIENT)
Dept: INTERNAL MEDICINE | Facility: CLINIC | Age: 85
End: 2020-06-30

## 2020-06-30 NOTE — TELEPHONE ENCOUNTER
TYRON FROM [facility] NEEDED VERBAL ORDERS FOR HOME HEALTH AND IF DR. VIVEROS WOULD AGREE TO FOLLOW PATIENT    CALL TYRON -[phone]

## 2020-07-01 ENCOUNTER — READMISSION MANAGEMENT (OUTPATIENT)
Dept: CALL CENTER | Facility: HOSPITAL | Age: 85
End: 2020-07-01

## 2020-07-01 NOTE — OUTREACH NOTE
Prep Survey      Responses   [facility] patient discharged from?  Non-BH   Is LACE score < 7 ?  Non-BH Discharge   Eligibility  [facility]   Date of Discharge  07/01/20   Does the patient have one of the following disease processes/diagnoses(primary or secondary)?  Non-BH Discharge   Prep survey completed?  Yes          Minoo Hermosillo RN

## 2020-07-02 ENCOUNTER — TRANSITIONAL CARE MANAGEMENT TELEPHONE ENCOUNTER (OUTPATIENT)
Dept: CALL CENTER | Facility: HOSPITAL | Age: 85
End: 2020-07-02

## 2020-07-02 NOTE — OUTREACH NOTE
Call Center TCM Note      Responses   [facility] patient discharged from?  Non-BH   Does the patient have one of the following disease processes/diagnoses(primary or secondary)?  Non- Discharge   TCM attempt successful?  No   Unsuccessful attempts  Attempt 1          Mahsa Murdock RN    7/2/2020, 11:52

## 2020-07-02 NOTE — OUTREACH NOTE
Call Center TCM Note      Responses   [facility] patient discharged from?  Non-BH   Does the patient have one of the following disease processes/diagnoses(primary or secondary)?  Non- Discharge   TCM attempt successful?  No   Unsuccessful attempts  Attempt 2          Mahsa Murdock RN    7/2/2020, 15:06

## 2020-07-03 ENCOUNTER — TRANSITIONAL CARE MANAGEMENT TELEPHONE ENCOUNTER (OUTPATIENT)
Dept: CALL CENTER | Facility: HOSPITAL | Age: 85
End: 2020-07-03

## 2020-07-03 NOTE — OUTREACH NOTE
Call Center TCM Note      Responses   [facility] patient discharged from?  Non-BH   Does the patient have one of the following disease processes/diagnoses(primary or secondary)?  Non- Discharge   TCM attempt successful?  No   Unsuccessful attempts  Attempt 3          Mahsa Murdock RN    7/3/2020, 10:15

## 2020-07-06 ENCOUNTER — TELEPHONE (OUTPATIENT)
Dept: INTERNAL MEDICINE | Facility: CLINIC | Age: 85
End: 2020-07-06

## 2020-07-06 NOTE — TELEPHONE ENCOUNTER
Alfredo from [facility]  is calling to state that his mobility is close to base line, so she is just going to do 2 visits over the next two weeks to work on balance and gait training.  She may do a home exercise program too.  If there are questions, she can be reached at [phone]

## 2020-07-07 ENCOUNTER — TELEPHONE (OUTPATIENT)
Dept: INTERNAL MEDICINE | Facility: CLINIC | Age: 85
End: 2020-07-07

## 2020-07-07 NOTE — TELEPHONE ENCOUNTER
TOMMY FROM [facility], CALLED REQUESTING A LIST OF PATIENT'S MEDICATION LIST FAXED TO HER.    FAX   [phone]  CALL BACK  [phone]

## 2020-07-07 NOTE — TELEPHONE ENCOUNTER
TOMMY IS ASKING FOR A VERBAL ORDER TO GET PATIENT A MEDICAL SOCIAL WORKER SO HE CAN HAVE HELP WITH MEALS AT HOME AND THINGS LIKE THAT    TOMMY (AT HOME NURSE) PH: [phone]

## 2020-07-14 ENCOUNTER — TELEPHONE (OUTPATIENT)
Dept: INTERNAL MEDICINE | Facility: CLINIC | Age: 85
End: 2020-07-14

## 2020-07-14 RX ORDER — MEMANTINE HYDROCHLORIDE 10 MG/1
TABLET ORAL
Qty: 60 TABLET | Refills: 1 | Status: SHIPPED | OUTPATIENT
Start: 2020-07-14 | End: 2020-09-08

## 2020-07-14 NOTE — TELEPHONE ENCOUNTER
GREG FROM [facility] ([phone]) CALLED AND STATED THAT THEY NEEDED A VERBAL ORDER FOR A HOLD ON HOME HEALTH.    THE PATIENT'S FAMILY HAS TAKEN HIM AND THE REST OF THE FAMILY TO [facility] FOR THE WEEK AND POSSIBLY NEXT WEEK.      HOME HEALTH WILL NEED TO BE PUT ON HOLD UNTIL PATIENT RETURNS.  VERBAL ORDER GIVEN.    DOES DR. VIVEROS APPROVE?

## 2020-07-15 ENCOUNTER — TELEPHONE (OUTPATIENT)
Dept: INTERNAL MEDICINE | Facility: CLINIC | Age: 85
End: 2020-07-15

## 2020-07-15 NOTE — TELEPHONE ENCOUNTER
PTS DAUGHTER CALLED AND MADE AN APPT FOR PT ON Monday WITH DR VIVEROS; PT WAS ORIGINALLY SCHEDULED [id number], BUT PT IS IN [place]; I WAS UNABLE TO DO A PRE-SCREENING ON PATIENT AS HE WAS NOT UNAVAILABLE TO SCREEN    FRANCO: [phone]

## 2020-07-20 ENCOUNTER — OFFICE VISIT (OUTPATIENT)
Dept: INTERNAL MEDICINE | Facility: CLINIC | Age: 85
End: 2020-07-20

## 2020-07-20 VITALS
BODY MASS INDEX: 32.14 KG/M2 | WEIGHT: 200 LBS | TEMPERATURE: 98.2 F | HEIGHT: 66 IN | HEART RATE: 68 BPM | SYSTOLIC BLOOD PRESSURE: 138 MMHG | DIASTOLIC BLOOD PRESSURE: 68 MMHG

## 2020-07-20 DIAGNOSIS — E11.59 TYPE 2 DIABETES MELLITUS WITH OTHER CIRCULATORY COMPLICATIONS (HCC): ICD-10-CM

## 2020-07-20 DIAGNOSIS — C67.9 MALIGNANT NEOPLASM OF URINARY BLADDER, UNSPECIFIED SITE (HCC): Primary | ICD-10-CM

## 2020-07-20 DIAGNOSIS — I73.9 PERIPHERAL VASCULAR DISEASE (HCC): ICD-10-CM

## 2020-07-20 DIAGNOSIS — M54.2 NECK PAIN: ICD-10-CM

## 2020-07-20 DIAGNOSIS — F33.41 RECURRENT MAJOR DEPRESSIVE DISORDER, IN PARTIAL REMISSION (HCC): ICD-10-CM

## 2020-07-20 PROCEDURE — 99214 OFFICE O/P EST MOD 30 MIN: CPT | Performed by: INTERNAL MEDICINE

## 2020-07-20 NOTE — PROGRESS NOTES
Chief Complaint   Patient presents with   • Neck Pain       History of Present Illness  85 y.o. white male presents for follow up on neck pain from sleeping on cough. He is using cream with some relief. Of note he has blood in urine from bladder scan. He denies chest pain    Review of Systems   Constitutional: Negative for chills and fever.   HENT: Positive for hearing loss.    Eyes: Negative for visual disturbance.   Respiratory: Negative for cough and shortness of breath.    Cardiovascular: Positive for leg swelling. Negative for chest pain and palpitations.   Gastrointestinal: Negative for abdominal pain.   Genitourinary: Positive for hematuria. Negative for dysuria.   Musculoskeletal: Positive for arthralgias and neck pain.   Neurological: Negative for dizziness and headaches.   Hematological: Negative for adenopathy.   Psychiatric/Behavioral: Positive for decreased concentration. Negative for dysphoric mood. The patient is not nervous/anxious.      All other ROS reviewed and negative.    PMSFH:  The following portions of the patient's history were reviewed and updated as appropriate: allergies, current medications, past family history, past medical history, past social history, past surgical history and problem list.      Current Outpatient Medications:   •  alendronate (FOSAMAX) 35 MG tablet, TAKE ONE TABLET BY MOUTH ONCE A WEEK, Disp: 12 tablet, Rfl: 0  •  amLODIPine (NORVASC) 5 MG tablet, Take 1 tablet by mouth 2 (Two) Times a Day., Disp: 60 tablet, Rfl: 5  •  aspirin 81 MG tablet, Take  by mouth Daily., Disp: , Rfl:   •  atorvastatin (LIPITOR) 40 MG tablet, Take 1 tablet by mouth Daily., Disp: 30 tablet, Rfl: 3  •  bumetanide (BUMEX) 1 MG tablet, Take 1 tablet by mouth Daily., Disp: 30 tablet, Rfl: 0  •  donepezil (ARICEPT) 10 MG tablet, Take 1 tablet by mouth Daily. (Patient taking differently: Take 10 mg by mouth Daily. 1-2 tabs qd), Disp: 30 tablet, Rfl: 6  •  ELIQUIS 5 MG tablet tablet, TAKE 1 TABLET BY  "MOUTH EVERY 12 HOURS, Disp: 60 tablet, Rfl: 3  •  famotidine (PEPCID) 20 MG tablet, Take 1 tablet by mouth 2 (Two) Times a Day., Disp: 60 tablet, Rfl: 5  •  finasteride (PROPECIA) 1 MG tablet, Take 5 mg by mouth Daily., Disp: , Rfl: 3  •  insulin aspart (novoLOG) 100 UNIT/ML injection, Inject 10 Units under the skin into the appropriate area as directed 3 (Three) Times a Day Before Meals., Disp: , Rfl:   •  Insulin Glargine (TOUJEO MAX SOLOSTAR) 300 UNIT/ML solution pen-injector, Inject 55 Units under the skin into the appropriate area as directed Daily., Disp: , Rfl:   •  Insulin Pen Needle (BD PEN NEEDLE DEBORAH U/F) 32G X 4 MM misc, , Disp: , Rfl:   •  levothyroxine (SYNTHROID, LEVOTHROID) 50 MCG tablet, Take 1 tablet by mouth Daily., Disp: 90 tablet, Rfl: 1  •  losartan (COZAAR) 100 MG tablet, Take 1 tablet by mouth Daily., Disp: 90 tablet, Rfl: 3  •  memantine (NAMENDA) 10 MG tablet, TAKE ONE TABLET BY MOUTH TWO TIMES A DAY, Disp: 60 tablet, Rfl: 1  •  metFORMIN (GLUCOPHAGE) 500 MG tablet, TAKE 2 TABLETS BY MOUTH TWICE DAILY (Patient taking differently: 500 mg 2 (Two) Times a Day With Meals.), Disp: 360 tablet, Rfl: 1  •  mirtazapine (REMERON) 15 MG tablet, Take 1 tablet by mouth Every Night., Disp: 30 tablet, Rfl: 6  •  tamsulosin (FLOMAX) 0.4 MG capsule 24 hr capsule, Daily., Disp: , Rfl:   •  vilazodone (Viibryd) 40 MG tablet tablet, Take 1 tablet by mouth Daily. with food, Disp: 30 tablet, Rfl: 5    VITALS:  /68 (BP Location: Left arm)   Pulse 68   Temp 98.2 °F (36.8 °C)   Ht 167.6 cm (65.98\")   Wt 90.7 kg (200 lb)   BMI 32.30 kg/m²     Physical Exam   Constitutional: He appears well-developed and well-nourished.   HENT:   Right Ear: External ear normal.   Left Ear: External ear normal.   Neck: No JVD present.   Cardiovascular: Normal rate and regular rhythm.   Murmur (II/VI SM) heard.  Pulmonary/Chest: Effort normal and breath sounds normal.   Abdominal: Soft. Bowel sounds are normal. There is no " tenderness.   Musculoskeletal: He exhibits edema (1 + pretibial edema).    Leobardo had a diabetic foot exam performed (no foot ullcers ) today.  Neurological: He is alert.   Skin:   No ulcers    Psychiatric: He has a normal mood and affect. His behavior is normal.       LABS:  Results for orders placed or performed in visit on 05/13/20   Urine Culture - Urine, Urine, Clean Catch   Result Value Ref Range    Urine Culture No growth    Urinalysis without microscopic (no culture) - Urine, Clean Catch   Result Value Ref Range    Color, UA Yellow Yellow, Straw    Appearance, UA Clear Clear    pH, UA 7.0 5.0 - 8.0    Specific Gravity, UA 1.018 1.005 - 1.030    Glucose, UA Negative Negative    Ketones, UA Negative Negative    Bilirubin, UA Negative Negative    Blood, UA Large (3+) (A) Negative    Protein, UA Trace (A) Negative    Leuk Esterase, UA Moderate (2+) (A) Negative    Nitrite, UA Negative Negative    Urobilinogen, UA 0.2 E.U./dL 0.2 - 1.0 E.U./dL   Urinalysis, Microscopic Only - Urine, Clean Catch   Result Value Ref Range    RBC, UA 21-30 (A) None Seen, 0-2 /HPF    WBC, UA 21-30 (A) None Seen, 0-2 /HPF    Bacteria, UA 1+ (A) None Seen /HPF    Squamous Epithelial Cells, UA 0-2 None Seen, 0-2 /HPF    Hyaline Casts, UA 3-6 None Seen /LPF    Methodology Manual Light Microscopy        Procedures         ASSESSMENT/PLAN:  Problem List Items Addressed This Visit        Cardiovascular and Mediastinum    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   Continue current treatment regimen.  Regular aerobic exercise.  Diabetes will be reassessed in 3 months.         Peripheral vascular disease (CMS/HCC)     On lipitor and encouraged exercise.             Nervous and Auditory    Neck pain     He slept poorly. He is using topical medicine that is ok. He declined PT.             Genitourinary    Malignant tumor of urinary bladder (CMS/HCC) - Primary     He has new diagnosis of  bladder cancer. He does have blood in urine. Encourage to follow with Urology at  to discuss treatment options.             Other    Recurrent major depressive disorder (CMS/HCC)     Psychological condition is improving with treatment.  Continue current treatment regimen.  Regular aerobic exercise.  Psychological condition  will be reassessed in 3 months.               FOLLOW-UP:  Return in about 3 months (around 10/20/2020) for Medicare Wellness.      Electronically signed by:    Edy Goodman MD

## 2020-07-20 NOTE — ASSESSMENT & PLAN NOTE
He has new diagnosis of bladder cancer. He does have blood in urine. Encourage to follow with Urology at  to discuss treatment options.

## 2020-07-20 NOTE — ASSESSMENT & PLAN NOTE
Diabetes is unchanged.   Continue current treatment regimen.  Regular aerobic exercise.  Diabetes will be reassessed in 3 months.

## 2020-07-21 RX ORDER — ATORVASTATIN CALCIUM 40 MG/1
40 TABLET, FILM COATED ORAL DAILY
Qty: 90 TABLET | Refills: 1 | Status: SHIPPED | OUTPATIENT
Start: 2020-07-21 | End: 2020-12-15

## 2020-07-31 ENCOUNTER — TELEPHONE (OUTPATIENT)
Dept: INTERNAL MEDICINE | Facility: CLINIC | Age: 85
End: 2020-07-31

## 2020-07-31 NOTE — TELEPHONE ENCOUNTER
Ana from [facility] is calling to state that the hospital change him to Januvia 100 mg and this replaced his insulin and needs a refill.  She can be reached at [phone]

## 2020-08-26 RX ORDER — AMLODIPINE BESYLATE 5 MG/1
TABLET ORAL
Qty: 60 TABLET | Refills: 5 | Status: SHIPPED | OUTPATIENT
Start: 2020-08-26 | End: 2021-02-17

## 2020-09-08 RX ORDER — MEMANTINE HYDROCHLORIDE 10 MG/1
TABLET ORAL
Qty: 60 TABLET | Refills: 1 | Status: SHIPPED | OUTPATIENT
Start: 2020-09-08 | End: 2020-12-08

## 2020-09-10 ENCOUNTER — OFFICE VISIT (OUTPATIENT)
Dept: CARDIOLOGY | Facility: CLINIC | Age: 85
End: 2020-09-10

## 2020-09-10 ENCOUNTER — LAB (OUTPATIENT)
Dept: LAB | Facility: HOSPITAL | Age: 85
End: 2020-09-10

## 2020-09-10 VITALS
DIASTOLIC BLOOD PRESSURE: 62 MMHG | BODY MASS INDEX: 31.34 KG/M2 | WEIGHT: 195 LBS | HEIGHT: 66 IN | SYSTOLIC BLOOD PRESSURE: 126 MMHG | OXYGEN SATURATION: 97 % | HEART RATE: 77 BPM

## 2020-09-10 DIAGNOSIS — E78.5 HYPERLIPIDEMIA LDL GOAL <100: Primary | ICD-10-CM

## 2020-09-10 DIAGNOSIS — I38 VALVULAR HEART DISEASE: Chronic | ICD-10-CM

## 2020-09-10 DIAGNOSIS — I48.0 PAROXYSMAL ATRIAL FIBRILLATION (HCC): ICD-10-CM

## 2020-09-10 DIAGNOSIS — I10 BENIGN ESSENTIAL HYPERTENSION: ICD-10-CM

## 2020-09-10 DIAGNOSIS — E78.5 HYPERLIPIDEMIA LDL GOAL <100: ICD-10-CM

## 2020-09-10 LAB
ALBUMIN SERPL-MCNC: 3.9 G/DL (ref 3.5–5.2)
ALBUMIN/GLOB SERPL: 1.4 G/DL
ALP SERPL-CCNC: 79 U/L (ref 39–117)
ALT SERPL W P-5'-P-CCNC: 18 U/L (ref 1–41)
ANION GAP SERPL CALCULATED.3IONS-SCNC: 11.6 MMOL/L (ref 5–15)
AST SERPL-CCNC: 20 U/L (ref 1–40)
BILIRUB SERPL-MCNC: 0.2 MG/DL (ref 0–1.2)
BUN SERPL-MCNC: 15 MG/DL (ref 8–23)
BUN/CREAT SERPL: 19.2 (ref 7–25)
CALCIUM SPEC-SCNC: 8.7 MG/DL (ref 8.6–10.5)
CHLORIDE SERPL-SCNC: 104 MMOL/L (ref 98–107)
CHOLEST SERPL-MCNC: 107 MG/DL (ref 0–200)
CO2 SERPL-SCNC: 24.4 MMOL/L (ref 22–29)
CREAT SERPL-MCNC: 0.78 MG/DL (ref 0.76–1.27)
GFR SERPL CREATININE-BSD FRML MDRD: 95 ML/MIN/1.73
GLOBULIN UR ELPH-MCNC: 2.8 GM/DL
GLUCOSE SERPL-MCNC: 136 MG/DL (ref 65–99)
HDLC SERPL-MCNC: 49 MG/DL (ref 40–60)
LDLC SERPL CALC-MCNC: 45 MG/DL (ref 0–100)
LDLC/HDLC SERPL: 0.91 {RATIO}
POTASSIUM SERPL-SCNC: 4.4 MMOL/L (ref 3.5–5.2)
PROT SERPL-MCNC: 6.7 G/DL (ref 6–8.5)
SODIUM SERPL-SCNC: 140 MMOL/L (ref 136–145)
TRIGL SERPL-MCNC: 67 MG/DL (ref 0–150)
VLDLC SERPL-MCNC: 13.4 MG/DL (ref 5–40)

## 2020-09-10 PROCEDURE — 80061 LIPID PANEL: CPT

## 2020-09-10 PROCEDURE — 99214 OFFICE O/P EST MOD 30 MIN: CPT | Performed by: INTERNAL MEDICINE

## 2020-09-10 PROCEDURE — 80053 COMPREHEN METABOLIC PANEL: CPT

## 2020-09-10 PROCEDURE — 36415 COLL VENOUS BLD VENIPUNCTURE: CPT

## 2020-09-10 NOTE — PROGRESS NOTES
[facility]    Patient ID: Leobardo Araujo is a 85 y.o. male.  : 1934   Contact: [phone]    Encounter date: 09/10/2020    PCP: Edy Goodman MD      Chief complaint:   Chief Complaint   Patient presents with   • History of aortic valve replacement with bioprosthetic valve       Problem List:  1. Atrial fibrillation, 2010;   a. CHADS2-VASc = 3: (Age >75, HTN, DM  b. Amiodarone therapy, discontinued, 2013.  c. Terumo TigerPaw left atrial appendage closure, Dr. Edson Roque, 2012.   2. Aortic stenosis:   a. Tissue aortic valve replacement (Jay 25) and left atrial appendageal ablation (Terumo TigerPaw device), Dr. Sandhu, 2012.   b. Echocardiogram, 2017: EF 60%. Jay tissue aortic valve replacement 25 mm, 12. Aortic valve mean pressure gradient is 8.8 mmHg. Mild MS, trace MR, trace-to-mild TR.  3. 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  Eliquis added.  Admitted at [facility]  4. Hypertension.  5. Hyperlipidemia.  6. Obesity, BMI 30.  7. Diabetes mellitus.  8. Remote tobacco abuse.  9. Erectile dysfunction.  10. History of abnormal LFTs.  11. Surgical history:  a. Bladder cancer resection, .  b. Lithotripsy.  c. Orchiectomy, benign.  d. Prostatectomy 2018    No Known Allergies    Current Medications:    Current Outpatient Medications:   •  alendronate (FOSAMAX) 35 MG tablet, TAKE ONE TABLET BY MOUTH ONCE A WEEK, Disp: 12 tablet, Rfl: 0  •  amLODIPine (NORVASC) 5 MG tablet, TAKE 1 TABLET BY MOUTH 2 TIMES A DAY, Disp: 60 tablet, Rfl: 5  •  aspirin 81 MG tablet, Take  by mouth Daily., Disp: , Rfl:   •  atorvastatin (LIPITOR) 40 MG tablet, TAKE 1 TABLET BY MOUTH DAILY, Disp: 90 tablet, Rfl: 1  •  bumetanide (BUMEX) 1 MG tablet, Take 1 tablet by mouth Daily., Disp: 30 tablet, Rfl: 0  •  donepezil (ARICEPT) 10 MG tablet, Take 1 tablet by mouth Daily. (Patient taking differently:  Take 10 mg by mouth Daily. 1-2 tabs qd), Disp: 30 tablet, Rfl: 6  •  ELIQUIS 5 MG tablet tablet, TAKE 1 TABLET BY MOUTH EVERY 12 HOURS, Disp: 60 tablet, Rfl: 3  •  famotidine (PEPCID) 20 MG tablet, Take 1 tablet by mouth 2 (Two) Times a Day., Disp: 60 tablet, Rfl: 5  •  finasteride (PROPECIA) 1 MG tablet, Take 5 mg by mouth Daily., Disp: , Rfl: 3  •  insulin aspart (novoLOG) 100 UNIT/ML injection, Inject 10 Units under the skin into the appropriate area as directed 3 (Three) Times a Day Before Meals., Disp: , Rfl:   •  Insulin Glargine (TOUJEO MAX SOLOSTAR) 300 UNIT/ML solution pen-injector, Inject 55 Units under the skin into the appropriate area as directed Daily., Disp: , Rfl:   •  Insulin Pen Needle (BD PEN NEEDLE DEBORAH U/F) 32G X 4 MM misc, , Disp: , Rfl:   •  levothyroxine (SYNTHROID, LEVOTHROID) 50 MCG tablet, Take 1 tablet by mouth Daily., Disp: 90 tablet, Rfl: 1  •  losartan (COZAAR) 100 MG tablet, Take 1 tablet by mouth Daily., Disp: 90 tablet, Rfl: 3  •  memantine (NAMENDA) 10 MG tablet, TAKE ONE TABLET BY MOUTH TWO TIMES A DAY, Disp: 60 tablet, Rfl: 1  •  metFORMIN (GLUCOPHAGE) 500 MG tablet, TAKE 2 TABLETS BY MOUTH TWICE DAILY (Patient taking differently: 500 mg 2 (Two) Times a Day With Meals.), Disp: 360 tablet, Rfl: 1  •  mirtazapine (REMERON) 15 MG tablet, Take 1 tablet by mouth Every Night., Disp: 30 tablet, Rfl: 6  •  SITagliptin (Januvia) 100 MG tablet, Take 1 tablet by mouth Daily., Disp: 90 tablet, Rfl: 3  •  tamsulosin (FLOMAX) 0.4 MG capsule 24 hr capsule, Daily., Disp: , Rfl:   •  vilazodone (Viibryd) 40 MG tablet tablet, Take 1 tablet by mouth Daily. with food, Disp: 30 tablet, Rfl: 5    HPI    Leobardo Araujo is a 85 y.o. male who presents today for a follow up of atrial fibrillation, aortic stenosis, and cardiac risk factors. Since last visit with me he had a stroke and was hospitalized at [facility] on February 2, 2020.  At that time he had some right-sided hemiparesis and dysarthria.  " He has recovered from that.  He has had some lower extremity edema.  Of note he is on amlodipine.  He saw well status in May and was placed on 1 mg of Bumex.  His edema has improved.  He was supposed of had an echocardiogram and states that he had it at another hospital but is not sure what hospital that is.  He will call us back with that information.  We do not have a copy of that study.  He did previously have a left atrial appendage closure with a Terumo TigerPaw device in 2012.      The following portions of the patient's history were reviewed and updated as appropriate: allergies, current medications and problem list.    Pertinent positives as listed in the HPI.  All other systems reviewed are negative.  ii       Vitals:    09/10/20 1102   BP: 126/62   BP Location: Left arm   Patient Position: Sitting   Pulse: 77   SpO2: 97%   Weight: 88.5 kg (195 lb)   Height: 167.6 cm (66\")       Physical Exam:  General: Alert and oriented.  Neck: Jugular venous pressure is within normal limits. Carotids have normal upstrokes without bruits.   Cardiovascular: Heart has a nondisplaced focal PMI. Regular rate and rhythm. 1/6 SE murmur, no gallop or rub.  Lungs: Clear, no rales or wheezes. Equal expansion is noted.   Extremities: Show 1+ edema.  Skin: Warm and dry.  Neurologic: Nonfocal.     Diagnostic Data (reviewed with patient):  Lab Results   Component Value Date    GLUCOSE 234 (H) 01/23/2020    BUN 17 01/23/2020    CREATININE 0.77 01/23/2020    EGFRIFNONA 96 01/23/2020    BCR 22.1 01/23/2020     01/23/2020    K 4.5 01/23/2020     01/23/2020    CO2 27.0 01/23/2020    CALCIUM 8.9 01/23/2020    ALBUMIN 3.80 07/15/2019    ALKPHOS 65 07/15/2019    AST 24 07/15/2019    ALT 25 07/15/2019     Lab Results   Component Value Date    CHOL 235 (H) 02/19/2019    TRIG 236 (H) 02/19/2019    HDL 41 02/19/2019     (H) 02/19/2019      Lab Results   Component Value Date    WBC 8.65 01/23/2020    RBC 4.31 01/23/2020    HGB " 12.2 (L) 01/23/2020    HCT 38.6 01/23/2020    MCV 89.6 01/23/2020     01/23/2020      Lab Results   Component Value Date    TSH 2.999 02/19/2019        Procedures      Assessment:    ICD-10-CM ICD-9-CM   1. Hyperlipidemia LDL goal <100 E78.5 272.4   2. Valvular heart disease I38 424.90   3. Paroxysmal atrial fibrillation (CMS/[facility]) I48.0 427.31   4. Benign essential hypertension I10 401.1         Plan:  1. Cholesterol profile and CMP today  2. Consider changing amlodipine to another medication due to his lower extremity edema  3. Locate echocardiogram at the alternate hospital or repeat an echocardiogram as I think this is important in the face of his recent CVA.  4. Continue amlodipine and losartan for his hypertension however in the future we may change his amlodipine  5. Continue Eliquis for the previous stroke  6. Continue atorvastatin at 40 mg daily however based on his lipid profile from today we may need to increase that dose  7. Continue aspirin for AVR.  8. Continue all other current medications.  9. F/up in 6 months, sooner if needed.  Obtain echocardiogram prior to visit unless we find one at the outside hospital and obtain the disc.      Linda Tom MD, FACC

## 2020-09-15 DIAGNOSIS — M85.859 OSTEOPENIA OF HIP, UNSPECIFIED LATERALITY: ICD-10-CM

## 2020-09-16 RX ORDER — ALENDRONATE SODIUM 35 MG/1
TABLET ORAL
Qty: 12 TABLET | Refills: 0 | Status: SHIPPED | OUTPATIENT
Start: 2020-09-16 | End: 2020-12-09 | Stop reason: SDUPTHER

## 2020-10-12 ENCOUNTER — OFFICE VISIT (OUTPATIENT)
Dept: NEUROLOGY | Facility: CLINIC | Age: 85
End: 2020-10-12

## 2020-10-12 VITALS — TEMPERATURE: 97.8 F | BODY MASS INDEX: 31.49 KG/M2 | HEIGHT: 66 IN

## 2020-10-12 DIAGNOSIS — F02.818 LATE ONSET ALZHEIMER'S DISEASE WITH BEHAVIORAL DISTURBANCE (HCC): Primary | ICD-10-CM

## 2020-10-12 DIAGNOSIS — G30.1 LATE ONSET ALZHEIMER'S DISEASE WITH BEHAVIORAL DISTURBANCE (HCC): Primary | ICD-10-CM

## 2020-10-12 PROCEDURE — 99214 OFFICE O/P EST MOD 30 MIN: CPT | Performed by: PHYSICIAN ASSISTANT

## 2020-10-12 RX ORDER — MIRTAZAPINE 30 MG/1
30 TABLET, FILM COATED ORAL NIGHTLY
Qty: 30 TABLET | Refills: 11 | Status: SHIPPED | OUTPATIENT
Start: 2020-10-12 | End: 2021-07-12 | Stop reason: SDUPTHER

## 2020-10-12 NOTE — PROGRESS NOTES
"Subjective       Chief Complaint: memory loss      History of Present Illness   Leobardo Araujo is a 85 y.o. male who returns to clinic today for ongoing evaluation and management of cognitive impairment. He was previously followed by Dr. Snell. He has noted symptoms for at least several years marked initially by forgetfulness. This has gradually worsened  over time. Additional symptoms have included impairments in executive function. He is currently residing at home with a  helping.     He has a history of YSABEL and is treated with a CPAP.     Prior evaluation has included screening blood work through his PCP. A head CT was unremarkable.      He was admitted for stroke in [place] in 2/20 largely manifesting as severe dysarthria.     Today: Since his last visit in 4/20, he feels essentially unchanged. His family agrees, though has continued to note irritability and anger.       I have reviewed and confirmed the past family, social and medical history as accurate on 10/12/2020.     Review of Systems   Constitutional: Negative.    HENT: Negative.    Eyes: Negative.    Respiratory: Negative.    Cardiovascular: Negative.    Gastrointestinal: Negative.    Endocrine: Negative.    Genitourinary: Negative.    Musculoskeletal: Negative.    Skin: Negative.    Allergic/Immunologic: Negative.    Hematological: Negative.        Objective     Temp 97.8 °F (36.6 °C)   Ht 167.6 cm (65.98\")   BMI 31.49 kg/m²     General appearance today is normal.       Physical Exam  Psychiatric:         Speech: Speech normal.          Neurologic Exam     Mental Status   Oriented to person.   Oriented to place.   Oriented to time. Disoriented to year.   Registration: recalls 3 of 3 objects. Recall at 5 minutes: recalls 1 of 3 objects. Follows 2 step commands.   Attention: normal.   Speech: speech is normal   Level of consciousness: alert  Able to name object. Able to read. Able to repeat. Unable to write. Normal comprehension. "     Cranial Nerves   Cranial nerves II through XII intact.     Motor Exam   Muscle bulk: normal    Gait, Coordination, and Reflexes     Gait  Gait: shuffling    Tremor   Resting tremor: absent        Results  MMSE=23 (unchanged)      Assessment/Plan   Leobardo was seen today for memory loss.    Diagnoses and all orders for this visit:    Late onset Alzheimer's disease with behavioral disturbance (CMS/HCC)    Other orders  -     mirtazapine (Remeron) 30 MG tablet; Take 1 tablet by mouth Every Night.          Discussion/Summary   Leobardo Araujo returns to clinic today for evaluation of Alzheimer's Disease . I again reviewed his current status and treatment options. After discussing potential treatment options, it was elected to continue on  donepezil and increase his mirtazapine to 30mg nightly for his mood. He will then follow up in 6 months , or sooner if needed.   I spent 25 minutes face to face with the patient and family with 15 minutes spent on discussing diagnosis, prognosis, evaluation, current status, treatment options and management as discussed above.       As part of this visit I obtained additional history from the family which is incorporated in the HPI.      Shelly Johnson PA-C

## 2020-10-22 RX ORDER — VILAZODONE HYDROCHLORIDE 40 MG/1
TABLET ORAL
Qty: 30 TABLET | Refills: 5 | Status: SHIPPED | OUTPATIENT
Start: 2020-10-22 | End: 2021-03-30

## 2020-10-28 DIAGNOSIS — G30.1 LATE ONSET ALZHEIMER'S DISEASE WITH BEHAVIORAL DISTURBANCE (HCC): Primary | ICD-10-CM

## 2020-10-28 DIAGNOSIS — F02.818 LATE ONSET ALZHEIMER'S DISEASE WITH BEHAVIORAL DISTURBANCE (HCC): Primary | ICD-10-CM

## 2020-10-28 RX ORDER — DONEPEZIL HYDROCHLORIDE 10 MG/1
10 TABLET, FILM COATED ORAL DAILY
Qty: 30 TABLET | Refills: 6 | Status: SHIPPED | OUTPATIENT
Start: 2020-10-28 | End: 2021-07-12 | Stop reason: SDUPTHER

## 2020-11-10 RX ORDER — LEVOTHYROXINE SODIUM 0.05 MG/1
50 TABLET ORAL DAILY
Qty: 90 TABLET | Refills: 1 | Status: SHIPPED | OUTPATIENT
Start: 2020-11-10 | End: 2021-06-16 | Stop reason: SDUPTHER

## 2020-11-24 RX ORDER — APIXABAN 5 MG/1
TABLET, FILM COATED ORAL
Qty: 60 TABLET | Refills: 0 | Status: SHIPPED | OUTPATIENT
Start: 2020-11-24 | End: 2020-12-09 | Stop reason: SDUPTHER

## 2020-12-08 DIAGNOSIS — F02.818 LATE ONSET ALZHEIMER'S DISEASE WITH BEHAVIORAL DISTURBANCE (HCC): Primary | ICD-10-CM

## 2020-12-08 DIAGNOSIS — G30.1 LATE ONSET ALZHEIMER'S DISEASE WITH BEHAVIORAL DISTURBANCE (HCC): Primary | ICD-10-CM

## 2020-12-08 DIAGNOSIS — M85.859 OSTEOPENIA OF HIP, UNSPECIFIED LATERALITY: ICD-10-CM

## 2020-12-08 RX ORDER — MEMANTINE HYDROCHLORIDE 10 MG/1
TABLET ORAL
Qty: 60 TABLET | Refills: 1 | Status: SHIPPED | OUTPATIENT
Start: 2020-12-08 | End: 2021-02-15 | Stop reason: SDUPTHER

## 2020-12-08 RX ORDER — ALENDRONATE SODIUM 35 MG/1
TABLET ORAL
Qty: 12 TABLET | Refills: 3 | OUTPATIENT
Start: 2020-12-08

## 2020-12-09 DIAGNOSIS — M85.859 OSTEOPENIA OF HIP, UNSPECIFIED LATERALITY: ICD-10-CM

## 2020-12-09 NOTE — TELEPHONE ENCOUNTER
Caller: VanAddie marcelo    Relationship: Emergency Contact    Best call back number: [phone]    Medication needed:   Requested Prescriptions     Pending Prescriptions Disp Refills   • alendronate (FOSAMAX) 35 MG tablet 12 tablet 0     Sig: Take 1 tablet by mouth 1 (One) Time Per Week.   • apixaban (Eliquis) 5 MG tablet tablet 60 tablet 0     Sig: Take 1 tablet by mouth Every 12 (Twelve) Hours.       When do you need the refill by: 12/09/20    What details did the patient provide when requesting the medication: PATIENT HAS JUST A FEW DAYS WORTH OF MEDICATION    Does the patient have less than a 3 day supply:  [x] Yes  [] No    What is the patient's preferred pharmacy:    [facility] IN [place]

## 2020-12-10 RX ORDER — ALENDRONATE SODIUM 35 MG/1
35 TABLET ORAL WEEKLY
Qty: 12 TABLET | Refills: 0 | Status: SHIPPED | OUTPATIENT
Start: 2020-12-10 | End: 2021-02-26

## 2020-12-15 RX ORDER — ATORVASTATIN CALCIUM 40 MG/1
40 TABLET, FILM COATED ORAL DAILY
Qty: 30 TABLET | Refills: 1 | Status: SHIPPED | OUTPATIENT
Start: 2020-12-15 | End: 2021-01-19

## 2021-01-08 ENCOUNTER — OFFICE VISIT (OUTPATIENT)
Dept: INTERNAL MEDICINE | Facility: CLINIC | Age: 86
End: 2021-01-08

## 2021-01-08 ENCOUNTER — LAB (OUTPATIENT)
Dept: LAB | Facility: HOSPITAL | Age: 86
End: 2021-01-08

## 2021-01-08 VITALS
SYSTOLIC BLOOD PRESSURE: 128 MMHG | TEMPERATURE: 97.1 F | HEIGHT: 66 IN | DIASTOLIC BLOOD PRESSURE: 64 MMHG | WEIGHT: 178.8 LBS | HEART RATE: 84 BPM | BODY MASS INDEX: 28.73 KG/M2

## 2021-01-08 DIAGNOSIS — E78.5 HYPERLIPIDEMIA LDL GOAL <100: ICD-10-CM

## 2021-01-08 DIAGNOSIS — I48.0 PAROXYSMAL ATRIAL FIBRILLATION (HCC): ICD-10-CM

## 2021-01-08 DIAGNOSIS — Z23 NEED FOR VACCINATION: ICD-10-CM

## 2021-01-08 DIAGNOSIS — C67.9 MALIGNANT NEOPLASM OF URINARY BLADDER, UNSPECIFIED SITE (HCC): ICD-10-CM

## 2021-01-08 DIAGNOSIS — I10 BENIGN ESSENTIAL HYPERTENSION: Primary | ICD-10-CM

## 2021-01-08 DIAGNOSIS — E11.59 TYPE 2 DIABETES MELLITUS WITH OTHER CIRCULATORY COMPLICATIONS (HCC): ICD-10-CM

## 2021-01-08 DIAGNOSIS — E53.8 B12 DEFICIENCY: ICD-10-CM

## 2021-01-08 DIAGNOSIS — D64.9 ANEMIA, UNSPECIFIED TYPE: ICD-10-CM

## 2021-01-08 DIAGNOSIS — F33.41 RECURRENT MAJOR DEPRESSIVE DISORDER, IN PARTIAL REMISSION (HCC): ICD-10-CM

## 2021-01-08 DIAGNOSIS — I38 VALVULAR HEART DISEASE: Chronic | ICD-10-CM

## 2021-01-08 LAB
ALBUMIN SERPL-MCNC: 4 G/DL (ref 3.5–5.2)
ALBUMIN/GLOB SERPL: 1.1 G/DL
ALP SERPL-CCNC: 122 U/L (ref 39–117)
ALT SERPL W P-5'-P-CCNC: 15 U/L (ref 1–41)
ANION GAP SERPL CALCULATED.3IONS-SCNC: 11 MMOL/L (ref 5–15)
AST SERPL-CCNC: 22 U/L (ref 1–40)
BASOPHILS # BLD AUTO: 0.06 10*3/MM3 (ref 0–0.2)
BASOPHILS NFR BLD AUTO: 0.6 % (ref 0–1.5)
BILIRUB SERPL-MCNC: 0.2 MG/DL (ref 0–1.2)
BUN SERPL-MCNC: 23 MG/DL (ref 8–23)
BUN/CREAT SERPL: 31.1 (ref 7–25)
CALCIUM SPEC-SCNC: 9 MG/DL (ref 8.6–10.5)
CHLORIDE SERPL-SCNC: 105 MMOL/L (ref 98–107)
CO2 SERPL-SCNC: 26 MMOL/L (ref 22–29)
CREAT SERPL-MCNC: 0.74 MG/DL (ref 0.76–1.27)
DEPRECATED RDW RBC AUTO: 50.6 FL (ref 37–54)
EOSINOPHIL # BLD AUTO: 0.3 10*3/MM3 (ref 0–0.4)
EOSINOPHIL NFR BLD AUTO: 3 % (ref 0.3–6.2)
ERYTHROCYTE [DISTWIDTH] IN BLOOD BY AUTOMATED COUNT: 16.3 % (ref 12.3–15.4)
GFR SERPL CREATININE-BSD FRML MDRD: 100 ML/MIN/1.73
GLOBULIN UR ELPH-MCNC: 3.5 GM/DL
GLUCOSE SERPL-MCNC: 96 MG/DL (ref 65–99)
HBA1C MFR BLD: 6.44 % (ref 4.8–5.6)
HCT VFR BLD AUTO: 33.6 % (ref 37.5–51)
HGB BLD-MCNC: 10.1 G/DL (ref 13–17.7)
IMM GRANULOCYTES # BLD AUTO: 0.02 10*3/MM3 (ref 0–0.05)
IMM GRANULOCYTES NFR BLD AUTO: 0.2 % (ref 0–0.5)
LYMPHOCYTES # BLD AUTO: 2.65 10*3/MM3 (ref 0.7–3.1)
LYMPHOCYTES NFR BLD AUTO: 26.6 % (ref 19.6–45.3)
MCH RBC QN AUTO: 25.6 PG (ref 26.6–33)
MCHC RBC AUTO-ENTMCNC: 30.1 G/DL (ref 31.5–35.7)
MCV RBC AUTO: 85.1 FL (ref 79–97)
MONOCYTES # BLD AUTO: 0.74 10*3/MM3 (ref 0.1–0.9)
MONOCYTES NFR BLD AUTO: 7.4 % (ref 5–12)
NEUTROPHILS NFR BLD AUTO: 6.19 10*3/MM3 (ref 1.7–7)
NEUTROPHILS NFR BLD AUTO: 62.2 % (ref 42.7–76)
NRBC BLD AUTO-RTO: 0 /100 WBC (ref 0–0.2)
PLATELET # BLD AUTO: 302 10*3/MM3 (ref 140–450)
PMV BLD AUTO: 11.4 FL (ref 6–12)
POTASSIUM SERPL-SCNC: 4 MMOL/L (ref 3.5–5.2)
PROT SERPL-MCNC: 7.5 G/DL (ref 6–8.5)
RBC # BLD AUTO: 3.95 10*6/MM3 (ref 4.14–5.8)
SODIUM SERPL-SCNC: 142 MMOL/L (ref 136–145)
WBC # BLD AUTO: 9.96 10*3/MM3 (ref 3.4–10.8)

## 2021-01-08 PROCEDURE — 82607 VITAMIN B-12: CPT

## 2021-01-08 PROCEDURE — 84443 ASSAY THYROID STIM HORMONE: CPT

## 2021-01-08 PROCEDURE — 99214 OFFICE O/P EST MOD 30 MIN: CPT | Performed by: INTERNAL MEDICINE

## 2021-01-08 PROCEDURE — 85025 COMPLETE CBC W/AUTO DIFF WBC: CPT

## 2021-01-08 PROCEDURE — 84466 ASSAY OF TRANSFERRIN: CPT

## 2021-01-08 PROCEDURE — 83540 ASSAY OF IRON: CPT

## 2021-01-08 PROCEDURE — 90694 VACC AIIV4 NO PRSRV 0.5ML IM: CPT | Performed by: INTERNAL MEDICINE

## 2021-01-08 PROCEDURE — 82728 ASSAY OF FERRITIN: CPT

## 2021-01-08 PROCEDURE — G0008 ADMIN INFLUENZA VIRUS VAC: HCPCS | Performed by: INTERNAL MEDICINE

## 2021-01-08 PROCEDURE — 80053 COMPREHEN METABOLIC PANEL: CPT

## 2021-01-08 PROCEDURE — 83036 HEMOGLOBIN GLYCOSYLATED A1C: CPT

## 2021-01-08 NOTE — PROGRESS NOTES
Chief Complaint   Patient presents with   • Hyperlipidemia   • Hypertension   • Atrial Fibrillation   • Diabetes   • Hypothyroidism   • Blood in Urine       History of Present Illness  86 y.o. white male presents for follow up on HTN and diabetes. His mood is good and he denies chest pain or shortness of air.     Review of Systems   Constitutional: Negative for chills, fatigue and fever.   HENT: Negative for congestion, ear pain and sinus pressure.    Respiratory: Negative for cough, chest tightness, shortness of breath and wheezing.    Cardiovascular: Negative for chest pain and palpitations.   Gastrointestinal: Negative for abdominal pain, blood in stool and constipation.   Skin: Negative for color change.   Allergic/Immunologic: Negative for environmental allergies.   Neurological: Negative for dizziness, speech difficulty and headaches.   Psychiatric/Behavioral: Negative for confusion. The patient is not nervous/anxious.    .    PMSFH:  The following portions of the patient's history were reviewed and updated as appropriate: allergies, current medications, past family history, past medical history, past social history, past surgical history and problem list.      Current Outpatient Medications:   •  alendronate (FOSAMAX) 35 MG tablet, Take 1 tablet by mouth 1 (One) Time Per Week., Disp: 12 tablet, Rfl: 0  •  amLODIPine (NORVASC) 5 MG tablet, TAKE 1 TABLET BY MOUTH 2 TIMES A DAY, Disp: 60 tablet, Rfl: 5  •  apixaban (Eliquis) 5 MG tablet tablet, Take 1 tablet by mouth Every 12 (Twelve) Hours., Disp: 60 tablet, Rfl: 1  •  aspirin 81 MG tablet, Take  by mouth Daily., Disp: , Rfl:   •  atorvastatin (LIPITOR) 40 MG tablet, TAKE 1 TABLET BY MOUTH DAILY, Disp: 30 tablet, Rfl: 1  •  donepezil (ARICEPT) 10 MG tablet, TAKE 1 TABLET BY MOUTH DAILY., Disp: 30 tablet, Rfl: 6  •  famotidine (PEPCID) 20 MG tablet, Take 1 tablet by mouth 2 (Two) Times a Day., Disp: 60 tablet, Rfl: 5  •  finasteride (PROPECIA) 1 MG tablet, Take  "5 mg by mouth Daily., Disp: , Rfl: 3  •  levothyroxine (SYNTHROID, LEVOTHROID) 50 MCG tablet, TAKE 1 TABLET BY MOUTH DAILY., Disp: 90 tablet, Rfl: 1  •  losartan (COZAAR) 100 MG tablet, Take 1 tablet by mouth Daily., Disp: 90 tablet, Rfl: 3  •  memantine (NAMENDA) 10 MG tablet, TAKE ONE TABLET BY MOUTH TWO TIMES A DAY, Disp: 60 tablet, Rfl: 1  •  metFORMIN (GLUCOPHAGE) 500 MG tablet, TAKE TWO TABLETS BY MOUTH TWO TIMES A DAY, Disp: 360 tablet, Rfl: 1  •  mirtazapine (Remeron) 30 MG tablet, Take 1 tablet by mouth Every Night., Disp: 30 tablet, Rfl: 11  •  SITagliptin (Januvia) 100 MG tablet, Take 1 tablet by mouth Daily., Disp: 90 tablet, Rfl: 3  •  tamsulosin (FLOMAX) 0.4 MG capsule 24 hr capsule, Daily., Disp: , Rfl:   •  Viibryd 40 MG tablet tablet, TAKE ONE TABLET BY MOUTH DAILY WITH FOOD, Disp: 30 tablet, Rfl: 5  •  bumetanide (BUMEX) 1 MG tablet, Take 1 tablet by mouth Daily., Disp: 30 tablet, Rfl: 0  •  insulin aspart (novoLOG) 100 UNIT/ML injection, Inject 10 Units under the skin into the appropriate area as directed 3 (Three) Times a Day Before Meals., Disp: , Rfl:   •  Insulin Glargine (TOUJEO MAX SOLOSTAR) 300 UNIT/ML solution pen-injector, Inject 55 Units under the skin into the appropriate area as directed Daily., Disp: , Rfl:   •  Insulin Pen Needle (BD PEN NEEDLE DEBORAH U/F) 32G X 4 MM misc, , Disp: , Rfl:     VITALS:  /64 (BP Location: Left arm, Patient Position: Sitting, Cuff Size: Adult)   Pulse 84   Temp 97.1 °F (36.2 °C)   Ht 167.6 cm (65.98\")   Wt 81.1 kg (178 lb 12.8 oz)   BMI 28.87 kg/m²     Physical Exam  Constitutional:       Appearance: Normal appearance.   Cardiovascular:      Rate and Rhythm: Normal rate and regular rhythm.      Heart sounds: Murmur (II/VI SM ) present.   Pulmonary:      Effort: Pulmonary effort is normal.      Breath sounds: No wheezing or rales.   Abdominal:      General: Bowel sounds are normal.      Palpations: Abdomen is soft.      Tenderness: There is no " abdominal tenderness.      Comments: obese   Neurological:      General: No focal deficit present.      Mental Status: He is alert.   Psychiatric:         Mood and Affect: Mood normal.         Behavior: Behavior normal.         LABS:  Results for orders placed or performed in visit on 09/10/20   Comprehensive Metabolic Panel    Specimen: Blood   Result Value Ref Range    Glucose 136 (H) 65 - 99 mg/dL    BUN 15 8 - 23 mg/dL    Creatinine 0.78 0.76 - 1.27 mg/dL    Sodium 140 136 - 145 mmol/L    Potassium 4.4 3.5 - 5.2 mmol/L    Chloride 104 98 - 107 mmol/L    CO2 24.4 22.0 - 29.0 mmol/L    Calcium 8.7 8.6 - 10.5 mg/dL    Total Protein 6.7 6.0 - 8.5 g/dL    Albumin 3.90 3.50 - 5.20 g/dL    ALT (SGPT) 18 1 - 41 U/L    AST (SGOT) 20 1 - 40 U/L    Alkaline Phosphatase 79 39 - 117 U/L    Total Bilirubin 0.2 0.0 - 1.2 mg/dL    eGFR Non African Amer 95 >60 mL/min/1.73    Globulin 2.8 gm/dL    A/G Ratio 1.4 g/dL    BUN/Creatinine Ratio 19.2 7.0 - 25.0    Anion Gap 11.6 5.0 - 15.0 mmol/L   Lipid Panel    Specimen: Blood   Result Value Ref Range    Total Cholesterol 107 0 - 200 mg/dL    Triglycerides 67 0 - 150 mg/dL    HDL Cholesterol 49 40 - 60 mg/dL    LDL Cholesterol  45 0 - 100 mg/dL    VLDL Cholesterol 13.4 5 - 40 mg/dL    LDL/HDL Ratio 0.91        Procedures         ASSESSMENT/PLAN:  Problems Addressed this Visit        Cardiac and Vasculature    Benign essential hypertension - Primary    Hyperlipidemia LDL goal <100    Relevant Orders    CBC & Differential    Comprehensive Metabolic Panel    TSH Rfx On Abnormal To Free T4       Endocrine and Metabolic    B12 deficiency     Follow up labs.          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   Continue current treatment regimen.  Regular aerobic exercise.  Diabetes will be reassessed in 4 months. Follow back up with Dr Atkinson/Sindy His HBA1c was 10.2 Feb  2019         Relevant Orders    Hemoglobin A1c    Ambulatory Referral to Endocrinology       Hematology and Neoplasia    Malignant tumor of urinary bladder (CMS/HCC)     He has had blood in urine and very concerning for recurrence and he and daughters and son aware but he does not want to further evaluate or go and they do not want to force him            Mental Health    Recurrent major depressive disorder (CMS/HCC)     Psychological condition is improving with treatment.  Continue current treatment regimen.  Regular aerobic exercise.  Psychological condition  will be reassessed at the next regular appointment.           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      Electronically signed by:    Edy Goodman MD

## 2021-01-09 LAB
TSH SERPL DL<=0.05 MIU/L-ACNC: 0.58 UIU/ML (ref 0.27–4.2)
VIT B12 BLD-MCNC: 325 PG/ML (ref 211–946)

## 2021-01-09 NOTE — ASSESSMENT & PLAN NOTE
Diabetes is improving with lifestyle modifications.   Continue current treatment regimen.  Regular aerobic exercise.  Diabetes will be reassessed in 4 months. Follow back up with Dr Atkinson/Sindy His HBA1c was 10.2 Feb 2019

## 2021-01-09 NOTE — ASSESSMENT & PLAN NOTE
He has had blood in urine and very concerning for recurrence and he and daughters and son aware but he does not want to further evaluate or go and they do not want to force him

## 2021-01-11 DIAGNOSIS — D64.9 ANEMIA, UNSPECIFIED TYPE: Primary | ICD-10-CM

## 2021-01-11 LAB
FERRITIN SERPL-MCNC: 15.6 NG/ML (ref 30–400)
IRON 24H UR-MRATE: 35 MCG/DL (ref 59–158)
IRON SATN MFR SERPL: 10 % (ref 20–50)
TIBC SERPL-MCNC: 362 MCG/DL (ref 298–536)
TRANSFERRIN SERPL-MCNC: 243 MG/DL (ref 200–360)

## 2021-01-15 ENCOUNTER — OFFICE VISIT (OUTPATIENT)
Dept: ENDOCRINOLOGY | Facility: CLINIC | Age: 86
End: 2021-01-15

## 2021-01-15 VITALS
WEIGHT: 180 LBS | TEMPERATURE: 97.1 F | BODY MASS INDEX: 24.38 KG/M2 | HEIGHT: 72 IN | DIASTOLIC BLOOD PRESSURE: 64 MMHG | SYSTOLIC BLOOD PRESSURE: 116 MMHG

## 2021-01-15 DIAGNOSIS — E11.65 TYPE 2 DIABETES MELLITUS WITH HYPERGLYCEMIA, WITHOUT LONG-TERM CURRENT USE OF INSULIN (HCC): Primary | Chronic | ICD-10-CM

## 2021-01-15 PROCEDURE — 99213 OFFICE O/P EST LOW 20 MIN: CPT | Performed by: PHYSICIAN ASSISTANT

## 2021-01-15 NOTE — PROGRESS NOTES
"     Office Note      Date: 01/15/2021  Patient Name: Leobardo Araujo  MRN: [id number]  : 1934    Chief Complaint   Patient presents with   • Follow-up   • Diabetes       History of Present Illness:   Leobardo Araujo is a 86 y.o. male who presents today for type 2 diabetes.  At his last visit on 3/20/2020, his A1c was 9.2%.  He was on basal insulin plus metformin at that time.  He added Novolog with dinner.  He reports that he improved his diet and has lost weight.  He reports that he is no longer taking insulin.  He is taking metformin.  He is testing FSBS every day.  Readings have been 175-200.  He denies any symptoms of hypoglycemia or hyperglycemia.  He denies any sores on his feet.  He sees podiatry regularly.  Eye exam due.    Subjective      The following portions of the patient's history were reviewed and updated as appropriate: allergies, current medications, past family history, past medical history, past social history, past surgical history and problem list.    Objective     Vitals:    01/15/21 1134   BP: 116/64   Temp: 97.1 °F (36.2 °C)   TempSrc: Infrared   Weight: 81.6 kg (180 lb)   Height: 182.9 cm (72\")     Body mass index is 24.41 kg/m².    Physical Exam  Vitals signs reviewed.   Constitutional:       General: He is not in acute distress.  Cardiovascular:      Pulses:           Dorsalis pedis pulses are 2+ on the right side and 2+ on the left side.        Posterior tibial pulses are 2+ on the right side and 2+ on the left side.   Feet:      Right foot:      Protective Sensation: 10 sites tested. 10 sites sensed.      Skin integrity: Dry skin present.      Toenail Condition: Right toenails are abnormally thick. Fungal disease present.     Left foot:      Protective Sensation: 10 sites tested. 10 sites sensed.      Skin integrity: Dry skin present.      Toenail Condition: Left toenails are abnormally thick. Fungal disease present.  Neurological:      Mental Status: He is alert and oriented to " person, place, and time.   Psychiatric:         Mood and Affect: Mood and affect normal.         HEMOGLOBIN A1C  Lab Results   Component Value Date    HGBA1C 6.44 (H) 01/08/2021       CMP  Lab Results   Component Value Date    GLUCOSE 96 01/08/2021    BUN 23 01/08/2021    CREATININE 0.74 (L) 01/08/2021    EGFRIFNONA 100 01/08/2021    BCR 31.1 (H) 01/08/2021    K 4.0 01/08/2021    CO2 26.0 01/08/2021    CALCIUM 9.0 01/08/2021    AST 22 01/08/2021    ALT 15 01/08/2021       LIPID PANEL  Lab Results   Component Value Date    CHOL 107 09/10/2020    TRIG 67 09/10/2020    HDL 49 09/10/2020    LDL 45 09/10/2020       TSH  Lab Results   Component Value Date    TSH 0.577 01/08/2021       Current Outpatient Medications   Medication Instructions   • alendronate (FOSAMAX) 35 mg, Oral, Weekly   • amLODIPine (NORVASC) 5 MG tablet TAKE 1 TABLET BY MOUTH 2 TIMES A DAY   • aspirin 81 MG tablet Oral, Daily   • atorvastatin (LIPITOR) 40 mg, Oral, Daily   • bumetanide (BUMEX) 1 mg, Oral, Daily   • donepezil (ARICEPT) 10 mg, Oral, Daily   • Eliquis 5 MG tablet tablet TAKE 1 TABLET BY MOUTH EVERY 12 (TWELVE) HOURS.   • famotidine (PEPCID) 20 mg, Oral, 2 Times Daily   • finasteride (PROPECIA) 5 mg, Oral, Daily   • levothyroxine (SYNTHROID, LEVOTHROID) 50 mcg, Oral, Daily   • losartan (COZAAR) 100 mg, Oral, Daily   • memantine (NAMENDA) 10 MG tablet TAKE ONE TABLET BY MOUTH TWO TIMES A DAY   • metFORMIN (GLUCOPHAGE) 500 MG tablet TAKE TWO TABLETS BY MOUTH TWO TIMES A DAY   • mirtazapine (REMERON) 30 mg, Oral, Nightly   • SITagliptin (JANUVIA) 100 mg, Oral, Daily   • tamsulosin (FLOMAX) 0.4 MG capsule 24 hr capsule Daily   • Viibryd 40 MG tablet tablet TAKE ONE TABLET BY MOUTH DAILY WITH FOOD       Assessment / Plan      Assessment & Plan:  1. Type 2 diabetes mellitus with hyperglycemia, without long-term current use of insulin (CMS/HCC)  Good job with dietary improvements and weight loss.  A1c lower than expected based on FSBS.  A1c may be  falsely lowered somewhat due to anemia.  Provided new glucometer to compare readings with his glucometer at home.  Continue metformin.  Advised to call with any questions or concerns.      Return in about 1 year (around 1/15/2022) for Next scheduled follow up, sooner PRN.     KHLOE Allen  01/15/2021

## 2021-01-18 ENCOUNTER — TELEPHONE (OUTPATIENT)
Dept: INTERNAL MEDICINE | Facility: CLINIC | Age: 86
End: 2021-01-18

## 2021-01-18 NOTE — TELEPHONE ENCOUNTER
Daughter Fadumo notified and verbalized understanding but stated when he went to urology they found a tumor in his bladder and stated that there wasn't anything they were going to do about it unless pt verbalized interest in doing it.

## 2021-01-18 NOTE — TELEPHONE ENCOUNTER
----- Message from Edy Goodman MD sent at 1/17/2021  9:22 PM EST -----  Please explain to a daughter that I was able to add on the lab for his anemia of iron panel and it showed low iron. Therefore would recommend he follow with urology regarding blood loss and get scope and also recommend getting upper and lower scope to rule out source of blood loss and bladder cancer and colon cancer

## 2021-01-19 RX ORDER — APIXABAN 5 MG/1
TABLET, FILM COATED ORAL
Qty: 60 TABLET | Refills: 1 | Status: SHIPPED | OUTPATIENT
Start: 2021-01-19 | End: 2021-03-23

## 2021-01-19 RX ORDER — ATORVASTATIN CALCIUM 40 MG/1
40 TABLET, FILM COATED ORAL DAILY
Qty: 30 TABLET | Refills: 1 | Status: SHIPPED | OUTPATIENT
Start: 2021-01-19 | End: 2021-02-17

## 2021-02-15 DIAGNOSIS — F02.818 LATE ONSET ALZHEIMER'S DISEASE WITH BEHAVIORAL DISTURBANCE (HCC): ICD-10-CM

## 2021-02-15 DIAGNOSIS — G30.1 LATE ONSET ALZHEIMER'S DISEASE WITH BEHAVIORAL DISTURBANCE (HCC): ICD-10-CM

## 2021-02-15 NOTE — TELEPHONE ENCOUNTER
Caller: [facility]      Relationship:     Best call back number: [phone]    Medication needed:   Requested Prescriptions     Pending Prescriptions Disp Refills   • memantine (NAMENDA) 10 MG tablet 60 tablet 1     Sig: Take 1 tablet by mouth 2 (Two) Times a Day.       When do you need the refill by: asap    What details did the patient provide when requesting the medication: patricia called and stated they put in request on 11th. Did not see anything in system. Please advise.     Does the patient have less than a 3 day supply:  [] Yes  [] No na    What is the patient's preferred pharmacy: [facility] - [place], [place] - [facility] [address] - [phone]  - [phone] FX

## 2021-02-16 RX ORDER — MEMANTINE HYDROCHLORIDE 10 MG/1
10 TABLET ORAL 2 TIMES DAILY
Qty: 60 TABLET | Refills: 11 | Status: SHIPPED | OUTPATIENT
Start: 2021-02-16 | End: 2021-07-12 | Stop reason: SDUPTHER

## 2021-02-17 RX ORDER — ATORVASTATIN CALCIUM 40 MG/1
40 TABLET, FILM COATED ORAL DAILY
Qty: 30 TABLET | Refills: 1 | Status: SHIPPED | OUTPATIENT
Start: 2021-02-17 | End: 2021-04-27

## 2021-02-17 RX ORDER — AMLODIPINE BESYLATE 5 MG/1
TABLET ORAL
Qty: 60 TABLET | Refills: 5 | Status: SHIPPED | OUTPATIENT
Start: 2021-02-17 | End: 2021-07-12 | Stop reason: SDUPTHER

## 2021-02-26 DIAGNOSIS — M85.859 OSTEOPENIA OF HIP, UNSPECIFIED LATERALITY: ICD-10-CM

## 2021-02-27 RX ORDER — ALENDRONATE SODIUM 35 MG/1
35 TABLET ORAL WEEKLY
Qty: 12 TABLET | Refills: 1 | Status: SHIPPED | OUTPATIENT
Start: 2021-02-27 | End: 2021-06-04

## 2021-03-23 RX ORDER — APIXABAN 5 MG/1
TABLET, FILM COATED ORAL
Qty: 60 TABLET | Refills: 1 | Status: SHIPPED | OUTPATIENT
Start: 2021-03-23 | End: 2021-06-29

## 2021-03-25 ENCOUNTER — OFFICE VISIT (OUTPATIENT)
Dept: CARDIOLOGY | Facility: CLINIC | Age: 86
End: 2021-03-25

## 2021-03-25 VITALS
BODY MASS INDEX: 24.49 KG/M2 | HEART RATE: 93 BPM | WEIGHT: 180.8 LBS | SYSTOLIC BLOOD PRESSURE: 126 MMHG | OXYGEN SATURATION: 97 % | DIASTOLIC BLOOD PRESSURE: 72 MMHG | TEMPERATURE: 97.7 F | HEIGHT: 72 IN

## 2021-03-25 DIAGNOSIS — E78.5 HYPERLIPIDEMIA LDL GOAL <100: ICD-10-CM

## 2021-03-25 DIAGNOSIS — Z95.3 HISTORY OF AORTIC VALVE REPLACEMENT WITH BIOPROSTHETIC VALVE: Primary | ICD-10-CM

## 2021-03-25 DIAGNOSIS — I10 BENIGN ESSENTIAL HYPERTENSION: ICD-10-CM

## 2021-03-25 DIAGNOSIS — I48.0 PAROXYSMAL ATRIAL FIBRILLATION (HCC): ICD-10-CM

## 2021-03-25 PROCEDURE — 99213 OFFICE O/P EST LOW 20 MIN: CPT | Performed by: NURSE PRACTITIONER

## 2021-03-30 RX ORDER — VILAZODONE HYDROCHLORIDE 40 MG/1
TABLET ORAL
Qty: 90 TABLET | Refills: 1 | Status: SHIPPED | OUTPATIENT
Start: 2021-03-30 | End: 2022-01-20

## 2021-03-30 RX ORDER — FAMOTIDINE 20 MG/1
TABLET, FILM COATED ORAL
Qty: 180 TABLET | Refills: 1 | Status: SHIPPED | OUTPATIENT
Start: 2021-03-30 | End: 2021-07-12 | Stop reason: SDUPTHER

## 2021-04-14 ENCOUNTER — TELEPHONE (OUTPATIENT)
Dept: NEUROLOGY | Facility: CLINIC | Age: 86
End: 2021-04-14

## 2021-04-14 RX ORDER — LOSARTAN POTASSIUM 100 MG/1
100 TABLET ORAL DAILY
Qty: 90 TABLET | Refills: 3 | Status: SHIPPED | OUTPATIENT
Start: 2021-04-14 | End: 2022-02-09 | Stop reason: HOSPADM

## 2021-04-14 NOTE — TELEPHONE ENCOUNTER
Provider: BOB GARCIA   Caller: KOREY  Relationship to Patient: PTS SON   Phone Number:   Reason for Call: PTS SON WOULD LIKE TO ASK ABOUT THE PTS ABILITY TO DRIVE. PT HASN'T BEEN DRIVING FOR A COUPLE YEARS SINCE HIS STROKE. PT WENT AND GOT A CAR AND WOULD LIKE TO DRIVE BUT PTS SON WOULD LIKE TO KNOW ABOUT SAFETY IN HIM DRIVING. PLEASE REVIEW AND ADVISE.     PTS SON STATES THAT HE DOESN'T WANT TO TALK ABOUT IT IN FRONT OF THE PT TOMORROW IF POSSIBLE AS HE GETS ANGRY.

## 2021-04-14 NOTE — TELEPHONE ENCOUNTER
Let's see how he does at his next appt. However, I will likely recommend no driving due to safety concerns. Thanks

## 2021-04-14 NOTE — TELEPHONE ENCOUNTER
Attempted to call pts daughter due to return call for son did not have full phone number. LVM for her/him to return call.

## 2021-04-15 ENCOUNTER — OFFICE VISIT (OUTPATIENT)
Dept: NEUROLOGY | Facility: CLINIC | Age: 86
End: 2021-04-15

## 2021-04-15 VITALS
BODY MASS INDEX: 24.52 KG/M2 | OXYGEN SATURATION: 93 % | TEMPERATURE: 97.8 F | WEIGHT: 181 LBS | DIASTOLIC BLOOD PRESSURE: 80 MMHG | HEIGHT: 72 IN | HEART RATE: 93 BPM | SYSTOLIC BLOOD PRESSURE: 140 MMHG

## 2021-04-15 DIAGNOSIS — G30.1 LATE ONSET ALZHEIMER'S DISEASE WITH BEHAVIORAL DISTURBANCE (HCC): Primary | ICD-10-CM

## 2021-04-15 DIAGNOSIS — F02.818 LATE ONSET ALZHEIMER'S DISEASE WITH BEHAVIORAL DISTURBANCE (HCC): Primary | ICD-10-CM

## 2021-04-15 PROCEDURE — 99214 OFFICE O/P EST MOD 30 MIN: CPT | Performed by: PHYSICIAN ASSISTANT

## 2021-04-15 NOTE — PROGRESS NOTES
"Subjective       Chief Complaint: memory loss      History of Present Illness   Leobardo Araujo is a 86 y.o. male who returns to clinic today for ongoing evaluation and management of cognitive impairment. He was previously followed by Dr. Snell. He has noted symptoms for at least several years marked initially by forgetfulness. This has gradually worsened  over time. Additional symptoms have included impairments in executive function. He is currently residing at home with a  helping.     He has a history of YSABEL and is treated with a CPAP.     Prior evaluation has included screening blood work through his PCP. A head CT was unremarkable.      He was admitted for stroke in [place] in 2/20 largely manifesting as severe dysarthria.     Today: Since his last visit in 10/20, he  Feels that his memory has improved. His family has noted a continued cognitive decline as well as irritability (which is improved with mirtazapine).       I have reviewed and confirmed the past family, social and medical history as accurate on 4/15/21.     Review of Systems   Constitutional: Negative.    HENT: Negative.    Eyes: Negative.    Respiratory: Negative.    Cardiovascular: Negative.    Gastrointestinal: Negative.    Endocrine: Negative.    Genitourinary: Negative.    Musculoskeletal: Negative.    Skin: Negative.    Allergic/Immunologic: Negative.    Hematological: Negative.        Objective     /80   Pulse 93   Temp 97.8 °F (36.6 °C)   Ht 182.9 cm (72\")   Wt 82.1 kg (181 lb)   SpO2 93%   BMI 24.55 kg/m²     General appearance today is normal.     Physical Exam  Neurological:      Coordination: Finger-Nose-Finger Test normal.      Deep Tendon Reflexes: Strength normal.   Psychiatric:         Speech: Speech normal.          Neurologic Exam     Mental Status   Oriented to person.   Oriented to place.   Disoriented to time. Oriented to month.   Registration: recalls 3 of 3 objects. Recall of objects at 5 minutes: 0/3 " recall. Follows 3 step commands.   Attention: normal.   Speech: speech is normal   Level of consciousness: alert  Able to name object. Able to read. Able to repeat. Unable to write. Normal comprehension.     Cranial Nerves   Cranial nerves II through XII intact.     Motor Exam   Muscle bulk: normal  Overall muscle tone: normal    Strength   Strength 5/5 throughout.     Sensory Exam   Light touch normal.     Gait, Coordination, and Reflexes     Coordination   Finger to nose coordination: normal    Tremor   Resting tremor: absent        Results  MMSE=21      Assessment/Plan   Diagnoses and all orders for this visit:    1. Late onset Alzheimer's disease with behavioral disturbance (CMS/HCC) (Primary)          Discussion/Summary   Leobardo rAaujo returns to clinic today for evaluation of Alzheimer's Disease . I again reviewed his current status and treatment options. After discussing potential treatment options, it was elected to continue on  donepezil, memantine and mirtazapine unchanged. I recommended that he no longer drive. He will then follow up in 6 months , or sooner if needed.   Total time of visit today: 30 minutes. As part of this visit I obtained additional history from the family which is incorporated in the HPI. I also discussed diagnosis, evaluation, current status, treatment options and management as discussed above.         Shelly Johnson PA-C

## 2021-04-19 ENCOUNTER — TELEPHONE (OUTPATIENT)
Dept: NEUROLOGY | Facility: CLINIC | Age: 86
End: 2021-04-19

## 2021-04-27 RX ORDER — ATORVASTATIN CALCIUM 40 MG/1
40 TABLET, FILM COATED ORAL DAILY
Qty: 30 TABLET | Refills: 1 | Status: SHIPPED | OUTPATIENT
Start: 2021-04-27 | End: 2021-07-12 | Stop reason: SDUPTHER

## 2021-05-06 ENCOUNTER — HOSPITAL ENCOUNTER (OUTPATIENT)
Dept: CARDIOLOGY | Facility: HOSPITAL | Age: 86
Discharge: HOME | End: 2021-05-06
Admitting: PHYSICIAN ASSISTANT

## 2021-05-06 VITALS — HEIGHT: 72 IN | WEIGHT: 184 LBS | BODY MASS INDEX: 24.92 KG/M2

## 2021-05-06 DIAGNOSIS — I38 VALVULAR HEART DISEASE: ICD-10-CM

## 2021-05-06 DIAGNOSIS — I48.0 PAROXYSMAL ATRIAL FIBRILLATION (HCC): ICD-10-CM

## 2021-05-06 PROCEDURE — 93306 TTE W/DOPPLER COMPLETE: CPT

## 2021-05-06 PROCEDURE — 93306 TTE W/DOPPLER COMPLETE: CPT | Performed by: INTERNAL MEDICINE

## 2021-05-07 LAB
BH CV ECHO MEAS - AI DEC SLOPE: 274.9 CM/SEC^2
BH CV ECHO MEAS - AI MAX PG: 30.1 MMHG
BH CV ECHO MEAS - AI MAX VEL: 274.5 CM/SEC
BH CV ECHO MEAS - AI P1/2T: 292.5 MSEC
BH CV ECHO MEAS - AO MAX PG (FULL): 22 MMHG
BH CV ECHO MEAS - AO MAX PG: 25.3 MMHG
BH CV ECHO MEAS - AO MEAN PG (FULL): 12.2 MMHG
BH CV ECHO MEAS - AO MEAN PG: 13.9 MMHG
BH CV ECHO MEAS - AO ROOT AREA (BSA CORRECTED): 2
BH CV ECHO MEAS - AO ROOT AREA: 12.6 CM^2
BH CV ECHO MEAS - AO ROOT DIAM: 4 CM
BH CV ECHO MEAS - AO V2 MAX: 251.4 CM/SEC
BH CV ECHO MEAS - AO V2 MEAN: 172.3 CM/SEC
BH CV ECHO MEAS - AO V2 VTI: 49.1 CM
BH CV ECHO MEAS - AVA(I,A): 1.1 CM^2
BH CV ECHO MEAS - AVA(I,D): 1.1 CM^2
BH CV ECHO MEAS - AVA(V,A): 0.97 CM^2
BH CV ECHO MEAS - AVA(V,D): 0.97 CM^2
BH CV ECHO MEAS - BSA(HAYCOCK): 2.1 M^2
BH CV ECHO MEAS - BSA: 2.1 M^2
BH CV ECHO MEAS - BZI_BMI: 25 KILOGRAMS/M^2
BH CV ECHO MEAS - BZI_METRIC_HEIGHT: 182.9 CM
BH CV ECHO MEAS - BZI_METRIC_WEIGHT: 83.5 KG
BH CV ECHO MEAS - EDV(CUBED): 50.9 ML
BH CV ECHO MEAS - EDV(MOD-SP2): 72 ML
BH CV ECHO MEAS - EDV(MOD-SP4): 64 ML
BH CV ECHO MEAS - EDV(TEICH): 58.4 ML
BH CV ECHO MEAS - EF(CUBED): 62.5 %
BH CV ECHO MEAS - EF(MOD-BP): 81 %
BH CV ECHO MEAS - EF(MOD-SP2): 61.1 %
BH CV ECHO MEAS - EF(MOD-SP4): 92.2 %
BH CV ECHO MEAS - EF(TEICH): 54.8 %
BH CV ECHO MEAS - ESV(CUBED): 19.1 ML
BH CV ECHO MEAS - ESV(MOD-SP2): 28 ML
BH CV ECHO MEAS - ESV(MOD-SP4): 5 ML
BH CV ECHO MEAS - ESV(TEICH): 26.4 ML
BH CV ECHO MEAS - FS: 27.9 %
BH CV ECHO MEAS - IVS/LVPW: 1
BH CV ECHO MEAS - IVSD: 1.2 CM
BH CV ECHO MEAS - LA DIMENSION: 4.1 CM
BH CV ECHO MEAS - LA/AO: 1
BH CV ECHO MEAS - LAD MAJOR: 6.3 CM
BH CV ECHO MEAS - LAT PEAK E' VEL: 12.3 CM/SEC
BH CV ECHO MEAS - LATERAL E/E' RATIO: 14.9
BH CV ECHO MEAS - LV DIASTOLIC VOL/BSA (35-75): 31.1 ML/M^2
BH CV ECHO MEAS - LV MASS(C)D: 151.1 GRAMS
BH CV ECHO MEAS - LV MASS(C)DI: 73.5 GRAMS/M^2
BH CV ECHO MEAS - LV MAX PG: 3.3 MMHG
BH CV ECHO MEAS - LV MEAN PG: 1.6 MMHG
BH CV ECHO MEAS - LV SYSTOLIC VOL/BSA (12-30): 2.4 ML/M^2
BH CV ECHO MEAS - LV V1 MAX: 90.8 CM/SEC
BH CV ECHO MEAS - LV V1 MEAN: 57.5 CM/SEC
BH CV ECHO MEAS - LV V1 VTI: 19.6 CM
BH CV ECHO MEAS - LVIDD: 3.7 CM
BH CV ECHO MEAS - LVIDS: 2.7 CM
BH CV ECHO MEAS - LVLD AP2: 7.3 CM
BH CV ECHO MEAS - LVLD AP4: 7.3 CM
BH CV ECHO MEAS - LVLS AP2: 6.6 CM
BH CV ECHO MEAS - LVLS AP4: 5.3 CM
BH CV ECHO MEAS - LVOT AREA (M): 2.8 CM^2
BH CV ECHO MEAS - LVOT AREA: 2.7 CM^2
BH CV ECHO MEAS - LVOT DIAM: 1.9 CM
BH CV ECHO MEAS - LVPWD: 1.2 CM
BH CV ECHO MEAS - MED PEAK E' VEL: 3.8 CM/SEC
BH CV ECHO MEAS - MEDIAL E/E' RATIO: 48.3
BH CV ECHO MEAS - MV DEC TIME: 0.3 SEC
BH CV ECHO MEAS - MV E MAX VEL: 180.8 CM/SEC
BH CV ECHO MEAS - MV MAX PG: 14.9 MMHG
BH CV ECHO MEAS - MV MEAN PG: 5.6 MMHG
BH CV ECHO MEAS - MV V2 MAX: 192.9 CM/SEC
BH CV ECHO MEAS - MV V2 MEAN: 107.4 CM/SEC
BH CV ECHO MEAS - MV V2 VTI: 52.2 CM
BH CV ECHO MEAS - MVA(VTI): 1 CM^2
BH CV ECHO MEAS - PA ACC SLOPE: 566.4 CM/SEC^2
BH CV ECHO MEAS - PA ACC TIME: 0.12 SEC
BH CV ECHO MEAS - PA PR(ACCEL): 26.7 MMHG
BH CV ECHO MEAS - PULM DIAS VEL: 71.9 CM/SEC
BH CV ECHO MEAS - PULM S/D: 0.42
BH CV ECHO MEAS - PULM SYS VEL: 30.3 CM/SEC
BH CV ECHO MEAS - RAP SYSTOLE: 3 MMHG
BH CV ECHO MEAS - RVSP: 28 MMHG
BH CV ECHO MEAS - SI(AO): 301.9 ML/M^2
BH CV ECHO MEAS - SI(CUBED): 15.5 ML/M^2
BH CV ECHO MEAS - SI(LVOT): 25.7 ML/M^2
BH CV ECHO MEAS - SI(MOD-SP2): 21.4 ML/M^2
BH CV ECHO MEAS - SI(MOD-SP4): 28.7 ML/M^2
BH CV ECHO MEAS - SI(TEICH): 15.6 ML/M^2
BH CV ECHO MEAS - SV(AO): 620.7 ML
BH CV ECHO MEAS - SV(CUBED): 31.8 ML
BH CV ECHO MEAS - SV(LVOT): 52.8 ML
BH CV ECHO MEAS - SV(MOD-SP2): 44 ML
BH CV ECHO MEAS - SV(MOD-SP4): 59 ML
BH CV ECHO MEAS - SV(TEICH): 32 ML
BH CV ECHO MEAS - TAPSE (>1.6): 0.7 CM
BH CV ECHO MEAS - TR MAX PG: 23 MMHG
BH CV ECHO MEAS - TR MAX VEL: 241.5 CM/SEC
BH CV ECHO MEAS - TV MAX PG: 25.2 MMHG
BH CV ECHO MEAS - TV V2 MAX: 250.8 CM/SEC
BH CV ECHO MEASUREMENTS AVERAGE E/E' RATIO: 22.46
BH CV VAS BP LEFT ARM: NORMAL MMHG
BH CV XLRA - RV BASE: 4 CM
BH CV XLRA - RV LENGTH: 7.6 CM
BH CV XLRA - RV MID: 3.7 CM
BH CV XLRA - TDI S': 12.1 CM/SEC
LEFT ATRIUM VOLUME INDEX: 47.7 ML/M^2
LEFT ATRIUM VOLUME: 98 ML
LV EF 2D ECHO EST: 70 %

## 2021-05-14 ENCOUNTER — TELEPHONE (OUTPATIENT)
Dept: INTERNAL MEDICINE | Facility: CLINIC | Age: 86
End: 2021-05-14

## 2021-06-04 ENCOUNTER — OFFICE VISIT (OUTPATIENT)
Dept: INTERNAL MEDICINE | Facility: CLINIC | Age: 86
End: 2021-06-04

## 2021-06-04 ENCOUNTER — APPOINTMENT (OUTPATIENT)
Dept: LAB | Facility: HOSPITAL | Age: 86
End: 2021-06-04

## 2021-06-04 VITALS
TEMPERATURE: 97.3 F | HEIGHT: 72 IN | SYSTOLIC BLOOD PRESSURE: 108 MMHG | WEIGHT: 176 LBS | DIASTOLIC BLOOD PRESSURE: 62 MMHG | HEART RATE: 66 BPM | BODY MASS INDEX: 23.84 KG/M2

## 2021-06-04 DIAGNOSIS — I73.9 PERIPHERAL VASCULAR DISEASE (HCC): ICD-10-CM

## 2021-06-04 DIAGNOSIS — E53.8 B12 DEFICIENCY: ICD-10-CM

## 2021-06-04 DIAGNOSIS — E11.59 TYPE 2 DIABETES MELLITUS WITH OTHER CIRCULATORY COMPLICATIONS (HCC): ICD-10-CM

## 2021-06-04 DIAGNOSIS — I48.0 PAROXYSMAL ATRIAL FIBRILLATION (HCC): ICD-10-CM

## 2021-06-04 DIAGNOSIS — C67.9 MALIGNANT NEOPLASM OF URINARY BLADDER, UNSPECIFIED SITE (HCC): ICD-10-CM

## 2021-06-04 DIAGNOSIS — I10 BENIGN ESSENTIAL HYPERTENSION: ICD-10-CM

## 2021-06-04 DIAGNOSIS — Z00.00 MEDICARE ANNUAL WELLNESS VISIT, SUBSEQUENT: Primary | ICD-10-CM

## 2021-06-04 DIAGNOSIS — F33.41 RECURRENT MAJOR DEPRESSIVE DISORDER, IN PARTIAL REMISSION (HCC): ICD-10-CM

## 2021-06-04 DIAGNOSIS — E78.5 HYPERLIPIDEMIA LDL GOAL <100: ICD-10-CM

## 2021-06-04 DIAGNOSIS — G47.33 OSA (OBSTRUCTIVE SLEEP APNEA): Chronic | ICD-10-CM

## 2021-06-04 PROCEDURE — 99397 PER PM REEVAL EST PAT 65+ YR: CPT | Performed by: INTERNAL MEDICINE

## 2021-06-04 PROCEDURE — 96160 PT-FOCUSED HLTH RISK ASSMT: CPT | Performed by: INTERNAL MEDICINE

## 2021-06-04 PROCEDURE — 1170F FXNL STATUS ASSESSED: CPT | Performed by: INTERNAL MEDICINE

## 2021-06-04 PROCEDURE — G0439 PPPS, SUBSEQ VISIT: HCPCS | Performed by: INTERNAL MEDICINE

## 2021-06-04 PROCEDURE — 1126F AMNT PAIN NOTED NONE PRSNT: CPT | Performed by: INTERNAL MEDICINE

## 2021-06-04 NOTE — PROGRESS NOTES
QUICK REFERENCE INFORMATION:  The ABCs of the Annual Wellness Visit    Subsequent Medicare Wellness Visit    HEALTH RISK ASSESSMENT    1934    Recent Hospitalizations:  No hospitalization(s) within the last year..        Current Medical Providers:  Patient Care Team:  Edy Goodman MD as PCP - General  Edy Goodman MD as PCP - Family Medicine        Smoking Status:  Social History     Tobacco Use   Smoking Status Never Smoker   Smokeless Tobacco Never Used       Alcohol Consumption:  Social History     Substance and Sexual Activity   Alcohol Use No       Depression Screen:   PHQ-2/PHQ-9 Depression Screening 6/4/2021   Little interest or pleasure in doing things 0   Feeling down, depressed, or hopeless 0   Total Score 0       Health Habits and Functional and Cognitive Screening:  Functional & Cognitive Status 6/4/2021   Do you have difficulty preparing food and eating? No   Do you have difficulty bathing yourself, getting dressed or grooming yourself? Yes   Do you have difficulty using the toilet? No   Do you have difficulty moving around from place to place? Yes   Do you have trouble with steps or getting out of a bed or a chair? Yes   Current Diet Frequent Junk Food   Dental Exam Up to date   Eye Exam Up to date   Exercise (times per week) 0 times per week   Current Exercise Activities Include None   Do you need help using the phone?  Yes   Are you deaf or do you have serious difficulty hearing?  Yes   Do you need help with transportation? Yes   Do you need help shopping? Yes   Do you need help preparing meals?  Yes   Do you need help with housework?  Yes   Do you need help with laundry? Yes   Do you need help taking your medications? Yes   Do you need help managing money? Yes   Do you ever drive or ride in a car without wearing a seat belt? Yes   Have you felt unusual stress, anger or loneliness in the last month? No   Who do you live with? Alone   If you need help, do you have trouble finding  someone available to you? No   Have you been bothered in the last four weeks by sexual problems? No   Do you have difficulty concentrating, remembering or making decisions? Yes       Fall Risk Screen:  KAYODEADI Fall Risk Assessment was completed, and patient is at LOW risk for falls.Assessment completed on:6/4/2021    ACE III MINI        Does the patient have evidence of cognitive impairment? Yes    Aspirin use counseling: Taking ASA appropriately as indicated    Recent Lab Results:  CMP:  Lab Results   Component Value Date    BUN 23 01/08/2021    CREATININE 0.74 (L) 01/08/2021    EGFRIFNONA 100 01/08/2021    BCR 31.1 (H) 01/08/2021     01/08/2021    K 4.0 01/08/2021    CO2 26.0 01/08/2021    CALCIUM 9.0 01/08/2021    ALBUMIN 4.00 01/08/2021    BILITOT 0.2 01/08/2021    ALKPHOS 122 (H) 01/08/2021    AST 22 01/08/2021    ALT 15 01/08/2021     HbA1c:  Lab Results   Component Value Date    HGBA1C 6.44 (H) 01/08/2021    HGBA1C 10.20 (H) 02/19/2019     Microalbumin:  No results found for: MICROALBUR, POCMALB, POCCREAT  Lipid Panel  Lab Results   Component Value Date    CHOL 107 09/10/2020    TRIG 67 09/10/2020    HDL 49 09/10/2020    LDL 45 09/10/2020    AST 22 01/08/2021    ALT 15 01/08/2021       Visual Acuity:  No exam data present    Age-appropriate Screening Schedule:  Refer to the list below for future screening recommendations based on patient's age, sex and/or medical conditions. Orders for these recommended tests are listed in the plan section. The patient has been provided with a written plan.    Health Maintenance   Topic Date Due   • TDAP/TD VACCINES (1 - Tdap) Never done   • ZOSTER VACCINE (1 of 2) Never done   • DIABETIC EYE EXAM  Never done   • URINE MICROALBUMIN  07/05/2018   • HEMOGLOBIN A1C  07/08/2021   • INFLUENZA VACCINE  08/01/2021   • LIPID PANEL  09/10/2021        Subjective   History of Present Illness    Leobardo Araujo is a 86 y.o. male who presents for a Subsequent Wellness Visit.    CHRONIC  CONDITIONS    The following portions of the patient's history were reviewed and updated as appropriate: allergies, current medications, past family history, past medical history, past social history, past surgical history and problem list.    Outpatient Medications Prior to Visit   Medication Sig Dispense Refill   • amLODIPine (NORVASC) 5 MG tablet TAKE 1 TABLET BY MOUTH 2 TIMES A DAY 60 tablet 5   • aspirin 81 MG tablet Take  by mouth Daily.     • atorvastatin (LIPITOR) 40 MG tablet TAKE 1 TABLET BY MOUTH DAILY 30 tablet 1   • donepezil (ARICEPT) 10 MG tablet TAKE 1 TABLET BY MOUTH DAILY. 30 tablet 6   • Eliquis 5 MG tablet tablet TAKE 1 TABLET BY MOUTH EVERY 12 HOURS 60 tablet 1   • famotidine (PEPCID) 20 MG tablet TAKE 1 TABLET BY MOUTH TWO TIMES A  tablet 1   • finasteride (PROPECIA) 1 MG tablet Take 5 mg by mouth Daily.  3   • levothyroxine (SYNTHROID, LEVOTHROID) 50 MCG tablet TAKE 1 TABLET BY MOUTH DAILY. 90 tablet 1   • losartan (COZAAR) 100 MG tablet TAKE 1 TABLET BY MOUTH DAILY. 90 tablet 3   • memantine (NAMENDA) 10 MG tablet Take 1 tablet by mouth 2 (Two) Times a Day. 60 tablet 11   • metFORMIN (GLUCOPHAGE) 500 MG tablet TAKE TWO TABLETS BY MOUTH TWO TIMES A  tablet 1   • mirtazapine (Remeron) 30 MG tablet Take 1 tablet by mouth Every Night. 30 tablet 11   • SITagliptin (Januvia) 100 MG tablet Take 1 tablet by mouth Daily. 90 tablet 3   • tamsulosin (FLOMAX) 0.4 MG capsule 24 hr capsule Daily.     • Viibryd 40 MG tablet tablet TAKE ONE TABLET BY MOUTH DAILY WITH FOOD 90 tablet 1   • alendronate (FOSAMAX) 35 MG tablet TAKE 1 TABLET BY MOUTH 1 (ONE) TIME PER WEEK. 12 tablet 1   • bumetanide (BUMEX) 1 MG tablet Take 1 tablet by mouth Daily. 30 tablet 0     No facility-administered medications prior to visit.       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)   • Peripheral vascular disease (CMS/HCC)   • Benign essential hypertension   • Paroxysmal atrial fibrillation (CMS/HCC)   • Hyperlipidemia LDL goal <100   • B12 deficiency   • Abnormal liver function   • Allergic rhinitis   • BPH with urinary obstruction   • Disorder of mitral and aortic valves   • Hypothyroidism   • Kidney stone   • Malignant tumor of urinary bladder (CMS/HCC)   • Neck pain   • Numbness   • Palpitations   • Polyp of colon   • Recurrent UTI (urinary tract infection)   • Medicare annual wellness visit, subsequent   • Multiple nevi   • Osteopenia of hip       Advance Care Planning:  ACP discussion was held with the patient during this visit. Patient has an advance directive in EMR which is still valid.     Identification of Risk Factors:  Risk factors include: Advance Directive Discussion  Dementia/Memory   Depression/Dysphoria  Fall Risk  Hearing Problem  Inactivity/Sedentary  Obesity/Overweight   Polypharmacy.    Review of Systems   Constitutional: Negative for chills and fever.   HENT: Positive for hearing loss. Negative for sore throat.    Eyes: Negative for visual disturbance.   Respiratory: Negative for cough.    Cardiovascular: Negative for chest pain and palpitations.   Musculoskeletal: Negative for back pain.   Neurological: Negative for dizziness and headaches.   Hematological: Negative for adenopathy.   Psychiatric/Behavioral: Positive for decreased concentration. Negative for dysphoric mood. The patient is not nervous/anxious.        Compared to one year ago, the patient feels his physical health is the same.  Compared to one year ago, the patient feels his mental health is the  "same.    Objective     Physical Exam  Cardiovascular:      Rate and Rhythm: Normal rate. Rhythm irregular.      Heart sounds: Murmur (II/VI SM ) heard.     Pulmonary:      Effort: Pulmonary effort is normal.      Breath sounds: Normal breath sounds.   Feet:      Right foot:      Skin integrity: Skin integrity normal. No ulcer or blister.      Toenail Condition: Right toenails are abnormally thick and long.      Left foot:      Skin integrity: Skin integrity normal. No ulcer or blister.      Toenail Condition: Left toenails are abnormally thick and long.      Comments: Diabetic Foot Exam Performed he has mild decreased sensation and poor nails  Neurological:      General: No focal deficit present.      Mental Status: He is alert.          Procedures     Vitals:    06/04/21 1452   BP: 108/62   Pulse: 66   Temp: 97.3 °F (36.3 °C)   Weight: 79.8 kg (176 lb)   Height: 182.9 cm (72.01\")   PainSc: 0-No pain       Patient's Body mass index is 23.86 kg/m². indicating that he is within normal range (BMI 18.5-24.9). No BMI management plan needed..      Assessment/Plan   Problem List Items Addressed This Visit        Cardiac and Vasculature    Benign essential hypertension    Current Assessment & Plan     Hypertension is improving with treatment.  Continue current treatment regimen.  Dietary sodium restriction.  Weight loss.  Regular aerobic exercise.  Blood pressure will be reassessed at the next regular appointment.         Relevant Medications    amLODIPine (NORVASC) 5 MG tablet    losartan (COZAAR) 100 MG tablet    Other Relevant Orders    Comprehensive Metabolic Panel    Lipid Panel    Hyperlipidemia LDL goal <100    Current Assessment & Plan     Lipid abnormalities are improving with lifestyle modifications.  Nutritional counseling was provided. and Pharmacotherapy as ordered.  Lipids will be reassessed in 1 year.         Relevant Medications    atorvastatin (LIPITOR) 40 MG tablet    Other Relevant Orders    TSH Rfx On " Abnormal To Free T4    Paroxysmal atrial fibrillation (CMS/HCC)    Current Assessment & Plan     Doing ok with fidelia and will see dr Tom in October.          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   Continue current treatment regimen.  Regular aerobic exercise.  Discussed foot care.  Reminded to get yearly retinal exam.  Diabetes will be reassessed in 6 months.         Relevant Medications    SITagliptin (Januvia) 100 MG tablet    metFORMIN (GLUCOPHAGE) 500 MG tablet    Other Relevant Orders    Hemoglobin A1c    Microalbumin / Creatinine Urine Ratio - Urine, Clean Catch    Ambulatory Referral to Podiatry (Completed)       Health Encounters    Medicare annual wellness visit, subsequent - Primary    Current Assessment & Plan     He has some hearing loss but declined evaluation but will get  Debrox for cerumen impaction right side. His mood is doing ok. His memory loss is worsening and follows with Shelly Johnson. I have gone over his medications with he and his daughter. He is not good with taking his medications but his daughters try and help and sone. He declined Home Health. Encouraged family to hire more help to help watch over him. Regarding fall risk encouraged to get up slowly and follow with Neurology Shelly Johnson. Try and get most recent eye exam from May 16. 2021 that was reportedly ok. Refer to Dr Albrecht for foot care. Proceed with labs. He declined follow up with Urology regarding bladder cancer. Age-appropriate Counseling:  Discussed preventative medicine issues  with patient including regular exercise, healthy diet, stress reduction, adequate sleep and recommended age-appropriate screening studies.  Immunizations reviewed.                 Hematology and Neoplasia    Malignant tumor of urinary bladder (CMS/HCC)    Current Assessment & Plan     He is not interested in follow up with urology but will try and give urine sample.          Relevant Medications    tamsulosin (FLOMAX) 0.4 MG capsule 24 hr capsule    Other Relevant Orders    Urinalysis With Microscopic - Urine, Clean Catch       Mental Health    Recurrent major depressive disorder (CMS/HCC)    Current Assessment & Plan     Patient's depression is recurrent and is mild without psychosis. Their depression is currently in partial remission and the condition is improving with treatment. This will be reassessed at the next regular appointment. F/U as described:patient will continue current medication therapy.         Relevant Medications    mirtazapine (Remeron) 30 MG tablet    donepezil (ARICEPT) 10 MG tablet    memantine (NAMENDA) 10 MG tablet    Viibryd 40 MG tablet tablet       Sleep    YSABEL (obstructive sleep apnea) (Chronic)    Current Assessment & Plan     Old machine and will establish with sleep doctor to help.          Relevant Orders    Ambulatory Referral to Sleep Medicine        Patient Self-Management and Personalized Health Advice  The patient has been provided with information about: diet, exercise, weight management, prevention of cardiac or vascular disease and fall prevention and preventive services including:   · Annual Wellness Visit (AWV).    Outpatient Encounter Medications as of 6/4/2021   Medication Sig Dispense Refill   • amLODIPine (NORVASC) 5 MG tablet TAKE 1 TABLET BY MOUTH 2 TIMES A DAY 60 tablet 5   • aspirin 81 MG tablet Take  by mouth Daily.     • atorvastatin (LIPITOR) 40 MG tablet TAKE 1 TABLET BY MOUTH DAILY 30 tablet 1   • donepezil (ARICEPT) 10 MG tablet TAKE 1 TABLET BY MOUTH DAILY.  30 tablet 6   • Eliquis 5 MG tablet tablet TAKE 1 TABLET BY MOUTH EVERY 12 HOURS 60 tablet 1   • famotidine (PEPCID) 20 MG tablet TAKE 1 TABLET BY MOUTH TWO TIMES A  tablet 1   • finasteride (PROPECIA) 1 MG tablet Take 5 mg by mouth Daily.  3   • levothyroxine (SYNTHROID, LEVOTHROID) 50 MCG tablet TAKE 1 TABLET BY MOUTH DAILY. 90 tablet 1   • losartan (COZAAR) 100 MG tablet TAKE 1 TABLET BY MOUTH DAILY. 90 tablet 3   • memantine (NAMENDA) 10 MG tablet Take 1 tablet by mouth 2 (Two) Times a Day. 60 tablet 11   • metFORMIN (GLUCOPHAGE) 500 MG tablet TAKE TWO TABLETS BY MOUTH TWO TIMES A  tablet 1   • mirtazapine (Remeron) 30 MG tablet Take 1 tablet by mouth Every Night. 30 tablet 11   • SITagliptin (Januvia) 100 MG tablet Take 1 tablet by mouth Daily. 90 tablet 3   • tamsulosin (FLOMAX) 0.4 MG capsule 24 hr capsule Daily.     • Viibryd 40 MG tablet tablet TAKE ONE TABLET BY MOUTH DAILY WITH FOOD 90 tablet 1   • [DISCONTINUED] alendronate (FOSAMAX) 35 MG tablet TAKE 1 TABLET BY MOUTH 1 (ONE) TIME PER WEEK. 12 tablet 1   • [DISCONTINUED] bumetanide (BUMEX) 1 MG tablet Take 1 tablet by mouth Daily. 30 tablet 0     No facility-administered encounter medications on file as of 6/4/2021.       Reviewed use of high risk medication in the elderly: yes  Reviewed for potential of harmful drug interactions in the elderly: yes    Follow Up:  Return in about 6 months (around 12/4/2021) for Recheck, Labs this visit.     There are no Patient Instructions on file for this visit.    An After Visit Summary and PPPS with all of these plans were given to the patient.

## 2021-06-05 LAB
ALBUMIN SERPL-MCNC: 4 G/DL (ref 3.6–4.6)
ALBUMIN/CREAT UR: 166 MG/G CREAT (ref 0–29)
ALBUMIN/GLOB SERPL: 1.4 {RATIO} (ref 1.2–2.2)
ALP SERPL-CCNC: 130 IU/L (ref 48–121)
ALT SERPL-CCNC: 29 IU/L (ref 0–44)
APPEARANCE UR: ABNORMAL
AST SERPL-CCNC: 26 IU/L (ref 0–40)
BACTERIA #/AREA URNS HPF: ABNORMAL /[HPF]
BASOPHILS # BLD AUTO: 0.1 X10E3/UL (ref 0–0.2)
BASOPHILS NFR BLD AUTO: 1 %
BILIRUB SERPL-MCNC: 0.2 MG/DL (ref 0–1.2)
BILIRUB UR QL STRIP: NEGATIVE
BUN SERPL-MCNC: 18 MG/DL (ref 8–27)
BUN/CREAT SERPL: 21 (ref 10–24)
CALCIUM SERPL-MCNC: 8.9 MG/DL (ref 8.6–10.2)
CASTS URNS QL MICRO: ABNORMAL /LPF
CHLORIDE SERPL-SCNC: 98 MMOL/L (ref 96–106)
CHOLEST SERPL-MCNC: 198 MG/DL (ref 100–199)
CO2 SERPL-SCNC: 24 MMOL/L (ref 20–29)
COLOR UR: YELLOW
CREAT SERPL-MCNC: 0.84 MG/DL (ref 0.76–1.27)
CREAT UR-MCNC: 49.5 MG/DL
EOSINOPHIL # BLD AUTO: 0.3 X10E3/UL (ref 0–0.4)
EOSINOPHIL NFR BLD AUTO: 4 %
EPI CELLS #/AREA URNS HPF: ABNORMAL /HPF (ref 0–10)
ERYTHROCYTE [DISTWIDTH] IN BLOOD BY AUTOMATED COUNT: 15.8 % (ref 11.6–15.4)
GLOBULIN SER CALC-MCNC: 2.9 G/DL (ref 1.5–4.5)
GLUCOSE SERPL-MCNC: ABNORMAL MG/DL
GLUCOSE UR QL: NEGATIVE
HBA1C MFR BLD: 7.9 % (ref 4.8–5.6)
HCT VFR BLD AUTO: 32.8 % (ref 37.5–51)
HDLC SERPL-MCNC: 49 MG/DL
HGB BLD-MCNC: 9.8 G/DL (ref 13–17.7)
HGB UR QL STRIP: ABNORMAL
IMM GRANULOCYTES # BLD AUTO: 0 X10E3/UL (ref 0–0.1)
IMM GRANULOCYTES NFR BLD AUTO: 0 %
KETONES UR QL STRIP: NEGATIVE
LDLC SERPL CALC-MCNC: 127 MG/DL (ref 0–99)
LEUKOCYTE ESTERASE UR QL STRIP: ABNORMAL
LYMPHOCYTES # BLD AUTO: 2.1 X10E3/UL (ref 0.7–3.1)
LYMPHOCYTES NFR BLD AUTO: 27 %
MCH RBC QN AUTO: 24.4 PG (ref 26.6–33)
MCHC RBC AUTO-ENTMCNC: 29.9 G/DL (ref 31.5–35.7)
MCV RBC AUTO: 82 FL (ref 79–97)
MICRO URNS: ABNORMAL
MICROALBUMIN UR-MCNC: 82.3 UG/ML
MONOCYTES # BLD AUTO: 0.7 X10E3/UL (ref 0.1–0.9)
MONOCYTES NFR BLD AUTO: 9 %
NEUTROPHILS # BLD AUTO: 4.5 X10E3/UL (ref 1.4–7)
NEUTROPHILS NFR BLD AUTO: 59 %
NITRITE UR QL STRIP: NEGATIVE
PH UR STRIP: 5 [PH] (ref 5–7.5)
PLATELET # BLD AUTO: 350 X10E3/UL (ref 150–450)
POTASSIUM SERPL-SCNC: ABNORMAL MMOL/L
PROT SERPL-MCNC: 6.9 G/DL (ref 6–8.5)
PROT UR QL STRIP: ABNORMAL
RBC # BLD AUTO: 4.02 X10E6/UL (ref 4.14–5.8)
RBC #/AREA URNS HPF: >30 /HPF (ref 0–2)
SODIUM SERPL-SCNC: 137 MMOL/L (ref 134–144)
SP GR UR: 1.01 (ref 1–1.03)
TRIGL SERPL-MCNC: 120 MG/DL (ref 0–149)
TSH SERPL DL<=0.005 MIU/L-ACNC: 2.12 UIU/ML (ref 0.45–4.5)
UROBILINOGEN UR STRIP-MCNC: 0.2 MG/DL (ref 0.2–1)
VIT B12 SERPL-MCNC: 413 PG/ML (ref 232–1245)
VLDLC SERPL CALC-MCNC: 22 MG/DL (ref 5–40)
WBC # BLD AUTO: 7.6 X10E3/UL (ref 3.4–10.8)
WBC #/AREA URNS HPF: >30 /HPF (ref 0–5)

## 2021-06-05 NOTE — ASSESSMENT & PLAN NOTE
Diabetes is improving with treatment.   Continue current treatment regimen.  Regular aerobic exercise.  Discussed foot care.  Reminded to get yearly retinal exam.  Diabetes will be reassessed in 6 months.

## 2021-06-05 NOTE — ASSESSMENT & PLAN NOTE
He has some hearing loss but declined evaluation but will get  Debrox for cerumen impaction right side. His mood is doing ok. His memory loss is worsening and follows with Shelly Johnson. I have gone over his medications with he and his daughter. He is not good with taking his medications but his daughters try and help and sone. He declined Home Health. Encouraged family to hire more help to help watch over him. Regarding fall risk encouraged to get up slowly and follow with Neurology Shelly Johnson. Try and get most recent eye exam from May 16. 2021 that was reportedly ok. Refer to Dr Albrecht for foot care. Proceed with labs. He declined follow up with Urology regarding bladder cancer. Age-appropriate Counseling:  Discussed preventative medicine issues with patient including regular exercise, healthy diet, stress reduction, adequate sleep and recommended age-appropriate screening studies.  Immunizations reviewed.

## 2021-06-05 NOTE — ASSESSMENT & PLAN NOTE
Lipid abnormalities are improving with lifestyle modifications.  Nutritional counseling was provided. and Pharmacotherapy as ordered.  Lipids will be reassessed in 1 year.

## 2021-06-07 ENCOUNTER — TELEPHONE (OUTPATIENT)
Dept: INTERNAL MEDICINE | Facility: CLINIC | Age: 86
End: 2021-06-07

## 2021-06-07 NOTE — TELEPHONE ENCOUNTER
----- Message from Edy Goodman MD sent at 6/6/2021  9:57 PM EDT -----  His daughter MARÍA ELENA Araujo request call with results of labs and please also call [facility] and read lab letter and see if they can get him back to Urology

## 2021-06-07 NOTE — TELEPHONE ENCOUNTER
Both daughters have been read the lab letter. [facility] states urology has already told them he has a tumor but is not operable. She wants you to reach out to urology doctor to see if anything has changed.

## 2021-06-16 RX ORDER — LEVOTHYROXINE SODIUM 0.05 MG/1
50 TABLET ORAL DAILY
Qty: 90 TABLET | Refills: 1 | Status: SHIPPED | OUTPATIENT
Start: 2021-06-16 | End: 2021-07-12 | Stop reason: SDUPTHER

## 2021-06-16 NOTE — TELEPHONE ENCOUNTER
Caller: [facility]. [place], [place] - [address] - [phone]  - [phone] FX    Relationship: Pharmacy    Best call back number: [phone]    Medication needed:   Requested Prescriptions     Pending Prescriptions Disp Refills   • levothyroxine (SYNTHROID, LEVOTHROID) 50 MCG tablet 90 tablet 1     Sig: Take 1 tablet by mouth Daily.       When do you need the refill by: 6/16/2021  What additional details did the patient provide when requesting the medication: PATIENT HAS LESS THAN A THREE DAYS SUPPLY    Does the patient have less than a 3 day supply:  [x] Yes  [] No    What is the patient's preferred pharmacy: [facility] - [place], [place] - [address] - [phone]  - [phone] FX

## 2021-06-17 ENCOUNTER — TELEPHONE (OUTPATIENT)
Dept: INTERNAL MEDICINE | Facility: CLINIC | Age: 86
End: 2021-06-17

## 2021-06-17 NOTE — TELEPHONE ENCOUNTER
Spoke with patients other daughter Addie and explained that PCP has not heard anything back yet from [facility]

## 2021-06-29 RX ORDER — APIXABAN 5 MG/1
TABLET, FILM COATED ORAL
Qty: 60 TABLET | Refills: 5 | Status: SHIPPED | OUTPATIENT
Start: 2021-06-29 | End: 2021-12-06 | Stop reason: SDUPTHER

## 2021-07-12 DIAGNOSIS — F02.818 LATE ONSET ALZHEIMER'S DISEASE WITH BEHAVIORAL DISTURBANCE (HCC): ICD-10-CM

## 2021-07-12 DIAGNOSIS — G30.1 LATE ONSET ALZHEIMER'S DISEASE WITH BEHAVIORAL DISTURBANCE (HCC): ICD-10-CM

## 2021-07-12 RX ORDER — ATORVASTATIN CALCIUM 40 MG/1
40 TABLET, FILM COATED ORAL DAILY
Qty: 30 TABLET | Refills: 1 | Status: SHIPPED | OUTPATIENT
Start: 2021-07-12 | End: 2021-08-24

## 2021-07-12 RX ORDER — LEVOTHYROXINE SODIUM 0.05 MG/1
50 TABLET ORAL DAILY
Qty: 90 TABLET | Refills: 1 | Status: SHIPPED | OUTPATIENT
Start: 2021-07-12 | End: 2022-01-21

## 2021-07-12 RX ORDER — FAMOTIDINE 20 MG/1
20 TABLET, FILM COATED ORAL 2 TIMES DAILY
Qty: 180 TABLET | Refills: 1 | Status: SHIPPED | OUTPATIENT
Start: 2021-07-12 | End: 2021-12-27

## 2021-07-12 RX ORDER — AMLODIPINE BESYLATE 5 MG/1
5 TABLET ORAL 2 TIMES DAILY
Qty: 60 TABLET | Refills: 5 | Status: SHIPPED | OUTPATIENT
Start: 2021-07-12 | End: 2021-12-27

## 2021-07-12 RX ORDER — TAMSULOSIN HYDROCHLORIDE 0.4 MG/1
1 CAPSULE ORAL DAILY
Qty: 90 CAPSULE | Refills: 1 | Status: SHIPPED | OUTPATIENT
Start: 2021-07-12 | End: 2021-12-27

## 2021-07-12 RX ORDER — FINASTERIDE 1 MG/1
5 TABLET, FILM COATED ORAL DAILY
Qty: 90 TABLET | Refills: 1 | Status: SHIPPED | OUTPATIENT
Start: 2021-07-12 | End: 2022-02-09 | Stop reason: HOSPADM

## 2021-07-12 RX ORDER — DONEPEZIL HYDROCHLORIDE 10 MG/1
10 TABLET, FILM COATED ORAL DAILY
Qty: 30 TABLET | Refills: 6 | Status: SHIPPED | OUTPATIENT
Start: 2021-07-12 | End: 2021-12-27

## 2021-07-12 RX ORDER — MIRTAZAPINE 30 MG/1
30 TABLET, FILM COATED ORAL NIGHTLY
Qty: 30 TABLET | Refills: 11 | Status: SHIPPED | OUTPATIENT
Start: 2021-07-12 | End: 2022-05-06 | Stop reason: SDUPTHER

## 2021-07-12 RX ORDER — MEMANTINE HYDROCHLORIDE 10 MG/1
10 TABLET ORAL 2 TIMES DAILY
Qty: 60 TABLET | Refills: 11 | Status: SHIPPED | OUTPATIENT
Start: 2021-07-12 | End: 2022-03-24

## 2021-07-12 NOTE — TELEPHONE ENCOUNTER
Caller: [facility]    Relationship: PHARMACY     Best call back number: [phone]    Medication needed:   Requested Prescriptions     Pending Prescriptions Disp Refills   • amLODIPine (NORVASC) 5 MG tablet 60 tablet 5     Sig: Take 1 tablet by mouth 2 (Two) Times a Day.   • atorvastatin (LIPITOR) 40 MG tablet 30 tablet 1     Sig: Take 1 tablet by mouth Daily.   • donepezil (ARICEPT) 10 MG tablet 30 tablet 6     Sig: Take 1 tablet by mouth Daily.   • famotidine (PEPCID) 20 MG tablet 180 tablet 1     Sig: Take 1 tablet by mouth 2 (Two) Times a Day.   • finasteride (PROPECIA) 1 MG tablet  3     Sig: Take 5 tablets by mouth Daily.   • levothyroxine (SYNTHROID, LEVOTHROID) 50 MCG tablet 90 tablet 1     Sig: Take 1 tablet by mouth Daily.   • memantine (NAMENDA) 10 MG tablet 60 tablet 11     Sig: Take 1 tablet by mouth 2 (Two) Times a Day.   • metFORMIN (GLUCOPHAGE) 500 MG tablet 120 tablet 1     Sig: Take 2 tablets by mouth 2 (Two) Times a Day.   • mirtazapine (Remeron) 30 MG tablet 30 tablet 11     Sig: Take 1 tablet by mouth Every Night.   • tamsulosin (FLOMAX) 0.4 MG capsule 24 hr capsule 30 capsule      Sig: Daily.       When do you need the refill by: 07/12/2021    What additional details did the patient provide when requesting the medication: [facility] RECEIVED A REQUEST FORM THE PATIENT THAT THE MEDICATION BE FILLED THROUGH MAIL ORDER THE PATIENT ALSO STATED THAT HE NEEDS  ALENDRONATE 35MG AND TRUE METRIC DIABETIC TESTING SUPPLIES     Does the patient have less than a 3 day supply:  [] Yes  [x] No    What is the patient's preferred pharmacy: [facility] - [facility] [address] [facility] [phone] [facility] [phone] FX

## 2021-07-12 NOTE — TELEPHONE ENCOUNTER
Last seen 06/04/21  Next appointment 12/06/21  Patient is requesting refill for ASA, eliquis, losartan,sitagliptin, viibyrd

## 2021-08-20 ENCOUNTER — TELEPHONE (OUTPATIENT)
Dept: INTERNAL MEDICINE | Facility: CLINIC | Age: 86
End: 2021-08-20

## 2021-08-20 NOTE — TELEPHONE ENCOUNTER
Called Addie and scheduled an appointment for next week. Addie wanted to know why does he need a visit so I explained the visit would be for documentation purposes. Addie wants to know if the visit from 6/2021 can be used. She does not want to bring pt in. Can we schedule a [facility] video visit for this?

## 2021-08-20 NOTE — TELEPHONE ENCOUNTER
Ingris Real faxed papers to be filled out but  states he needs to be seen for documentation purposes. Please schedule appt.

## 2021-08-24 RX ORDER — ATORVASTATIN CALCIUM 40 MG/1
TABLET, FILM COATED ORAL
Qty: 90 TABLET | Refills: 1 | Status: ON HOLD | OUTPATIENT
Start: 2021-08-24 | End: 2022-02-04

## 2021-08-27 ENCOUNTER — TELEMEDICINE (OUTPATIENT)
Dept: INTERNAL MEDICINE | Facility: CLINIC | Age: 86
End: 2021-08-27

## 2021-08-27 DIAGNOSIS — I10 BENIGN ESSENTIAL HYPERTENSION: Primary | ICD-10-CM

## 2021-08-27 DIAGNOSIS — E11.59 TYPE 2 DIABETES MELLITUS WITH OTHER CIRCULATORY COMPLICATIONS (HCC): ICD-10-CM

## 2021-08-27 DIAGNOSIS — R41.3 MEMORY LOSS: ICD-10-CM

## 2021-08-27 PROCEDURE — 99214 OFFICE O/P EST MOD 30 MIN: CPT | Performed by: INTERNAL MEDICINE

## 2021-08-27 NOTE — PROGRESS NOTES
Chief Complaint   Patient presents with   • Memory Loss     needs paper work for [employer]   • Diabetes   This was an audio and video enabled telemedicine encounter.He and is daughter agree to doximity video visit that was performed     History of Present Illness  86 y.o. white male presents for follow up of weakness and memory loss and diabetes. [facility] and did ok.     Review of Systems   Musculoskeletal: Positive for gait problem.   Psychiatric/Behavioral: Positive for decreased concentration.     .    PMSFH:  The following portions of the patient's history were reviewed and updated as appropriate: allergies, current medications, past family history, past medical history, past social history, past surgical history and problem list.      Current Outpatient Medications:   •  amLODIPine (NORVASC) 5 MG tablet, Take 1 tablet by mouth 2 (Two) Times a Day., Disp: 60 tablet, Rfl: 5  •  aspirin 81 MG tablet, Take  by mouth Daily., Disp: , Rfl:   •  atorvastatin (LIPITOR) 40 MG tablet, TAKE 1 TABLET EVERY DAY, Disp: 90 tablet, Rfl: 1  •  donepezil (ARICEPT) 10 MG tablet, Take 1 tablet by mouth Daily., Disp: 30 tablet, Rfl: 6  •  Eliquis 5 MG tablet tablet, TAKE 1 TABLET BY MOUTH EVERY 12 HOURS, Disp: 60 tablet, Rfl: 5  •  famotidine (PEPCID) 20 MG tablet, Take 1 tablet by mouth 2 (Two) Times a Day., Disp: 180 tablet, Rfl: 1  •  finasteride (PROPECIA) 1 MG tablet, Take 5 tablets by mouth Daily., Disp: 90 tablet, Rfl: 1  •  levothyroxine (SYNTHROID, LEVOTHROID) 50 MCG tablet, Take 1 tablet by mouth Daily., Disp: 90 tablet, Rfl: 1  •  losartan (COZAAR) 100 MG tablet, TAKE 1 TABLET BY MOUTH DAILY., Disp: 90 tablet, Rfl: 3  •  memantine (NAMENDA) 10 MG tablet, Take 1 tablet by mouth 2 (Two) Times a Day., Disp: 60 tablet, Rfl: 11  •  metFORMIN (GLUCOPHAGE) 500 MG tablet, TAKE 2 TABLETS TWICE DAILY, Disp: 240 tablet, Rfl: 1  •  mirtazapine (Remeron) 30 MG tablet, Take 1 tablet by  mouth Every Night., Disp: 30 tablet, Rfl: 11  •  SITagliptin (Januvia) 100 MG tablet, Take 1 tablet by mouth Daily., Disp: 90 tablet, Rfl: 3  •  tamsulosin (FLOMAX) 0.4 MG capsule 24 hr capsule, Take 1 capsule by mouth Daily., Disp: 90 capsule, Rfl: 1  •  Viibryd 40 MG tablet tablet, TAKE ONE TABLET BY MOUTH DAILY WITH FOOD, Disp: 90 tablet, Rfl: 1    VITALS:  There were no vitals taken for this visit.    Physical Exam  Constitutional:       Appearance: Normal appearance.   Neurological:      Mental Status: He is alert.      Comments: He was able to get up slowly and move around the room pretty steady . He stated he was feeling good today the best years    Psychiatric:         Mood and Affect: Mood normal.         Behavior: Behavior normal.         LABS:  Results for orders placed or performed in visit on 06/04/21   Vitamin B12    Specimen: Blood    BLOOD  RELEASE TO LINDA   Result Value Ref Range    Vitamin B-12 413 232 - 1,245 pg/mL   TSH Rfx On Abnormal To Free T4    Specimen: Blood    BLOOD  RELEASE TO LINDA   Result Value Ref Range    TSH 2.120 0.450 - 4.500 uIU/mL   Microalbumin / Creatinine Urine Ratio -    Specimen: Blood    BLOOD  RELEASE TO LINDA   Result Value Ref Range    Creatinine, Urine 49.5 Not Estab. mg/dL    Microalbumin, Urine 82.3 Not Estab. ug/mL    Microalbumin/Creatinine Ratio 166 (H) 0 - 29 mg/g creat   Lipid Panel    Specimen: Blood    BLOOD  RELEASE TO LINDA   Result Value Ref Range    Total Cholesterol 198 100 - 199 mg/dL    Triglycerides 120 0 - 149 mg/dL    HDL Cholesterol 49 >39 mg/dL    VLDL Cholesterol Oli 22 5 - 40 mg/dL    LDL Chol Calc ([facility]) 127 (H) 0 - 99 mg/dL   Hemoglobin A1c    Specimen: Blood    BLOOD  RELEASE TO LINDA   Result Value Ref Range    Hemoglobin A1C 7.9 (H) 4.8 - 5.6 %   Comprehensive Metabolic Panel    Specimen: Blood    BLOOD  RELEASE TO LINDA   Result Value Ref Range    Glucose CANCELED mg/dL    BUN 18 8 - 27 mg/dL    Creatinine 0.84 0.76 - 1.27 mg/dL    eGFR Non   Am 79 >59 mL/min/1.73    eGFR African Am 92 >59 mL/min/1.73    BUN/Creatinine Ratio 21 10 - 24    Sodium 137 134 - 144 mmol/L    Potassium CANCELED mmol/L    Chloride 98 96 - 106 mmol/L    Total CO2 24 20 - 29 mmol/L    Calcium 8.9 8.6 - 10.2 mg/dL    Total Protein 6.9 6.0 - 8.5 g/dL    Albumin 4.0 3.6 - 4.6 g/dL    Globulin 2.9 1.5 - 4.5 g/dL    A/G Ratio 1.4 1.2 - 2.2    Total Bilirubin 0.2 0.0 - 1.2 mg/dL    Alkaline Phosphatase 130 (H) 48 - 121 IU/L    AST (SGOT) 26 0 - 40 IU/L    ALT (SGPT) 29 0 - 44 IU/L   Microscopic Examination -    BLOOD  RELEASE TO [facility]   Result Value Ref Range    WBC, UA >30 (A) 0 - 5 /hpf    RBC, UA >30 (A) 0 - 2 /hpf    Epithelial Cells (non renal) 0-10 0 - 10 /hpf    Casts None seen None seen /lpf    Bacteria, UA Few None seen/Few   Urinalysis With Microscopic -    Specimen: Blood    BLOOD  RELEASE TO [facility]   Result Value Ref Range    Specific Gravity, UA 1.015 1.005 - 1.030    pH, UA 5.0 5.0 - 7.5    Color, UA Yellow Yellow    Appearance, UA Cloudy (A) Clear    Leukocytes, UA 3+ (A) Negative    Protein 1+ (A) Negative/Trace    Glucose, UA Negative Negative    Ketones Negative Negative    Blood, UA 2+ (A) Negative    Bilirubin, UA Negative Negative    Urobilinogen, UA 0.2 0.2 - 1.0 mg/dL    Nitrite, UA Negative Negative    Microscopic Examination See below:    CBC & Differential    Specimen: Blood    BLOOD  RELEASE TO [facility]   Result Value Ref Range    WBC 7.6 3.4 - 10.8 x10E3/uL    RBC 4.02 (L) 4.14 - 5.80 x10E6/uL    Hemoglobin 9.8 (L) 13.0 - 17.7 g/dL    Hematocrit 32.8 (L) 37.5 - 51.0 %    MCV 82 79 - 97 fL    MCH 24.4 (L) 26.6 - 33.0 pg    MCHC 29.9 (L) 31.5 - 35.7 g/dL    RDW 15.8 (H) 11.6 - 15.4 %    Platelets 350 150 - 450 x10E3/uL    Neutrophil Rel % 59 Not Estab. %    Lymphocyte Rel % 27 Not Estab. %    Monocyte Rel % 9 Not Estab. %    Eosinophil Rel % 4 Not Estab. %    Basophil Rel % 1 Not Estab. %    Neutrophils Absolute 4.5 1.4 - 7.0 x10E3/uL    Lymphocytes Absolute  2.1 0.7 - 3.1 x10E3/uL    Monocytes Absolute 0.7 0.1 - 0.9 x10E3/uL    Eosinophils Absolute 0.3 0.0 - 0.4 x10E3/uL    Basophils Absolute 0.1 0.0 - 0.2 x10E3/uL    Immature Granulocyte Rel % 0 Not Estab. %    Immature Grans Absolute 0.0 0.0 - 0.1 x10E3/uL       Procedures         ASSESSMENT/PLAN:  Problems Addressed this Visit        Cardiac and Vasculature    Benign essential hypertension - Primary     They were unable to get Blood pressure today. 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 The family would like to set up [employer] [facility].            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      Electronically signed by:    Edy Goodman MD

## 2021-08-27 NOTE — ASSESSMENT & PLAN NOTE
He is following with Shelly Johnson. The family would like to set up [employer] [facility].

## 2021-10-05 NOTE — PROGRESS NOTES
[facility]iology    Patient ID: Leobardo Araujo is a 86 y.o. male.  : 1934   Contact: [phone]    Encounter date: 10/06/2021    PCP: Edy Goodman MD      Chief complaint:   Chief Complaint   Patient presents with   • Hyperlipidemia   • History of Aortic Valve Replacement with Bioprosthetic Valve   • Atrial Fibrillation       Problem List:  1. Atrial fibrillation, 2010;   a. CHADS2-VASc = 3: (Age >75, HTN, DM  b. Amiodarone therapy, discontinued, 2013.  c. Terumo TigerPaw left atrial appendage closure, Dr. Edson Roque, 2012.   2. Aortic stenosis:   a. Tissue aortic valve replacement (Jay 25) and left atrial appendageal ablation (Terumo TigerPaw device), Dr. Sandhu, 2012.   b. Echocardiogram, 2017: EF 60%. Jay tissue aortic valve replacement 25 mm, 12. Aortic valve mean pressure gradient is 8.8 mmHg. Mild MS, trace MR, trace-to-mild TR.  c. Echocardiogram, 2021: EF 70%. Concentric LVH. Mild MS. Mild dilation of the aortic root.  Normal RVSP from TR. RA cavity dilated. LA volume severely increased. Prosthetic AV mean gradient 14 mmHg with a peak of 24 mmHg. Moderate/ moderate to severe eccentric jet of MR.   3. 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  Eliquis added.  Admitted at [facility]  4. Hypertension.  5. Hyperlipidemia.  6. Obesity, BMI 30.  7. Diabetes mellitus.  8. Remote tobacco abuse.  9. Erectile dysfunction.  10. History of abnormal LFTs.  11. Surgical history:  a. Bladder cancer resection, .  b. Lithotripsy.  c. Orchiectomy, benign.  d. Prostatectomy 2018    No Known Allergies    Current Medications:    Current Outpatient Medications:   •  amLODIPine (NORVASC) 5 MG tablet, Take 1 tablet by mouth 2 (Two) Times a Day., Disp: 60 tablet, Rfl: 5  •  aspirin 81 MG tablet, Take  by mouth Daily., Disp: , Rfl:   •  atorvastatin (LIPITOR) 40 MG tablet, TAKE 1 TABLET  EVERY DAY, Disp: 90 tablet, Rfl: 1  •  donepezil (ARICEPT) 10 MG tablet, Take 1 tablet by mouth Daily., Disp: 30 tablet, Rfl: 6  •  Eliquis 5 MG tablet tablet, TAKE 1 TABLET BY MOUTH EVERY 12 HOURS, Disp: 60 tablet, Rfl: 5  •  famotidine (PEPCID) 20 MG tablet, Take 1 tablet by mouth 2 (Two) Times a Day., Disp: 180 tablet, Rfl: 1  •  finasteride (PROPECIA) 1 MG tablet, Take 5 tablets by mouth Daily., Disp: 90 tablet, Rfl: 1  •  levothyroxine (SYNTHROID, LEVOTHROID) 50 MCG tablet, Take 1 tablet by mouth Daily., Disp: 90 tablet, Rfl: 1  •  losartan (COZAAR) 100 MG tablet, TAKE 1 TABLET BY MOUTH DAILY., Disp: 90 tablet, Rfl: 3  •  memantine (NAMENDA) 10 MG tablet, Take 1 tablet by mouth 2 (Two) Times a Day., Disp: 60 tablet, Rfl: 11  •  metFORMIN (GLUCOPHAGE) 500 MG tablet, TAKE 2 TABLETS TWICE DAILY, Disp: 240 tablet, Rfl: 1  •  mirtazapine (Remeron) 30 MG tablet, Take 1 tablet by mouth Every Night., Disp: 30 tablet, Rfl: 11  •  SITagliptin (Januvia) 100 MG tablet, Take 1 tablet by mouth Daily., Disp: 90 tablet, Rfl: 3  •  tamsulosin (FLOMAX) 0.4 MG capsule 24 hr capsule, Take 1 capsule by mouth Daily., Disp: 90 capsule, Rfl: 1  •  Viibryd 40 MG tablet tablet, TAKE ONE TABLET BY MOUTH DAILY WITH FOOD, Disp: 90 tablet, Rfl: 1    HPI    Leobardo Araujo is a 86 y.o. male who presents today for a 6 month follow up of valvular heart disease, paroxysmal atrial fibrillation, and cardiac risk factors. Since last visit, the patient has been doing well overall from a cardiovascular standpoint. Patient denies chest pain, shortness of breath, palpitations, edema, dizziness, and syncope. He walk about a block daily but does not have a particular exercise routine.     The following portions of the patient's history were reviewed and updated as appropriate: allergies, current medications and problem list.    Pertinent positives as listed in the HPI.  All other systems reviewed are negative.         Vitals:    10/06/21 1119   BP: 124/80  "  BP Location: Left arm   Patient Position: Sitting   Cuff Size: Adult   Pulse: 86   SpO2: 97%   Weight: 79.6 kg (175 lb 6.4 oz)   Height: 182.9 cm (72\")       Physical Exam:  General: Alert and oriented.  Neck: Jugular venous pressure is within normal limits. Carotids have normal upstrokes without bruits.   Cardiovascular: Heart has a nondisplaced focal PMI. Regular rate and rhythm. 2/6 HSM at apex & 1/6 ALAN RUSB  Lungs: Clear, no rales or wheezes. Equal expansion is noted.   Extremities: Show trace edema.  Skin: Warm and dry.  Neurologic: Nonfocal.     Diagnostic Data (reviewed with patient):  Lab Results   Component Value Date    GLUCOSE 96 01/08/2021    BUN 18 06/04/2021    CREATININE 0.84 06/04/2021    EGFRIFNONA 79 06/04/2021    EGFRIFAFRI 92 06/04/2021    BCR 21 06/04/2021     06/04/2021    K CANCELED 06/04/2021    CL 98 06/04/2021    CO2 24 06/04/2021    CALCIUM 8.9 06/04/2021    ALBUMIN 4.0 06/04/2021    ALKPHOS 130 (H) 06/04/2021    AST 26 06/04/2021    ALT 29 06/04/2021     Lab Results   Component Value Date    CHLPL 198 06/04/2021    TRIG 120 06/04/2021    HDL 49 06/04/2021     (H) 06/04/2021      Lab Results   Component Value Date    WBC 7.6 06/04/2021    RBC 4.02 (L) 06/04/2021    HGB 9.8 (L) 06/04/2021    HCT 32.8 (L) 06/04/2021    MCV 82 06/04/2021     06/04/2021      Lab Results   Component Value Date    TSH 2.120 06/04/2021        Procedures      Assessment:    ICD-10-CM ICD-9-CM   1. Paroxysmal atrial fibrillation (HCC)  I48.0 427.31   2. Valvular heart disease  I38 424.90   3. Benign essential hypertension  I10 401.1   4. Hyperlipidemia LDL goal <100  E78.5 272.4         Plan:  1. Made patient aware of worsening mitral valve regurgitation and calcification and discussed in detail the potential need for a replacement if this progresses. Both he and his daughter verbalized understanding. (Echo images were reviewed)  2. Echocardiogram before next visit to reassess mitral valve " and cardiac function.   3. Begin routine aerobic exercise for at least 30 minutes 5 days per week.  4. Continue on Eliquis 5 mg BID for stroke prophylaxis.   5. Continue on losartan 100 mg daily and amlodipine 5 mg BID for hypertension.   6. Continue on aspirin 81 mg for antiplatelet therapy.   7. Continue on atorvastatin 40 mg daily for hyperlipidemia.   8. Continue all other current medications.  9. F/up in 12 months, sooner if needed.    Scribed for Linda Tom MD by Ita Mohamud. 10/6/2021 11:36 EDT    I Linda Tom MD personally performed the services described in this documentation as scribed by the above individual in my presence, and it is both accurate and complete.    Linda Tom MD, FACC

## 2021-10-06 ENCOUNTER — OFFICE VISIT (OUTPATIENT)
Dept: CARDIOLOGY | Facility: CLINIC | Age: 86
End: 2021-10-06

## 2021-10-06 VITALS
HEIGHT: 72 IN | OXYGEN SATURATION: 97 % | WEIGHT: 175.4 LBS | SYSTOLIC BLOOD PRESSURE: 124 MMHG | HEART RATE: 86 BPM | DIASTOLIC BLOOD PRESSURE: 80 MMHG | BODY MASS INDEX: 23.76 KG/M2

## 2021-10-06 DIAGNOSIS — E78.5 HYPERLIPIDEMIA LDL GOAL <100: ICD-10-CM

## 2021-10-06 DIAGNOSIS — I48.0 PAROXYSMAL ATRIAL FIBRILLATION (HCC): Primary | ICD-10-CM

## 2021-10-06 DIAGNOSIS — I38 VALVULAR HEART DISEASE: ICD-10-CM

## 2021-10-06 DIAGNOSIS — I10 BENIGN ESSENTIAL HYPERTENSION: ICD-10-CM

## 2021-10-06 PROCEDURE — 99214 OFFICE O/P EST MOD 30 MIN: CPT | Performed by: INTERNAL MEDICINE

## 2021-10-25 ENCOUNTER — TELEPHONE (OUTPATIENT)
Dept: INTERNAL MEDICINE | Facility: CLINIC | Age: 86
End: 2021-10-25

## 2021-10-25 NOTE — TELEPHONE ENCOUNTER
Mirtazapine helps with sleep so take at night but other meds do not usually effect sleep so does not matter when to take them

## 2021-10-25 NOTE — TELEPHONE ENCOUNTER
Caller: Addie Araujo    Relationship: Emergency Contact    Best call back number: [phone]     What medications are you currently taking:   Current Outpatient Medications on File Prior to Visit   Medication Sig Dispense Refill   • amLODIPine (NORVASC) 5 MG tablet Take 1 tablet by mouth 2 (Two) Times a Day. 60 tablet 5   • aspirin 81 MG tablet Take  by mouth Daily.     • atorvastatin (LIPITOR) 40 MG tablet TAKE 1 TABLET EVERY DAY 90 tablet 1   • donepezil (ARICEPT) 10 MG tablet Take 1 tablet by mouth Daily. 30 tablet 6   • Eliquis 5 MG tablet tablet TAKE 1 TABLET BY MOUTH EVERY 12 HOURS 60 tablet 5   • famotidine (PEPCID) 20 MG tablet Take 1 tablet by mouth 2 (Two) Times a Day. 180 tablet 1   • finasteride (PROPECIA) 1 MG tablet Take 5 tablets by mouth Daily. 90 tablet 1   • levothyroxine (SYNTHROID, LEVOTHROID) 50 MCG tablet Take 1 tablet by mouth Daily. 90 tablet 1   • losartan (COZAAR) 100 MG tablet TAKE 1 TABLET BY MOUTH DAILY. 90 tablet 3   • memantine (NAMENDA) 10 MG tablet Take 1 tablet by mouth 2 (Two) Times a Day. 60 tablet 11   • metFORMIN (GLUCOPHAGE) 500 MG tablet TAKE 2 TABLETS TWICE DAILY 240 tablet 1   • mirtazapine (Remeron) 30 MG tablet Take 1 tablet by mouth Every Night. 30 tablet 11   • SITagliptin (Januvia) 100 MG tablet Take 1 tablet by mouth Daily. 90 tablet 3   • tamsulosin (FLOMAX) 0.4 MG capsule 24 hr capsule Take 1 capsule by mouth Daily. 90 capsule 1   • Viibryd 40 MG tablet tablet TAKE ONE TABLET BY MOUTH DAILY WITH FOOD 90 tablet 1     No current facility-administered medications on file prior to visit.          When did you start taking these medications:     Which medication are you concerned about:     Who prescribed you this medication:     What are your concerns: PATIENT'S DAUGHTER WOULD LIKE TO KNOW WHICH OF THE PATIENT'S PRESCRIPTIONS HE SHOULD TAKE IN THE MORNING AND WHICH SHOULD BE TAKEN AT NIGHT. PATIENT HAS BEEN COMPLAINING OF NOT BEING ABLE TO SLEEP WELL.     How long have  you had these concerns:    CALLBACK: YES

## 2021-10-27 ENCOUNTER — TELEPHONE (OUTPATIENT)
Dept: NEUROLOGY | Facility: CLINIC | Age: 86
End: 2021-10-27

## 2021-10-27 NOTE — TELEPHONE ENCOUNTER
Provider: BOB GARCIA    Caller: COOPER    Relationship to Patient: DAUGHTER    Phone Number: [phone]    Reason for Call: PT'S 6 MO F/U [facility] APPT HAD TO BE RESCHEDULED DUE TO NOT HAVING TRANSPORTATION (BOTH DAUGHTER AND SON WERE UNAVAILABLE TO BRING PT TO APPT).      When was the patient last seen: 04/15/21

## 2021-12-06 ENCOUNTER — OFFICE VISIT (OUTPATIENT)
Dept: INTERNAL MEDICINE | Facility: CLINIC | Age: 86
End: 2021-12-06

## 2021-12-06 ENCOUNTER — TELEPHONE (OUTPATIENT)
Dept: INTERNAL MEDICINE | Facility: CLINIC | Age: 86
End: 2021-12-06

## 2021-12-06 ENCOUNTER — LAB (OUTPATIENT)
Dept: LAB | Facility: HOSPITAL | Age: 86
End: 2021-12-06

## 2021-12-06 VITALS
TEMPERATURE: 97.3 F | DIASTOLIC BLOOD PRESSURE: 72 MMHG | WEIGHT: 166 LBS | SYSTOLIC BLOOD PRESSURE: 134 MMHG | BODY MASS INDEX: 22.48 KG/M2 | HEIGHT: 72 IN | HEART RATE: 74 BPM

## 2021-12-06 DIAGNOSIS — R26.81 UNSTEADY GAIT: ICD-10-CM

## 2021-12-06 DIAGNOSIS — D50.9 IRON DEFICIENCY ANEMIA, UNSPECIFIED IRON DEFICIENCY ANEMIA TYPE: ICD-10-CM

## 2021-12-06 DIAGNOSIS — R31.29 OTHER MICROSCOPIC HEMATURIA: ICD-10-CM

## 2021-12-06 DIAGNOSIS — E11.59 TYPE 2 DIABETES MELLITUS WITH OTHER CIRCULATORY COMPLICATIONS (HCC): Primary | ICD-10-CM

## 2021-12-06 DIAGNOSIS — I48.0 PAROXYSMAL ATRIAL FIBRILLATION (HCC): ICD-10-CM

## 2021-12-06 DIAGNOSIS — E11.59 TYPE 2 DIABETES MELLITUS WITH OTHER CIRCULATORY COMPLICATIONS (HCC): ICD-10-CM

## 2021-12-06 DIAGNOSIS — I10 BENIGN ESSENTIAL HYPERTENSION: ICD-10-CM

## 2021-12-06 DIAGNOSIS — Z23 NEED FOR VACCINATION: ICD-10-CM

## 2021-12-06 DIAGNOSIS — R41.3 MEMORY LOSS: ICD-10-CM

## 2021-12-06 PROCEDURE — 90662 IIV NO PRSV INCREASED AG IM: CPT | Performed by: INTERNAL MEDICINE

## 2021-12-06 PROCEDURE — 99215 OFFICE O/P EST HI 40 MIN: CPT | Performed by: INTERNAL MEDICINE

## 2021-12-06 PROCEDURE — G0008 ADMIN INFLUENZA VIRUS VAC: HCPCS | Performed by: INTERNAL MEDICINE

## 2021-12-06 NOTE — TELEPHONE ENCOUNTER
Caller: Addie Araujo    Relationship to patient: Emergency Contact    Best call back number: [phone]    Patient is needing: PATIENTS DAUGHTER IS WANTING TO KNOW IF DR VIVEROS WILL GET THE PATIENT A HANDICAP STICKER. PLEASE CALL DAUGHTER

## 2021-12-06 NOTE — PROGRESS NOTES
Chief Complaint   Patient presents with   • Hypertension   • Hyperlipidemia   • Diabetes   • Anemia   Counseling was given to patient and family for the following topics: diagnostic results, instructions for management, risk factor reductions, prognosis, patient and family education, impressions, risks and benefits of treatment options and importance of treatment compliance . Total time of the encounter was 42 minutes and 27 minutes was spend counseling.      History of Present Illness  87 y.o. white male presents for follow up on HTN and diabetes He is following up for anemia and denies any bleeding of urine or in his bowels. He has some weakness and has had a fall. He denies chest pain or shortess of breath.     Review of Systems   Respiratory: Negative for shortness of breath.    Cardiovascular: Negative for chest pain.   Gastrointestinal: Negative for abdominal pain.   Musculoskeletal: Positive for gait problem.   Neurological: Positive for weakness. Negative for dizziness.   Hematological: Does not bruise/bleed easily.   Psychiatric/Behavioral: Positive for decreased concentration.     .    PMSFH:  The following portions of the patient's history were reviewed and updated as appropriate: allergies, current medications, past family history, past medical history, past social history, past surgical history and problem list.      Current Outpatient Medications:   •  amLODIPine (NORVASC) 5 MG tablet, Take 1 tablet by mouth 2 (Two) Times a Day., Disp: 60 tablet, Rfl: 5  •  apixaban (Eliquis) 5 MG tablet tablet, Take 1 tablet by mouth Every 12 (Twelve) Hours., Disp: 60 tablet, Rfl: 5  •  aspirin 81 MG tablet, Take  by mouth Daily., Disp: , Rfl:   •  atorvastatin (LIPITOR) 40 MG tablet, TAKE 1 TABLET EVERY DAY, Disp: 90 tablet, Rfl: 1  •  donepezil (ARICEPT) 10 MG tablet, Take 1 tablet by mouth Daily., Disp: 30 tablet, Rfl: 6  •  famotidine (PEPCID) 20 MG tablet, Take 1 tablet by mouth 2 (Two) Times a Day., Disp: 180  "tablet, Rfl: 1  •  finasteride (PROPECIA) 1 MG tablet, Take 5 tablets by mouth Daily., Disp: 90 tablet, Rfl: 1  •  levothyroxine (SYNTHROID, LEVOTHROID) 50 MCG tablet, Take 1 tablet by mouth Daily., Disp: 90 tablet, Rfl: 1  •  losartan (COZAAR) 100 MG tablet, TAKE 1 TABLET BY MOUTH DAILY., Disp: 90 tablet, Rfl: 3  •  memantine (NAMENDA) 10 MG tablet, Take 1 tablet by mouth 2 (Two) Times a Day., Disp: 60 tablet, Rfl: 11  •  metFORMIN (GLUCOPHAGE) 500 MG tablet, TAKE 2 TABLETS TWICE DAILY, Disp: 240 tablet, Rfl: 1  •  mirtazapine (Remeron) 30 MG tablet, Take 1 tablet by mouth Every Night., Disp: 30 tablet, Rfl: 11  •  tamsulosin (FLOMAX) 0.4 MG capsule 24 hr capsule, Take 1 capsule by mouth Daily., Disp: 90 capsule, Rfl: 1  •  Viibryd 40 MG tablet tablet, TAKE ONE TABLET BY MOUTH DAILY WITH FOOD, Disp: 90 tablet, Rfl: 1    VITALS:  /72   Pulse 74   Temp 97.3 °F (36.3 °C)   Ht 182.9 cm (72.01\")   Wt 75.3 kg (166 lb)   BMI 22.51 kg/m²     Physical Exam  HENT:      Head: Normocephalic.   Cardiovascular:      Rate and Rhythm: Normal rate and regular rhythm.      Heart sounds: Murmur (II/VI SM ) heard.       Pulmonary:      Breath sounds: No wheezing or rales.   Psychiatric:         Mood and Affect: Mood normal.         Behavior: Behavior normal.         LABS:      Procedures         ASSESSMENT/PLAN:  Problems Addressed this Visit        Cardiac and Vasculature    Benign essential hypertension     Encouraged daughter to monitor blood pressure at home and she stated she would.          Relevant Orders    Comprehensive Metabolic Panel (Completed)    Microalbumin / Creatinine Urine Ratio - Urine, Clean Catch (Completed)    Paroxysmal atrial fibrillation (HCC)     Resume eliquis.             Endocrine and Metabolic    Type 2 diabetes mellitus with other circulatory complications (HCC) - Primary     Improving overall with diet and medications.          Relevant Orders    Microalbumin / Creatinine Urine Ratio - Urine, " Clean Catch (Completed)    Hemoglobin A1c (Completed)       Hematology and Neoplasia    Iron deficiency anemia     Had long discussion about getting cystoscope and EGD and colonoscopy.I went back through risk of missing bladder tumor, colon polyps, or colon cancer or an ulcer.  He and his daughter voiced understanding but declined scopes.          Relevant Orders    CBC & Differential (Completed)    Iron Profile (Completed)    Ferritin (Completed)    Reticulocytes (Completed)    Erythropoietin (Completed)       Neuro    Memory loss     Follow up labs and with Shelly Johnson.          Relevant Orders    Vitamin B12 (Completed)       Symptoms and Signs    Unsteady gait     Encourage to get up slowly and get bearings before taking first step. Keep home well lit with clear floors to avoid tripping. Use assist devices for all walking especially from bed to bathroom. He declined recommended rolling walker for now and he declined PT.           Other Visit Diagnoses     Other microscopic hematuria        Follow up labs     Relevant Orders    Urinalysis With Microscopic - Urine, Clean Catch (Completed)    Need for vaccination        Relevant Orders    Fluzone High-Dose 65+yrs (Completed)      Diagnoses       Codes Comments    Type 2 diabetes mellitus with other circulatory complications (HCC)    -  Primary ICD-10-CM: E11.59  ICD-9-CM: 250.70     Paroxysmal atrial fibrillation (HCC)     ICD-10-CM: I48.0  ICD-9-CM: 427.31     Iron deficiency anemia, unspecified iron deficiency anemia type     ICD-10-CM: D50.9  ICD-9-CM: 280.9     Memory loss     ICD-10-CM: R41.3  ICD-9-CM: 780.93     Other microscopic hematuria     ICD-10-CM: R31.29  ICD-9-CM: 599.72 Follow up labs     Benign essential hypertension     ICD-10-CM: I10  ICD-9-CM: 401.1     Need for vaccination     ICD-10-CM: Z23  ICD-9-CM: V05.9     Unsteady gait     ICD-10-CM: R26.81  ICD-9-CM: 781.2           FOLLOW-UP:  Return in about 6 months (around 6/6/2022) for Medicare  Wellness, Labs this visit.      Electronically signed by:    Edy Goodman MD

## 2021-12-07 LAB
ALBUMIN SERPL-MCNC: 4.3 G/DL (ref 3.5–5.2)
ALBUMIN/CREAT UR: 573 MG/G CREAT (ref 0–29)
ALBUMIN/GLOB SERPL: 1.3 G/DL
ALP SERPL-CCNC: 168 U/L (ref 39–117)
ALT SERPL-CCNC: 21 U/L (ref 1–41)
APPEARANCE UR: ABNORMAL
AST SERPL-CCNC: 22 U/L (ref 1–40)
BACTERIA #/AREA URNS HPF: ABNORMAL /HPF
BASOPHILS # BLD AUTO: 0.04 10*3/MM3 (ref 0–0.2)
BASOPHILS NFR BLD AUTO: 0.4 % (ref 0–1.5)
BILIRUB SERPL-MCNC: 0.5 MG/DL (ref 0–1.2)
BILIRUB UR QL STRIP: NEGATIVE
BUN SERPL-MCNC: 16 MG/DL (ref 8–23)
BUN/CREAT SERPL: 21.1 (ref 7–25)
CALCIUM SERPL-MCNC: 9.5 MG/DL (ref 8.6–10.5)
CASTS URNS MICRO: ABNORMAL
CHLORIDE SERPL-SCNC: 103 MMOL/L (ref 98–107)
CO2 SERPL-SCNC: 26.9 MMOL/L (ref 22–29)
COLOR UR: YELLOW
CREAT SERPL-MCNC: 0.76 MG/DL (ref 0.76–1.27)
CREAT UR-MCNC: 106.5 MG/DL
EOSINOPHIL # BLD AUTO: 0.16 10*3/MM3 (ref 0–0.4)
EOSINOPHIL NFR BLD AUTO: 1.6 % (ref 0.3–6.2)
EPI CELLS #/AREA URNS HPF: ABNORMAL /HPF
EPO SERPL-ACNC: 13.8 MIU/ML (ref 2.6–18.5)
ERYTHROCYTE [DISTWIDTH] IN BLOOD BY AUTOMATED COUNT: 17 % (ref 12.3–15.4)
FERRITIN SERPL-MCNC: 34.2 NG/ML (ref 30–400)
GLOBULIN SER CALC-MCNC: 3.2 GM/DL
GLUCOSE SERPL-MCNC: 174 MG/DL (ref 65–99)
GLUCOSE UR QL: NEGATIVE
HBA1C MFR BLD: 6.8 % (ref 4.8–5.6)
HCT VFR BLD AUTO: 35.8 % (ref 37.5–51)
HGB BLD-MCNC: 11.5 G/DL (ref 13–17.7)
HGB UR QL STRIP: ABNORMAL
IMM GRANULOCYTES # BLD AUTO: 0.04 10*3/MM3 (ref 0–0.05)
IMM GRANULOCYTES NFR BLD AUTO: 0.4 % (ref 0–0.5)
IRON SATN MFR SERPL: 15 % (ref 20–50)
IRON SERPL-MCNC: 68 MCG/DL (ref 59–158)
KETONES UR QL STRIP: NEGATIVE
LEUKOCYTE ESTERASE UR QL STRIP: ABNORMAL
LYMPHOCYTES # BLD AUTO: 1.89 10*3/MM3 (ref 0.7–3.1)
LYMPHOCYTES NFR BLD AUTO: 18.3 % (ref 19.6–45.3)
MCH RBC QN AUTO: 26.9 PG (ref 26.6–33)
MCHC RBC AUTO-ENTMCNC: 32.1 G/DL (ref 31.5–35.7)
MCV RBC AUTO: 83.8 FL (ref 79–97)
MICROALBUMIN UR-MCNC: 610.3 UG/ML
MONOCYTES # BLD AUTO: 0.73 10*3/MM3 (ref 0.1–0.9)
MONOCYTES NFR BLD AUTO: 7.1 % (ref 5–12)
NEUTROPHILS # BLD AUTO: 7.44 10*3/MM3 (ref 1.7–7)
NEUTROPHILS NFR BLD AUTO: 72.2 % (ref 42.7–76)
NITRITE UR QL STRIP: NEGATIVE
NRBC BLD AUTO-RTO: 0 /100 WBC (ref 0–0.2)
PH UR STRIP: 5.5 [PH] (ref 5–8)
PLATELET # BLD AUTO: 413 10*3/MM3 (ref 140–450)
POTASSIUM SERPL-SCNC: 4.6 MMOL/L (ref 3.5–5.2)
PROT SERPL-MCNC: 7.5 G/DL (ref 6–8.5)
PROT UR QL STRIP: ABNORMAL
RBC # BLD AUTO: 4.27 10*6/MM3 (ref 4.14–5.8)
RBC #/AREA URNS HPF: ABNORMAL /HPF
RETICS/RBC NFR AUTO: 1.44 % (ref 0.7–1.9)
SODIUM SERPL-SCNC: 141 MMOL/L (ref 136–145)
SP GR UR: 1.02 (ref 1–1.03)
TIBC SERPL-MCNC: 444 MCG/DL
UIBC SERPL-MCNC: 444 MCG/DL (ref 112–346)
UROBILINOGEN UR STRIP-MCNC: ABNORMAL MG/DL
VIT B12 SERPL-MCNC: 466 PG/ML (ref 211–946)
WBC # BLD AUTO: 10.3 10*3/MM3 (ref 3.4–10.8)
WBC #/AREA URNS HPF: ABNORMAL /HPF

## 2021-12-07 NOTE — ASSESSMENT & PLAN NOTE
Had long discussion about getting cystoscope and EGD and colonoscopy.I went back through risk of missing bladder tumor, colon polyps, or colon cancer or an ulcer.  He and his daughter voiced understanding but declined scopes.

## 2021-12-23 DIAGNOSIS — F02.818 LATE ONSET ALZHEIMER'S DISEASE WITH BEHAVIORAL DISTURBANCE (HCC): ICD-10-CM

## 2021-12-23 DIAGNOSIS — G30.1 LATE ONSET ALZHEIMER'S DISEASE WITH BEHAVIORAL DISTURBANCE (HCC): ICD-10-CM

## 2021-12-27 RX ORDER — TAMSULOSIN HYDROCHLORIDE 0.4 MG/1
CAPSULE ORAL
Qty: 90 CAPSULE | Refills: 1 | Status: SHIPPED | OUTPATIENT
Start: 2021-12-27

## 2021-12-27 RX ORDER — AMLODIPINE BESYLATE 5 MG/1
TABLET ORAL
Qty: 180 TABLET | Refills: 1 | Status: SHIPPED | OUTPATIENT
Start: 2021-12-27 | End: 2022-02-09 | Stop reason: HOSPADM

## 2021-12-27 RX ORDER — FAMOTIDINE 20 MG/1
TABLET, FILM COATED ORAL
Qty: 180 TABLET | Refills: 1 | Status: SHIPPED | OUTPATIENT
Start: 2021-12-27

## 2021-12-27 RX ORDER — DONEPEZIL HYDROCHLORIDE 10 MG/1
TABLET, FILM COATED ORAL
Qty: 90 TABLET | Refills: 1 | Status: SHIPPED | OUTPATIENT
Start: 2021-12-27 | End: 2023-01-31

## 2021-12-30 ENCOUNTER — TELEPHONE (OUTPATIENT)
Dept: INTERNAL MEDICINE | Facility: CLINIC | Age: 86
End: 2021-12-30

## 2021-12-30 NOTE — TELEPHONE ENCOUNTER
Caller: Addie Araujo    Relationship: Emergency Contact    Best call back number: [phone]        What test was performed: BLOOD AND URINE     When was the test performed: ABOUT THREE WEEKS AGO     Where was the test performed: IN OFFICE     Additional notes: PLEASE CALL THE PATIENTS DAUGHTER TO LET HER KNOW IF THE RESULTS ARE IN

## 2022-01-14 ENCOUNTER — TELEPHONE (OUTPATIENT)
Dept: INTERNAL MEDICINE | Facility: CLINIC | Age: 87
End: 2022-01-14

## 2022-01-14 NOTE — TELEPHONE ENCOUNTER
Caller: [facility] - [facility]     Relationship: HOSPICE CARE    Best call back number: [phone]    What is your medical concern? PATIENT IS REQUESTING HOSPICE CARE. CICI IS CALLING TO SEE IF DR VIVEROS WILL CONTINUE TO FOLLOW THE PATIENT WHILE IN HOSPICE CARE. PLEASE CALL AND ADVISE

## 2022-01-14 NOTE — TELEPHONE ENCOUNTER
Caller: Cooper Araujo    Relationship: Emergency Contact    Best call back number: [phone]     What is the medical concern/diagnosis: NOT EATING, NOT TAKING MEDICATION, AND STARING OFF FOR ONE WEEK    What specialty or service is being requested: HOSPICE   What is the provider, practice or medical service name: UNSURE     What is the office location:A HOSPICE IN [place] TO PERHAPS COME INTO THE PATIENT'S HOME AND EVALUATE HIM     What is the office phone number: UNSURE     Any additional details:[facility] [phone]    What Type Of Note Output Would You Prefer (Optional)?: Standard Output How Severe Is Your Rash?: mild Is This A New Presentation, Or A Follow-Up?: Rash Additional History: Pt states the rash came after he started a high blood pressure medication. He has taken prednisone multiple times for rash and things come back after he’s off of it. He also tried TAC cream to help and states it didn’t help at all.

## 2022-01-17 ENCOUNTER — LAB (OUTPATIENT)
Dept: LAB | Facility: HOSPITAL | Age: 87
End: 2022-01-17

## 2022-01-17 ENCOUNTER — OFFICE VISIT (OUTPATIENT)
Dept: INTERNAL MEDICINE | Facility: CLINIC | Age: 87
End: 2022-01-17

## 2022-01-17 ENCOUNTER — TRANSCRIBE ORDERS (OUTPATIENT)
Dept: LAB | Facility: HOSPITAL | Age: 87
End: 2022-01-17

## 2022-01-17 VITALS
HEART RATE: 69 BPM | HEIGHT: 72 IN | WEIGHT: 151.4 LBS | BODY MASS INDEX: 20.51 KG/M2 | SYSTOLIC BLOOD PRESSURE: 130 MMHG | DIASTOLIC BLOOD PRESSURE: 91 MMHG | TEMPERATURE: 97.8 F

## 2022-01-17 DIAGNOSIS — F02.818 LATE ONSET ALZHEIMER'S DISEASE WITH BEHAVIORAL DISTURBANCE: ICD-10-CM

## 2022-01-17 DIAGNOSIS — R62.7 FAILURE TO THRIVE IN ADULT: Primary | ICD-10-CM

## 2022-01-17 DIAGNOSIS — E03.9 HYPOTHYROIDISM, UNSPECIFIED TYPE: ICD-10-CM

## 2022-01-17 DIAGNOSIS — G30.1 LATE ONSET ALZHEIMER'S DISEASE WITH BEHAVIORAL DISTURBANCE: ICD-10-CM

## 2022-01-17 DIAGNOSIS — R63.4 WEIGHT LOSS, UNINTENTIONAL: ICD-10-CM

## 2022-01-17 PROCEDURE — 99215 OFFICE O/P EST HI 40 MIN: CPT | Performed by: STUDENT IN AN ORGANIZED HEALTH CARE EDUCATION/TRAINING PROGRAM

## 2022-01-17 NOTE — PROGRESS NOTES
"Chief Complaint  Leobardo Araujo is a 87 y.o. male presenting for poor appetite (starring off into space ) and Fatigue.     Patient has a past medical history of aortic stenosis status post replacement, PVD, hypertension, PAF, hyperlipidemia, T2DM, vitamin B12 deficiency, hypothyroidism, colon polyp, erectile dysfunction, BPH with obstruction, kidney stone, recurrent UTI, bladder cancer, iron deficiency anemia, MDD, memory loss, metabolic encephalopathy, YSABEL and unsteady gait.    History of Present Illness  Patient is here accompanied by his son and daughter.    They are concerned due to ongoing significant weight loss and total loss of appetite. He has lost a total of 25 pounds since October 2021, where of 15 pounds since December. He just has no appetite, he barely takes fluids. Last few days he has not wanted to take his medications. He used to be able to fix his own food, now he just pushes food away when presented, even food that he used to like. He also used to like ice cream and does not want it. He drinks smaller amounts of fluids. Patient is not reporting any pain. He states he has not lost the will to live, he still wants to continue living. He denies    The following portions of the patient's history were reviewed and updated as appropriate: allergies, current medications, past family history, past medical history, past social history, past surgical history and problem list.    Objective  /91 (BP Location: Left arm, Patient Position: Sitting, Cuff Size: Adult)   Pulse 69   Temp 97.8 °F (36.6 °C) (Temporal)   Ht 182.9 cm (72.01\")   Wt 68.7 kg (151 lb 6.4 oz)   BMI 20.53 kg/m²     Physical Exam  Vitals reviewed.   Constitutional:       Appearance: Normal appearance.   HENT:      Head: Normocephalic and atraumatic.      Nose: No congestion.   Eyes:      Extraocular Movements: Extraocular movements intact.      Conjunctiva/sclera: Conjunctivae normal.   Cardiovascular:      Rate and Rhythm: Normal " rate and regular rhythm.      Heart sounds: Normal heart sounds. No murmur heard.      Pulmonary:      Effort: Pulmonary effort is normal.      Breath sounds: Normal breath sounds.   Abdominal:      General: There is no distension.      Palpations: Abdomen is soft. There is no mass.      Tenderness: There is no abdominal tenderness.   Musculoskeletal:      Cervical back: Neck supple.      Right lower leg: No edema.      Left lower leg: No edema.   Skin:     General: Skin is warm and dry.   Neurological:      Mental Status: He is alert.      Motor: Weakness present.      Comments: Patient is able to get onto the exam table with assistance.   Psychiatric:         Behavior: Behavior normal.         Thought Content: Thought content normal.         Assessment/Plan   1. Failure to thrive in adult  2. Weight loss, unintentional  Unclear cause at this time. We will check blood work as ordered. Encouraged to ensure adequate fluid intake, at least 30 fluid ounces daily if possible. I recommend follow-up with Dr. Goodman later this week to further evaluate.  - CBC (No Diff); Future  - Comprehensive Metabolic Panel; Future  - Urinalysis With Culture If Indicated -; Future    3. Hypothyroidism, unspecified type  Will recheck thyroid function.  - TSH; Future  - T4, Free; Future    4. Late onset Alzheimer's disease with behavioral disturbance (HCC)  May be contributing to his loss of appetite.    Total time spent on chart review, charting and face-to-face with patient and family members 45 minutes.    Return in about 3 days (around 1/20/2022), or With Dr. Goodman.    Future Appointments       Provider Department Center    1/20/2022 12:45 PM Edy Goodman MD [facility] INTERNAL MEDICINE [facility]    5/12/2022 2:30 PM Shelly Johnson PA-C [facility] NEUROLOGY [facility]    8/8/2022 12:45 PM Edy Goodman MD [facility] INTERNAL MEDICINE [facility]    10/12/2022 1:30 PM Linda Tom,  MD [facility] CARDIOLOGY [facility] Cintron MD  Family Medicine  01/17/2022

## 2022-01-18 LAB
ALBUMIN SERPL-MCNC: 3.6 G/DL (ref 3.5–5.2)
ALBUMIN/GLOB SERPL: 1.1 G/DL
ALP SERPL-CCNC: 101 U/L (ref 39–117)
ALT SERPL-CCNC: 7 U/L (ref 1–41)
AST SERPL-CCNC: 18 U/L (ref 1–40)
BILIRUB SERPL-MCNC: 0.7 MG/DL (ref 0–1.2)
BUN SERPL-MCNC: 24 MG/DL (ref 8–23)
BUN/CREAT SERPL: 29.3 (ref 7–25)
CALCIUM SERPL-MCNC: 9.3 MG/DL (ref 8.6–10.5)
CHLORIDE SERPL-SCNC: 104 MMOL/L (ref 98–107)
CO2 SERPL-SCNC: 26 MMOL/L (ref 22–29)
CREAT SERPL-MCNC: 0.82 MG/DL (ref 0.76–1.27)
ERYTHROCYTE [DISTWIDTH] IN BLOOD BY AUTOMATED COUNT: 15.9 % (ref 12.3–15.4)
GLOBULIN SER CALC-MCNC: 3.4 GM/DL
GLUCOSE SERPL-MCNC: 306 MG/DL (ref 65–99)
HCT VFR BLD AUTO: 41.9 % (ref 37.5–51)
HGB BLD-MCNC: 12.8 G/DL (ref 13–17.7)
MCH RBC QN AUTO: 26.5 PG (ref 26.6–33)
MCHC RBC AUTO-ENTMCNC: 30.5 G/DL (ref 31.5–35.7)
MCV RBC AUTO: 86.7 FL (ref 79–97)
PLATELET # BLD AUTO: 248 10*3/MM3 (ref 140–450)
POTASSIUM SERPL-SCNC: 4.1 MMOL/L (ref 3.5–5.2)
PROT SERPL-MCNC: 7 G/DL (ref 6–8.5)
RBC # BLD AUTO: 4.83 10*6/MM3 (ref 4.14–5.8)
SODIUM SERPL-SCNC: 145 MMOL/L (ref 136–145)
T4 FREE SERPL-MCNC: 1.38 NG/DL (ref 0.93–1.7)
TSH SERPL DL<=0.005 MIU/L-ACNC: 2.07 UIU/ML (ref 0.27–4.2)
WBC # BLD AUTO: 8.94 10*3/MM3 (ref 3.4–10.8)

## 2022-01-20 ENCOUNTER — TELEMEDICINE (OUTPATIENT)
Dept: INTERNAL MEDICINE | Facility: CLINIC | Age: 87
End: 2022-01-20

## 2022-01-20 ENCOUNTER — TELEPHONE (OUTPATIENT)
Dept: INTERNAL MEDICINE | Facility: CLINIC | Age: 87
End: 2022-01-20

## 2022-01-20 DIAGNOSIS — I73.9 PERIPHERAL VASCULAR DISEASE: ICD-10-CM

## 2022-01-20 DIAGNOSIS — Z91.81 AT HIGH RISK FOR FALLS: ICD-10-CM

## 2022-01-20 DIAGNOSIS — C67.9 MALIGNANT NEOPLASM OF URINARY BLADDER, UNSPECIFIED SITE: Primary | ICD-10-CM

## 2022-01-20 DIAGNOSIS — N39.0 RECURRENT UTI (URINARY TRACT INFECTION): ICD-10-CM

## 2022-01-20 DIAGNOSIS — M62.81 GENERALIZED MUSCLE WEAKNESS: ICD-10-CM

## 2022-01-20 DIAGNOSIS — R63.4 WEIGHT LOSS, UNINTENTIONAL: ICD-10-CM

## 2022-01-20 DIAGNOSIS — R26.81 UNSTEADY GAIT: ICD-10-CM

## 2022-01-20 DIAGNOSIS — F33.0 MILD EPISODE OF RECURRENT MAJOR DEPRESSIVE DISORDER: ICD-10-CM

## 2022-01-20 DIAGNOSIS — R62.7 FAILURE TO THRIVE IN ADULT: ICD-10-CM

## 2022-01-20 DIAGNOSIS — I48.0 PAROXYSMAL ATRIAL FIBRILLATION: ICD-10-CM

## 2022-01-20 DIAGNOSIS — E11.59 TYPE 2 DIABETES MELLITUS WITH OTHER CIRCULATORY COMPLICATIONS: ICD-10-CM

## 2022-01-20 PROCEDURE — 99215 OFFICE O/P EST HI 40 MIN: CPT | Performed by: INTERNAL MEDICINE

## 2022-01-20 RX ORDER — AMOXICILLIN 875 MG/1
875 TABLET, COATED ORAL 2 TIMES DAILY
Qty: 14 TABLET | Refills: 0 | Status: SHIPPED | OUTPATIENT
Start: 2022-01-20 | End: 2022-01-25

## 2022-01-20 NOTE — PROGRESS NOTES
Chief Complaint   Patient presents with   • Weakness - Generalized   • Weight Loss   This was an audio and video enabled telemedicine encounter.His daughter, son and him gave consent to yes to video visit because of COVID and did doximity video visit. Counseling was given to patient and family for the following topics: diagnostic results, instructions for management, risk factor reductions, prognosis, patient and family education, impressions, risks and benefits of treatment options and importance of treatment compliance . Total time of the encounter was 85 minutes and 55 minutes was spend counseling.      History of Present Illness  87 y.o. white male presents for evaluation of weight loss. He has been losing weight with decreased appetite and decreased intake and has increased weakness. He has trouble getting out of the bed. His memory loss is worse. His recent labs showed quite elevated sugar and has not been taking any of his medications. His urine showed only 4000 colonies of growth. He has some shortness of breath.     Review of Systems   Constitutional: Positive for appetite change (decreased appetite ), fatigue and unexpected weight change (weight loss).   Musculoskeletal: Positive for gait problem.   Neurological: Positive for weakness.   Psychiatric/Behavioral: Positive for decreased concentration and sleep disturbance.     .    PMSFH:  The following portions of the patient's history were reviewed and updated as appropriate: allergies, current medications, past family history, past medical history, past social history, past surgical history and problem list.      Current Outpatient Medications:   •  amLODIPine (NORVASC) 5 MG tablet, TAKE 1 TABLET TWICE DAILY, Disp: 180 tablet, Rfl: 1  •  amoxicillin (AMOXIL) 875 MG tablet, Take 1 tablet by mouth 2 (Two) Times a Day for 7 days., Disp: 14 tablet, Rfl: 0  •  apixaban (Eliquis) 5 MG tablet tablet, Take 1 tablet by mouth Every 12 (Twelve) Hours., Disp: 60  tablet, Rfl: 5  •  aspirin 81 MG tablet, Take  by mouth Daily., Disp: , Rfl:   •  atorvastatin (LIPITOR) 40 MG tablet, TAKE 1 TABLET EVERY DAY, Disp: 90 tablet, Rfl: 1  •  donepezil (ARICEPT) 10 MG tablet, TAKE 1 TABLET EVERY DAY, Disp: 90 tablet, Rfl: 1  •  famotidine (PEPCID) 20 MG tablet, TAKE 1 TABLET TWICE DAILY, Disp: 180 tablet, Rfl: 1  •  finasteride (PROPECIA) 1 MG tablet, Take 5 tablets by mouth Daily., Disp: 90 tablet, Rfl: 1  •  levothyroxine (SYNTHROID, LEVOTHROID) 50 MCG tablet, TAKE 1 TABLET EVERY DAY, Disp: 90 tablet, Rfl: 1  •  losartan (COZAAR) 100 MG tablet, TAKE 1 TABLET BY MOUTH DAILY., Disp: 90 tablet, Rfl: 3  •  memantine (NAMENDA) 10 MG tablet, Take 1 tablet by mouth 2 (Two) Times a Day., Disp: 60 tablet, Rfl: 11  •  metFORMIN (GLUCOPHAGE) 500 MG tablet, TAKE 2 TABLETS TWICE DAILY, Disp: 240 tablet, Rfl: 1  •  mirtazapine (Remeron) 30 MG tablet, Take 1 tablet by mouth Every Night., Disp: 30 tablet, Rfl: 11  •  tamsulosin (FLOMAX) 0.4 MG capsule 24 hr capsule, TAKE 1 CAPSULE EVERY DAY, Disp: 90 capsule, Rfl: 1    VITALS:  There were no vitals taken for this visit.    Physical Exam  Constitutional:       Comments: Chronic ill appearing    Neurological:      Comments: Some mild confusion He has quite slow get up and go and difficulty getting up and unsteady gait          LABS:      Procedures         ASSESSMENT/PLAN:  Problems Addressed this Visit        Cardiac and Vasculature    Paroxysmal atrial fibrillation (HCC)     Follow with cardiology and recent labs were ok regarding electrolytes          Peripheral vascular disease (HCC)     Follow blood pressure             Endocrine and Metabolic    Type 2 diabetes mellitus with other circulatory complications (HCC)     Diabetes is worsening.   acute issue with blood sugar >300 and encouraged to resume medicine  Diabetes will be reassessed in 3 months.         Weight loss, unintentional     He has lost 24 pounds in 3 months and will proceed with CT  scan of chest abdomen and pelvis          Relevant Orders    Ambulatory Referral to Home Health (Completed)    Hospital Bed    CT Abdomen Pelvis Without Contrast    CT Chest Without Contrast Diagnostic       Genitourinary and Reproductive     Recurrent UTI (urinary tract infection)     His urine grew 4000 colonoscopies prelim and have sent in antibiotics but explained to daughter typically UTI need 100,000             Hematology and Neoplasia    Malignant tumor of urinary bladder (HCC) - Primary     He has previously declined follow up. He continues to declined seeing Urology or Oncology However his family did agree to proceeding with scan to follow up with his weight loss.          Relevant Orders    CT Abdomen Pelvis Without Contrast    CT Chest Without Contrast Diagnostic       Mental Health    Recurrent major depressive disorder (HCC)     Patient's depression is recurrent and is mild without psychosis. Their depression is currently active and the condition is worsening. This will be reassessed in 4 weeks. F/U as described:resume remeron .            Musculoskeletal and Injuries    Generalized muscle weakness     High fall risk and challenge to get places and trouble getting out of bed. Get hospital bed because of weakness and struggling. This was a face to face visit via video visit          Relevant Orders    Ambulatory Referral to [facility] (Completed)    Hospital Bed    CT Abdomen Pelvis Without Contrast    CT Chest Without Contrast Diagnostic       Symptoms and Signs    Failure to thrive in adult     87 y.o. white male presents for evaluation of weight loss. He has been losing weight with decreased appetite and decreased intake and has increased weakness. He has trouble getting out of the bed. His memory loss is worse. His recent labs showed quite elevated sugar and has not been taking any of his medications. His urine showed only 4000 colonies of growth. He has some shortness of breath.          Relevant  Orders    Ambulatory Referral to Home Health (Completed)    Hospital Bed    CT Abdomen Pelvis Without Contrast    CT Chest Without Contrast Diagnostic    Unsteady gait     Encourage to get up slowly and get bearings before taking first step. Keep home well lit with clear floors to avoid tripping. Use assist devices for all walking especially from bed to bathroom.          Relevant Orders    Ambulatory Referral to Home Health (Completed)    Hospital Bed      Other Visit Diagnoses     At high risk for falls        Relevant Orders    Ambulatory Referral to Home Health (Completed)    Hospital Bed      Diagnoses       Codes Comments    Malignant neoplasm of urinary bladder, unspecified site (HCC)    -  Primary ICD-10-CM: C67.9  ICD-9-CM: 188.9     Recurrent UTI (urinary tract infection)     ICD-10-CM: N39.0  ICD-9-CM: 599.0     Weight loss, unintentional     ICD-10-CM: R63.4  ICD-9-CM: 783.21     Mild episode of recurrent major depressive disorder (HCC)     ICD-10-CM: F33.0  ICD-9-CM: 296.31     Type 2 diabetes mellitus with other circulatory complications (HCC)     ICD-10-CM: E11.59  ICD-9-CM: 250.70     Peripheral vascular disease (HCC)     ICD-10-CM: I73.9  ICD-9-CM: 443.9     Paroxysmal atrial fibrillation (HCC)     ICD-10-CM: I48.0  ICD-9-CM: 427.31     Generalized muscle weakness     ICD-10-CM: M62.81  ICD-9-CM: 728.87     At high risk for falls     ICD-10-CM: Z91.81  ICD-9-CM: V15.88     Unsteady gait     ICD-10-CM: R26.81  ICD-9-CM: 781.2     Failure to thrive in adult     ICD-10-CM: R62.7  ICD-9-CM: 783.7           FOLLOW-UP:  Return in 1 month (on 2/20/2022).      Electronically signed by:    Edy Goodman MD

## 2022-01-20 NOTE — TELEPHONE ENCOUNTER
Caller: Addie Araujo    Relationship: Emergency Contact    Best call back number: [phone]    What medication are you requesting: APPETITE STIMULATOR AND ANTIBIOTIC    What are your current symptoms: NA    How long have you been experiencing symptoms: NA    Have you had these symptoms before:    [x] Yes  [x] No    Have you been treated for these symptoms before:   [x] Yes  [x] No    If a prescription is needed, what is your preferred pharmacy and phone number: [address] [phone] [facility] [phone] [facility]     Additional notes:  PATIENTS DAUGHTER CALLED TO CHECK STATUS OF PRESCRIPTION THAT DOCTOR VIVEROS WAS GOING TO PRESCRIBE.  DAUGHTER WAS NOT SURE IF DOCTOR FELICE WAS GOING TO ALSO PRESCRIBE AN ANTIBIOTIC.    REQUESTS CALL BACK

## 2022-01-20 NOTE — TELEPHONE ENCOUNTER
Spoke with pt daughter and she wants to know why you sent the antibiotic since you said the pt didn't have any signs of a UTI. She said she really needs the appetite stimulant sent.

## 2022-01-21 ENCOUNTER — TELEPHONE (OUTPATIENT)
Dept: INTERNAL MEDICINE | Facility: CLINIC | Age: 87
End: 2022-01-21

## 2022-01-21 RX ORDER — LEVOTHYROXINE SODIUM 0.05 MG/1
TABLET ORAL
Qty: 90 TABLET | Refills: 1 | Status: SHIPPED | OUTPATIENT
Start: 2022-01-21 | End: 2022-02-09 | Stop reason: HOSPADM

## 2022-01-22 PROBLEM — M62.81 GENERALIZED MUSCLE WEAKNESS: Status: ACTIVE | Noted: 2022-01-22

## 2022-01-23 NOTE — ASSESSMENT & PLAN NOTE
High fall risk and challenge to get places and trouble getting out of bed. Get hospital bed because of weakness and struggling. This was a face to face visit via video visit

## 2022-01-23 NOTE — ASSESSMENT & PLAN NOTE
He has previously declined follow up. He continues to declined seeing Urology or Oncology However his family did agree to proceeding with scan to follow up with his weight loss.

## 2022-01-23 NOTE — ASSESSMENT & PLAN NOTE
Patient's depression is recurrent and is mild without psychosis. Their depression is currently active and the condition is worsening. This will be reassessed in 4 weeks. F/U as described:resume remeron .

## 2022-01-23 NOTE — ASSESSMENT & PLAN NOTE
Diabetes is worsening.   acute issue with blood sugar >300 and encouraged to resume medicine  Diabetes will be reassessed in 3 months.

## 2022-01-23 NOTE — ASSESSMENT & PLAN NOTE
87 y.o. white male presents for evaluation of weight loss. He has been losing weight with decreased appetite and decreased intake and has increased weakness. He has trouble getting out of the bed. His memory loss is worse. His recent labs showed quite elevated sugar and has not been taking any of his medications. His urine showed only 4000 colonies of growth. He has some shortness of breath.

## 2022-01-23 NOTE — PATIENT INSTRUCTIONS

## 2022-01-23 NOTE — ASSESSMENT & PLAN NOTE
His urine grew 4000 colonoscopies prelim and have sent in antibiotics but explained to daughter typically UTI need 100,000

## 2022-01-24 LAB
APPEARANCE UR: ABNORMAL
BACTERIA #/AREA URNS HPF: ABNORMAL /HPF
BACTERIA UR CULT: ABNORMAL
BILIRUB UR QL STRIP: NEGATIVE
CASTS URNS QL MICRO: ABNORMAL /LPF
COLOR UR: YELLOW
EPI CELLS #/AREA URNS HPF: ABNORMAL /HPF (ref 0–10)
GLUCOSE UR QL STRIP: NEGATIVE
HGB UR QL STRIP: ABNORMAL
KETONES UR QL STRIP: ABNORMAL
LEUKOCYTE ESTERASE UR QL STRIP: ABNORMAL
MICRO URNS: ABNORMAL
NITRITE UR QL STRIP: NEGATIVE
OTHER ANTIBIOTIC SUSC ISLT: ABNORMAL
PH UR STRIP: 5.5 [PH] (ref 5–7.5)
PROT UR QL STRIP: ABNORMAL
RBC #/AREA URNS HPF: >30 /HPF (ref 0–2)
SP GR UR STRIP: 1.02 (ref 1–1.03)
URINALYSIS REFLEX: ABNORMAL
UROBILINOGEN UR STRIP-MCNC: 0.2 MG/DL (ref 0.2–1)
WBC #/AREA URNS HPF: >30 /HPF (ref 0–5)

## 2022-01-24 NOTE — TELEPHONE ENCOUNTER
I have sent in referral for Home Health and request for hospital bed for him and they want to get at [facility] on [address] in [place]

## 2022-01-25 ENCOUNTER — APPOINTMENT (OUTPATIENT)
Dept: CT IMAGING | Facility: HOSPITAL | Age: 87
End: 2022-01-25

## 2022-01-25 ENCOUNTER — HOSPITAL ENCOUNTER (INPATIENT)
Facility: HOSPITAL | Age: 87
LOS: 15 days | Discharge: NURSING, REHAB OR LONG-TERM CARE | End: 2022-02-09
Attending: EMERGENCY MEDICINE | Admitting: INTERNAL MEDICINE

## 2022-01-25 DIAGNOSIS — U07.1 COVID-19 VIRUS INFECTION: ICD-10-CM

## 2022-01-25 DIAGNOSIS — R13.10 DYSPHAGIA, UNSPECIFIED TYPE: ICD-10-CM

## 2022-01-25 DIAGNOSIS — R53.1 GENERALIZED WEAKNESS: ICD-10-CM

## 2022-01-25 DIAGNOSIS — N39.0 ACUTE UTI: Primary | ICD-10-CM

## 2022-01-25 DIAGNOSIS — R63.4 WEIGHT LOSS, UNINTENTIONAL: ICD-10-CM

## 2022-01-25 DIAGNOSIS — M62.81 GENERALIZED MUSCLE WEAKNESS: ICD-10-CM

## 2022-01-25 DIAGNOSIS — R41.82 ALTERED MENTAL STATUS, UNSPECIFIED ALTERED MENTAL STATUS TYPE: ICD-10-CM

## 2022-01-25 DIAGNOSIS — N39.0 COMPLICATED UTI (URINARY TRACT INFECTION): ICD-10-CM

## 2022-01-25 PROBLEM — F03.90 DEMENTIA WITHOUT BEHAVIORAL DISTURBANCE (HCC): Chronic | Status: ACTIVE | Noted: 2022-01-25

## 2022-01-25 LAB
ALBUMIN SERPL-MCNC: 3.2 G/DL (ref 3.5–5.2)
ALBUMIN/GLOB SERPL: 0.8 G/DL
ALP SERPL-CCNC: 96 U/L (ref 39–117)
ALT SERPL W P-5'-P-CCNC: 5 U/L (ref 1–41)
ANION GAP SERPL CALCULATED.3IONS-SCNC: 11 MMOL/L (ref 5–15)
AST SERPL-CCNC: 13 U/L (ref 1–40)
BACTERIA UR QL AUTO: ABNORMAL /HPF
BASOPHILS # BLD AUTO: 0.01 10*3/MM3 (ref 0–0.2)
BASOPHILS NFR BLD AUTO: 0.1 % (ref 0–1.5)
BILIRUB SERPL-MCNC: 0.6 MG/DL (ref 0–1.2)
BILIRUB UR QL STRIP: NEGATIVE
BUN SERPL-MCNC: 23 MG/DL (ref 8–23)
BUN/CREAT SERPL: 29.9 (ref 7–25)
CALCIUM SPEC-SCNC: 8.9 MG/DL (ref 8.6–10.5)
CHLORIDE SERPL-SCNC: 102 MMOL/L (ref 98–107)
CLARITY UR: ABNORMAL
CO2 SERPL-SCNC: 25 MMOL/L (ref 22–29)
COLOR UR: YELLOW
CREAT SERPL-MCNC: 0.77 MG/DL (ref 0.76–1.27)
CRP SERPL-MCNC: 3.73 MG/DL (ref 0–0.5)
D-LACTATE SERPL-SCNC: 1.9 MMOL/L (ref 0.5–2)
DEPRECATED RDW RBC AUTO: 47.8 FL (ref 37–54)
EOSINOPHIL # BLD AUTO: 0.28 10*3/MM3 (ref 0–0.4)
EOSINOPHIL NFR BLD AUTO: 3.6 % (ref 0.3–6.2)
ERYTHROCYTE [DISTWIDTH] IN BLOOD BY AUTOMATED COUNT: 16.3 % (ref 12.3–15.4)
FERRITIN SERPL-MCNC: 109.9 NG/ML (ref 30–400)
FLUAV RNA RESP QL NAA+PROBE: NOT DETECTED
FLUBV RNA RESP QL NAA+PROBE: NOT DETECTED
GFR SERPL CREATININE-BSD FRML MDRD: 96 ML/MIN/1.73
GLOBULIN UR ELPH-MCNC: 4.1 GM/DL
GLUCOSE SERPL-MCNC: 180 MG/DL (ref 65–99)
GLUCOSE UR STRIP-MCNC: NEGATIVE MG/DL
HCT VFR BLD AUTO: 35.3 % (ref 37.5–51)
HGB BLD-MCNC: 11.3 G/DL (ref 13–17.7)
HGB UR QL STRIP.AUTO: ABNORMAL
HYALINE CASTS UR QL AUTO: ABNORMAL /LPF
IMM GRANULOCYTES # BLD AUTO: 0.03 10*3/MM3 (ref 0–0.05)
IMM GRANULOCYTES NFR BLD AUTO: 0.4 % (ref 0–0.5)
KETONES UR QL STRIP: ABNORMAL
LDH SERPL-CCNC: 221 U/L (ref 135–225)
LEUKOCYTE ESTERASE UR QL STRIP.AUTO: ABNORMAL
LYMPHOCYTES # BLD AUTO: 1.49 10*3/MM3 (ref 0.7–3.1)
LYMPHOCYTES NFR BLD AUTO: 19.4 % (ref 19.6–45.3)
MAGNESIUM SERPL-MCNC: 1.7 MG/DL (ref 1.6–2.4)
MCH RBC QN AUTO: 26.3 PG (ref 26.6–33)
MCHC RBC AUTO-ENTMCNC: 32 G/DL (ref 31.5–35.7)
MCV RBC AUTO: 82.1 FL (ref 79–97)
MONOCYTES # BLD AUTO: 0.63 10*3/MM3 (ref 0.1–0.9)
MONOCYTES NFR BLD AUTO: 8.2 % (ref 5–12)
NEUTROPHILS NFR BLD AUTO: 5.26 10*3/MM3 (ref 1.7–7)
NEUTROPHILS NFR BLD AUTO: 68.3 % (ref 42.7–76)
NITRITE UR QL STRIP: NEGATIVE
NRBC BLD AUTO-RTO: 0 /100 WBC (ref 0–0.2)
PH UR STRIP.AUTO: 5.5 [PH] (ref 5–8)
PLATELET # BLD AUTO: 291 10*3/MM3 (ref 140–450)
PMV BLD AUTO: 11.6 FL (ref 6–12)
POTASSIUM SERPL-SCNC: 3.8 MMOL/L (ref 3.5–5.2)
PROCALCITONIN SERPL-MCNC: 0.03 NG/ML (ref 0–0.25)
PROT SERPL-MCNC: 7.3 G/DL (ref 6–8.5)
PROT UR QL STRIP: ABNORMAL
RBC # BLD AUTO: 4.3 10*6/MM3 (ref 4.14–5.8)
RBC # UR STRIP: ABNORMAL /HPF
REF LAB TEST METHOD: ABNORMAL
SARS-COV-2 RNA RESP QL NAA+PROBE: DETECTED
SODIUM SERPL-SCNC: 138 MMOL/L (ref 136–145)
SP GR UR STRIP: 1.02 (ref 1–1.03)
SQUAMOUS #/AREA URNS HPF: ABNORMAL /HPF
TSH SERPL DL<=0.05 MIU/L-ACNC: 1.54 UIU/ML (ref 0.27–4.2)
UROBILINOGEN UR QL STRIP: ABNORMAL
WBC # UR STRIP: ABNORMAL /HPF
WBC NRBC COR # BLD: 7.7 10*3/MM3 (ref 3.4–10.8)

## 2022-01-25 PROCEDURE — 87077 CULTURE AEROBIC IDENTIFY: CPT | Performed by: EMERGENCY MEDICINE

## 2022-01-25 PROCEDURE — 86140 C-REACTIVE PROTEIN: CPT | Performed by: INTERNAL MEDICINE

## 2022-01-25 PROCEDURE — 83036 HEMOGLOBIN GLYCOSYLATED A1C: CPT | Performed by: NURSE PRACTITIONER

## 2022-01-25 PROCEDURE — 83735 ASSAY OF MAGNESIUM: CPT | Performed by: EMERGENCY MEDICINE

## 2022-01-25 PROCEDURE — 83605 ASSAY OF LACTIC ACID: CPT | Performed by: INTERNAL MEDICINE

## 2022-01-25 PROCEDURE — 74176 CT ABD & PELVIS W/O CONTRAST: CPT

## 2022-01-25 PROCEDURE — 99284 EMERGENCY DEPT VISIT MOD MDM: CPT

## 2022-01-25 PROCEDURE — 80053 COMPREHEN METABOLIC PANEL: CPT | Performed by: EMERGENCY MEDICINE

## 2022-01-25 PROCEDURE — 99223 1ST HOSP IP/OBS HIGH 75: CPT | Performed by: INTERNAL MEDICINE

## 2022-01-25 PROCEDURE — 84443 ASSAY THYROID STIM HORMONE: CPT | Performed by: EMERGENCY MEDICINE

## 2022-01-25 PROCEDURE — 82728 ASSAY OF FERRITIN: CPT | Performed by: INTERNAL MEDICINE

## 2022-01-25 PROCEDURE — 83615 LACTATE (LD) (LDH) ENZYME: CPT | Performed by: INTERNAL MEDICINE

## 2022-01-25 PROCEDURE — 87636 SARSCOV2 & INF A&B AMP PRB: CPT | Performed by: EMERGENCY MEDICINE

## 2022-01-25 PROCEDURE — 87040 BLOOD CULTURE FOR BACTERIA: CPT | Performed by: INTERNAL MEDICINE

## 2022-01-25 PROCEDURE — 81001 URINALYSIS AUTO W/SCOPE: CPT | Performed by: EMERGENCY MEDICINE

## 2022-01-25 PROCEDURE — 87186 SC STD MICRODIL/AGAR DIL: CPT | Performed by: EMERGENCY MEDICINE

## 2022-01-25 PROCEDURE — 36415 COLL VENOUS BLD VENIPUNCTURE: CPT

## 2022-01-25 PROCEDURE — 87086 URINE CULTURE/COLONY COUNT: CPT | Performed by: EMERGENCY MEDICINE

## 2022-01-25 PROCEDURE — 71250 CT THORAX DX C-: CPT

## 2022-01-25 PROCEDURE — 84439 ASSAY OF FREE THYROXINE: CPT | Performed by: NURSE PRACTITIONER

## 2022-01-25 PROCEDURE — 84145 PROCALCITONIN (PCT): CPT | Performed by: INTERNAL MEDICINE

## 2022-01-25 PROCEDURE — 85025 COMPLETE CBC W/AUTO DIFF WBC: CPT | Performed by: EMERGENCY MEDICINE

## 2022-01-25 PROCEDURE — 25010000002 MEROPENEM PER 100 MG: Performed by: INTERNAL MEDICINE

## 2022-01-25 PROCEDURE — 25010000002 VANCOMYCIN 10 G RECONSTITUTED SOLUTION

## 2022-01-25 RX ORDER — SODIUM CHLORIDE 9 MG/ML
100 INJECTION, SOLUTION INTRAVENOUS CONTINUOUS
Status: ACTIVE | OUTPATIENT
Start: 2022-01-25 | End: 2022-01-26

## 2022-01-25 RX ADMIN — MEROPENEM 1 G: 1 INJECTION, POWDER, FOR SOLUTION INTRAVENOUS at 18:34

## 2022-01-25 RX ADMIN — SODIUM CHLORIDE 100 ML/HR: 9 INJECTION, SOLUTION INTRAVENOUS at 20:26

## 2022-01-25 RX ADMIN — SODIUM CHLORIDE 500 ML: 9 INJECTION, SOLUTION INTRAVENOUS at 18:34

## 2022-01-25 RX ADMIN — VANCOMYCIN HYDROCHLORIDE 1250 MG: 10 INJECTION, POWDER, LYOPHILIZED, FOR SOLUTION INTRAVENOUS at 20:25

## 2022-01-26 LAB
ANION GAP SERPL CALCULATED.3IONS-SCNC: 11 MMOL/L (ref 5–15)
BACTERIA UR QL AUTO: ABNORMAL /HPF
BASOPHILS # BLD AUTO: 0.02 10*3/MM3 (ref 0–0.2)
BASOPHILS NFR BLD AUTO: 0.3 % (ref 0–1.5)
BILIRUB UR QL STRIP: NEGATIVE
BUN SERPL-MCNC: 20 MG/DL (ref 8–23)
BUN/CREAT SERPL: 33.3 (ref 7–25)
CALCIUM SPEC-SCNC: 8.2 MG/DL (ref 8.6–10.5)
CHLORIDE SERPL-SCNC: 103 MMOL/L (ref 98–107)
CLARITY UR: ABNORMAL
CO2 SERPL-SCNC: 23 MMOL/L (ref 22–29)
COLOR UR: YELLOW
CREAT SERPL-MCNC: 0.6 MG/DL (ref 0.76–1.27)
D DIMER PPP FEU-MCNC: 1.52 MCGFEU/ML (ref 0–0.56)
DEPRECATED RDW RBC AUTO: 47.8 FL (ref 37–54)
EOSINOPHIL # BLD AUTO: 0.12 10*3/MM3 (ref 0–0.4)
EOSINOPHIL NFR BLD AUTO: 1.9 % (ref 0.3–6.2)
ERYTHROCYTE [DISTWIDTH] IN BLOOD BY AUTOMATED COUNT: 16.1 % (ref 12.3–15.4)
GFR SERPL CREATININE-BSD FRML MDRD: 127 ML/MIN/1.73
GLUCOSE BLDC GLUCOMTR-MCNC: 105 MG/DL (ref 70–130)
GLUCOSE BLDC GLUCOMTR-MCNC: 112 MG/DL (ref 70–130)
GLUCOSE BLDC GLUCOMTR-MCNC: 131 MG/DL (ref 70–130)
GLUCOSE BLDC GLUCOMTR-MCNC: 162 MG/DL (ref 70–130)
GLUCOSE SERPL-MCNC: 121 MG/DL (ref 65–99)
GLUCOSE UR STRIP-MCNC: NEGATIVE MG/DL
HBA1C MFR BLD: 7.4 % (ref 4.8–5.6)
HCT VFR BLD AUTO: 33.5 % (ref 37.5–51)
HGB BLD-MCNC: 10.7 G/DL (ref 13–17.7)
HGB UR QL STRIP.AUTO: ABNORMAL
HYALINE CASTS UR QL AUTO: ABNORMAL /LPF
IMM GRANULOCYTES # BLD AUTO: 0.02 10*3/MM3 (ref 0–0.05)
IMM GRANULOCYTES NFR BLD AUTO: 0.3 % (ref 0–0.5)
KETONES UR QL STRIP: ABNORMAL
LEUKOCYTE ESTERASE UR QL STRIP.AUTO: ABNORMAL
LYMPHOCYTES # BLD AUTO: 1.88 10*3/MM3 (ref 0.7–3.1)
LYMPHOCYTES NFR BLD AUTO: 29.1 % (ref 19.6–45.3)
MCH RBC QN AUTO: 26.2 PG (ref 26.6–33)
MCHC RBC AUTO-ENTMCNC: 31.9 G/DL (ref 31.5–35.7)
MCV RBC AUTO: 82.1 FL (ref 79–97)
MONOCYTES # BLD AUTO: 0.75 10*3/MM3 (ref 0.1–0.9)
MONOCYTES NFR BLD AUTO: 11.6 % (ref 5–12)
NEUTROPHILS NFR BLD AUTO: 3.66 10*3/MM3 (ref 1.7–7)
NEUTROPHILS NFR BLD AUTO: 56.8 % (ref 42.7–76)
NITRITE UR QL STRIP: NEGATIVE
NRBC BLD AUTO-RTO: 0 /100 WBC (ref 0–0.2)
PH UR STRIP.AUTO: 5.5 [PH] (ref 5–8)
PLATELET # BLD AUTO: 242 10*3/MM3 (ref 140–450)
PMV BLD AUTO: 11.1 FL (ref 6–12)
POTASSIUM SERPL-SCNC: 3.6 MMOL/L (ref 3.5–5.2)
POTASSIUM SERPL-SCNC: 4.1 MMOL/L (ref 3.5–5.2)
PROT UR QL STRIP: ABNORMAL
RBC # BLD AUTO: 4.08 10*6/MM3 (ref 4.14–5.8)
RBC # UR STRIP: ABNORMAL /HPF
REF LAB TEST METHOD: ABNORMAL
SODIUM SERPL-SCNC: 137 MMOL/L (ref 136–145)
SP GR UR STRIP: 1.02 (ref 1–1.03)
SQUAMOUS #/AREA URNS HPF: ABNORMAL /HPF
T4 FREE SERPL-MCNC: 1.1 NG/DL (ref 0.93–1.7)
TSH SERPL DL<=0.05 MIU/L-ACNC: 2.94 UIU/ML (ref 0.27–4.2)
UROBILINOGEN UR QL STRIP: ABNORMAL
WBC # UR STRIP: ABNORMAL /HPF
WBC NRBC COR # BLD: 6.45 10*3/MM3 (ref 3.4–10.8)

## 2022-01-26 PROCEDURE — 81001 URINALYSIS AUTO W/SCOPE: CPT | Performed by: NURSE PRACTITIONER

## 2022-01-26 PROCEDURE — 87086 URINE CULTURE/COLONY COUNT: CPT | Performed by: NURSE PRACTITIONER

## 2022-01-26 PROCEDURE — 80048 BASIC METABOLIC PNL TOTAL CA: CPT | Performed by: NURSE PRACTITIONER

## 2022-01-26 PROCEDURE — 85025 COMPLETE CBC W/AUTO DIFF WBC: CPT | Performed by: NURSE PRACTITIONER

## 2022-01-26 PROCEDURE — 99233 SBSQ HOSP IP/OBS HIGH 50: CPT | Performed by: INTERNAL MEDICINE

## 2022-01-26 PROCEDURE — 82962 GLUCOSE BLOOD TEST: CPT

## 2022-01-26 PROCEDURE — 87077 CULTURE AEROBIC IDENTIFY: CPT | Performed by: NURSE PRACTITIONER

## 2022-01-26 PROCEDURE — 25010000002 HEPARIN (PORCINE) PER 1000 UNITS: Performed by: INTERNAL MEDICINE

## 2022-01-26 PROCEDURE — 25010000002 MEROPENEM PER 100 MG: Performed by: NURSE PRACTITIONER

## 2022-01-26 PROCEDURE — 84132 ASSAY OF SERUM POTASSIUM: CPT | Performed by: INTERNAL MEDICINE

## 2022-01-26 PROCEDURE — 85379 FIBRIN DEGRADATION QUANT: CPT | Performed by: INTERNAL MEDICINE

## 2022-01-26 PROCEDURE — 87186 SC STD MICRODIL/AGAR DIL: CPT | Performed by: NURSE PRACTITIONER

## 2022-01-26 PROCEDURE — 25010000002 VANCOMYCIN 10 G RECONSTITUTED SOLUTION: Performed by: EMERGENCY MEDICINE

## 2022-01-26 PROCEDURE — 63710000001 INSULIN LISPRO (HUMAN) PER 5 UNITS: Performed by: NURSE PRACTITIONER

## 2022-01-26 PROCEDURE — 84443 ASSAY THYROID STIM HORMONE: CPT | Performed by: NURSE PRACTITIONER

## 2022-01-26 RX ORDER — BISACODYL 10 MG
10 SUPPOSITORY, RECTAL RECTAL DAILY PRN
Status: DISCONTINUED | OUTPATIENT
Start: 2022-01-26 | End: 2022-02-09 | Stop reason: HOSPADM

## 2022-01-26 RX ORDER — SODIUM CHLORIDE 0.9 % (FLUSH) 0.9 %
10 SYRINGE (ML) INJECTION AS NEEDED
Status: DISCONTINUED | OUTPATIENT
Start: 2022-01-26 | End: 2022-02-09 | Stop reason: HOSPADM

## 2022-01-26 RX ORDER — DONEPEZIL HYDROCHLORIDE 10 MG/1
10 TABLET, FILM COATED ORAL DAILY
Status: DISCONTINUED | OUTPATIENT
Start: 2022-01-26 | End: 2022-02-09 | Stop reason: HOSPADM

## 2022-01-26 RX ORDER — AMOXICILLIN 250 MG
2 CAPSULE ORAL 2 TIMES DAILY
Status: DISCONTINUED | OUTPATIENT
Start: 2022-01-26 | End: 2022-02-09 | Stop reason: HOSPADM

## 2022-01-26 RX ORDER — POTASSIUM CHLORIDE 1.5 G/1.77G
40 POWDER, FOR SOLUTION ORAL AS NEEDED
Status: DISCONTINUED | OUTPATIENT
Start: 2022-01-26 | End: 2022-02-09 | Stop reason: HOSPADM

## 2022-01-26 RX ORDER — ONDANSETRON 2 MG/ML
4 INJECTION INTRAMUSCULAR; INTRAVENOUS EVERY 6 HOURS PRN
Status: DISCONTINUED | OUTPATIENT
Start: 2022-01-26 | End: 2022-02-09 | Stop reason: HOSPADM

## 2022-01-26 RX ORDER — POTASSIUM CHLORIDE 750 MG/1
40 CAPSULE, EXTENDED RELEASE ORAL AS NEEDED
Status: DISCONTINUED | OUTPATIENT
Start: 2022-01-26 | End: 2022-02-09 | Stop reason: HOSPADM

## 2022-01-26 RX ORDER — ONDANSETRON 4 MG/1
4 TABLET, FILM COATED ORAL EVERY 6 HOURS PRN
Status: DISCONTINUED | OUTPATIENT
Start: 2022-01-26 | End: 2022-02-09 | Stop reason: HOSPADM

## 2022-01-26 RX ORDER — MIRTAZAPINE 15 MG/1
30 TABLET, FILM COATED ORAL NIGHTLY
Status: DISCONTINUED | OUTPATIENT
Start: 2022-01-26 | End: 2022-01-28

## 2022-01-26 RX ORDER — MEMANTINE HYDROCHLORIDE 10 MG/1
10 TABLET ORAL 2 TIMES DAILY
Status: DISCONTINUED | OUTPATIENT
Start: 2022-01-26 | End: 2022-02-09 | Stop reason: HOSPADM

## 2022-01-26 RX ORDER — ACETAMINOPHEN 650 MG/1
650 SUPPOSITORY RECTAL EVERY 4 HOURS PRN
Status: DISCONTINUED | OUTPATIENT
Start: 2022-01-26 | End: 2022-02-09 | Stop reason: HOSPADM

## 2022-01-26 RX ORDER — POTASSIUM CHLORIDE 7.45 MG/ML
10 INJECTION INTRAVENOUS
Status: DISCONTINUED | OUTPATIENT
Start: 2022-01-26 | End: 2022-02-09 | Stop reason: HOSPADM

## 2022-01-26 RX ORDER — HEPARIN SODIUM 5000 [USP'U]/ML
5000 INJECTION, SOLUTION INTRAVENOUS; SUBCUTANEOUS EVERY 8 HOURS SCHEDULED
Status: DISCONTINUED | OUTPATIENT
Start: 2022-01-26 | End: 2022-02-09 | Stop reason: HOSPADM

## 2022-01-26 RX ORDER — MAGNESIUM SULFATE HEPTAHYDRATE 40 MG/ML
2 INJECTION, SOLUTION INTRAVENOUS AS NEEDED
Status: DISCONTINUED | OUTPATIENT
Start: 2022-01-26 | End: 2022-02-09 | Stop reason: HOSPADM

## 2022-01-26 RX ORDER — ACETAMINOPHEN 160 MG/5ML
650 SOLUTION ORAL EVERY 4 HOURS PRN
Status: DISCONTINUED | OUTPATIENT
Start: 2022-01-26 | End: 2022-02-09 | Stop reason: HOSPADM

## 2022-01-26 RX ORDER — TAMSULOSIN HYDROCHLORIDE 0.4 MG/1
0.4 CAPSULE ORAL DAILY
Status: DISCONTINUED | OUTPATIENT
Start: 2022-01-26 | End: 2022-02-09 | Stop reason: HOSPADM

## 2022-01-26 RX ORDER — FAMOTIDINE 20 MG/1
20 TABLET, FILM COATED ORAL 2 TIMES DAILY
Status: DISCONTINUED | OUTPATIENT
Start: 2022-01-26 | End: 2022-02-09 | Stop reason: HOSPADM

## 2022-01-26 RX ORDER — CALCIUM CARBONATE 200(500)MG
2 TABLET,CHEWABLE ORAL 2 TIMES DAILY PRN
Status: DISCONTINUED | OUTPATIENT
Start: 2022-01-26 | End: 2022-02-09 | Stop reason: HOSPADM

## 2022-01-26 RX ORDER — SODIUM CHLORIDE 0.9 % (FLUSH) 0.9 %
10 SYRINGE (ML) INJECTION EVERY 12 HOURS SCHEDULED
Status: DISCONTINUED | OUTPATIENT
Start: 2022-01-26 | End: 2022-02-09 | Stop reason: HOSPADM

## 2022-01-26 RX ORDER — MAGNESIUM SULFATE HEPTAHYDRATE 40 MG/ML
4 INJECTION, SOLUTION INTRAVENOUS AS NEEDED
Status: DISCONTINUED | OUTPATIENT
Start: 2022-01-26 | End: 2022-02-09 | Stop reason: HOSPADM

## 2022-01-26 RX ORDER — ACETAMINOPHEN 325 MG/1
650 TABLET ORAL EVERY 4 HOURS PRN
Status: DISCONTINUED | OUTPATIENT
Start: 2022-01-26 | End: 2022-02-09 | Stop reason: HOSPADM

## 2022-01-26 RX ORDER — ASPIRIN 81 MG/1
81 TABLET ORAL DAILY
Status: DISCONTINUED | OUTPATIENT
Start: 2022-01-26 | End: 2022-02-09

## 2022-01-26 RX ORDER — BISACODYL 5 MG/1
5 TABLET, DELAYED RELEASE ORAL DAILY PRN
Status: DISCONTINUED | OUTPATIENT
Start: 2022-01-26 | End: 2022-02-09 | Stop reason: HOSPADM

## 2022-01-26 RX ORDER — NICOTINE POLACRILEX 4 MG
15 LOZENGE BUCCAL
Status: DISCONTINUED | OUTPATIENT
Start: 2022-01-26 | End: 2022-02-09 | Stop reason: HOSPADM

## 2022-01-26 RX ORDER — DEXTROSE MONOHYDRATE 25 G/50ML
25 INJECTION, SOLUTION INTRAVENOUS
Status: DISCONTINUED | OUTPATIENT
Start: 2022-01-26 | End: 2022-02-09 | Stop reason: HOSPADM

## 2022-01-26 RX ORDER — POLYETHYLENE GLYCOL 3350 17 G/17G
17 POWDER, FOR SOLUTION ORAL DAILY PRN
Status: DISCONTINUED | OUTPATIENT
Start: 2022-01-26 | End: 2022-02-09 | Stop reason: HOSPADM

## 2022-01-26 RX ADMIN — MIRTAZAPINE 30 MG: 15 TABLET, FILM COATED ORAL at 20:37

## 2022-01-26 RX ADMIN — TAMSULOSIN HYDROCHLORIDE 0.4 MG: 0.4 CAPSULE ORAL at 10:20

## 2022-01-26 RX ADMIN — FAMOTIDINE 20 MG: 20 TABLET, FILM COATED ORAL at 10:20

## 2022-01-26 RX ADMIN — POTASSIUM CHLORIDE 40 MEQ: 1.5 POWDER, FOR SOLUTION ORAL at 10:40

## 2022-01-26 RX ADMIN — FAMOTIDINE 20 MG: 20 TABLET, FILM COATED ORAL at 20:33

## 2022-01-26 RX ADMIN — SENNOSIDES AND DOCUSATE SODIUM 2 TABLET: 50; 8.6 TABLET ORAL at 10:23

## 2022-01-26 RX ADMIN — MIRTAZAPINE 30 MG: 15 TABLET, FILM COATED ORAL at 01:14

## 2022-01-26 RX ADMIN — HEPARIN SODIUM 5000 UNITS: 5000 INJECTION, SOLUTION INTRAVENOUS; SUBCUTANEOUS at 10:20

## 2022-01-26 RX ADMIN — SODIUM CHLORIDE, PRESERVATIVE FREE 10 ML: 5 INJECTION INTRAVENOUS at 20:36

## 2022-01-26 RX ADMIN — POTASSIUM CHLORIDE 40 MEQ: 1.5 POWDER, FOR SOLUTION ORAL at 17:34

## 2022-01-26 RX ADMIN — HEPARIN SODIUM 5000 UNITS: 5000 INJECTION, SOLUTION INTRAVENOUS; SUBCUTANEOUS at 13:57

## 2022-01-26 RX ADMIN — MEROPENEM 1 G: 1 INJECTION, POWDER, FOR SOLUTION INTRAVENOUS at 10:16

## 2022-01-26 RX ADMIN — VANCOMYCIN HYDROCHLORIDE 1250 MG: 10 INJECTION, POWDER, LYOPHILIZED, FOR SOLUTION INTRAVENOUS at 20:26

## 2022-01-26 RX ADMIN — MEMANTINE 10 MG: 10 TABLET ORAL at 10:32

## 2022-01-26 RX ADMIN — SENNOSIDES AND DOCUSATE SODIUM 2 TABLET: 50; 8.6 TABLET ORAL at 01:14

## 2022-01-26 RX ADMIN — MEROPENEM 1 G: 1 INJECTION, POWDER, FOR SOLUTION INTRAVENOUS at 01:14

## 2022-01-26 RX ADMIN — INSULIN LISPRO 2 UNITS: 100 INJECTION, SOLUTION INTRAVENOUS; SUBCUTANEOUS at 17:27

## 2022-01-26 RX ADMIN — SENNOSIDES AND DOCUSATE SODIUM 2 TABLET: 50; 8.6 TABLET ORAL at 20:33

## 2022-01-26 RX ADMIN — MEMANTINE 10 MG: 10 TABLET ORAL at 20:33

## 2022-01-26 RX ADMIN — DONEPEZIL HYDROCHLORIDE 10 MG: 10 TABLET, FILM COATED ORAL at 10:20

## 2022-01-26 RX ADMIN — HEPARIN SODIUM 5000 UNITS: 5000 INJECTION, SOLUTION INTRAVENOUS; SUBCUTANEOUS at 20:33

## 2022-01-26 RX ADMIN — ASPIRIN 81 MG: 81 TABLET, COATED ORAL at 10:20

## 2022-01-26 RX ADMIN — SODIUM CHLORIDE, PRESERVATIVE FREE 10 ML: 5 INJECTION INTRAVENOUS at 01:15

## 2022-01-26 RX ADMIN — MEROPENEM 1 G: 1 INJECTION, POWDER, FOR SOLUTION INTRAVENOUS at 17:27

## 2022-01-26 RX ADMIN — MEMANTINE 10 MG: 10 TABLET ORAL at 01:14

## 2022-01-27 LAB
ALBUMIN SERPL-MCNC: 3.1 G/DL (ref 3.5–5.2)
ALBUMIN/GLOB SERPL: 0.8 G/DL
ALP SERPL-CCNC: 87 U/L (ref 39–117)
ALT SERPL W P-5'-P-CCNC: 8 U/L (ref 1–41)
ANION GAP SERPL CALCULATED.3IONS-SCNC: 10 MMOL/L (ref 5–15)
AST SERPL-CCNC: 14 U/L (ref 1–40)
BASOPHILS # BLD AUTO: 0.04 10*3/MM3 (ref 0–0.2)
BASOPHILS NFR BLD AUTO: 0.5 % (ref 0–1.5)
BILIRUB SERPL-MCNC: 0.4 MG/DL (ref 0–1.2)
BUN SERPL-MCNC: 11 MG/DL (ref 8–23)
BUN/CREAT SERPL: 17.5 (ref 7–25)
CALCIUM SPEC-SCNC: 8.6 MG/DL (ref 8.6–10.5)
CHLORIDE SERPL-SCNC: 104 MMOL/L (ref 98–107)
CO2 SERPL-SCNC: 25 MMOL/L (ref 22–29)
CREAT SERPL-MCNC: 0.63 MG/DL (ref 0.76–1.27)
CRP SERPL-MCNC: 1.49 MG/DL (ref 0–0.5)
DEPRECATED RDW RBC AUTO: 48.5 FL (ref 37–54)
EOSINOPHIL # BLD AUTO: 0.12 10*3/MM3 (ref 0–0.4)
EOSINOPHIL NFR BLD AUTO: 1.4 % (ref 0.3–6.2)
ERYTHROCYTE [DISTWIDTH] IN BLOOD BY AUTOMATED COUNT: 16.1 % (ref 12.3–15.4)
GFR SERPL CREATININE-BSD FRML MDRD: 120 ML/MIN/1.73
GLOBULIN UR ELPH-MCNC: 3.8 GM/DL
GLUCOSE BLDC GLUCOMTR-MCNC: 110 MG/DL (ref 70–130)
GLUCOSE BLDC GLUCOMTR-MCNC: 135 MG/DL (ref 70–130)
GLUCOSE BLDC GLUCOMTR-MCNC: 146 MG/DL (ref 70–130)
GLUCOSE BLDC GLUCOMTR-MCNC: 164 MG/DL (ref 70–130)
GLUCOSE SERPL-MCNC: 125 MG/DL (ref 65–99)
HCT VFR BLD AUTO: 35.8 % (ref 37.5–51)
HGB BLD-MCNC: 11.4 G/DL (ref 13–17.7)
IMM GRANULOCYTES # BLD AUTO: 0.03 10*3/MM3 (ref 0–0.05)
IMM GRANULOCYTES NFR BLD AUTO: 0.4 % (ref 0–0.5)
LYMPHOCYTES # BLD AUTO: 1.86 10*3/MM3 (ref 0.7–3.1)
LYMPHOCYTES NFR BLD AUTO: 21.9 % (ref 19.6–45.3)
MAGNESIUM SERPL-MCNC: 1.9 MG/DL (ref 1.6–2.4)
MCH RBC QN AUTO: 26.4 PG (ref 26.6–33)
MCHC RBC AUTO-ENTMCNC: 31.8 G/DL (ref 31.5–35.7)
MCV RBC AUTO: 82.9 FL (ref 79–97)
MONOCYTES # BLD AUTO: 0.84 10*3/MM3 (ref 0.1–0.9)
MONOCYTES NFR BLD AUTO: 9.9 % (ref 5–12)
NEUTROPHILS NFR BLD AUTO: 5.6 10*3/MM3 (ref 1.7–7)
NEUTROPHILS NFR BLD AUTO: 65.9 % (ref 42.7–76)
NRBC BLD AUTO-RTO: 0 /100 WBC (ref 0–0.2)
PHOSPHATE SERPL-MCNC: 2.2 MG/DL (ref 2.5–4.5)
PLATELET # BLD AUTO: 286 10*3/MM3 (ref 140–450)
PMV BLD AUTO: 11.8 FL (ref 6–12)
POTASSIUM SERPL-SCNC: 3.8 MMOL/L (ref 3.5–5.2)
PROCALCITONIN SERPL-MCNC: 0.03 NG/ML (ref 0–0.25)
PROT SERPL-MCNC: 6.9 G/DL (ref 6–8.5)
RBC # BLD AUTO: 4.32 10*6/MM3 (ref 4.14–5.8)
SODIUM SERPL-SCNC: 139 MMOL/L (ref 136–145)
VANCOMYCIN TROUGH SERPL-MCNC: 6.6 MCG/ML (ref 5–20)
WBC NRBC COR # BLD: 8.49 10*3/MM3 (ref 3.4–10.8)

## 2022-01-27 PROCEDURE — 99232 SBSQ HOSP IP/OBS MODERATE 35: CPT | Performed by: INTERNAL MEDICINE

## 2022-01-27 PROCEDURE — 86140 C-REACTIVE PROTEIN: CPT | Performed by: INTERNAL MEDICINE

## 2022-01-27 PROCEDURE — 97161 PT EVAL LOW COMPLEX 20 MIN: CPT

## 2022-01-27 PROCEDURE — 80202 ASSAY OF VANCOMYCIN: CPT | Performed by: EMERGENCY MEDICINE

## 2022-01-27 PROCEDURE — 83735 ASSAY OF MAGNESIUM: CPT | Performed by: INTERNAL MEDICINE

## 2022-01-27 PROCEDURE — 84145 PROCALCITONIN (PCT): CPT | Performed by: INTERNAL MEDICINE

## 2022-01-27 PROCEDURE — 97110 THERAPEUTIC EXERCISES: CPT

## 2022-01-27 PROCEDURE — 63710000001 INSULIN LISPRO (HUMAN) PER 5 UNITS: Performed by: NURSE PRACTITIONER

## 2022-01-27 PROCEDURE — 82962 GLUCOSE BLOOD TEST: CPT

## 2022-01-27 PROCEDURE — 25010000002 HALOPERIDOL LACTATE PER 5 MG: Performed by: INTERNAL MEDICINE

## 2022-01-27 PROCEDURE — 97166 OT EVAL MOD COMPLEX 45 MIN: CPT

## 2022-01-27 PROCEDURE — 84100 ASSAY OF PHOSPHORUS: CPT | Performed by: INTERNAL MEDICINE

## 2022-01-27 PROCEDURE — 80053 COMPREHEN METABOLIC PANEL: CPT | Performed by: INTERNAL MEDICINE

## 2022-01-27 PROCEDURE — 25010000002 VANCOMYCIN 10 G RECONSTITUTED SOLUTION

## 2022-01-27 PROCEDURE — 25010000002 HEPARIN (PORCINE) PER 1000 UNITS: Performed by: INTERNAL MEDICINE

## 2022-01-27 PROCEDURE — 97535 SELF CARE MNGMENT TRAINING: CPT

## 2022-01-27 PROCEDURE — 25010000002 MEROPENEM PER 100 MG: Performed by: NURSE PRACTITIONER

## 2022-01-27 PROCEDURE — 85025 COMPLETE CBC W/AUTO DIFF WBC: CPT | Performed by: INTERNAL MEDICINE

## 2022-01-27 PROCEDURE — 25010000002 LORAZEPAM PER 2 MG: Performed by: INTERNAL MEDICINE

## 2022-01-27 RX ORDER — LORAZEPAM 2 MG/ML
1 INJECTION INTRAMUSCULAR ONCE
Status: COMPLETED | OUTPATIENT
Start: 2022-01-27 | End: 2022-01-27

## 2022-01-27 RX ORDER — QUETIAPINE FUMARATE 25 MG/1
25 TABLET, FILM COATED ORAL NIGHTLY
Status: DISCONTINUED | OUTPATIENT
Start: 2022-01-27 | End: 2022-01-28

## 2022-01-27 RX ORDER — HALOPERIDOL 5 MG/ML
1 INJECTION INTRAMUSCULAR ONCE
Status: COMPLETED | OUTPATIENT
Start: 2022-01-27 | End: 2022-01-27

## 2022-01-27 RX ADMIN — HALOPERIDOL LACTATE 1 MG: 5 INJECTION, SOLUTION INTRAMUSCULAR at 15:31

## 2022-01-27 RX ADMIN — QUETIAPINE FUMARATE 25 MG: 25 TABLET ORAL at 20:25

## 2022-01-27 RX ADMIN — TAMSULOSIN HYDROCHLORIDE 0.4 MG: 0.4 CAPSULE ORAL at 09:09

## 2022-01-27 RX ADMIN — FAMOTIDINE 20 MG: 20 TABLET, FILM COATED ORAL at 20:25

## 2022-01-27 RX ADMIN — DONEPEZIL HYDROCHLORIDE 10 MG: 10 TABLET, FILM COATED ORAL at 09:10

## 2022-01-27 RX ADMIN — ASPIRIN 81 MG: 81 TABLET, COATED ORAL at 09:08

## 2022-01-27 RX ADMIN — MEROPENEM 1 G: 1 INJECTION, POWDER, FOR SOLUTION INTRAVENOUS at 01:35

## 2022-01-27 RX ADMIN — HEPARIN SODIUM 5000 UNITS: 5000 INJECTION, SOLUTION INTRAVENOUS; SUBCUTANEOUS at 09:09

## 2022-01-27 RX ADMIN — HEPARIN SODIUM 5000 UNITS: 5000 INJECTION, SOLUTION INTRAVENOUS; SUBCUTANEOUS at 15:31

## 2022-01-27 RX ADMIN — MEROPENEM 1 G: 1 INJECTION, POWDER, FOR SOLUTION INTRAVENOUS at 09:07

## 2022-01-27 RX ADMIN — MIRTAZAPINE 30 MG: 15 TABLET, FILM COATED ORAL at 20:25

## 2022-01-27 RX ADMIN — SENNOSIDES AND DOCUSATE SODIUM 2 TABLET: 50; 8.6 TABLET ORAL at 20:26

## 2022-01-27 RX ADMIN — VANCOMYCIN HYDROCHLORIDE 1250 MG: 10 INJECTION, POWDER, LYOPHILIZED, FOR SOLUTION INTRAVENOUS at 23:09

## 2022-01-27 RX ADMIN — HEPARIN SODIUM 5000 UNITS: 5000 INJECTION, SOLUTION INTRAVENOUS; SUBCUTANEOUS at 20:25

## 2022-01-27 RX ADMIN — MEROPENEM 1 G: 1 INJECTION, POWDER, FOR SOLUTION INTRAVENOUS at 18:54

## 2022-01-27 RX ADMIN — MEMANTINE 10 MG: 10 TABLET ORAL at 09:08

## 2022-01-27 RX ADMIN — MEMANTINE 10 MG: 10 TABLET ORAL at 20:26

## 2022-01-27 RX ADMIN — FAMOTIDINE 20 MG: 20 TABLET, FILM COATED ORAL at 09:08

## 2022-01-27 RX ADMIN — SENNOSIDES AND DOCUSATE SODIUM 2 TABLET: 50; 8.6 TABLET ORAL at 09:08

## 2022-01-27 RX ADMIN — INSULIN LISPRO 2 UNITS: 100 INJECTION, SOLUTION INTRAVENOUS; SUBCUTANEOUS at 18:54

## 2022-01-27 RX ADMIN — LORAZEPAM 1 MG: 2 INJECTION INTRAMUSCULAR; INTRAVENOUS at 15:31

## 2022-01-28 LAB
ANION GAP SERPL CALCULATED.3IONS-SCNC: 10 MMOL/L (ref 5–15)
BACTERIA SPEC AEROBE CULT: ABNORMAL
BASOPHILS # BLD AUTO: 0.04 10*3/MM3 (ref 0–0.2)
BASOPHILS NFR BLD AUTO: 0.6 % (ref 0–1.5)
BUN SERPL-MCNC: 11 MG/DL (ref 8–23)
BUN/CREAT SERPL: 19.6 (ref 7–25)
CALCIUM SPEC-SCNC: 8.4 MG/DL (ref 8.6–10.5)
CHLORIDE SERPL-SCNC: 106 MMOL/L (ref 98–107)
CO2 SERPL-SCNC: 23 MMOL/L (ref 22–29)
CREAT SERPL-MCNC: 0.56 MG/DL (ref 0.76–1.27)
DEPRECATED RDW RBC AUTO: 56.3 FL (ref 37–54)
EOSINOPHIL # BLD AUTO: 0.17 10*3/MM3 (ref 0–0.4)
EOSINOPHIL NFR BLD AUTO: 2.5 % (ref 0.3–6.2)
ERYTHROCYTE [DISTWIDTH] IN BLOOD BY AUTOMATED COUNT: 16.6 % (ref 12.3–15.4)
GFR SERPL CREATININE-BSD FRML MDRD: 138 ML/MIN/1.73
GLUCOSE BLDC GLUCOMTR-MCNC: 118 MG/DL (ref 70–130)
GLUCOSE BLDC GLUCOMTR-MCNC: 140 MG/DL (ref 70–130)
GLUCOSE BLDC GLUCOMTR-MCNC: 147 MG/DL (ref 70–130)
GLUCOSE SERPL-MCNC: 112 MG/DL (ref 65–99)
HCT VFR BLD AUTO: 38.7 % (ref 37.5–51)
HGB BLD-MCNC: 11 G/DL (ref 13–17.7)
IMM GRANULOCYTES # BLD AUTO: 0.02 10*3/MM3 (ref 0–0.05)
IMM GRANULOCYTES NFR BLD AUTO: 0.3 % (ref 0–0.5)
LYMPHOCYTES # BLD AUTO: 1.54 10*3/MM3 (ref 0.7–3.1)
LYMPHOCYTES NFR BLD AUTO: 23.1 % (ref 19.6–45.3)
MAGNESIUM SERPL-MCNC: 1.7 MG/DL (ref 1.6–2.4)
MCH RBC QN AUTO: 26.8 PG (ref 26.6–33)
MCHC RBC AUTO-ENTMCNC: 28.4 G/DL (ref 31.5–35.7)
MCV RBC AUTO: 94.4 FL (ref 79–97)
MONOCYTES # BLD AUTO: 0.82 10*3/MM3 (ref 0.1–0.9)
MONOCYTES NFR BLD AUTO: 12.3 % (ref 5–12)
NEUTROPHILS NFR BLD AUTO: 4.09 10*3/MM3 (ref 1.7–7)
NEUTROPHILS NFR BLD AUTO: 61.2 % (ref 42.7–76)
NRBC BLD AUTO-RTO: 0 /100 WBC (ref 0–0.2)
PHOSPHATE SERPL-MCNC: 2.7 MG/DL (ref 2.5–4.5)
PLATELET # BLD AUTO: 244 10*3/MM3 (ref 140–450)
PMV BLD AUTO: 11.2 FL (ref 6–12)
POTASSIUM SERPL-SCNC: 3.8 MMOL/L (ref 3.5–5.2)
RBC # BLD AUTO: 4.1 10*6/MM3 (ref 4.14–5.8)
SODIUM SERPL-SCNC: 139 MMOL/L (ref 136–145)
WBC NRBC COR # BLD: 6.68 10*3/MM3 (ref 3.4–10.8)

## 2022-01-28 PROCEDURE — 84100 ASSAY OF PHOSPHORUS: CPT | Performed by: INTERNAL MEDICINE

## 2022-01-28 PROCEDURE — 80048 BASIC METABOLIC PNL TOTAL CA: CPT | Performed by: INTERNAL MEDICINE

## 2022-01-28 PROCEDURE — 99233 SBSQ HOSP IP/OBS HIGH 50: CPT | Performed by: INTERNAL MEDICINE

## 2022-01-28 PROCEDURE — 82962 GLUCOSE BLOOD TEST: CPT

## 2022-01-28 PROCEDURE — 25010000002 MEROPENEM PER 100 MG: Performed by: NURSE PRACTITIONER

## 2022-01-28 PROCEDURE — 25010000002 HEPARIN (PORCINE) PER 1000 UNITS: Performed by: INTERNAL MEDICINE

## 2022-01-28 PROCEDURE — 85025 COMPLETE CBC W/AUTO DIFF WBC: CPT | Performed by: INTERNAL MEDICINE

## 2022-01-28 PROCEDURE — 83735 ASSAY OF MAGNESIUM: CPT | Performed by: INTERNAL MEDICINE

## 2022-01-28 RX ORDER — QUETIAPINE FUMARATE 25 MG/1
12.5 TABLET, FILM COATED ORAL EVERY 12 HOURS PRN
Status: DISCONTINUED | OUTPATIENT
Start: 2022-01-28 | End: 2022-02-09 | Stop reason: HOSPADM

## 2022-01-28 RX ORDER — LINEZOLID 600 MG/1
600 TABLET, FILM COATED ORAL EVERY 12 HOURS SCHEDULED
Status: DISCONTINUED | OUTPATIENT
Start: 2022-01-28 | End: 2022-01-31

## 2022-01-28 RX ADMIN — HEPARIN SODIUM 5000 UNITS: 5000 INJECTION, SOLUTION INTRAVENOUS; SUBCUTANEOUS at 20:53

## 2022-01-28 RX ADMIN — LINEZOLID 600 MG: 600 TABLET, FILM COATED ORAL at 09:59

## 2022-01-28 RX ADMIN — DONEPEZIL HYDROCHLORIDE 10 MG: 10 TABLET, FILM COATED ORAL at 09:47

## 2022-01-28 RX ADMIN — MEROPENEM 1 G: 1 INJECTION, POWDER, FOR SOLUTION INTRAVENOUS at 03:54

## 2022-01-28 RX ADMIN — HEPARIN SODIUM 5000 UNITS: 5000 INJECTION, SOLUTION INTRAVENOUS; SUBCUTANEOUS at 17:29

## 2022-01-28 RX ADMIN — SODIUM CHLORIDE, PRESERVATIVE FREE 10 ML: 5 INJECTION INTRAVENOUS at 20:54

## 2022-01-28 RX ADMIN — TAMSULOSIN HYDROCHLORIDE 0.4 MG: 0.4 CAPSULE ORAL at 09:47

## 2022-01-28 RX ADMIN — SENNOSIDES AND DOCUSATE SODIUM 2 TABLET: 50; 8.6 TABLET ORAL at 20:53

## 2022-01-28 RX ADMIN — ASPIRIN 81 MG: 81 TABLET, COATED ORAL at 09:47

## 2022-01-28 RX ADMIN — MEMANTINE 10 MG: 10 TABLET ORAL at 20:53

## 2022-01-28 RX ADMIN — LINEZOLID 600 MG: 600 TABLET, FILM COATED ORAL at 20:53

## 2022-01-28 RX ADMIN — HEPARIN SODIUM 5000 UNITS: 5000 INJECTION, SOLUTION INTRAVENOUS; SUBCUTANEOUS at 09:47

## 2022-01-28 RX ADMIN — SENNOSIDES AND DOCUSATE SODIUM 2 TABLET: 50; 8.6 TABLET ORAL at 09:47

## 2022-01-28 RX ADMIN — FAMOTIDINE 20 MG: 20 TABLET, FILM COATED ORAL at 09:47

## 2022-01-28 RX ADMIN — MEMANTINE 10 MG: 10 TABLET ORAL at 09:47

## 2022-01-28 RX ADMIN — FAMOTIDINE 20 MG: 20 TABLET, FILM COATED ORAL at 20:53

## 2022-01-29 LAB
ANION GAP SERPL CALCULATED.3IONS-SCNC: 11 MMOL/L (ref 5–15)
BASOPHILS # BLD AUTO: 0.02 10*3/MM3 (ref 0–0.2)
BASOPHILS NFR BLD AUTO: 0.2 % (ref 0–1.5)
BUN SERPL-MCNC: 12 MG/DL (ref 8–23)
BUN/CREAT SERPL: 19 (ref 7–25)
CALCIUM SPEC-SCNC: 8.1 MG/DL (ref 8.6–10.5)
CHLORIDE SERPL-SCNC: 100 MMOL/L (ref 98–107)
CO2 SERPL-SCNC: 25 MMOL/L (ref 22–29)
CREAT SERPL-MCNC: 0.63 MG/DL (ref 0.76–1.27)
DEPRECATED RDW RBC AUTO: 47.2 FL (ref 37–54)
EOSINOPHIL # BLD AUTO: 0.03 10*3/MM3 (ref 0–0.4)
EOSINOPHIL NFR BLD AUTO: 0.3 % (ref 0.3–6.2)
ERYTHROCYTE [DISTWIDTH] IN BLOOD BY AUTOMATED COUNT: 16 % (ref 12.3–15.4)
GFR SERPL CREATININE-BSD FRML MDRD: 120 ML/MIN/1.73
GLUCOSE BLDC GLUCOMTR-MCNC: 115 MG/DL (ref 70–130)
GLUCOSE BLDC GLUCOMTR-MCNC: 133 MG/DL (ref 70–130)
GLUCOSE BLDC GLUCOMTR-MCNC: 133 MG/DL (ref 70–130)
GLUCOSE BLDC GLUCOMTR-MCNC: 192 MG/DL (ref 70–130)
GLUCOSE SERPL-MCNC: 146 MG/DL (ref 65–99)
HCT VFR BLD AUTO: 32.1 % (ref 37.5–51)
HGB BLD-MCNC: 10.5 G/DL (ref 13–17.7)
IMM GRANULOCYTES # BLD AUTO: 0.02 10*3/MM3 (ref 0–0.05)
IMM GRANULOCYTES NFR BLD AUTO: 0.2 % (ref 0–0.5)
LYMPHOCYTES # BLD AUTO: 1.22 10*3/MM3 (ref 0.7–3.1)
LYMPHOCYTES NFR BLD AUTO: 14 % (ref 19.6–45.3)
MAGNESIUM SERPL-MCNC: 1.6 MG/DL (ref 1.6–2.4)
MCH RBC QN AUTO: 26.6 PG (ref 26.6–33)
MCHC RBC AUTO-ENTMCNC: 32.7 G/DL (ref 31.5–35.7)
MCV RBC AUTO: 81.3 FL (ref 79–97)
MONOCYTES # BLD AUTO: 0.98 10*3/MM3 (ref 0.1–0.9)
MONOCYTES NFR BLD AUTO: 11.2 % (ref 5–12)
NEUTROPHILS NFR BLD AUTO: 6.47 10*3/MM3 (ref 1.7–7)
NEUTROPHILS NFR BLD AUTO: 74.1 % (ref 42.7–76)
NRBC BLD AUTO-RTO: 0 /100 WBC (ref 0–0.2)
PLATELET # BLD AUTO: 254 10*3/MM3 (ref 140–450)
PMV BLD AUTO: 11.4 FL (ref 6–12)
POTASSIUM SERPL-SCNC: 3.8 MMOL/L (ref 3.5–5.2)
RBC # BLD AUTO: 3.95 10*6/MM3 (ref 4.14–5.8)
SODIUM SERPL-SCNC: 136 MMOL/L (ref 136–145)
WBC NRBC COR # BLD: 8.74 10*3/MM3 (ref 3.4–10.8)

## 2022-01-29 PROCEDURE — 63710000001 INSULIN LISPRO (HUMAN) PER 5 UNITS: Performed by: NURSE PRACTITIONER

## 2022-01-29 PROCEDURE — 99232 SBSQ HOSP IP/OBS MODERATE 35: CPT | Performed by: INTERNAL MEDICINE

## 2022-01-29 PROCEDURE — 85025 COMPLETE CBC W/AUTO DIFF WBC: CPT | Performed by: INTERNAL MEDICINE

## 2022-01-29 PROCEDURE — 25010000002 HEPARIN (PORCINE) PER 1000 UNITS: Performed by: INTERNAL MEDICINE

## 2022-01-29 PROCEDURE — 80048 BASIC METABOLIC PNL TOTAL CA: CPT | Performed by: INTERNAL MEDICINE

## 2022-01-29 PROCEDURE — 0 MAGNESIUM SULFATE 4 GM/100ML SOLUTION: Performed by: NURSE PRACTITIONER

## 2022-01-29 PROCEDURE — 82962 GLUCOSE BLOOD TEST: CPT

## 2022-01-29 PROCEDURE — 83735 ASSAY OF MAGNESIUM: CPT | Performed by: INTERNAL MEDICINE

## 2022-01-29 PROCEDURE — 97530 THERAPEUTIC ACTIVITIES: CPT

## 2022-01-29 RX ADMIN — HEPARIN SODIUM 5000 UNITS: 5000 INJECTION, SOLUTION INTRAVENOUS; SUBCUTANEOUS at 15:10

## 2022-01-29 RX ADMIN — ASPIRIN 81 MG: 81 TABLET, COATED ORAL at 09:29

## 2022-01-29 RX ADMIN — MEMANTINE 10 MG: 10 TABLET ORAL at 21:56

## 2022-01-29 RX ADMIN — FAMOTIDINE 20 MG: 20 TABLET, FILM COATED ORAL at 09:29

## 2022-01-29 RX ADMIN — SENNOSIDES AND DOCUSATE SODIUM 2 TABLET: 50; 8.6 TABLET ORAL at 21:56

## 2022-01-29 RX ADMIN — MEMANTINE 10 MG: 10 TABLET ORAL at 09:29

## 2022-01-29 RX ADMIN — LINEZOLID 600 MG: 600 TABLET, FILM COATED ORAL at 21:56

## 2022-01-29 RX ADMIN — HEPARIN SODIUM 5000 UNITS: 5000 INJECTION, SOLUTION INTRAVENOUS; SUBCUTANEOUS at 09:29

## 2022-01-29 RX ADMIN — SENNOSIDES AND DOCUSATE SODIUM 2 TABLET: 50; 8.6 TABLET ORAL at 09:28

## 2022-01-29 RX ADMIN — FAMOTIDINE 20 MG: 20 TABLET, FILM COATED ORAL at 21:56

## 2022-01-29 RX ADMIN — MAGNESIUM SULFATE HEPTAHYDRATE 4 G: 40 INJECTION, SOLUTION INTRAVENOUS at 17:16

## 2022-01-29 RX ADMIN — INSULIN LISPRO 2 UNITS: 100 INJECTION, SOLUTION INTRAVENOUS; SUBCUTANEOUS at 12:10

## 2022-01-29 RX ADMIN — SODIUM CHLORIDE, PRESERVATIVE FREE 10 ML: 5 INJECTION INTRAVENOUS at 09:29

## 2022-01-29 RX ADMIN — DONEPEZIL HYDROCHLORIDE 10 MG: 10 TABLET, FILM COATED ORAL at 09:28

## 2022-01-29 RX ADMIN — TAMSULOSIN HYDROCHLORIDE 0.4 MG: 0.4 CAPSULE ORAL at 09:29

## 2022-01-29 RX ADMIN — HEPARIN SODIUM 5000 UNITS: 5000 INJECTION, SOLUTION INTRAVENOUS; SUBCUTANEOUS at 21:56

## 2022-01-29 RX ADMIN — LINEZOLID 600 MG: 600 TABLET, FILM COATED ORAL at 09:29

## 2022-01-30 LAB
BACTERIA SPEC AEROBE CULT: NORMAL
BACTERIA SPEC AEROBE CULT: NORMAL
GLUCOSE BLDC GLUCOMTR-MCNC: 113 MG/DL (ref 70–130)
GLUCOSE BLDC GLUCOMTR-MCNC: 114 MG/DL (ref 70–130)
GLUCOSE BLDC GLUCOMTR-MCNC: 128 MG/DL (ref 70–130)
GLUCOSE BLDC GLUCOMTR-MCNC: 223 MG/DL (ref 70–130)
MAGNESIUM SERPL-MCNC: 2 MG/DL (ref 1.6–2.4)

## 2022-01-30 PROCEDURE — 82962 GLUCOSE BLOOD TEST: CPT

## 2022-01-30 PROCEDURE — 99231 SBSQ HOSP IP/OBS SF/LOW 25: CPT | Performed by: INTERNAL MEDICINE

## 2022-01-30 PROCEDURE — 25010000002 HEPARIN (PORCINE) PER 1000 UNITS: Performed by: INTERNAL MEDICINE

## 2022-01-30 PROCEDURE — 63710000001 INSULIN LISPRO (HUMAN) PER 5 UNITS: Performed by: NURSE PRACTITIONER

## 2022-01-30 PROCEDURE — 83735 ASSAY OF MAGNESIUM: CPT | Performed by: INTERNAL MEDICINE

## 2022-01-30 RX ADMIN — TAMSULOSIN HYDROCHLORIDE 0.4 MG: 0.4 CAPSULE ORAL at 09:25

## 2022-01-30 RX ADMIN — MEMANTINE 10 MG: 10 TABLET ORAL at 20:13

## 2022-01-30 RX ADMIN — FAMOTIDINE 20 MG: 20 TABLET, FILM COATED ORAL at 09:25

## 2022-01-30 RX ADMIN — DONEPEZIL HYDROCHLORIDE 10 MG: 10 TABLET, FILM COATED ORAL at 09:28

## 2022-01-30 RX ADMIN — SODIUM CHLORIDE, PRESERVATIVE FREE 10 ML: 5 INJECTION INTRAVENOUS at 09:28

## 2022-01-30 RX ADMIN — LINEZOLID 600 MG: 600 TABLET, FILM COATED ORAL at 09:25

## 2022-01-30 RX ADMIN — SENNOSIDES AND DOCUSATE SODIUM 2 TABLET: 50; 8.6 TABLET ORAL at 20:13

## 2022-01-30 RX ADMIN — HEPARIN SODIUM 5000 UNITS: 5000 INJECTION, SOLUTION INTRAVENOUS; SUBCUTANEOUS at 20:13

## 2022-01-30 RX ADMIN — INSULIN LISPRO 3 UNITS: 100 INJECTION, SOLUTION INTRAVENOUS; SUBCUTANEOUS at 14:05

## 2022-01-30 RX ADMIN — SODIUM CHLORIDE, PRESERVATIVE FREE 10 ML: 5 INJECTION INTRAVENOUS at 20:14

## 2022-01-30 RX ADMIN — HEPARIN SODIUM 5000 UNITS: 5000 INJECTION, SOLUTION INTRAVENOUS; SUBCUTANEOUS at 14:05

## 2022-01-30 RX ADMIN — ASPIRIN 81 MG: 81 TABLET, COATED ORAL at 09:27

## 2022-01-30 RX ADMIN — SENNOSIDES AND DOCUSATE SODIUM 2 TABLET: 50; 8.6 TABLET ORAL at 09:25

## 2022-01-30 RX ADMIN — LINEZOLID 600 MG: 600 TABLET, FILM COATED ORAL at 20:13

## 2022-01-30 RX ADMIN — FAMOTIDINE 20 MG: 20 TABLET, FILM COATED ORAL at 20:13

## 2022-01-30 RX ADMIN — MEMANTINE 10 MG: 10 TABLET ORAL at 09:27

## 2022-01-30 RX ADMIN — HEPARIN SODIUM 5000 UNITS: 5000 INJECTION, SOLUTION INTRAVENOUS; SUBCUTANEOUS at 05:59

## 2022-01-31 LAB
GLUCOSE BLDC GLUCOMTR-MCNC: 129 MG/DL (ref 70–130)
GLUCOSE BLDC GLUCOMTR-MCNC: 172 MG/DL (ref 70–130)
GLUCOSE BLDC GLUCOMTR-MCNC: 177 MG/DL (ref 70–130)
GLUCOSE BLDC GLUCOMTR-MCNC: 206 MG/DL (ref 70–130)

## 2022-01-31 PROCEDURE — 82962 GLUCOSE BLOOD TEST: CPT

## 2022-01-31 PROCEDURE — 63710000001 INSULIN LISPRO (HUMAN) PER 5 UNITS: Performed by: NURSE PRACTITIONER

## 2022-01-31 PROCEDURE — 99233 SBSQ HOSP IP/OBS HIGH 50: CPT | Performed by: NURSE PRACTITIONER

## 2022-01-31 PROCEDURE — 25010000002 HEPARIN (PORCINE) PER 1000 UNITS: Performed by: INTERNAL MEDICINE

## 2022-01-31 RX ORDER — AMPICILLIN 500 MG/1
500 CAPSULE ORAL EVERY 8 HOURS SCHEDULED
Status: DISCONTINUED | OUTPATIENT
Start: 2022-01-31 | End: 2022-02-07

## 2022-01-31 RX ADMIN — TAMSULOSIN HYDROCHLORIDE 0.4 MG: 0.4 CAPSULE ORAL at 10:59

## 2022-01-31 RX ADMIN — INSULIN LISPRO 2 UNITS: 100 INJECTION, SOLUTION INTRAVENOUS; SUBCUTANEOUS at 13:04

## 2022-01-31 RX ADMIN — HEPARIN SODIUM 5000 UNITS: 5000 INJECTION, SOLUTION INTRAVENOUS; SUBCUTANEOUS at 13:04

## 2022-01-31 RX ADMIN — INSULIN LISPRO 2 UNITS: 100 INJECTION, SOLUTION INTRAVENOUS; SUBCUTANEOUS at 18:10

## 2022-01-31 RX ADMIN — SENNOSIDES AND DOCUSATE SODIUM 2 TABLET: 50; 8.6 TABLET ORAL at 21:55

## 2022-01-31 RX ADMIN — FAMOTIDINE 20 MG: 20 TABLET, FILM COATED ORAL at 11:00

## 2022-01-31 RX ADMIN — ASPIRIN 81 MG: 81 TABLET, COATED ORAL at 10:59

## 2022-01-31 RX ADMIN — HEPARIN SODIUM 5000 UNITS: 5000 INJECTION, SOLUTION INTRAVENOUS; SUBCUTANEOUS at 21:56

## 2022-01-31 RX ADMIN — AMPICILLIN 500 MG: 500 CAPSULE ORAL at 18:10

## 2022-01-31 RX ADMIN — ACETAMINOPHEN 650 MG: 325 TABLET, FILM COATED ORAL at 00:03

## 2022-01-31 RX ADMIN — SODIUM CHLORIDE, PRESERVATIVE FREE 10 ML: 5 INJECTION INTRAVENOUS at 11:00

## 2022-01-31 RX ADMIN — MEMANTINE 10 MG: 10 TABLET ORAL at 21:55

## 2022-01-31 RX ADMIN — HEPARIN SODIUM 5000 UNITS: 5000 INJECTION, SOLUTION INTRAVENOUS; SUBCUTANEOUS at 05:55

## 2022-01-31 RX ADMIN — MEMANTINE 10 MG: 10 TABLET ORAL at 10:59

## 2022-01-31 RX ADMIN — DONEPEZIL HYDROCHLORIDE 10 MG: 10 TABLET, FILM COATED ORAL at 10:59

## 2022-01-31 RX ADMIN — QUETIAPINE FUMARATE 12.5 MG: 25 TABLET ORAL at 21:56

## 2022-01-31 RX ADMIN — FAMOTIDINE 20 MG: 20 TABLET, FILM COATED ORAL at 21:56

## 2022-01-31 RX ADMIN — SODIUM CHLORIDE, PRESERVATIVE FREE 10 ML: 5 INJECTION INTRAVENOUS at 21:56

## 2022-02-01 LAB
GLUCOSE BLDC GLUCOMTR-MCNC: 120 MG/DL (ref 70–130)
GLUCOSE BLDC GLUCOMTR-MCNC: 121 MG/DL (ref 70–130)
GLUCOSE BLDC GLUCOMTR-MCNC: 158 MG/DL (ref 70–130)
GLUCOSE BLDC GLUCOMTR-MCNC: 165 MG/DL (ref 70–130)

## 2022-02-01 PROCEDURE — 97530 THERAPEUTIC ACTIVITIES: CPT

## 2022-02-01 PROCEDURE — 99223 1ST HOSP IP/OBS HIGH 75: CPT | Performed by: PSYCHIATRY & NEUROLOGY

## 2022-02-01 PROCEDURE — 97535 SELF CARE MNGMENT TRAINING: CPT

## 2022-02-01 PROCEDURE — 25010000002 HEPARIN (PORCINE) PER 1000 UNITS: Performed by: INTERNAL MEDICINE

## 2022-02-01 PROCEDURE — 82962 GLUCOSE BLOOD TEST: CPT

## 2022-02-01 PROCEDURE — 92610 EVALUATE SWALLOWING FUNCTION: CPT

## 2022-02-01 PROCEDURE — 99233 SBSQ HOSP IP/OBS HIGH 50: CPT | Performed by: INTERNAL MEDICINE

## 2022-02-01 PROCEDURE — 63710000001 INSULIN LISPRO (HUMAN) PER 5 UNITS: Performed by: NURSE PRACTITIONER

## 2022-02-01 RX ORDER — MIRTAZAPINE 15 MG/1
30 TABLET, FILM COATED ORAL NIGHTLY
Status: DISCONTINUED | OUTPATIENT
Start: 2022-02-01 | End: 2022-02-09 | Stop reason: HOSPADM

## 2022-02-01 RX ADMIN — ACETAMINOPHEN 650 MG: 325 TABLET, FILM COATED ORAL at 21:35

## 2022-02-01 RX ADMIN — MEMANTINE 10 MG: 10 TABLET ORAL at 21:35

## 2022-02-01 RX ADMIN — ASPIRIN 81 MG: 81 TABLET, COATED ORAL at 09:03

## 2022-02-01 RX ADMIN — MEMANTINE 10 MG: 10 TABLET ORAL at 09:02

## 2022-02-01 RX ADMIN — AMPICILLIN 500 MG: 500 CAPSULE ORAL at 06:02

## 2022-02-01 RX ADMIN — AMPICILLIN 500 MG: 500 CAPSULE ORAL at 02:39

## 2022-02-01 RX ADMIN — INSULIN LISPRO 2 UNITS: 100 INJECTION, SOLUTION INTRAVENOUS; SUBCUTANEOUS at 13:06

## 2022-02-01 RX ADMIN — SODIUM CHLORIDE, PRESERVATIVE FREE 10 ML: 5 INJECTION INTRAVENOUS at 09:03

## 2022-02-01 RX ADMIN — INSULIN LISPRO 2 UNITS: 100 INJECTION, SOLUTION INTRAVENOUS; SUBCUTANEOUS at 17:27

## 2022-02-01 RX ADMIN — HEPARIN SODIUM 5000 UNITS: 5000 INJECTION, SOLUTION INTRAVENOUS; SUBCUTANEOUS at 21:35

## 2022-02-01 RX ADMIN — MIRTAZAPINE 30 MG: 15 TABLET, FILM COATED ORAL at 21:35

## 2022-02-01 RX ADMIN — SENNOSIDES AND DOCUSATE SODIUM 2 TABLET: 50; 8.6 TABLET ORAL at 09:03

## 2022-02-01 RX ADMIN — TAMSULOSIN HYDROCHLORIDE 0.4 MG: 0.4 CAPSULE ORAL at 09:02

## 2022-02-01 RX ADMIN — DONEPEZIL HYDROCHLORIDE 10 MG: 10 TABLET, FILM COATED ORAL at 09:02

## 2022-02-01 RX ADMIN — FAMOTIDINE 20 MG: 20 TABLET, FILM COATED ORAL at 21:35

## 2022-02-01 RX ADMIN — HEPARIN SODIUM 5000 UNITS: 5000 INJECTION, SOLUTION INTRAVENOUS; SUBCUTANEOUS at 06:02

## 2022-02-01 RX ADMIN — AMPICILLIN 500 MG: 500 CAPSULE ORAL at 21:36

## 2022-02-01 RX ADMIN — AMPICILLIN 500 MG: 500 CAPSULE ORAL at 13:06

## 2022-02-01 RX ADMIN — HEPARIN SODIUM 5000 UNITS: 5000 INJECTION, SOLUTION INTRAVENOUS; SUBCUTANEOUS at 13:06

## 2022-02-01 RX ADMIN — SODIUM CHLORIDE, PRESERVATIVE FREE 10 ML: 5 INJECTION INTRAVENOUS at 21:35

## 2022-02-01 RX ADMIN — FAMOTIDINE 20 MG: 20 TABLET, FILM COATED ORAL at 09:02

## 2022-02-01 NOTE — THERAPY TREATMENT NOTE
Patient Name: Leobardo Araujo  : 1934    MRN: [id number]                              Today's Date: 2022       Admit Date: 2022    Visit Dx:     ICD-10-CM ICD-9-CM   1. Acute UTI  N39.0 599.0   2. Generalized weakness  R53.1 780.79   3. Altered mental status, unspecified altered mental status type  R41.82 780.97   4. COVID-19 virus infection  U07.1 079.89   5. Weight loss, unintentional  R63.4 783.21   6. Generalized muscle weakness  M62.81 728.87   7. Complicated UTI (urinary tract infection)  N39.0 599.0     Patient Active Problem List   Diagnosis   • Memory loss   • YSABEL (obstructive sleep apnea)   • Recurrent major depressive disorder (HCC)   • Fatigue   • History of aortic valve stenosis   • Erectile dysfunction   • Hypokalemia   • Metabolic encephalopathy   • Valvular heart disease   • Abnormal findings on diagnostic imaging of lung   • History of aortic valve replacement with bioprosthetic valve   • Type 2 diabetes mellitus with other circulatory complications (HCC)   • Peripheral vascular disease (HCC)   • Benign essential hypertension   • Paroxysmal atrial fibrillation (HCC)   • Hyperlipidemia LDL goal <100   • B12 deficiency   • Abnormal liver function   • Allergic rhinitis   • BPH with urinary obstruction   • Disorder of mitral and aortic valves   • Hypothyroidism   • Kidney stone   • Malignant tumor of urinary bladder (HCC)   • Neck pain   • Numbness   • Palpitations   • Polyp of colon   • Recurrent UTI (urinary tract infection)   • Medicare annual wellness visit, subsequent   • Multiple nevi   • Osteopenia of hip   • Iron deficiency anemia   • Unsteady gait   • Failure to thrive in adult   • Weight loss, unintentional   • Generalized muscle weakness   • Complicated UTI (urinary tract infection)   • Dementia without behavioral disturbance (HCC)     Past Medical History:   Diagnosis Date   • Abnormal findings on diagnostic imaging of lung    • Arthritis    • Atrial fibrillation (HCC)      Atrial fibrillation, October 2010; CHADS2-VASc = 3: Amiodarone therapy, discontinued, November 2013. Elke Sultana left atrial appendage closure, Dr. Edson Roque, 07/23/2012.     • B12 deficiency    • Basal cell carcinoma    • Bladder cancer (HCC)    • Central sleep apnea in conditions classified elsewhere    • Delirium 2/13/2019   • Depression    • Diarrhea 4/24/2018   • History of anxiety    • History of aortic valve stenosis    • History of coronary atherosclerosis    • History of sexual dysfunction in male    • Hypercholesteremia    • Hyperlipidemia LDL goal <100    • Hypertension 1/6/2017   • Memory loss    • Nausea, vomiting, and diarrhea 4/24/2018   • Obstructive sleep apnea    • Peripheral vascular disease (HCC)    • Renal insufficiency 4/24/2018   • Sepsis (HCC)     from UTI   • Stroke (HCC)    • Testicular mass    • Tobacco abuse     Remote tobacco abuse.   • Type 2 diabetes mellitus with other circulatory complications (HCC)    • UTI (urinary tract infection) 04/2018   • Valvular heart disease 4/24/2018     Past Surgical History:   Procedure Laterality Date   • AORTIC VALVE REPAIR/REPLACEMENT  2012   • BLADDER RESECTION LAPAROSCOPIC  2000    Bladder cancer resection, 2000.   • CATARACT EXTRACTION     • CYSTOSCOPY W/ LITHOLAPAXY / EHL     • EPIDIDYMECTOMY Right    • KIDNEY SURGERY     • ORCHIECTOMY      Orchiectomy, benign.      General Information     Row Name 02/01/22 0822          OT Time and Intention    Document Type therapy note (daily note)  -MA     Mode of Treatment occupational therapy  -MA     Row Name 02/01/22 0822          General Information    Patient Profile Reviewed yes  -MA     Existing Precautions/Restrictions fall; other (see comments)  baseline dementia  -MA     Barriers to Rehab previous functional deficit; cognitive status  -MA     Row Name 02/01/22 0822          Cognition    Orientation Status (Cognition) oriented to; person; unable/difficult to assess  Pt w/ increased lethargy,  only answering questions w/ one word answers occassionally.  -MA     Row Name 02/01/22 0822          Safety Issues, Functional Mobility    Safety Issues Affecting Function (Mobility) awareness of need for assistance; insight into deficits/self-awareness; safety precaution awareness; safety precautions follow-through/compliance; sequencing abilities  -MA     Impairments Affecting Function (Mobility) balance; cognition; coordination; endurance/activity tolerance; motor planning; strength; postural/trunk control  -MA     Cognitive Impairments, Mobility Safety/Performance awareness, need for assistance; insight into deficits/self-awareness; safety precaution awareness; safety precaution follow-through; sequencing abilities  -MA           User Key  (r) = Recorded By, (t) = Taken By, (c) = Cosigned By    Initials Name Provider Type    Char Chester OT Occupational Therapist                 Mobility/ADL's     Row Name 02/01/22 0823          Bed Mobility    Bed Mobility supine-sit  -MA     Supine-Sit [place] (Bed Mobility) maximum assist (25% patient effort); 1 person assist; verbal cues  -MA     Assistive Device (Bed Mobility) draw sheet; head of bed elevated; bed rails  -MA     Comment (Bed Mobility) Pt max Ax1 for supine-to-sit, pt req A to bring trunk upright and bring BLE's off EOB.  -MA     Row Name 02/01/22 0823          Transfers    Transfers sit-stand transfer; bed-chair transfer  -MA     Comment (Transfers) Pt max Ax1 for STS and SPT from bed-to-chair w/ BUE support, VC's for sequencing and hand placement  -MA     Bed-Chair [place] (Transfers) maximum assist (25% patient effort); 1 person assist; verbal cues  -MA     Assistive Device (Bed-Chair Transfers) other (see comments)  BUE support  -MA     Sit-Stand [place] (Transfers) maximum assist (25% patient effort); 1 person assist; verbal cues  -MA     Row Name 02/01/22 0823          Sit-Stand Transfer    Assistive Device (Sit-Stand Transfers)  other (see comments)  BUE support  -MA     Row Name 02/01/22 0823          Functional Mobility    Functional Mobility- Comment Deferred to PT  -MA     Row Name 02/01/22 0823          Activities of Daily Living    BADL Assessment/Intervention grooming  -MA     Row Name 02/01/22 0823          Grooming Assessment/Training    [place] Level (Grooming) wash face, hands; set up; hair care, combing/brushing; maximum assist (25% patient effort); verbal cues  -MA     Position (Grooming) supported sitting  -MA           User Key  (r) = Recorded By, (t) = Taken By, (c) = Cosigned By    Initials Name Provider Type    Char Chester OT Occupational Therapist               Obj/Interventions     Row Name 02/01/22 0825          Balance    Balance Assessment sitting static balance; standing static balance; standing dynamic balance  -MA     Static Sitting Balance mild impairment; unsupported; sitting, edge of bed; sitting in chair  -MA     Static Standing Balance severe impairment; supported; standing  -MA     Dynamic Standing Balance severe impairment; supported; standing  -MA     Balance Interventions sit to stand; occupation based/functional task  -MA           User Key  (r) = Recorded By, (t) = Taken By, (c) = Cosigned By    Initials Name Provider Type    Char Chester OT Occupational Therapist               Goals/Plan    No documentation.                Clinical Impression     Row Name 02/01/22 0825          Pain Assessment    Additional Documentation Pain Scale: Numbers Pre/Post-Treatment (Group)  -[facility] Name 02/01/22 0825          Pain Scale: Numbers Pre/Post-Treatment    Pretreatment Pain Rating 0/10 - no pain  -MA     Posttreatment Pain Rating 0/10 - no pain  -MA     Row Name 02/01/22 0825          Plan of Care Review    Plan of Care Reviewed With patient  -MA     Progress declining  -MA     Outcome Summary Pt w/ increased lethargy this date. Pt max Ax1 for supine-to-sit, STS and SPT from bed-to-chair w/  BUE support, SUA for washing face, max A for combing hair. Pt req max VC's for sequencing and safety throughout. Will continue to progress pt as tolerated per OT POC.  -MA     Row Name 02/01/22 0825          Therapy Assessment/Plan (OT)    Rehab Potential (OT) fair, will monitor progress closely  -MA     Criteria for Skilled Therapeutic Interventions Met (OT) yes; skilled treatment is necessary  -MA     Therapy Frequency (OT) daily  -MA     Row Name 02/01/22 0825          Therapy Plan Review/Discharge Plan (OT)    Anticipated Discharge Disposition (OT) skilled nursing facility  -MA     Row Name 02/01/22 0825          Vital Signs    Pre Systolic BP Rehab 95  -MA     Pre Treatment Diastolic BP 65  -MA     Pretreatment Heart Rate (beats/min) 89  -MA     Pre SpO2 (%) 95  -MA     O2 Delivery Pre Treatment room air  -MA     O2 Delivery Intra Treatment room air  -MA     O2 Delivery Post Treatment room air  -MA     Pre Patient Position Supine  -MA     Intra Patient Position Standing  -MA     Post Patient Position Sitting  -MA     Row Name 02/01/22 0825          Positioning and Restraints    Pre-Treatment Position in bed  -MA     Post Treatment Position chair  -MA     In Chair notified nsg; reclined; call light within reach; encouraged to call for assist; exit alarm on; waffle cushion; legs elevated; heels elevated  -MA           User Key  (r) = Recorded By, (t) = Taken By, (c) = Cosigned By    Initials Name Provider Type    Char Chester, OT Occupational Therapist               Outcome Measures     Row Name 02/01/22 0827          How much help from another is currently needed...    Putting on and taking off regular lower body clothing? 1  -MA     Bathing (including washing, rinsing, and drying) 2  -MA     Toileting (which includes using toilet bed pan or urinal) 2  -MA     Putting on and taking off regular upper body clothing 2  -MA     Taking care of personal grooming (such as brushing teeth) 2  -MA     Eating meals 2   -MA     -[facility] 6 Clicks Score (OT) 11  -MA     Row Name 02/01/22 0827          Functional Assessment    Outcome Measure Options AM-PAC 6 Clicks Daily Activity (OT)  -MA           User Key  (r) = Recorded By, (t) = Taken By, (c) = Cosigned By    Initials Name Provider Type    Char Chester OT Occupational Therapist                Occupational Therapy Education                 Title: PT OT SLP Therapies (In Progress)     Topic: Occupational Therapy (In Progress)     Point: ADL training (In Progress)     Description:   Instruct learner(s) on proper safety adaptation and remediation techniques during self care or transfers.   Instruct in proper use of assistive devices.              Learning Progress Summary           Patient Acceptance, E, NR by MA at 2/1/2022 0827    Acceptance, E, VU by MA at 1/27/2022 1348                   Point: Home exercise program (Done)     Description:   Instruct learner(s) on appropriate technique for monitoring, assisting and/or progressing therapeutic exercises/activities.              Learning Progress Summary           Patient Acceptance, E, VU by MA at 1/27/2022 1348                   Point: Precautions (In Progress)     Description:   Instruct learner(s) on prescribed precautions during self-care and functional transfers.              Learning Progress Summary           Patient Acceptance, E, NR by MA at 2/1/2022 0827    Acceptance, E, VU by MA at 1/27/2022 1348                   Point: Body mechanics (In Progress)     Description:   Instruct learner(s) on proper positioning and spine alignment during self-care, functional mobility activities and/or exercises.              Learning Progress Summary           Patient Acceptance, E, NR by MA at 2/1/2022 0827    Acceptance, E, VU by MA at 1/27/2022 1348                               User Key     Initials Effective Dates Name Provider Type [facility]    MA 11/19/20 -  Char Parrish OT Occupational Therapist OT              OT  Recommendation and Plan  Therapy Frequency (OT): daily  Plan of Care Review  Plan of Care Reviewed With: patient  Progress: declining  Outcome Summary: Pt w/ increased lethargy this date. Pt max Ax1 for supine-to-sit, STS and SPT from bed-to-chair w/ BUE support, SUA for washing face, max A for combing hair. Pt req max VC's for sequencing and safety throughout. Will continue to progress pt as tolerated per OT POC.     Time Calculation:    Time Calculation- OT     Row Name 02/01/22 0827             Time Calculation- OT    OT Start Time 0754  -MA      OT Stop Time 0817  -MA      OT Time Calculation (min) 23 min  -MA      OT Received On 02/01/22  -MA      OT Goal Re-Cert Due Date 02/06/22  -MA              Timed Charges    53134 - OT Therapeutic Activity Minutes 13  -MA      96713 - OT Self Care/Mgmt Minutes 10  -MA              Total Minutes    Timed Charges Total Minutes 23  -MA       Total Minutes 23  -MA            User Key  (r) = Recorded By, (t) = Taken By, (c) = Cosigned By    Initials Name Provider Type    Char Chester OT Occupational Therapist              Therapy Charges for Today     Code Description Service Date Service Provider Modifiers Qty    83778420462 HC OT THERAPEUTIC ACT EA 15 MIN 2/1/2022 Char Parrish OT GO 1    63690572390 HC OT SELF CARE/MGMT/TRAIN EA 15 MIN 2/1/2022 Char Parrish OT GO 1               Char Parrish OT  2/1/2022

## 2022-02-01 NOTE — CONSULTS
Neurology    Referring provider:   Terri Noonan MD  [address]  [address]  [place] [address]    Reason for Consultation: Altered mental state    Chief complaint: Minimally responsive    History of present illness: 87-year-old man hospitalized since January 25 with Covid pneumonia referred by Dr. Quintero for evaluation of decline in consciousness.    Patient is being followed by Dr. Beard who felt that he was good enough with his Covid and enterococcal UTI yesterday that he would be good enough to go to nursing home.    Today he is less alert.    The only difference I can see is that his p.o. intake is limited and that he got Seroquel 12.5 mg last night.    The details of why that was administered is not clear.    He is apparently being treated for dementia with Aricept and Namenda.    Patient is to follow-up with  with a history of bladder cancer.  He is apparently failure to thrive with decreased appetite and is been placed on Remeron 30 mg at bedtime to help that apparently.    He is on Aricept and Namenda.    Dr. Quintero called the daughter who is her power of  and she declined to converse due to work related issues.    Patient has atrial fibrillation on Eliquis 5 mg twice daily prior to the this admission.          Review of Systems: Patient is unable to participate    Home meds:   Medications Prior to Admission   Medication Sig Dispense Refill Last Dose   • amLODIPine (NORVASC) 5 MG tablet TAKE 1 TABLET TWICE DAILY 180 tablet 1 Past Month at Unknown time   • aspirin 81 MG tablet Take  by mouth Daily.   Past Week at Unknown time   • atorvastatin (LIPITOR) 40 MG tablet TAKE 1 TABLET EVERY DAY 90 tablet 1 Past Month at Unknown time   • donepezil (ARICEPT) 10 MG tablet TAKE 1 TABLET EVERY DAY 90 tablet 1 Past Week at Unknown time   • famotidine (PEPCID) 20 MG tablet TAKE 1 TABLET TWICE DAILY 180 tablet 1 Past Month at Unknown time   • finasteride (PROPECIA) 1 MG tablet Take 5  tablets by mouth Daily. 90 tablet 1 Past Month at Unknown time   • memantine (NAMENDA) 10 MG tablet Take 1 tablet by mouth 2 (Two) Times a Day. 60 tablet 11 Past Week at Unknown time   • metFORMIN (GLUCOPHAGE) 500 MG tablet TAKE 2 TABLETS TWICE DAILY 240 tablet 1 Past Week at Unknown time   • mirtazapine (Remeron) 30 MG tablet Take 1 tablet by mouth Every Night. 30 tablet 11 Past Week at Unknown time   • tamsulosin (FLOMAX) 0.4 MG capsule 24 hr capsule TAKE 1 CAPSULE EVERY DAY 90 capsule 1 Past Week at Unknown time   • apixaban (Eliquis) 5 MG tablet tablet Take 1 tablet by mouth Every 12 (Twelve) Hours. 60 tablet 5 Unknown at Unknown time   • levothyroxine (SYNTHROID, LEVOTHROID) 50 MCG tablet TAKE 1 TABLET EVERY DAY 90 tablet 1 Unknown at Unknown time   • losartan (COZAAR) 100 MG tablet TAKE 1 TABLET BY MOUTH DAILY. 90 tablet 3 Unknown at Unknown time       History  Past Medical History:   Diagnosis Date   • Abnormal findings on diagnostic imaging of lung    • Arthritis    • Atrial fibrillation (HCC)     Atrial fibrillation, October 2010; CHADS2-VASc = 3: Amiodarone therapy, discontinued, November 2013. Elke Sultana left atrial appendage closure, Dr. Edson Roque, 07/23/2012.     • B12 deficiency    • Basal cell carcinoma    • Bladder cancer (HCC)    • Central sleep apnea in conditions classified elsewhere    • Delirium 2/13/2019   • Depression    • Diarrhea 4/24/2018   • History of anxiety    • History of aortic valve stenosis    • History of coronary atherosclerosis    • History of sexual dysfunction in male    • Hypercholesteremia    • Hyperlipidemia LDL goal <100    • Hypertension 1/6/2017   • Memory loss    • Nausea, vomiting, and diarrhea 4/24/2018   • Obstructive sleep apnea    • Peripheral vascular disease (HCC)    • Renal insufficiency 4/24/2018   • Sepsis (HCC)     from UTI   • Stroke (HCC)    • Testicular mass    • Tobacco abuse     Remote tobacco abuse.   • Type 2 diabetes mellitus with other  circulatory complications (HCC)    • UTI (urinary tract infection) 04/2018   • Valvular heart disease 4/24/2018   ,   Past Surgical History:   Procedure Laterality Date   • AORTIC VALVE REPAIR/REPLACEMENT  2012   • BLADDER RESECTION LAPAROSCOPIC  2000    Bladder cancer resection, 2000.   • CATARACT EXTRACTION     • CYSTOSCOPY W/ LITHOLAPAXY / EHL     • EPIDIDYMECTOMY Right    • KIDNEY SURGERY     • ORCHIECTOMY      Orchiectomy, benign.   ,   Family History   Problem Relation Age of Onset   • Hypertension Mother    • Heart disease Mother    • Stroke Mother    • Hypertension Father    • Heart disease Father    • Alcohol abuse Brother    • Stroke Other         Stroke syndrome   • Mental illness Other    • Hypertension Other    • Diabetes Other    • Alcohol abuse Other    • Coronary artery disease Other    • Hyperlipidemia Other    ,   Social History     Tobacco Use   • Smoking status: Never Smoker   • Smokeless tobacco: Never Used   Vaping Use   • Vaping Use: Never used   Substance Use Topics   • Alcohol use: No   • Drug use: No    and Allergies:  Patient has no known allergies.,    Vital Signs   Blood pressure 119/68, pulse 109, temperature 98 °F (36.7 °C), temperature source Oral, resp. rate 18, SpO2 94 %.  There is no height or weight on file to calculate BMI.    Physical Exam:   General: Lethargic white male              Head: No trauma              Neck: Pain with lateral rotation.                  Resp: Normal breath sounds              Cor: Regular rhythm              Extremities: No edema              Skin: Warm and dry              Neuro: Lethargic but awakens and follows simple commands.    Speech is soft but articulate.  Does not communicate particularly well.    Cranial nerves show pinpoint pupils conjugate eye movements symmetrical face.    Tongue protrudes midline palate elevates midline.    Reflexes are plus minus throughout.    Motor testing shows normal power and  with no pronator drift.    Moves  both lower extremities without difficulty.        Results Review: Creatinine 163.        Labs:  Lab Results (last 72 hours)     Procedure Component Value Units Date/Time    POC Glucose Once [[id number]]  (Abnormal) Collected: 02/01/22 1226    Specimen: Blood Updated: 02/01/22 1246     Glucose 158 mg/dL      Comment: Meter: [id number] : [id number] Vinegar Andressa       POC Glucose Once [[id number]]  (Normal) Collected: 02/01/22 0737    Specimen: Blood Updated: 02/01/22 0746     Glucose 120 mg/dL      Comment: Meter: [id number] : [id number] Vinegar Andressa       POC Glucose Once [[id number]]  (Abnormal) Collected: 01/31/22 2106    Specimen: Blood Updated: 01/31/22 2107     Glucose 206 mg/dL      Comment: Meter: [id number] : [id number] Miguel Xiong       POC Glucose Once [[id number]]  (Abnormal) Collected: 01/31/22 1723    Specimen: Blood Updated: 01/31/22 1746     Glucose 172 mg/dL      Comment: Meter: [id number] : [id number] Maddie THOMPSON       POC Glucose Once [[id number]]  (Abnormal) Collected: 01/31/22 1231    Specimen: Blood Updated: 01/31/22 1242     Glucose 177 mg/dL      Comment: Meter: [id number] : [id number] Vinegar Andressa       POC Glucose Once [[id number]]  (Normal) Collected: 01/31/22 0746    Specimen: Blood Updated: 01/31/22 0801     Glucose 129 mg/dL      Comment: Meter: [id number] : [id number] Vinegar Andressa       POC Glucose Once [[id number]]  (Normal) Collected: 01/30/22 2105    Specimen: Blood Updated: 01/30/22 2106     Glucose 113 mg/dL      Comment: Meter: [id number] : [id number] Miguel Xiong       Blood Culture - Blood, Arm, Left [[id number]]  (Normal) Collected: 01/25/22 1700    Specimen: Blood from Arm, Left Updated: 01/30/22 1900     Blood Culture No growth at 5 days    Blood Culture - Blood, Arm, Right [[id number]]  (Normal) Collected: 01/25/22 1810    Specimen: Blood from Arm, Right Updated: 01/30/22 1900     Blood Culture No growth at 5 days    POC Glucose Once [[id number]]  (Normal)  Collected: 01/30/22 1634    Specimen: Blood Updated: 01/30/22 1645     Glucose 128 mg/dL      Comment: Meter: [id number] : [id number] Monk Mauricio       Magnesium [[id number]]  (Normal) Collected: 01/30/22 1258    Specimen: Blood Updated: 01/30/22 1403     Magnesium 2.0 mg/dL     POC Glucose Once [[id number]]  (Abnormal) Collected: 01/30/22 1233    Specimen: Blood Updated: 01/30/22 1242     Glucose 223 mg/dL      Comment: Meter: [id number] : [id number] Monk Mauricio       POC Glucose Once [[id number]]  (Normal) Collected: 01/30/22 0854    Specimen: Blood Updated: 01/30/22 0905     Glucose 114 mg/dL      Comment: Meter: [id number] : [id number] Monk Mauricio       POC Glucose Once [[id number]]  (Abnormal) Collected: 01/29/22 2113    Specimen: Blood Updated: 01/29/22 2115     Glucose 133 mg/dL      Comment: Meter: [id number] : [id number] Leighann Rios       POC Glucose Once [[id number]]  (Abnormal) Collected: 01/29/22 1722    Specimen: Blood Updated: 01/29/22 1723     Glucose 133 mg/dL      Comment: Meter: [id number] : [id number] Oliver Rolle       Basic Metabolic Panel [[id number]]  (Abnormal) Collected: 01/29/22 1552    Specimen: Blood Updated: 01/29/22 1656     Glucose 146 mg/dL      BUN 12 mg/dL      Creatinine 0.63 mg/dL      Sodium 136 mmol/L      Potassium 3.8 mmol/L      Chloride 100 mmol/L      CO2 25.0 mmol/L      Calcium 8.1 mg/dL      eGFR Non African Amer 120 mL/min/1.73      BUN/Creatinine Ratio 19.0     Anion Gap 11.0 mmol/L     Narrative:      GFR Normal >60  Chronic Kidney Disease <60  Kidney Failure <15      Magnesium [[id number]]  (Normal) Collected: 01/29/22 1552    Specimen: Blood Updated: 01/29/22 1656     Magnesium 1.6 mg/dL     CBC & Differential [[id number]]  (Abnormal) Collected: 01/29/22 1552    Specimen: Blood Updated: 01/29/22 1630    Narrative:      The following orders were created for panel order CBC & Differential.  Procedure                               Abnormality          Status                     ---------                               -----------         ------                     CBC Auto Differential[[id number]]        Abnormal            Final result                 Please view results for these tests on the individual orders.    CBC Auto Differential [[id number]]  (Abnormal) Collected: 01/29/22 1552    Specimen: Blood Updated: 01/29/22 1630     WBC 8.74 10*3/mm3      RBC 3.95 10*6/mm3      Hemoglobin 10.5 g/dL      Hematocrit 32.1 %      MCV 81.3 fL      MCH 26.6 pg      MCHC 32.7 g/dL      RDW 16.0 %      RDW-SD 47.2 fl      MPV 11.4 fL      Platelets 254 10*3/mm3      Neutrophil % 74.1 %      Lymphocyte % 14.0 %      Monocyte % 11.2 %      Eosinophil % 0.3 %      Basophil % 0.2 %      Immature Grans % 0.2 %      Neutrophils, Absolute 6.47 10*3/mm3      Lymphocytes, Absolute 1.22 10*3/mm3      Monocytes, Absolute 0.98 10*3/mm3      Eosinophils, Absolute 0.03 10*3/mm3      Basophils, Absolute 0.02 10*3/mm3      Immature Grans, Absolute 0.02 10*3/mm3      nRBC 0.0 /100 WBC           Rads:  Imaging Results (Last 72 Hours)     ** No results found for the last 72 hours. **            Assessment: Adult failure to thrive, likely Alzheimer's disease.    Covid pneumonia.    Enterococcal urinary tract infection       Plan:    Resume Remeron 30 mg at bedtime for appetite stimulation and help with sleep.    CT head without.    No evidence of hemorrhage resume Eliquis 5 mg twice daily.    Patient likely needs palliative care consult to discuss goals of care with the POA daughter who is her caregiver.        Comment:   Extremely guarded prognosis.    I discussed the patients findings and my recommendations with primary care team      Placido Stokes MD  02/01/22  14:47 EST

## 2022-02-01 NOTE — PLAN OF CARE
Goal Outcome Evaluation:            PT increasingly lethargic throughout shift, and oral medications seem increasingly difficult. SLP evaluation recommends dysphagia 4/nectar thick diet. Awaiting daughters call with complete medication list to rule out neurological deficits. VSS & O2 stable on RA. Will follow up with family and consults tomorrow.         Verified PTA meds with daughter Addie, states that she has no record/recolection of giving  Eliquis within the last year. Also states she has no log of receiving Eliquis from RX provider, Lady. Will follow up with family and Attending tomorrow.

## 2022-02-01 NOTE — THERAPY EVALUATION
Acute Care - Speech Language Pathology   Swallow Initial Evaluation [facility]   Clinical Swallow Evaluation       Patient Name: Leobardo Araujo  : 1934  MRN: [id number]  Today's Date: 2022               Admit Date: 2022    Visit Dx:     ICD-10-CM ICD-9-CM   1. Acute UTI  N39.0 599.0   2. Generalized weakness  R53.1 780.79   3. Altered mental status, unspecified altered mental status type  R41.82 780.97   4. COVID-19 virus infection  U07.1 079.89   5. Weight loss, unintentional  R63.4 783.21   6. Generalized muscle weakness  M62.81 728.87   7. Complicated UTI (urinary tract infection)  N39.0 599.0     Patient Active Problem List   Diagnosis   • Memory loss   • YSABEL (obstructive sleep apnea)   • Recurrent major depressive disorder (HCC)   • Fatigue   • History of aortic valve stenosis   • Erectile dysfunction   • Hypokalemia   • Metabolic encephalopathy   • Valvular heart disease   • Abnormal findings on diagnostic imaging of lung   • History of aortic valve replacement with bioprosthetic valve   • Type 2 diabetes mellitus with other circulatory complications (HCC)   • Peripheral vascular disease (HCC)   • Benign essential hypertension   • Paroxysmal atrial fibrillation (HCC)   • Hyperlipidemia LDL goal <100   • B12 deficiency   • Abnormal liver function   • Allergic rhinitis   • BPH with urinary obstruction   • Disorder of mitral and aortic valves   • Hypothyroidism   • Kidney stone   • Malignant tumor of urinary bladder (HCC)   • Neck pain   • Numbness   • Palpitations   • Polyp of colon   • Recurrent UTI (urinary tract infection)   • Medicare annual wellness visit, subsequent   • Multiple nevi   • Osteopenia of hip   • Iron deficiency anemia   • Unsteady gait   • Failure to thrive in adult   • Weight loss, unintentional   • Generalized muscle weakness   • Complicated UTI (urinary tract infection)   • Dementia without behavioral disturbance (HCC)     Past Medical History:   Diagnosis Date   •  Abnormal findings on diagnostic imaging of lung    • Arthritis    • Atrial fibrillation (HCC)     Atrial fibrillation, October 2010; CHADS2-VASc = 3: Amiodarone therapy, discontinued, November 2013. Elke Sultana left atrial appendage closure, Dr. Edson Roque, 07/23/2012.     • B12 deficiency    • Basal cell carcinoma    • Bladder cancer (HCC)    • Central sleep apnea in conditions classified elsewhere    • Delirium 2/13/2019   • Depression    • Diarrhea 4/24/2018   • History of anxiety    • History of aortic valve stenosis    • History of coronary atherosclerosis    • History of sexual dysfunction in male    • Hypercholesteremia    • Hyperlipidemia LDL goal <100    • Hypertension 1/6/2017   • Memory loss    • Nausea, vomiting, and diarrhea 4/24/2018   • Obstructive sleep apnea    • Peripheral vascular disease (HCC)    • Renal insufficiency 4/24/2018   • Sepsis (HCC)     from UTI   • Stroke (HCC)    • Testicular mass    • Tobacco abuse     Remote tobacco abuse.   • Type 2 diabetes mellitus with other circulatory complications (HCC)    • UTI (urinary tract infection) 04/2018   • Valvular heart disease 4/24/2018     Past Surgical History:   Procedure Laterality Date   • AORTIC VALVE REPAIR/REPLACEMENT  2012   • BLADDER RESECTION LAPAROSCOPIC  2000    Bladder cancer resection, 2000.   • CATARACT EXTRACTION     • CYSTOSCOPY W/ LITHOLAPAXY / EHL     • EPIDIDYMECTOMY Right    • KIDNEY SURGERY     • ORCHIECTOMY      Orchiectomy, benign.       SLP Recommendation and Plan  SLP Swallowing Diagnosis: suspected pharyngeal dysphagia (02/01/22 1350)  SLP Diet Recommendation: mechanical soft with no mixed consistencies, nectar thick liquids (02/01/22 1350)  Recommended Precautions and Strategies: general aspiration precautions (02/01/22 1350)  SLP Rec. for Method of Medication Administration: meds whole, with thick liquids, with pudding or applesauce, as tolerated (02/01/22 1350)     Monitor for Signs of Aspiration: yes,  notify SLP if any concerns (02/01/22 1350)  Recommended Diagnostics: VFSS ([facility]) (02/01/22 1350)  Swallow Criteria for Skilled Therapeutic Interventions Met: demonstrates skilled criteria (02/01/22 1350)  Anticipated Discharge Disposition (SLP): unknown, anticipate therapy at next level of care (02/01/22 1350)  Rehab Potential/Prognosis, Swallowing: good, to achieve stated therapy goals (02/01/22 1350)     Predicted Duration Therapy Intervention (Days): until discharge (02/01/22 1350)                                         SWALLOW EVALUATION (last 72 hours)     SLP Adult Swallow Evaluation     Row Name 02/01/22 1350                   Rehab Evaluation    Document Type evaluation  -CH        Subjective Information no complaints  -        Patient Observations lethargic; cooperative; agree to therapy  -        Patient/Family/Caregiver Comments/Observations No family present. Airborne precautions  -        Patient Effort adequate  -        Symptoms Noted During/After Treatment none  -CH                  General Information    Patient Profile Reviewed yes  -CH        Pertinent History Of Current Problem Patient admitted w UTI and Covid 19. Clinical swallow evaluation ordered d/t RN concerns that patient is not tolerating thin liquids.  -        Current Method of Nutrition regular textures; thin liquids  -        Precautions/Limitations, Hearing WFL; for purposes of eval  -        Prior Level of Function-Communication unknown  -        Prior Level of Function-Swallowing unknown  -        Plans/Goals Discussed with patient; agreed upon  -        Barriers to Rehab cognitive status  -        Patient's Goals for Discharge patient did not state  -                  Pain    Additional Documentation Pain Scale: FACES Pre/Post-Treatment (Group)  -                  Pain Scale: Numbers Pre/Post-Treatment    Pretreatment Pain Rating 0/10 - no pain  -        Posttreatment Pain Rating 0/10 - no pain  -                   Pain Scale: FACES Pre/Post-Treatment    Pain: FACES Scale, Pretreatment 0-->no hurt  -CH        Posttreatment Pain Rating 0-->no hurt  -CH                  Oral Motor Structure and Function    Dentition Assessment natural, present and adequate  -CH        Secretion Management WNL/WFL  -CH        Mucosal Quality moist, healthy  -CH        Volitional Swallow weak  -CH                  Oral Musculature and Cranial Nerve Assessment    Oral Motor General Assessment generalized oral motor weakness  -                  General Eating/Swallowing Observations    Respiratory Support Currently in Use room air  -        Eating/Swallowing Skills fed by SLP  -        Positioning During Eating upright 90 degree; upright in chair; needs frequent re-positioning  -        Utensils Used spoon; cup; straw  -        Consistencies Trialed ice chips; thin liquids; pureed; soft textures; regular textures  -        Pre SpO2 (%) 97  -CH        Post SpO2 (%) 96  -CH                  Respiratory    Respiratory Status WFL; room air  -CH                  Clinical Swallow Eval    Oral Prep Phase WFL  -CH        Oral Transit WFL  -CH        Oral Residue WFL  -CH        Pharyngeal Phase suspected pharyngeal impairment  -CH        Esophageal Phase unremarkable  -CH        Clinical Swallow Evaluation Summary Patient given trials of thin liquids via ice, spoon, cup and straw, pureed, soft solids and regular solid trials. throat clear and delayed cough observed following cup and straw sips of thin liquids. No overt s/s of aspiration with Nectar thick liquids via cup or straw sips or with pureed or solid consistencies. Recommend: lvl IV, Nectar thick liquids, straw OK. Meds whole with thick liquids or pureed. MBS to further assess.  -CH                  Pharyngeal Phase Concerns    Pharyngeal Phase Concerns cough; throat clear  -CH        Cough thin  -CH        Throat Clear thin  -CH                  SLP Evaluation Clinical  Impression    SLP Swallowing Diagnosis suspected pharyngeal dysphagia  -        Functional Impact risk of aspiration/pneumonia  -        Rehab Potential/Prognosis, Swallowing good, to achieve stated therapy goals  -        Swallow Criteria for Skilled Therapeutic Interventions Met demonstrates skilled criteria  -                  Recommendations    Predicted Duration Therapy Intervention (Days) until discharge  -        SLP Diet Recommendation mechanical soft with no mixed consistencies; nectar thick liquids  -        Recommended Diagnostics VFSS (MBS)  -        Recommended Precautions and Strategies general aspiration precautions  -        Oral Care Recommendations Oral Care BID/PRN; Swab  -        SLP Rec. for Method of Medication Administration meds whole; with thick liquids; with pudding or applesauce; as tolerated  -        Monitor for Signs of Aspiration yes; notify SLP if any concerns  -        Anticipated Discharge Disposition (SLP) unknown; anticipate therapy at next level of care  -              User Key  (r) = Recorded By, (t) = Taken By, (c) = Cosigned By    Initials Name Effective Dates    Jane Saldaña MS CCC-SLP 06/16/21 -                 EDUCATION  The patient has been educated in the following areas:   Dysphagia (Swallowing Impairment) Oral Care/Hydration Modified Diet Instruction.              Time Calculation:    Time Calculation- SLP     Row Name 02/01/22 1428             Time Calculation- SLP    SLP Start Time 1350  -CH      SLP Received On 02/01/22  -              Untimed Charges    [id number] Eval Oral Pharyng Swallow Minutes 48  -CH              Total Minutes    Untimed Charges Total Minutes 48  -CH       Total Minutes 48  -CH            User Key  (r) = Recorded By, (t) = Taken By, (c) = Cosigned By    Initials Name Provider Type    Jane Saldaña MS CCC-SLP Speech and Language Pathologist                Therapy Charges for Today     Code Description  Service Date Service Provider Modifiers Qty    [id number]  ST EVAL ORAL PHARYNG SWALLOW 3 2/1/2022 Jane James MS CCC-SLP GN 1               Jane James MS CCC-SLP  2/1/2022     Patient was not wearing a face mask and did exhibit coughing during this therapy encounter.  Procedure performed was aerosolizing, involved close contact (within 6 feet for at least 15 minutes or longer), and did not involve contact with infectious secretions or specimens.  Therapist used appropriate personal protective equipment including gloves, standard procedure mask, eye protection, gown and N95 mask.  Appropriate PPE was worn during the entire therapy session.  Hand hygiene was completed before and after therapy session.

## 2022-02-01 NOTE — CASE MANAGEMENT/SOCIAL WORK
Continued Stay Note  [facility]     Patient Name: Leobardo Araujo  MRN: [id number]  Today's Date: 2/1/2022    Admit Date: 1/25/2022     Discharge Plan     Row Name 02/01/22 1443       Plan    Plan STR- SNF    Patient/Family in Agreement with Plan yes    Plan Comments Have spoken with doroteo Thorne's healthcare surrogate. She agrees with STR then most likely LTC placement.. [facility] is following at present.    Final Discharge Disposition Code 30 - still a patient               Discharge Codes    No documentation.                     Masha Muse RN

## 2022-02-01 NOTE — PLAN OF CARE
Goal Outcome Evaluation:    SLP evaluation completed. Will address dysphagia via MBS. Please see note for further details and recommendations.

## 2022-02-01 NOTE — PLAN OF CARE
Goal Outcome Evaluation:  Plan of Care Reviewed With: patient        Progress: declining  Outcome Summary: Pt w/ increased lethargy this date. Pt max Ax1 for supine-to-sit, STS and SPT from bed-to-chair w/ BUE support, SUA for washing face, max A for combing hair. Pt req max VC's for sequencing and safety throughout. Will continue to progress pt as tolerated per OT POC.

## 2022-02-01 NOTE — PLAN OF CARE
VSS, pt resting and eating well for today, still very drowsy and confused but somewhat more active. Will continue to monitor.  Problem: Adult Inpatient Plan of Care  Goal: Plan of Care Review  Outcome: Ongoing, Progressing  Goal: Patient-Specific Goal (Individualized)  Outcome: Ongoing, Progressing  Goal: Absence of Hospital-Acquired Illness or Injury  Outcome: Ongoing, Progressing  Intervention: Identify and Manage Fall Risk  Recent Flowsheet Documentation  Taken 1/31/2022 2000 by Kamlesh Marie RN  Safety Promotion/Fall Prevention:   activity supervised   assistive device/personal items within reach   clutter free environment maintained   fall prevention program maintained   nonskid shoes/slippers when out of bed  Intervention: Prevent Skin Injury  Recent Flowsheet Documentation  Taken 1/31/2022 2000 by Kamlesh Marie RN  Body Position: weight shift assistance provided  Skin Protection:   adhesive use limited   incontinence pads utilized   tubing/devices free from skin contact  Intervention: Prevent and Manage VTE (venous thromboembolism) Risk  Recent Flowsheet Documentation  Taken 1/31/2022 2000 by Kamlesh Marie RN  VTE Prevention/Management: bleeding risk factor(s) identified  Goal: Optimal Comfort and Wellbeing  Outcome: Ongoing, Progressing  Goal: Readiness for Transition of Care  Outcome: Ongoing, Progressing     Problem: Fall Injury Risk  Goal: Absence of Fall and Fall-Related Injury  Outcome: Ongoing, Progressing  Intervention: Identify and Manage Contributors to Fall Injury Risk  Recent Flowsheet Documentation  Taken 1/31/2022 2000 by Kamlesh Marie RN  Medication Review/Management: medications reviewed  Intervention: Promote Injury-Free Environment  Recent Flowsheet Documentation  Taken 1/31/2022 2000 by Kamlesh Marie RN  Safety Promotion/Fall Prevention:   activity supervised   assistive device/personal items within reach   clutter free environment maintained   fall prevention program maintained   nonskid  shoes/slippers when out of bed     Problem: Confusion Acute  Goal: Optimal Cognitive Function  Outcome: Ongoing, Progressing     Problem: UTI (Urinary Tract Infection)  Goal: Improved Infection Symptoms  Outcome: Ongoing, Progressing     Problem: Social Isolation  Goal: Increased Social Interaction  Outcome: Ongoing, Progressing     Problem: Skin Injury Risk Increased  Goal: Skin Health and Integrity  Outcome: Ongoing, Progressing  Intervention: Optimize Skin Protection  Recent Flowsheet Documentation  Taken 1/31/2022 2000 by Kamlesh Marie RN  Pressure Reduction Techniques: frequent weight shift encouraged  Pressure Reduction Devices: pressure-redistributing mattress utilized  Skin Protection:   adhesive use limited   incontinence pads utilized   tubing/devices free from skin contact   Goal Outcome Evaluation:

## 2022-02-01 NOTE — PROGRESS NOTES
1       Leobardo Araujo  1934  [id number]  2/1/2022    CC: Altered mental status.    Leobardo Araujo is a 87 y.o. male here for COVID-19 positive with enterococcal urinary tract infection..      Past medical history:  Past Medical History:   Diagnosis Date   • Abnormal findings on diagnostic imaging of lung    • Arthritis    • Atrial fibrillation (HCC)     Atrial fibrillation, October 2010; CHADS2-VASc = 3: Amiodarone therapy, discontinued, November 2013. Elke Sultana left atrial appendage closure, Dr. Edson Roque, 07/23/2012.     • B12 deficiency    • Basal cell carcinoma    • Bladder cancer (HCC)    • Central sleep apnea in conditions classified elsewhere    • Delirium 2/13/2019   • Depression    • Diarrhea 4/24/2018   • History of anxiety    • History of aortic valve stenosis    • History of coronary atherosclerosis    • History of sexual dysfunction in male    • Hypercholesteremia    • Hyperlipidemia LDL goal <100    • Hypertension 1/6/2017   • Memory loss    • Nausea, vomiting, and diarrhea 4/24/2018   • Obstructive sleep apnea    • Peripheral vascular disease (HCC)    • Renal insufficiency 4/24/2018   • Sepsis (HCC)     from UTI   • Stroke (HCC)    • Testicular mass    • Tobacco abuse     Remote tobacco abuse.   • Type 2 diabetes mellitus with other circulatory complications (HCC)    • UTI (urinary tract infection) 04/2018   • Valvular heart disease 4/24/2018       Medications:   Current Facility-Administered Medications:   •  acetaminophen (TYLENOL) tablet 650 mg, 650 mg, Oral, Q4H PRN, 650 mg at 01/31/22 0003 **OR** acetaminophen (TYLENOL) 160 MG/5ML solution 650 mg, 650 mg, Oral, Q4H PRN **OR** acetaminophen (TYLENOL) suppository 650 mg, 650 mg, Rectal, Q4H PRN, Liss Bell, APRN  •  ampicillin (PRINCIPEN) capsule 500 mg, 500 mg, Oral, Q8H, Leonardo Beard MD, 500 mg at 02/01/22 0602  •  aspirin EC tablet 81 mg, 81 mg, Oral, Daily, Liss Bell APRN, 81 mg at 01/31/22 1059  •   sennosides-docusate (PERICOLACE) 8.6-50 MG per tablet 2 tablet, 2 tablet, Oral, BID, 2 tablet at 01/31/22 2155 **AND** polyethylene glycol (MIRALAX) packet 17 g, 17 g, Oral, Daily PRN **AND** bisacodyl (DULCOLAX) EC tablet 5 mg, 5 mg, Oral, Daily PRN **AND** bisacodyl (DULCOLAX) suppository 10 mg, 10 mg, Rectal, Daily PRN, Liss Bell APRN  •  calcium carbonate (TUMS) chewable tablet 500 mg (200 mg elemental), 2 tablet, Oral, BID PRN, Liss Bell APRN  •  dextrose (D50W) (25 g/50 mL) IV injection 25 g, 25 g, Intravenous, Q15 Min PRN, Liss Bell APRN  •  dextrose (GLUTOSE) oral gel 15 g, 15 g, Oral, Q15 Min PRN, Liss Bell APRN  •  donepezil (ARICEPT) tablet 10 mg, 10 mg, Oral, Daily, Liss Bell APRN, 10 mg at 01/31/22 1059  •  famotidine (PEPCID) tablet 20 mg, 20 mg, Oral, BID, Liss Bell APRN, 20 mg at 01/31/22 2156  •  glucagon (human recombinant) (GLUCAGEN DIAGNOSTIC) injection 1 mg, 1 mg, Subcutaneous, Q15 Min PRN, Liss Bell APRN  •  heparin (porcine) 5000 UNIT/ML injection 5,000 Units, 5,000 Units, Subcutaneous, Q8H, Emperatriz Mcgill MD, 5,000 Units at 02/01/22 0602  •  insulin lispro (humaLOG) injection 0-7 Units, 0-7 Units, Subcutaneous, TID AC, Liss Bell APRN, 2 Units at 01/31/22 1810  •  Magnesium Sulfate 2 gram Bolus, followed by 8 gram infusion (total Mg dose 10 grams)- Mg less than or equal to 1mg/dL, 2 g, Intravenous, PRN **OR** Magnesium Sulfate 2 gram / 50mL Infusion (GIVE X 3 BAGS TO EQUAL 6GM TOTAL DOSE) - Mg 1.1 - 1.5 mg/dl, 2 g, Intravenous, PRN **OR** Magnesium Sulfate 4 gram infusion- Mg 1.6-1.9 mg/dL, 4 g, Intravenous, PRN, Liss Bell APRN, Last Rate: 25 mL/hr at 01/29/22 1716, 4 g at 01/29/22 1716  •  memantine (NAMENDA) tablet 10 mg, 10 mg, Oral, BID, Liss Bell APRN, 10 mg at 01/31/22 2155  •  ondansetron (ZOFRAN) tablet 4 mg, 4 mg, Oral, Q6H PRN **OR** ondansetron (ZOFRAN) injection 4 mg, 4 mg, Intravenous, Q6H PRN, Liss Bell APRN  •   potassium chloride (MICRO-K) CR capsule 40 mEq, 40 mEq, Oral, PRN **OR** potassium chloride (KLOR-CON) packet 40 mEq, 40 mEq, Oral, PRN, 40 mEq at 01/26/22 1734 **OR** potassium chloride 10 mEq in 100 mL IVPB, 10 mEq, Intravenous, Q1H PRN, Liss Bell, APRN  •  QUEtiapine (SEROquel) tablet 12.5 mg, 12.5 mg, Oral, Q12H PRN, Emperatriz Mcgill MD, 12.5 mg at 01/31/22 2156  •  sodium chloride 0.9 % flush 10 mL, 10 mL, Intravenous, Q12H, Serey, Liss, APRN, 10 mL at 01/31/22 2156  •  sodium chloride 0.9 % flush 10 mL, 10 mL, Intravenous, PRN, SerLiss kam, APRN  •  tamsulosin (FLOMAX) 24 hr capsule 0.4 mg, 0.4 mg, Oral, Daily, Liss Bell, APRN, 0.4 mg at 01/31/22 1059  Antibiotics:  Anti-Infectives (From admission, onward)    Ordered     Dose/Rate Route Frequency Start Stop    01/31/22 1053  ampicillin (PRINCIPEN) capsule 500 mg        Ordering Provider: Leonardo Beard MD    500 mg Oral Every 8 Hours Scheduled 01/31/22 1400 02/07/22 1359    01/27/22 2130  vancomycin 1250 mg/250 mL 0.9% NS IVPB ([facility])        Ordering Provider: Sami Bloom RPH    1,250 mg  over 75 Minutes Intravenous Once 01/27/22 2200 01/28/22 0024    01/25/22 1858  vancomycin 1250 mg/250 mL 0.9% NS IVPB ([facility])        Ordering Provider: Jyoti Chapman RPH    20 mg/kg × 68.7 kg  over 90 Minutes Intravenous Once 01/25/22 1900 01/25/22 2158    01/25/22 1720  meropenem (MERREM) 1 g/100 mL 0.9% NS ([facility])        Ordering Provider: Terri Noonan MD    1 g  over 30 Minutes Intravenous Once 01/25/22 1722 01/25/22 2013          Allergies:  has No Known Allergies.    Review of Systems: All other reviewed and negative except as per HPI    Blood pressure 95/65, pulse 91, temperature 98.1 °F (36.7 °C), temperature source Oral, resp. rate 18, SpO2 92 %.  GENERAL: Awake and alert, in no acute distress.  Elderly gentleman.  Not clinically toxic.  Confused.  HEENT: Oropharynx without thrush. Sinuses nontender. Dentition in good repair. No cervical  adenopathy. No carotid bruits/ jugular venous distention.   EYES: PERRL. No conjunctival injection. No icterus. EOMI.  LYMPHATICS: No lymphadenopathy of the neck or axillary or inguinal regions.   HEART: No murmur, gallop, or pericardial friction rub.   LUNGS: Clear to auscultation anteriorly. No percussion dullness.   ABDOMEN: Soft, nontender, nondistended. No appreciable HSM.  No suprapubic discomfort to deep palpation.  No CVA tenderness to punch.  SKIN: Warm and dry without cutaneous eruptions. No embolic stigmata.   PSYCHIATRIC: Cranial nerve function intact.       DIAGNOSTICS:  Lab Results   Component Value Date    WBC 8.74 01/29/2022    HGB 10.5 (L) 01/29/2022    HCT 32.1 (L) 01/29/2022     01/29/2022     Lab Results   Component Value Date    CRP 1.49 (H) 01/27/2022     Lab Results   Component Value Date    SEDRATE 26 (H) 01/23/2020     Lab Results   Component Value Date    GLUCOSE 146 (H) 01/29/2022    BUN 12 01/29/2022    CREATININE 0.63 (L) 01/29/2022    EGFRIFNONA 120 01/29/2022    EGFRIFAFRI 108 01/17/2022    BCR 19.0 01/29/2022    CO2 25.0 01/29/2022    CALCIUM 8.1 (L) 01/29/2022    PROTENTOTREF 7.0 01/17/2022    ALBUMIN 3.10 (L) 01/27/2022    LABIL2 1.1 01/17/2022    AST 14 01/27/2022    ALT 8 01/27/2022       Microbiology:Urine culture with greater than 100,000 colony-forming units of group D enterococci/ampicillin susceptible.  COVID-19 nasopharyngeal PCR positive.  Influenza A/B-.  Admission blood cultures x2 unremarkable.    RADIOLOGY:  Imaging Results (Last 72 Hours)     ** No results found for the last 72 hours. **          Assessment and Plan: Altered mental status.  Enterococcal urinary tract infection/ampicillin susceptible.  COVID-19 nasopharyngeal PCR positive/chest x-ray within normal limits/no arterial desaturation.  Maximum temperature over 24 hours 99.9.  Currently 98.1.  Pulse 91, respiratory rate 18, blood pressure 95/65 mmHg with an O2 saturation of 92% on room air.  No new  radiographic imaging studies.  Utilization management: On oral ampicillin.  Acceptable for discharge to nursing home.  Suggest completing an additional 7 days of p.o. amoxicillin at current dose.      Leonardo Beard MD  2/1/2022

## 2022-02-01 NOTE — PROGRESS NOTES
[facility] Medicine Services  PROGRESS NOTE    Patient Name: Leobardo Araujo  : 1934  MRN: [id number]    Date of Admission: 2022  Primary Care Physician: Edy Goodman MD    Subjective   Subjective     CC:  AMS, UTI    HPI:  Staring at me when I ask questions, not responding    ROS:  Not able to obtain    Objective   Objective     Vital Signs:   Temp:  [98 °F (36.7 °C)-99.9 °F (37.7 °C)] 98.1 °F (36.7 °C)  Heart Rate:  [91-95] 91  Resp:  [16-18] 18  BP: ()/(65-81) 95/65     Physical Exam:  Constitutional: No acute distress, awake, alert  HENT: NCAT, mucous membranes moist  Respiratory: Clear to auscultation bilaterally, respiratory effort normal   Cardiovascular: RRR on tele  Gastrointestinal: Positive bowel sounds, soft, nontender, nondistended  Musculoskeletal: No bilateral ankle edema  Psychiatric: Unable to assess, not responding to questions  Neurologic: Confused, not oriented, not speaking during exam  Skin: No rashes      Results Reviewed:  LAB RESULTS:      Lab 22  1552 22  0942 224 22  0051 22  1706   WBC 8.74 6.68 8.49 6.45 7.70   HEMOGLOBIN 10.5* 11.0* 11.4* 10.7* 11.3*   HEMATOCRIT 32.1* 38.7 35.8* 33.5* 35.3*   PLATELETS 254 244 286 242 291   NEUTROS ABS 6.47 4.09 5.60 3.66 5.26   IMMATURE GRANS (ABS) 0.02 0.02 0.03 0.02 0.03   LYMPHS ABS 1.22 1.54 1.86 1.88 1.49   MONOS ABS 0.98* 0.82 0.84 0.75 0.63   EOS ABS 0.03 0.17 0.12 0.12 0.28   MCV 81.3 94.4 82.9 82.1 82.1   CRP  --   --  1.49*  --  3.73*   PROCALCITONIN  --   --  0.03  --  0.03   LACTATE  --   --   --   --  1.9   LDH  --   --   --   --  221   D DIMER QUANT  --   --   --  1.52*  --          Lab 22  1258 22  1552 22  0942 22  17022  170   SODIUM  --  136 139 139  --  137  --  138   POTASSIUM  --  3.8 3.8 3.8 4.1 3.6   < > 3.8   CHLORIDE  --  100 106 104  --  103  --  102   CO2  --  25.0  23.0 25.0  --  23.0  --  25.0   ANION GAP  --  11.0 10.0 10.0  --  11.0  --  11.0   BUN  --  12 11 11  --  20  --  23   CREATININE  --  0.63* 0.56* 0.63*  --  0.60*  --  0.77   GLUCOSE  --  146* 112* 125*  --  121*  --  180*   CALCIUM  --  8.1* 8.4* 8.6  --  8.2*  --  8.9   MAGNESIUM 2.0 1.6 1.7 1.9  --   --   --  1.7   PHOSPHORUS  --   --  2.7 2.2*  --   --   --   --    HEMOGLOBIN A1C  --   --   --   --   --   --   --  7.40*   TSH  --   --   --   --   --  2.940  --  1.540    < > = values in this interval not displayed.         Lab 01/27/22 2024 01/25/22  1706   TOTAL PROTEIN 6.9 7.3   ALBUMIN 3.10* 3.20*   GLOBULIN 3.8 4.1   ALT (SGPT) 8 5   AST (SGOT) 14 13   BILIRUBIN 0.4 0.6   ALK PHOS 87 96                 Lab 01/25/22  1706   FERRITIN 109.90         Brief Urine Lab Results  (Last result in the past 365 days)      Color   Clarity   Blood   Leuk Est   Nitrite   Protein   CREAT   Urine HCG        01/26/22 0347 Yellow   Turbid   Large (3+)   Large (3+)   Negative   30 mg/dL (1+)                 Microbiology Results Abnormal     Procedure Component Value - Date/Time    Blood Culture - Blood, Arm, Left [[id number]]  (Normal) Collected: 01/25/22 1700    Lab Status: Final result Specimen: Blood from Arm, Left Updated: 01/30/22 1900     Blood Culture No growth at 5 days    Blood Culture - Blood, Arm, Right [[id number]]  (Normal) Collected: 01/25/22 1810    Lab Status: Final result Specimen: Blood from Arm, Right Updated: 01/30/22 1900     Blood Culture No growth at 5 days          No radiology results from the last 24 hrs    Results for orders placed during the hospital encounter of 05/06/21    Adult Transthoracic Echo Complete W/ Cont if Necessary Per Protocol    Interpretation Summary  · Estimated left ventricular EF = 70% Left ventricular systolic function is normal.  · Left ventricular wall thickness is consistent with concentric hypertrophy.  · Mild mitral valve stenosis is present.  · Mild dilation of the aortic  root is present.  · Moderate pulmonic valve regurgitation is present.  · Estimated right ventricular systolic pressure from tricuspid regurgitation is normal (<35 mmHg).  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  · The right atrial cavity is dilated.  · Moderate mitral valve regurgitation is present.  · Mild aortic valve stenosis is present.  · Left atrial volume is severely increased.  · There is a prosthetic valve in the aortic position mean gradient 14 mmHg with a peak of 24 consistent with borderline mild aortic stenosis  · There is an eccentric jet of MR which is at least moderate but very well may be moderate to severe.  · There is heavy calcification of the mitral valve leaflets with a mean gradient across the mitral valve 5 mmHg consistent with mild mitral stenosis      I have reviewed the medications:  Scheduled Meds:ampicillin, 500 mg, Oral, Q8H  aspirin, 81 mg, Oral, Daily  donepezil, 10 mg, Oral, Daily  famotidine, 20 mg, Oral, BID  heparin (porcine), 5,000 Units, Subcutaneous, Q8H  insulin lispro, 0-7 Units, Subcutaneous, TID AC  memantine, 10 mg, Oral, BID  senna-docusate sodium, 2 tablet, Oral, BID  sodium chloride, 10 mL, Intravenous, Q12H  tamsulosin, 0.4 mg, Oral, Daily      Continuous Infusions:   PRN Meds:.•  acetaminophen **OR** acetaminophen **OR** acetaminophen  •  senna-docusate sodium **AND** polyethylene glycol **AND** bisacodyl **AND** bisacodyl  •  calcium carbonate  •  dextrose  •  dextrose  •  glucagon (human recombinant)  •  magnesium sulfate **OR** magnesium sulfate **OR** magnesium sulfate  •  ondansetron **OR** ondansetron  •  potassium chloride **OR** potassium chloride **OR** potassium chloride  •  QUEtiapine  •  sodium chloride    Assessment/Plan   Assessment & Plan     Active Hospital Problems    Diagnosis  POA   • **Complicated UTI (urinary tract infection) [N39.0]  Yes   • Dementia without behavioral disturbance (HCC) [F03.90]  Yes   • Hypothyroidism [E03.9]  Yes   •  Malignant tumor of urinary bladder (HCC) [C67.9]  Yes   • 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        Brief Hospital Course to date:  Leobardo Araujo is a 87 y.o. male with history of A. fib, basal cell carcinoma HTN, diabetes previous stroke, questionable bladder cancer admitted with increased confusion and weight loss.    Per note yesterday, patient was much more alert and speaking.  Not able to obtain much history today.    Confusion, worsening  -Discussed with Dr. Stokes, neuro consult placed  -suspect more encephalopathy secondary to worsening dementia      UTI, complicated  -ID following, continue ampicillin  Possible urinary bladder tumor  -Urine cultures positive for Enterococcus faecalis    Covid infection, asymptomatic  -Family refused remdesivir  -no treatment, can dc isolation on 2/4 (10 days)    Encephalopathy, dementia  -Worse today, continue Namenda and Aricept  -Now refusing pills, which he took this morning per nursing    Diabetes, A1c 7.4%  -Continue SSI    PAF  -Eliquis was DC'd prior to admission, unsure why or by whom  -On last cardiology note by Dr. Tom, he was taking in October 2021    Discussed with both Fadumo and Addie - neither know his list of meds   Asked Addie to call and give list of meds to nurse when she gets home, specifically regarding eliquis          DVT prophylaxis:  Medical DVT prophylaxis orders are present.       AM-PAC 6 Clicks Score (PT): 14 (01/31/22 0805)    Disposition: I expect the patient to be discharged TBD, greater than 35 min spent in care of patient and discussion with family     CODE STATUS:   Code Status and Medical Interventions:   Ordered at: 01/26/22 0110     Code Status (Patient has no pulse and is not breathing):    CPR (Attempt to Resuscitate)     Medical Interventions (Patient has  pulse or is breathing):    Full       Allegra Quintero, DO  02/01/22

## 2022-02-02 ENCOUNTER — APPOINTMENT (OUTPATIENT)
Dept: CT IMAGING | Facility: HOSPITAL | Age: 87
End: 2022-02-02

## 2022-02-02 LAB
GLUCOSE BLDC GLUCOMTR-MCNC: 123 MG/DL (ref 70–130)
GLUCOSE BLDC GLUCOMTR-MCNC: 135 MG/DL (ref 70–130)
GLUCOSE BLDC GLUCOMTR-MCNC: 143 MG/DL (ref 70–130)

## 2022-02-02 PROCEDURE — 99231 SBSQ HOSP IP/OBS SF/LOW 25: CPT | Performed by: PSYCHIATRY & NEUROLOGY

## 2022-02-02 PROCEDURE — 25010000002 HEPARIN (PORCINE) PER 1000 UNITS: Performed by: INTERNAL MEDICINE

## 2022-02-02 PROCEDURE — 82962 GLUCOSE BLOOD TEST: CPT

## 2022-02-02 PROCEDURE — 70450 CT HEAD/BRAIN W/O DYE: CPT

## 2022-02-02 PROCEDURE — 99233 SBSQ HOSP IP/OBS HIGH 50: CPT | Performed by: INTERNAL MEDICINE

## 2022-02-02 RX ORDER — SACCHAROMYCES BOULARDII 250 MG
250 CAPSULE ORAL 2 TIMES DAILY
Status: DISCONTINUED | OUTPATIENT
Start: 2022-02-02 | End: 2022-02-09 | Stop reason: HOSPADM

## 2022-02-02 RX ADMIN — FAMOTIDINE 20 MG: 20 TABLET, FILM COATED ORAL at 22:38

## 2022-02-02 RX ADMIN — HEPARIN SODIUM 5000 UNITS: 5000 INJECTION, SOLUTION INTRAVENOUS; SUBCUTANEOUS at 06:01

## 2022-02-02 RX ADMIN — AMPICILLIN 500 MG: 500 CAPSULE ORAL at 22:38

## 2022-02-02 RX ADMIN — SODIUM CHLORIDE, PRESERVATIVE FREE 10 ML: 5 INJECTION INTRAVENOUS at 22:37

## 2022-02-02 RX ADMIN — SODIUM CHLORIDE, PRESERVATIVE FREE 10 ML: 5 INJECTION INTRAVENOUS at 09:50

## 2022-02-02 RX ADMIN — ASPIRIN 81 MG: 81 TABLET, COATED ORAL at 09:49

## 2022-02-02 RX ADMIN — MEMANTINE 10 MG: 10 TABLET ORAL at 09:50

## 2022-02-02 RX ADMIN — TAMSULOSIN HYDROCHLORIDE 0.4 MG: 0.4 CAPSULE ORAL at 09:50

## 2022-02-02 RX ADMIN — MIRTAZAPINE 30 MG: 15 TABLET, FILM COATED ORAL at 22:38

## 2022-02-02 RX ADMIN — QUETIAPINE FUMARATE 12.5 MG: 25 TABLET ORAL at 22:39

## 2022-02-02 RX ADMIN — FAMOTIDINE 20 MG: 20 TABLET, FILM COATED ORAL at 09:50

## 2022-02-02 RX ADMIN — AMPICILLIN 500 MG: 500 CAPSULE ORAL at 06:01

## 2022-02-02 RX ADMIN — Medication 250 MG: at 09:50

## 2022-02-02 RX ADMIN — AMPICILLIN 500 MG: 500 CAPSULE ORAL at 17:08

## 2022-02-02 RX ADMIN — HEPARIN SODIUM 5000 UNITS: 5000 INJECTION, SOLUTION INTRAVENOUS; SUBCUTANEOUS at 17:08

## 2022-02-02 RX ADMIN — DONEPEZIL HYDROCHLORIDE 10 MG: 10 TABLET, FILM COATED ORAL at 09:50

## 2022-02-02 RX ADMIN — HEPARIN SODIUM 5000 UNITS: 5000 INJECTION, SOLUTION INTRAVENOUS; SUBCUTANEOUS at 22:39

## 2022-02-02 RX ADMIN — MEMANTINE 10 MG: 10 TABLET ORAL at 22:39

## 2022-02-02 RX ADMIN — Medication 250 MG: at 22:38

## 2022-02-02 NOTE — PLAN OF CARE
Goal Outcome Evaluation:  Plan of Care Reviewed With: daughter (Dr. Grider spoke with dtr/[facility] Fadumo Hendrix)        Progress: declining  Outcome Summary: Palliative consult for GOC/ACP, comfort care, hospice referral and discussion. Pt in COVID isolation, no observable signs of discomfort. JAZMIN Nichols reported occasional bites of applesauce with meds, otherwise no PO intake. Dr. Grider spoke with [facility] (had spoken with Dr. Quintero prior to consult). Hospice consult placed for assessment for inpatient services. Palliative following for continued support as POC develops.    1330 Palliative IDT meeting: WIN Gallardo RN, CHPN; GIRISH Grider DO; AMIE HarrellW, [facility]; SAMMY Chester RN, CHPN; SHAUN Xavier, RN, CHPN; MARÍA ELENA Lee, RN; SAMMY Rod, APRN    Problem: Palliative Care  Goal: Enhanced Quality of Life  Outcome: Ongoing, Progressing  Intervention: Maximize Comfort  Flowsheets (Taken 2/2/2022 1637)  Pain Management Interventions: (no observable signs of discomfort) other (see comments)  Intervention: Promote Advance Care Planning  Flowsheets (Taken 2/2/2022 1637)  Life Transition/Adjustment: (Hospice consult; inpatient vs. home)   palliative care discussed   palliative care initiated   other (see comments)  Intervention: Optimize Psychosocial Wellbeing  Flowsheets (Taken 2/2/2022 1637)  Spiritual Activities Assistance: (Spiritual Care consult) other (see comments)  Family/Support System Care:   caregiver stress acknowledged   support provided

## 2022-02-02 NOTE — CONSULTS
Edy Goodman MD  Consulting physician: Leon    Chief Complaint   Patient presents with   • Abnormal Lab       Reason for consult: C    HPI: Patient 87-year-old male brought in hospital due to generalized weakness. Patient was found to be Covid positive and admitted to the telemetry floor. Patient has continued to decline despite appropriate care. He has been evaluated by multiple physicians including neurology to see if there is any acute stroke that could be an underlying cause of his confusion and decline but so far nothing along that line has been found. Again the patient has been declining becoming increasingly confused and having minimal to no oral intake.    Pain assesment: Unable to assess    Dyspnea: Unable to assess    N/V: Able to assess    PPS: 20      Past Medical History:   Diagnosis Date   • Abnormal findings on diagnostic imaging of lung    • Arthritis    • Atrial fibrillation (HCC)     Atrial fibrillation, October 2010; CHADS2-VASc = 3: Amiodarone therapy, discontinued, November 2013. Elke Sultana left atrial appendage closure, Dr. Edson Roque, 07/23/2012.     • B12 deficiency    • Basal cell carcinoma    • Bladder cancer (HCC)    • Central sleep apnea in conditions classified elsewhere    • Delirium 2/13/2019   • Depression    • Diarrhea 4/24/2018   • History of anxiety    • History of aortic valve stenosis    • History of coronary atherosclerosis    • History of sexual dysfunction in male    • Hypercholesteremia    • Hyperlipidemia LDL goal <100    • Hypertension 1/6/2017   • Memory loss    • Nausea, vomiting, and diarrhea 4/24/2018   • Obstructive sleep apnea    • Peripheral vascular disease (HCC)    • Renal insufficiency 4/24/2018   • Sepsis (HCC)     from UTI   • Stroke (HCC)    • Testicular mass    • Tobacco abuse     Remote tobacco abuse.   • Type 2 diabetes mellitus with other circulatory complications (HCC)    • UTI (urinary tract infection) 04/2018   • Valvular heart  disease 4/24/2018     Past Surgical History:   Procedure Laterality Date   • AORTIC VALVE REPAIR/REPLACEMENT  2012   • BLADDER RESECTION LAPAROSCOPIC  2000    Bladder cancer resection, 2000.   • CATARACT EXTRACTION     • CYSTOSCOPY W/ LITHOLAPAXY / EHL     • EPIDIDYMECTOMY Right    • KIDNEY SURGERY     • ORCHIECTOMY      Orchiectomy, benign.     Current Code Status     Date Active Code Status Order ID Comments User Context       2/2/2022 0951 No CPR (Do Not Attempt to Resuscitate) [id number]  Allegra Quintero, DO Inpatient     Advance Care Planning Activity      Questions for Current Code Status     Question Answer    Code Status (Patient has no pulse and is not breathing) No CPR (Do Not Attempt to Resuscitate)    Medical Interventions (Patient has pulse or is breathing) Comfort Measures    Comments Fadumo MAHMOOD    Level Of Support Discussed With Health Care Surrogate        Current Facility-Administered Medications   Medication Dose Route Frequency Provider Last Rate Last Admin   • acetaminophen (TYLENOL) tablet 650 mg  650 mg Oral Q4H PRN Liss Bell APRN   650 mg at 02/01/22 2135    Or   • acetaminophen (TYLENOL) 160 MG/5ML solution 650 mg  650 mg Oral Q4H PRN Liss Bell APRN        Or   • acetaminophen (TYLENOL) suppository 650 mg  650 mg Rectal Q4H PRN Liss Bell APRN       • ampicillin (PRINCIPEN) capsule 500 mg  500 mg Oral Q8H Leonardo Beard MD   500 mg at 02/02/22 0601   • aspirin EC tablet 81 mg  81 mg Oral Daily Liss Bell APRN   81 mg at 02/02/22 0949   • sennosides-docusate (PERICOLACE) 8.6-50 MG per tablet 2 tablet  2 tablet Oral BID Liss Bell APRN   2 tablet at 02/01/22 0903    And   • polyethylene glycol (MIRALAX) packet 17 g  17 g Oral Daily PRN Liss Bell APRN        And   • bisacodyl (DULCOLAX) EC tablet 5 mg  5 mg Oral Daily PRN Liss Bell APRN        And   • bisacodyl (DULCOLAX) suppository 10 mg  10 mg Rectal Daily PRN Liss Bell APRN       • calcium carbonate  (TUMS) chewable tablet 500 mg (200 mg elemental)  2 tablet Oral BID PRN Liss Bell APRN       • dextrose (D50W) (25 g/50 mL) IV injection 25 g  25 g Intravenous Q15 Min PRN Liss Bell APRN       • dextrose (GLUTOSE) oral gel 15 g  15 g Oral Q15 Min PRN Liss Bell APRN       • donepezil (ARICEPT) tablet 10 mg  10 mg Oral Daily Liss Bell APRN   10 mg at 02/02/22 0950   • famotidine (PEPCID) tablet 20 mg  20 mg Oral BID Liss Bell APRN   20 mg at 02/02/22 0950   • glucagon (human recombinant) (GLUCAGEN DIAGNOSTIC) injection 1 mg  1 mg Subcutaneous Q15 Min PRN Liss Bell APRN       • heparin (porcine) 5000 UNIT/ML injection 5,000 Units  5,000 Units Subcutaneous Q8H Emperatriz Mcgill MD   5,000 Units at 02/02/22 0601   • insulin lispro (humaLOG) injection 0-7 Units  0-7 Units Subcutaneous TID AC Liss Bell APRN   2 Units at 02/01/22 1727   • Magnesium Sulfate 2 gram Bolus, followed by 8 gram infusion (total Mg dose 10 grams)- Mg less than or equal to 1mg/dL  2 g Intravenous PRN Liss Bell APRN        Or   • Magnesium Sulfate 2 gram / 50mL Infusion (GIVE X 3 BAGS TO EQUAL 6GM TOTAL DOSE) - Mg 1.1 - 1.5 mg/dl  2 g Intravenous PRN Liss Bell APRN        Or   • Magnesium Sulfate 4 gram infusion- Mg 1.6-1.9 mg/dL  4 g Intravenous PRN Liss Bell APRN 25 mL/hr at 01/29/22 1716 4 g at 01/29/22 1716   • memantine (NAMENDA) tablet 10 mg  10 mg Oral BID Liss Bell APRN   10 mg at 02/02/22 0950   • mirtazapine (REMERON) tablet 30 mg  30 mg Oral Nightly Placido Stokes MD   30 mg at 02/01/22 2135   • ondansetron (ZOFRAN) tablet 4 mg  4 mg Oral Q6H PRN Liss Bell APRN        Or   • ondansetron (ZOFRAN) injection 4 mg  4 mg Intravenous Q6H PRN Liss Bell APRN       • potassium chloride (MICRO-K) CR capsule 40 mEq  40 mEq Oral PRN Liss Bell APRN        Or   • potassium chloride (KLOR-CON) packet 40 mEq  40 mEq Oral PRN Liss Bell APRN   40 mEq at 01/26/22 1734    Or    • potassium chloride 10 mEq in 100 mL IVPB  10 mEq Intravenous Q1H PRN Liss Bell APRN       • QUEtiapine (SEROquel) tablet 12.5 mg  12.5 mg Oral Q12H PRN Emperatriz Mcgill MD   12.5 mg at 01/31/22 2156   • saccharomyces boulardii (FLORASTOR) capsule 250 mg  250 mg Oral BID Leonardo Beard MD   250 mg at 02/02/22 0950   • sodium chloride 0.9 % flush 10 mL  10 mL Intravenous Q12H Liss Bell APRN   10 mL at 02/02/22 0950   • sodium chloride 0.9 % flush 10 mL  10 mL Intravenous PRN Liss Bell APRN       • tamsulosin (FLOMAX) 24 hr capsule 0.4 mg  0.4 mg Oral Daily Liss Bell APRN   0.4 mg at 02/02/22 0950        •  acetaminophen **OR** acetaminophen **OR** acetaminophen  •  senna-docusate sodium **AND** polyethylene glycol **AND** bisacodyl **AND** bisacodyl  •  calcium carbonate  •  dextrose  •  dextrose  •  glucagon (human recombinant)  •  magnesium sulfate **OR** magnesium sulfate **OR** magnesium sulfate  •  ondansetron **OR** ondansetron  •  potassium chloride **OR** potassium chloride **OR** potassium chloride  •  QUEtiapine  •  sodium chloride  No Known Allergies  Family History   Problem Relation Age of Onset   • Hypertension Mother    • Heart disease Mother    • Stroke Mother    • Hypertension Father    • Heart disease Father    • Alcohol abuse Brother    • Stroke Other         Stroke syndrome   • Mental illness Other    • Hypertension Other    • Diabetes Other    • Alcohol abuse Other    • Coronary artery disease Other    • Hyperlipidemia Other      Social History     Socioeconomic History   • Marital status:    Tobacco Use   • Smoking status: Never Smoker   • Smokeless tobacco: Never Used   Vaping Use   • Vaping Use: Never used   Substance and Sexual Activity   • Alcohol use: No   • Drug use: No   • Sexual activity: Defer     Review of Systems -unable to assess secondary patient being very confused and unable to intelligibly answer questions      /84   Pulse 97    Temp 98.7 °F (37.1 °C) (Oral)   Resp 20   SpO2 90%   No intake or output data in the 24 hours ending 02/02/22 1328  Physical Exam:      General Appearance:   Opens eyes, only mumbles incoherently   Head:    Normocephalic, without obvious abnormality, atraumatic   Eyes:            Lids and lashes normal, conjunctivae and sclerae normal, no   icterus, no pallor, corneas clear, PERRLA   Ears:    Ears appear intact with no abnormalities noted   Throat:   No oral lesions, no thrush, oral mucosa moist   Neck:   No adenopathy, supple, trachea midline, no thyromegaly, no   carotid bruit, no JVD   Back:     No kyphosis present, no scoliosis present, no skin lesions,      erythema or scars, no tenderness to percussion or                   palpation,   range of motion normal   Lungs:     Clear to auscultation,respirations regular, even and                  unlabored    Heart:    Regular rhythm and normal rate, normal S1 and S2, no            murmur, no gallop, no rub, no click   Chest Wall:    No abnormalities observed   Abdomen:     Normal bowel sounds, no masses, no organomegaly, soft        non-tender, non-distended, no guarding, no rebound                tenderness   Rectal:     Deferred   Extremities:   Trace edema   Pulses:   Pulses palpable and equal bilaterally   Skin:   No bleeding, bruising or rash   Lymph nodes:   No palpable adenopathy           Results from last 7 days   Lab Units 01/29/22  1552   WBC 10*3/mm3 8.74   HEMOGLOBIN g/dL 10.5*   HEMATOCRIT % 32.1*   PLATELETS 10*3/mm3 254     Results from last 7 days   Lab Units 01/29/22  1552 01/28/22  0942 01/27/22 2024   SODIUM mmol/L 136   < > 139   POTASSIUM mmol/L 3.8   < > 3.8   CHLORIDE mmol/L 100   < > 104   CO2 mmol/L 25.0   < > 25.0   BUN mg/dL 12   < > 11   CREATININE mg/dL 0.63*   < > 0.63*   CALCIUM mg/dL 8.1*   < > 8.6   BILIRUBIN mg/dL  --   --  0.4   ALK PHOS U/L  --   --  87   ALT (SGPT) U/L  --   --  8   AST (SGOT) U/L  --   --  14   GLUCOSE mg/dL  146*   < > 125*    < > = values in this interval not displayed.     Results from last 7 days   Lab Units 01/29/22  1552   SODIUM mmol/L 136   POTASSIUM mmol/L 3.8   CHLORIDE mmol/L 100   CO2 mmol/L 25.0   BUN mg/dL 12   CREATININE mg/dL 0.63*   GLUCOSE mg/dL 146*   CALCIUM mg/dL 8.1*     Imaging Results (Last 72 Hours)     Procedure Component Value Units Date/Time    CT Head Without Contrast [[id number]] Collected: 02/02/22 0832     Updated: 02/02/22 0840    Narrative:      EXAMINATION: CT HEAD WO CONTRAST-      INDICATION: Stroke follow-up; N39.0-Urinary tract infection, site not  specified; R53.1-Weakness; R41.82-Altered mental status, unspecified;  U07.1-COVID-19; R63.4-Abnormal weight loss; M62.81-Muscle weakness  (generalized); N39.0-Urinary tract infection, site not specified     TECHNIQUE: Axial noncontrast CT of the head with multiplanar  reconstruction     The radiation dose reduction device was turned on for each scan per the  ALARA (As Low as Reasonably Achievable) protocol.     COMPARISON: 4/23/2018     FINDINGS: There is focal encephalomalacic change in the right frontal  lobe compatible with chronic appearing infarct, however new at least  since 2018 comparison. There is otherwise no evidence of intracranial  hemorrhage, mass or mass effect. There are redemonstrated advanced  age-related changes with generalized volume loss and typical  hypoattenuating white matter changes of chronic small vessel ischemia.  There is associated ex vacuo prominence of the lateral ventricles. The  orbits are unremarkable and the paranasal sinuses demonstrate some  mucosal thickening and small amount of aerated secretions in the right  maxillary sinus and sphenoid chambers.       Impression:      Right frontal lobe volume loss compatible with chronic  appearing infarct, however new since 2018 comparison. Advanced  age-related changes are otherwise stable from prior, without acute  intracranial abnormality.     Mucosal  thickening and aerated secretions in the right maxillary sinus  and sphenoid chambers. Correlate with any clinical evidence of  sinusitis.        This report was finalized on 2/2/2022 8:37 AM by Hugo Wood.           Impression: COVID  Change in MS  Anorexia  Edema  [facility]    Plan: Did talk to the patient healthcare power of  over the phone. She is already talked to the hospitalist today as well. Overall after discussion the plan is to proceed with a comfort focused plan of care in light of the fact that the patient does appear to be getting close to end-of-life. Did talk about a possible limited prognosis of days to week. We will have hospice, inpatient team, evaluate the patient. Continue to follow and support.        Anselmo Grider DO  02/02/22  13:28 EST

## 2022-02-02 NOTE — PROGRESS NOTES
Continued Stay Note  [facility]     Patient Name: Leobardo Araujo  MRN: [id number]  Today's Date: 2/2/2022    Admit Date: 1/25/2022     Discharge Plan     Row Name 02/02/22 1627       Plan    Plan Hospice consult    Plan Comments Reviewed. Report received from [facility].  The patient is still able to take po, taking po medications (require crushing at times).  The patient has not required prn medications for symptom management, he has not required injectable medications for symptom managment, and when he desires has po pleasure intake.  At present, this patient does not meet medicare requirements for inpatient hospice.   If inpatient hospice can be of further assistance please call [phone].                                    Sariah Pickett RN

## 2022-02-02 NOTE — PROGRESS NOTES
[facility] Medicine Services  PROGRESS NOTE    Patient Name: Leobardo Araujo  : 1934  MRN: [id number]    Date of Admission: 2022  Primary Care Physician: Edy Goodman MD    Subjective   Subjective     CC:  Dementia, Covid    HPI:  Long discussion with daughter this morning over the phone, Fadumo, who is agreeable to changing CODE STATUS and palliative eval    ROS:  Not able to obtain secondary to mental status    Objective   Objective     Vital Signs:   Temp:  [98 °F (36.7 °C)-98.7 °F (37.1 °C)] 98.7 °F (37.1 °C)  Heart Rate:  [] 96  Resp:  [18-22] 20  BP: (112-140)/(68-81) 120/69     Physical Exam:  With patient's consent, physical exam was conducted via visual telemedicine encounter due to patient's current isolation requirements in the interest of PPE conservation.    Constitutional: No acute distress, sleeping  HENT: NCAT, MMM, no conjunctival injection  Respiratory: Good effort, nonlabored respirations   Cardiovascular:  tele with NSR  Musculoskeletal: No edema, normal muscle tone and mass for age  Psychiatric: Confused at baseline, not interactive  Neurologic: Disoriented with minimal responses, movements symmetric BUE and BLE  Skin: No visible rashes, no jaundice seen on exposed skin through window      Results Reviewed:  LAB RESULTS:      Lab 22  1552 22  0942 22   WBC 8.74 6.68 8.49   HEMOGLOBIN 10.5* 11.0* 11.4*   HEMATOCRIT 32.1* 38.7 35.8*   PLATELETS 254 244 286   NEUTROS ABS 6.47 4.09 5.60   IMMATURE GRANS (ABS) 0.02 0.02 0.03   LYMPHS ABS 1.22 1.54 1.86   MONOS ABS 0.98* 0.82 0.84   EOS ABS 0.03 0.17 0.12   MCV 81.3 94.4 82.9   CRP  --   --  1.49*   PROCALCITONIN  --   --  0.03         Lab 22  1258 22  1552 22  0942 22  2202   SODIUM  --  136 139 139  --    POTASSIUM  --  3.8 3.8 3.8 4.1   CHLORIDE  --  100 106 104  --    CO2  --  25.0 23.0 25.0  --    ANION GAP  --  11.0 10.0 10.0  --     BUN  --  12 11 11  --    CREATININE  --  0.63* 0.56* 0.63*  --    GLUCOSE  --  146* 112* 125*  --    CALCIUM  --  8.1* 8.4* 8.6  --    MAGNESIUM 2.0 1.6 1.7 1.9  --    PHOSPHORUS  --   --  2.7 2.2*  --          Lab 01/27/22 2024   TOTAL PROTEIN 6.9   ALBUMIN 3.10*   GLOBULIN 3.8   ALT (SGPT) 8   AST (SGOT) 14   BILIRUBIN 0.4   ALK PHOS 87                     Brief Urine Lab Results  (Last result in the past 365 days)      Color   Clarity   Blood   Leuk Est   Nitrite   Protein   CREAT   Urine HCG        01/26/22 0347 Yellow   Turbid   Large (3+)   Large (3+)   Negative   30 mg/dL (1+)                 Microbiology Results Abnormal     Procedure Component Value - Date/Time    Blood Culture - Blood, Arm, Left [[id number]]  (Normal) Collected: 01/25/22 1700    Lab Status: Final result Specimen: Blood from Arm, Left Updated: 01/30/22 1900     Blood Culture No growth at 5 days    Blood Culture - Blood, Arm, Right [[id number]]  (Normal) Collected: 01/25/22 1810    Lab Status: Final result Specimen: Blood from Arm, Right Updated: 01/30/22 1900     Blood Culture No growth at 5 days          CT Head Without Contrast    Result Date: 2/2/2022  EXAMINATION: CT HEAD WO CONTRAST-  INDICATION: Stroke follow-up; N39.0-Urinary tract infection, site not specified; R53.1-Weakness; R41.82-Altered mental status, unspecified; U07.1-COVID-19; R63.4-Abnormal weight loss; M62.81-Muscle weakness (generalized); N39.0-Urinary tract infection, site not specified  TECHNIQUE: Axial noncontrast CT of the head with multiplanar reconstruction  The radiation dose reduction device was turned on for each scan per the ALARA (As Low as Reasonably Achievable) protocol.  COMPARISON: 4/23/2018  FINDINGS: There is focal encephalomalacic change in the right frontal lobe compatible with chronic appearing infarct, however new at least since 2018 comparison. There is otherwise no evidence of intracranial hemorrhage, mass or mass effect. There are redemonstrated  advanced age-related changes with generalized volume loss and typical hypoattenuating white matter changes of chronic small vessel ischemia. There is associated ex vacuo prominence of the lateral ventricles. The orbits are unremarkable and the paranasal sinuses demonstrate some mucosal thickening and small amount of aerated secretions in the right maxillary sinus and sphenoid chambers.      Impression: Right frontal lobe volume loss compatible with chronic appearing infarct, however new since 2018 comparison. Advanced age-related changes are otherwise stable from prior, without acute intracranial abnormality.  Mucosal thickening and aerated secretions in the right maxillary sinus and sphenoid chambers. Correlate with any clinical evidence of sinusitis.   This report was finalized on 2/2/2022 8:37 AM by Hugo Wood.        Results for orders placed during the hospital encounter of 05/06/21    Adult Transthoracic Echo Complete W/ Cont if Necessary Per Protocol    Interpretation Summary  · Estimated left ventricular EF = 70% Left ventricular systolic function is normal.  · Left ventricular wall thickness is consistent with concentric hypertrophy.  · Mild mitral valve stenosis is present.  · Mild dilation of the aortic root is present.  · Moderate pulmonic valve regurgitation is present.  · Estimated right ventricular systolic pressure from tricuspid regurgitation is normal (<35 mmHg).  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  · The right atrial cavity is dilated.  · Moderate mitral valve regurgitation is present.  · Mild aortic valve stenosis is present.  · Left atrial volume is severely increased.  · There is a prosthetic valve in the aortic position mean gradient 14 mmHg with a peak of 24 consistent with borderline mild aortic stenosis  · There is an eccentric jet of MR which is at least moderate but very well may be moderate to severe.  · There is heavy calcification of the mitral valve leaflets with a  mean gradient across the mitral valve 5 mmHg consistent with mild mitral stenosis      I have reviewed the medications:  Scheduled Meds:ampicillin, 500 mg, Oral, Q8H  aspirin, 81 mg, Oral, Daily  donepezil, 10 mg, Oral, Daily  famotidine, 20 mg, Oral, BID  heparin (porcine), 5,000 Units, Subcutaneous, Q8H  insulin lispro, 0-7 Units, Subcutaneous, TID AC  memantine, 10 mg, Oral, BID  mirtazapine, 30 mg, Oral, Nightly  saccharomyces boulardii, 250 mg, Oral, BID  senna-docusate sodium, 2 tablet, Oral, BID  sodium chloride, 10 mL, Intravenous, Q12H  tamsulosin, 0.4 mg, Oral, Daily      Continuous Infusions:   PRN Meds:.•  acetaminophen **OR** acetaminophen **OR** acetaminophen  •  senna-docusate sodium **AND** polyethylene glycol **AND** bisacodyl **AND** bisacodyl  •  calcium carbonate  •  dextrose  •  dextrose  •  glucagon (human recombinant)  •  magnesium sulfate **OR** magnesium sulfate **OR** magnesium sulfate  •  ondansetron **OR** ondansetron  •  potassium chloride **OR** potassium chloride **OR** potassium chloride  •  QUEtiapine  •  sodium chloride    Assessment/Plan   Assessment & Plan     Active Hospital Problems    Diagnosis  POA   • **Complicated UTI (urinary tract infection) [N39.0]  Yes   • Dementia without behavioral disturbance (HCC) [F03.90]  Yes   • Hypothyroidism [E03.9]  Yes   • Malignant tumor of urinary bladder (HCC) [C67.9]  Yes   • 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        Brief Hospital Course to date:  Leobardo Araujo is a 87 y.o. male with history of A. fib, basal cell carcinoma HTN, diabetes previous stroke, questionable bladder cancer admitted with increased confusion and weight loss.  He continues to decline and now having difficulty with taking pills and swallowing, following commands    Confusion, worsening dementia  -Fadumo,  who is POA, agreeable to palliative consult  -CODE STATUS changed  -Discussed with Dr. Grider and Dr. Stokes regarding family and goals of care         UTI, complicated  -ID following, continue ampicillin    Possible urinary bladder tumor  -Urine cultures positive for Enterococcus faecalis     Covid infection, asymptomatic  -Family refused remdesivir  -no treatment, can dc isolation on 2/4 (10 days)     Encephalopathy, dementia  -Worse today, continue Namenda and Aricept  -Now refusing pills, which he took this morning per nursing     Diabetes, A1c 7.4%  -Continue SSI     PAF  -Eliquis was DC'd prior to admission, unsure why or by whom  -On last cardiology note by Dr. Tom, he was taking in October 2021  -Plan for comfort measures at discharge, poor candidate for anticoagulation due to severe dementia and fall risk     DVT prophylaxis:  Medical DVT prophylaxis orders are present.       AM-PAC 6 Clicks Score (PT): 7 (02/01/22 0905)    Disposition: I expect the patient to be discharged pending family discussion regarding home with hospice versus possible inpatient hospice    CODE STATUS:   Code Status and Medical Interventions:   Ordered at: 01/26/22 0110     Code Status (Patient has no pulse and is not breathing):    CPR (Attempt to Resuscitate)     Medical Interventions (Patient has pulse or is breathing):    Full       Allegra Quintero, DO  02/02/22

## 2022-02-02 NOTE — PROGRESS NOTES
Neurology       Patient Care Team:  Edy Goodman MD as PCP - General  Edy Goodman MD as PCP - Family Medicine  Betty Thrasher PA as Physician Assistant (Endocrinology)    Chief complaint: Dementia    History: Patient remains lethargic and apathetic.    Daughter who is power of  agreed to a DNR.    With fear that he is a hospice appropriate.      Past Medical History:   Diagnosis Date   • Abnormal findings on diagnostic imaging of lung    • Arthritis    • Atrial fibrillation (HCC)     Atrial fibrillation, October 2010; CHADS2-VASc = 3: Amiodarone therapy, discontinued, November 2013. Terumo [place] left atrial appendage closure, Dr. Edson Roque, 07/23/2012.     • B12 deficiency    • Basal cell carcinoma    • Bladder cancer (HCC)    • Central sleep apnea in conditions classified elsewhere    • Delirium 2/13/2019   • Depression    • Diarrhea 4/24/2018   • History of anxiety    • History of aortic valve stenosis    • History of coronary atherosclerosis    • History of sexual dysfunction in male    • Hypercholesteremia    • Hyperlipidemia LDL goal <100    • Hypertension 1/6/2017   • Memory loss    • Nausea, vomiting, and diarrhea 4/24/2018   • Obstructive sleep apnea    • Peripheral vascular disease (HCC)    • Renal insufficiency 4/24/2018   • Sepsis (HCC)     from UTI   • Stroke (HCC)    • Testicular mass    • Tobacco abuse     Remote tobacco abuse.   • Type 2 diabetes mellitus with other circulatory complications (HCC)    • UTI (urinary tract infection) 04/2018   • Valvular heart disease 4/24/2018       Vital Signs   Vitals:    02/02/22 0510 02/02/22 0729 02/02/22 1154 02/02/22 1159   BP: 127/71 120/69 137/67 110/84   BP Location: Right arm Left arm Left arm    Patient Position: Lying Lying Lying    Pulse:  96 97    Resp: 20 20 20    Temp: 98.7 °F (37.1 °C)      TempSrc: Oral      SpO2:  94% 90%        Physical Exam:   General: Awake              Neuro: Mumbles answer  questions.    Minimal movement.        Results Review:  No new result  Results from last 7 days   Lab Units 01/29/22  1552   WBC 10*3/mm3 8.74   HEMOGLOBIN g/dL 10.5*   HEMATOCRIT % 32.1*   PLATELETS 10*3/mm3 254     Results from last 7 days   Lab Units 01/29/22  1552 01/28/22  0942 01/27/22  2024   SODIUM mmol/L 136 139 139   POTASSIUM mmol/L 3.8 3.8 3.8   CHLORIDE mmol/L 100 106 104   CO2 mmol/L 25.0 23.0 25.0   BUN mg/dL 12 11 11   CREATININE mg/dL 0.63* 0.56* 0.63*   CALCIUM mg/dL 8.1* 8.4* 8.6   BILIRUBIN mg/dL  --   --  0.4   ALK PHOS U/L  --   --  87   ALT (SGPT) U/L  --   --  8   AST (SGOT) U/L  --   --  14   GLUCOSE mg/dL 146* 112* 125*       Imaging Results (Last 24 Hours)     Procedure Component Value Units Date/Time    CT Head Without Contrast [[id number]] Collected: 02/02/22 0832     Updated: 02/02/22 0840    Narrative:      EXAMINATION: CT HEAD WO CONTRAST-      INDICATION: Stroke follow-up; N39.0-Urinary tract infection, site not  specified; R53.1-Weakness; R41.82-Altered mental status, unspecified;  U07.1-COVID-19; R63.4-Abnormal weight loss; M62.81-Muscle weakness  (generalized); N39.0-Urinary tract infection, site not specified     TECHNIQUE: Axial noncontrast CT of the head with multiplanar  reconstruction     The radiation dose reduction device was turned on for each scan per the  ALARA (As Low as Reasonably Achievable) protocol.     COMPARISON: 4/23/2018     FINDINGS: There is focal encephalomalacic change in the right frontal  lobe compatible with chronic appearing infarct, however new at least  since 2018 comparison. There is otherwise no evidence of intracranial  hemorrhage, mass or mass effect. There are redemonstrated advanced  age-related changes with generalized volume loss and typical  hypoattenuating white matter changes of chronic small vessel ischemia.  There is associated ex vacuo prominence of the lateral ventricles. The  orbits are unremarkable and the paranasal sinuses demonstrate  some  mucosal thickening and small amount of aerated secretions in the right  maxillary sinus and sphenoid chambers.       Impression:      Right frontal lobe volume loss compatible with chronic  appearing infarct, however new since 2018 comparison. Advanced  age-related changes are otherwise stable from prior, without acute  intracranial abnormality.     Mucosal thickening and aerated secretions in the right maxillary sinus  and sphenoid chambers. Correlate with any clinical evidence of  sinusitis.        This report was finalized on 2/2/2022 8:37 AM by Hugo Wood.             Assessment:  Adult failure to thrive likely Alzheimer's disease.    Covid pneumonia.    Enterococcal UTI.        Plan:  Nothing to add.    Patient is likely transition to hospice.    Comment:  See the patient on request          I discussed the patients findings and my recommendations with nursing staff and primary care team    Placido Stokes MD  02/02/22  14:22 EST

## 2022-02-02 NOTE — PLAN OF CARE
VSS, Pt drowsy but still rousable and less confused compared to last night, and is pulling at lines and linens much less than last night. Will continue to monitor.  Problem: Adult Inpatient Plan of Care  Goal: Plan of Care Review  Outcome: Ongoing, Progressing  Goal: Patient-Specific Goal (Individualized)  Outcome: Ongoing, Progressing  Goal: Absence of Hospital-Acquired Illness or Injury  Outcome: Ongoing, Progressing  Intervention: Identify and Manage Fall Risk  Recent Flowsheet Documentation  Taken 2/2/2022 0000 by Kamlesh Marie RN  Safety Promotion/Fall Prevention:   activity supervised   assistive device/personal items within reach   clutter free environment maintained   fall prevention program maintained   nonskid shoes/slippers when out of bed  Taken 2/1/2022 2200 by Kamlesh Marie RN  Safety Promotion/Fall Prevention:   activity supervised   assistive device/personal items within reach   clutter free environment maintained   fall prevention program maintained   nonskid shoes/slippers when out of bed  Taken 2/1/2022 2000 by Kamlesh Marie RN  Safety Promotion/Fall Prevention:   assistive device/personal items within reach   clutter free environment maintained   activity supervised   fall prevention program maintained   nonskid shoes/slippers when out of bed  Intervention: Prevent Skin Injury  Recent Flowsheet Documentation  Taken 2/2/2022 0000 by Kamlesh Marie RN  Body Position: weight shift assistance provided  Skin Protection:   adhesive use limited   incontinence pads utilized   tubing/devices free from skin contact  Taken 2/1/2022 2200 by Kamlesh Marie RN  Body Position: weight shift assistance provided  Skin Protection:   adhesive use limited   incontinence pads utilized   tubing/devices free from skin contact  Taken 2/1/2022 2000 by Kamlesh Marie RN  Body Position: neutral body alignment  Skin Protection:   adhesive use limited   incontinence pads utilized   tubing/devices free from skin contact  Intervention:  Prevent and Manage VTE (venous thromboembolism) Risk  Recent Flowsheet Documentation  Taken 2/2/2022 0000 by Kamlesh Marie RN  VTE Prevention/Management: bleeding risk factor(s) identified  Taken 2/1/2022 2200 by Kamlesh Marie RN  VTE Prevention/Management: bleeding risk factor(s) identified  Taken 2/1/2022 2000 by Kamlesh Marie RN  VTE Prevention/Management: bleeding risk factor(s) identified  Intervention: Prevent Infection  Recent Flowsheet Documentation  Taken 2/2/2022 0000 by Kamlesh Marie RN  Infection Prevention: environmental surveillance performed  Taken 2/1/2022 2000 by Kamlesh Marie RN  Infection Prevention: environmental surveillance performed  Goal: Optimal Comfort and Wellbeing  Outcome: Ongoing, Progressing  Goal: Readiness for Transition of Care  Outcome: Ongoing, Progressing     Problem: Fall Injury Risk  Goal: Absence of Fall and Fall-Related Injury  Outcome: Ongoing, Progressing  Intervention: Identify and Manage Contributors to Fall Injury Risk  Recent Flowsheet Documentation  Taken 2/2/2022 0000 by Kamlesh Marie RN  Medication Review/Management: medications reviewed  Taken 2/1/2022 2200 by Kamlesh Marie RN  Medication Review/Management: medications reviewed  Taken 2/1/2022 2000 by Kamlesh Marie RN  Medication Review/Management: medications reviewed  Intervention: Promote Injury-Free Environment  Recent Flowsheet Documentation  Taken 2/2/2022 0000 by Kamlesh Marie RN  Safety Promotion/Fall Prevention:   activity supervised   assistive device/personal items within reach   clutter free environment maintained   fall prevention program maintained   nonskid shoes/slippers when out of bed  Taken 2/1/2022 2200 by Kamlesh Marie RN  Safety Promotion/Fall Prevention:   activity supervised   assistive device/personal items within reach   clutter free environment maintained   fall prevention program maintained   nonskid shoes/slippers when out of bed  Taken 2/1/2022 2000 by Kamlesh Marie RN  Safety Promotion/Fall  Prevention:   assistive device/personal items within reach   clutter free environment maintained   activity supervised   fall prevention program maintained   nonskid shoes/slippers when out of bed     Problem: Confusion Acute  Goal: Optimal Cognitive Function  Outcome: Ongoing, Progressing     Problem: UTI (Urinary Tract Infection)  Goal: Improved Infection Symptoms  Outcome: Ongoing, Progressing     Problem: Social Isolation  Goal: Increased Social Interaction  Outcome: Ongoing, Progressing     Problem: Skin Injury Risk Increased  Goal: Skin Health and Integrity  Outcome: Ongoing, Progressing  Intervention: Optimize Skin Protection  Recent Flowsheet Documentation  Taken 2/2/2022 0000 by Kamlesh Marie RN  Pressure Reduction Techniques: frequent weight shift encouraged  Pressure Reduction Devices: pressure-redistributing mattress utilized  Skin Protection:   adhesive use limited   incontinence pads utilized   tubing/devices free from skin contact  Taken 2/1/2022 2200 by Kamlesh Marie RN  Pressure Reduction Techniques: frequent weight shift encouraged  Pressure Reduction Devices: pressure-redistributing mattress utilized  Skin Protection:   adhesive use limited   incontinence pads utilized   tubing/devices free from skin contact  Taken 2/1/2022 2000 by Kamlesh Marie RN  Pressure Reduction Techniques: frequent weight shift encouraged  Pressure Reduction Devices: pressure-redistributing mattress utilized  Skin Protection:   adhesive use limited   incontinence pads utilized   tubing/devices free from skin contact   Goal Outcome Evaluation:

## 2022-02-02 NOTE — PROGRESS NOTES
1       Leobardo Araujo  1934  [id number]  2/2/2022    CC: Altered mental status.    Leobardo Araujo is a 87 y.o. male here for COVID-19 positive, polymicrobial urinary tract infection prior to admission with persistence of ampicillin susceptible group D enterococci.      Past medical history:  Past Medical History:   Diagnosis Date   • Abnormal findings on diagnostic imaging of lung    • Arthritis    • Atrial fibrillation (HCC)     Atrial fibrillation, October 2010; CHADS2-VASc = 3: Amiodarone therapy, discontinued, November 2013. Terumo [place] left atrial appendage closure, Dr. Edson Roque, 07/23/2012.     • B12 deficiency    • Basal cell carcinoma    • Bladder cancer (HCC)    • Central sleep apnea in conditions classified elsewhere    • Delirium 2/13/2019   • Depression    • Diarrhea 4/24/2018   • History of anxiety    • History of aortic valve stenosis    • History of coronary atherosclerosis    • History of sexual dysfunction in male    • Hypercholesteremia    • Hyperlipidemia LDL goal <100    • Hypertension 1/6/2017   • Memory loss    • Nausea, vomiting, and diarrhea 4/24/2018   • Obstructive sleep apnea    • Peripheral vascular disease (HCC)    • Renal insufficiency 4/24/2018   • Sepsis (HCC)     from UTI   • Stroke (HCC)    • Testicular mass    • Tobacco abuse     Remote tobacco abuse.   • Type 2 diabetes mellitus with other circulatory complications (HCC)    • UTI (urinary tract infection) 04/2018   • Valvular heart disease 4/24/2018       Medications:   Current Facility-Administered Medications:   •  acetaminophen (TYLENOL) tablet 650 mg, 650 mg, Oral, Q4H PRN, 650 mg at 02/01/22 2135 **OR** acetaminophen (TYLENOL) 160 MG/5ML solution 650 mg, 650 mg, Oral, Q4H PRN **OR** acetaminophen (TYLENOL) suppository 650 mg, 650 mg, Rectal, Q4H PRN, Liss Bell APRN  •  ampicillin (PRINCIPEN) capsule 500 mg, 500 mg, Oral, Q8H, Leonardo Beard MD, 500 mg at 02/02/22 0601  •  aspirin EC tablet 81 mg, 81 mg,  Oral, Daily, Liss Bell APRN, 81 mg at 02/01/22 0903  •  sennosides-docusate (PERICOLACE) 8.6-50 MG per tablet 2 tablet, 2 tablet, Oral, BID, 2 tablet at 02/01/22 0903 **AND** polyethylene glycol (MIRALAX) packet 17 g, 17 g, Oral, Daily PRN **AND** bisacodyl (DULCOLAX) EC tablet 5 mg, 5 mg, Oral, Daily PRN **AND** bisacodyl (DULCOLAX) suppository 10 mg, 10 mg, Rectal, Daily PRN, Liss Bell APRN  •  calcium carbonate (TUMS) chewable tablet 500 mg (200 mg elemental), 2 tablet, Oral, BID PRN, Liss Bell APRN  •  dextrose (D50W) (25 g/50 mL) IV injection 25 g, 25 g, Intravenous, Q15 Min PRN, Liss Bell APRN  •  dextrose (GLUTOSE) oral gel 15 g, 15 g, Oral, Q15 Min PRN, Liss Bell APRN  •  donepezil (ARICEPT) tablet 10 mg, 10 mg, Oral, Daily, Liss Bell APRN, 10 mg at 02/01/22 0902  •  famotidine (PEPCID) tablet 20 mg, 20 mg, Oral, BID, Liss Bell APRN, 20 mg at 02/01/22 2135  •  glucagon (human recombinant) (GLUCAGEN DIAGNOSTIC) injection 1 mg, 1 mg, Subcutaneous, Q15 Min PRN, Liss Bell APRN  •  heparin (porcine) 5000 UNIT/ML injection 5,000 Units, 5,000 Units, Subcutaneous, Q8H, Emperatriz Mcgill MD, 5,000 Units at 02/02/22 0601  •  insulin lispro (humaLOG) injection 0-7 Units, 0-7 Units, Subcutaneous, TID AC, Liss Bell APRN, 2 Units at 02/01/22 1727  •  Magnesium Sulfate 2 gram Bolus, followed by 8 gram infusion (total Mg dose 10 grams)- Mg less than or equal to 1mg/dL, 2 g, Intravenous, PRN **OR** Magnesium Sulfate 2 gram / 50mL Infusion (GIVE X 3 BAGS TO EQUAL 6GM TOTAL DOSE) - Mg 1.1 - 1.5 mg/dl, 2 g, Intravenous, PRN **OR** Magnesium Sulfate 4 gram infusion- Mg 1.6-1.9 mg/dL, 4 g, Intravenous, PRN, Liss Bell APRN, Last Rate: 25 mL/hr at 01/29/22 1716, 4 g at 01/29/22 1716  •  memantine (NAMENDA) tablet 10 mg, 10 mg, Oral, BID, Liss Bell APRN, 10 mg at 02/01/22 2135  •  mirtazapine (REMERON) tablet 30 mg, 30 mg, Oral, Nightly, Placido Stokes MD, 30 mg at  02/01/22 2135  •  ondansetron (ZOFRAN) tablet 4 mg, 4 mg, Oral, Q6H PRN **OR** ondansetron (ZOFRAN) injection 4 mg, 4 mg, Intravenous, Q6H PRN, Liss Bell, APRN  •  potassium chloride (MICRO-K) CR capsule 40 mEq, 40 mEq, Oral, PRN **OR** potassium chloride (KLOR-CON) packet 40 mEq, 40 mEq, Oral, PRN, 40 mEq at 01/26/22 1734 **OR** potassium chloride 10 mEq in 100 mL IVPB, 10 mEq, Intravenous, Q1H PRN, Liss Bell APRN  •  QUEtiapine (SEROquel) tablet 12.5 mg, 12.5 mg, Oral, Q12H PRN, Emperatriz Mcgill MD, 12.5 mg at 01/31/22 2156  •  sodium chloride 0.9 % flush 10 mL, 10 mL, Intravenous, Q12H, Liss Bell APRN, 10 mL at 02/01/22 2135  •  sodium chloride 0.9 % flush 10 mL, 10 mL, Intravenous, PRN, Liss Bell, APRN  •  tamsulosin (FLOMAX) 24 hr capsule 0.4 mg, 0.4 mg, Oral, Daily, Liss Bell APRN, 0.4 mg at 02/01/22 0902  Antibiotics:  Anti-Infectives (From admission, onward)    Ordered     Dose/Rate Route Frequency Start Stop    01/31/22 1053  ampicillin (PRINCIPEN) capsule 500 mg        Ordering Provider: Leonardo Beard MD    500 mg Oral Every 8 Hours Scheduled 01/31/22 1400 02/07/22 1359    01/27/22 2130  vancomycin 1250 mg/250 mL 0.9% NS IVPB ([facility])        Ordering Provider: Sami Bloom RPH    1,250 mg  over 75 Minutes Intravenous Once 01/27/22 2200 01/28/22 0024    01/25/22 1858  vancomycin 1250 mg/250 mL 0.9% NS IVPB ([facility])        Ordering Provider: Jyoti Chapman RPH    20 mg/kg × 68.7 kg  over 90 Minutes Intravenous Once 01/25/22 1900 01/25/22 2158    01/25/22 1720  meropenem (MERREM) 1 g/100 mL 0.9% NS ([facility])        Ordering Provider: Terri Noonan MD    1 g  over 30 Minutes Intravenous Once 01/25/22 1722 01/25/22 2013          Allergies:  has No Known Allergies.    Review of Systems: All other reviewed and negative except as per HPI    Blood pressure 120/69, pulse 96, temperature 98.7 °F (37.1 °C), temperature source Oral, resp. rate 20, SpO2 94 %.  GENERAL: Awake and alert,  in no acute distress.  Elderly.  Debilitated.  Confused.  HEENT: Oropharynx without thrush. Sinuses nontender.  No cervical adenopathy. No carotid bruits/ jugular venous distention.   EYES: PERRL. No conjunctival injection. No icterus. EOMI.  LYMPHATICS: No lymphadenopathy of the neck or axillary or inguinal regions.   HEART: No murmur, gallop, or pericardial friction rub.   LUNGS: Clear to auscultation anteriorly. No percussion dullness.   ABDOMEN: Soft, nontender, nondistended. No appreciable HSM.  No CVA tenderness to punch.  No suprapubic discomfort with deep palpation.  SKIN: Warm and dry without cutaneous eruptions. No embolic stigmata.   PSYCHIATRIC: Baseline dementia.  Cranial nerve function intact.       DIAGNOSTICS:  Lab Results   Component Value Date    WBC 8.74 01/29/2022    HGB 10.5 (L) 01/29/2022    HCT 32.1 (L) 01/29/2022     01/29/2022     Lab Results   Component Value Date    CRP 1.49 (H) 01/27/2022     Lab Results   Component Value Date    SEDRATE 26 (H) 01/23/2020     Lab Results   Component Value Date    GLUCOSE 146 (H) 01/29/2022    BUN 12 01/29/2022    CREATININE 0.63 (L) 01/29/2022    EGFRIFNONA 120 01/29/2022    EGFRIFAFRI 108 01/17/2022    BCR 19.0 01/29/2022    CO2 25.0 01/29/2022    CALCIUM 8.1 (L) 01/29/2022    PROTENTOTREF 7.0 01/17/2022    ALBUMIN 3.10 (L) 01/27/2022    LABIL2 1.1 01/17/2022    AST 14 01/27/2022    ALT 8 01/27/2022       Microbiology: COVID-19 nasopharyngeal PCR positive on admission.  Urine culture with 10 to the fifth colony-forming units of ampicillin susceptible group D enterococci.  Admission blood cultures x2 -.  Previous recovery on 1/17 of group D enterococci and Morganella species at low colony counts.    RADIOLOGY:  Imaging Results (Last 72 Hours)     ** No results found for the last 72 hours. **          Assessment and Plan: Baseline dementia.  Altered mental status.  Polymicrobial urinary tract infection as outpatient with Morganella species at low  colony counts and group D enterococci.  10 to the fifth enterococci/ampicillin susceptible on 2 urine cultures with this admission.  Abdominal CT scan with bilateral renal calculi/nonobstructing.  COVID-19 nasopharyngeal PCR positive but normal chest radiograph and no arterial desaturation.  Maximum temperature over 24 hours 98.0.  Currently 98.7.  Pulse 96, respiratory rate 20, blood pressure 120/69 mmHg with an O2 saturation of 94% on room air.  Serum creatinine 0.63.  WBC 8.74.  Currently on p.o. ampicillin.  Probiotics added.      Leonardo Beard MD  2/2/2022

## 2022-02-02 NOTE — SIGNIFICANT NOTE
02/02/22 1532   SLP Deferred Reason   SLP Deferred Reason Routine  (Pt initially scheduled for MBS today however, RN reports pt may go comfort measures. Will defer and cancel MBS and f/u tomorrow for re-eval pending further GOC)

## 2022-02-02 NOTE — DISCHARGE PLACEMENT REQUEST
"Leobardo Pereira (87 y.o. Male)             Date of Birth Social Security Number Address Home Phone MRN    1934  [address]  [facility] [id number] [phone] [id number]    Anglican Marital Status             Synagogue        Admission Date Admission Type Admitting Provider Attending Provider Department, Room/Bed    1/25/22 Emergency Allegra Quintero DO Carroll, Meghan C, DO [facility] 6A, N618/1    Discharge Date Discharge Disposition Discharge Destination                         Attending Provider: Allegra Quintero DO    Allergies: No Known Allergies    Isolation: Contact Air   Infection: COVID (confirmed) (01/25/22)   Code Status: No CPR   Advance Care Planning Activity    Ht: 182.9 cm (72.01\")   Wt: 68.7 kg (151 lb 6.4 oz)    Admission Cmt: None   Principal Problem: Complicated UTI (urinary tract infection) [N39.0]                 Active Insurance as of 1/25/2022     Primary Coverage     Payor Plan Insurance Group Employer/Plan Group    HUMANA MEDICARE REPLACEMENT HUMANA MEDICARE REPLACEMENT [id number]     Payor Plan Address Payor Plan Phone Number Payor Plan Fax Number Effective Dates    [address] [address] [phone]  1/1/2021 - None Entered    [facility] [address]       Subscriber Name Subscriber Birth Date Member ID       LEOBARDO PEREIRA 1934 [id number]                 Emergency Contacts      (Rel.) Home Phone Work Phone Mobile Phone    Addie Pereira (Daughter) [phone] -- [phone]    RANDALLFRANCO DUCKWORTH (Daughter) [phone] -- --    KOREY PEREIRA (Son) [phone] -- [phone]            Emergency Contact Information     Name Relation Home Work Mobile    Addie Pereira Daughter [phone]    FRANCO PEREIRA Daughter [phone]      KOREY PEREIRA Son [phone]          Insurance Information                HUMANA MEDICARE REPLACEMENT/HUMANA MEDICARE REPLACEMENT Phone: [phone]    Subscriber: Leobardo Pereira Subscriber#: " [id number]    Group#: [id number] Precert#: [id number]             History & Physical      Terri Noonan MD at 22 1903              [facility] Medicine Services  HISTORY AND PHYSICAL    Patient Name: Leobardo Araujo  : 1934  MRN: [id number]  Primary Care Physician: Edy Goodman MD  Date of admission: 2022    Subjective   Subjective     Chief Complaint:  Confusion    HPI:  Leobardo Araujo is a 87 y.o. male with PMH significant for atrial fibrillation, basal cell carcinoma, HTN, depression, diabetes, CAD, dementia, Hx stroke, bladder cancer who presented to [facility] ED on 2022 with complaints of increased confusion and weight loss. According to patient's son, the patient has stopped eating approximately 1 month ago and has lost 17 pounds. The family took the patient to his PCP, Dr. Goodman, who did labs on 2022 and stated everything was normal except the urine culture, so he sent him to the ED for further evaluation and treatment. Patient's urine culture was positive for Morganella, enterococcus and E. coli, so Merrem and vancomycin were started. Many of patient's medications (Eliquis, levothyroxine, Norvasc, Cozaar, Propecia, Pepcid, Lipitor and amlodipine) were recently stopped. Patient son, Alverto, states that his sister is the POA and can answer most of the questions. She will be coming in. Patient will be admitted to hospital medicine service for further evaluation and treatment.    COVID Details:        Symptoms: [] NONE [] Fever [x]  Cough [] Shortness of breath [] Change in taste or smell    The patient qualifies to receive the vaccine, but they have not yet received it.    Review of Systems   Gen- No fevers, chills  CV- No chest pain, palpitations  Resp- No dyspnea; +cough  GI- No N/V/D, abd pain  All other systems have been reviewed and the pertinent positives and negatives are listed above in the HPI or ROS  Personal History     Past Medical History:   Diagnosis  Date   • Abnormal findings on diagnostic imaging of lung    • Arthritis    • Atrial fibrillation (HCC)     Atrial fibrillation, October 2010; CHADS2-VASc = 3: Amiodarone therapy, discontinued, November 2013. Elke Sultana left atrial appendage closure, Dr. dEson Roque, 07/23/2012.     • B12 deficiency    • Basal cell carcinoma    • Bladder cancer (HCC)    • Central sleep apnea in conditions classified elsewhere    • Delirium 2/13/2019   • Depression    • Diarrhea 4/24/2018   • History of anxiety    • History of aortic valve stenosis    • History of coronary atherosclerosis    • History of sexual dysfunction in male    • Hypercholesteremia    • Hyperlipidemia LDL goal <100    • Hypertension 1/6/2017   • Memory loss    • Nausea, vomiting, and diarrhea 4/24/2018   • Obstructive sleep apnea    • Peripheral vascular disease (HCC)    • Renal insufficiency 4/24/2018   • Sepsis (HCC)     from UTI   • Stroke (HCC)    • Testicular mass    • Tobacco abuse     Remote tobacco abuse.   • Type 2 diabetes mellitus with other circulatory complications (HCC)    • UTI (urinary tract infection) 04/2018   • Valvular heart disease 4/24/2018       Past Surgical History:   Procedure Laterality Date   • AORTIC VALVE REPAIR/REPLACEMENT  2012   • BLADDER RESECTION LAPAROSCOPIC  2000    Bladder cancer resection, 2000.   • CATARACT EXTRACTION     • CYSTOSCOPY W/ LITHOLAPAXY / EHL     • EPIDIDYMECTOMY Right    • KIDNEY SURGERY     • ORCHIECTOMY      Orchiectomy, benign.       Family History: family history includes Alcohol abuse in his brother and another family member; Coronary artery disease in an other family member; Diabetes in an other family member; Heart disease in his father and mother; Hyperlipidemia in an other family member; Hypertension in his father, mother, and another family member; Mental illness in an other family member; Stroke in his mother and another family member. Otherwise pertinent FHx was reviewed and unremarkable.      Social History:  reports that he has never smoked. He has never used smokeless tobacco. He reports that he does not drink alcohol and does not use drugs.  Social History     Social History Narrative    Mr. Araujo is a 87 year old white  male ( ~18 years ago). He has 5 children, and has grandchildren. His son and daughter are taking care of him.  He has an advanced directive.        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       Medications:  amLODIPine, amoxicillin, apixaban, aspirin, atorvastatin, donepezil, famotidine, finasteride, levothyroxine, losartan, memantine, metFORMIN, mirtazapine, and tamsulosin    No Known Allergies    Objective   Objective     Vital Signs:   Temp:  [99.2 °F (37.3 °C)] 99.2 °F (37.3 °C)  Heart Rate:  [] 84  Resp:  [12] 12  BP: (101-123)/(64-90) 117/76  Physical Exam   Constitutional: Alert, elderly ill-appearing male lying in bed in NAD  Eyes: EOMI, sclerae anicteric, no conjunctival injection  Head: NCAT  ENT: [place], moist mucous membranes   Respiratory: Nonlabored, symmetrical chest expansion, CTAB, 98% RA  Cardiovascular: IRR, HR 94no M/R/G, +DP pulses bilaterally  Gastrointestinal: Soft, NT, ND +BS  Musculoskeletal: MAC; no LE edema bilaterally  Neurologic: Confused, strength symmetric in all extremities, follows all commands, speech clear  Skin: No rashes on exposed skin  Psychiatric: Pleasant and cooperative; normal affect    Result Review:  I have personally reviewed the results from the time of this admission to 01/25/22 7:04 PM EST and agree with these findings:  [x]  Laboratory  [x]  Microbiology  [x]  Radiology  []  EKG/Telemetry   []  Cardiology/Vascular   []  Pathology  [x]  Old records  []  Other:  Most notable findings include:   LAB RESULTS:      Lab 01/25/22  1706   WBC 7.70   HEMOGLOBIN 11.3*   HEMATOCRIT 35.3*   PLATELETS 291   NEUTROS ABS 5.26   IMMATURE GRANS (ABS) 0.03   LYMPHS ABS 1.49    MONOS ABS 0.63   EOS ABS 0.28   MCV 82.1   LACTATE 1.9         Lab 01/25/22  1706   SODIUM 138   POTASSIUM 3.8   CHLORIDE 102   CO2 25.0   ANION GAP 11.0   BUN 23   CREATININE 0.77   GLUCOSE 180*   CALCIUM 8.9   MAGNESIUM 1.7   TSH 1.540         Lab 01/25/22  1706   TOTAL PROTEIN 7.3   ALBUMIN 3.20*   GLOBULIN 4.1   ALT (SGPT) 5   AST (SGOT) 13   BILIRUBIN 0.6   ALK PHOS 96                     UA    Urinalysis 12/6/21 12/6/21 1/17/22 1/17/22 1/25/22 1/25/22    1233 1233 0000 0000 2032 2032   Squamous Epithelial Cells, UA      None Seen   Specific [place], UA     1.021    Ketones, UA     Trace (A)    Blood, UA See below: (A)  3+ (A)  Large (3+) (A)    Leukocytes, UA See below: (A)  3+ (A)  Moderate (2+) (A)    Nitrite, UA Negative  Negative  Negative    RBC, UA  See below: (A)  >30 (A)  13-20 (A)   WBC, UA      Too Numerous to Count (A)   Bacteria, UA  See below: (A)  Many (A)  3+ (A)   (A) Abnormal value       Comments are available for some flowsheets but are not being displayed.           Microbiology Results (last 10 days)     Procedure Component Value - Date/Time    Urine culture, Comprehensive - , [[id number]]  (Abnormal) Collected: 01/17/22 0000    Lab Status: Final result Updated: 01/24/22 1709     Urine Culture Final report     Result 1 Morganella morganii     Comment: 4,000 Colonies/mL        Result 2 Enterococcus faecalis     Comment: Greater than 100,000 colony forming units per mL  For Enterococcus species, aminoglycosides (except for high-level  resistance screening), cephalosporins, clindamycin, and  trimethoprim-sulfamethoxazole are not effective clinically.  (CLSI, L590-H73, 2016)  Note: this isolate is vancomycin-susceptible.  This information is provided for epidemiologic purposes only:  vancomycin is not among the antibiotics recommended for therapy  of urinary tract infections caused by Enterococcus.          Result 3 Comment     Comment: Mixed urogenital trevon  5,000  Colonies/mL           Susceptibility Testing Comment     Comment:       ** S = Susceptible; I = Intermediate; R = Resistant **                     P = Positive; N = Negative              MICS are expressed in micrograms per mL     Antibiotic                 RSLT#1    RSLT#2    RSLT#3    RSLT#4  Amoxicillin/Clavulanic Acid    R  Ampicillin                     R  Cefazolin                      R  Cefuroxime                     R  Ciprofloxacin                  S         R  Ertapenem                      S  Gentamicin                     S  Imipenem                       I  Levofloxacin                   S         R  Meropenem                      S  Nitrofurantoin                 R         S  Penicillin                               S  Piperacillin/Tazobactam        S  Tetracycline                   S         R  Tobramycin                     S  Trimethoprim/Sulfa             S  Vancomycin                               S         Narrative:      Performed at:  [address] [place] [address]  [id number]  : Mikey Banks PhD, Phone:  [phone]          CT Abdomen Pelvis Without Contrast    Result Date: 1/25/2022   DATE OF EXAM: 1/25/2022 5:17 PM  PROCEDURE: CT CHEST WO CONTRAST DIAGNOSTIC-, CT ABDOMEN PELVIS WO CONTRAST-  INDICATIONS: WEAK  COMPARISON: No Comparisons Available  TECHNIQUE: Routine transaxial slices were obtained through the chest, abdomen and pelvis without the administration of intravenous contrast. Reconstructed coronal and sagittal images were also obtained. Automated exposure control and iterative construction methods were used.  FINDINGS: CT chest: There are scattered areas of density some groundglass within the left upper and left lower lobe more peripherally. This could be seen with Covid pneumonia in the proper clinical setting. There are some additional somewhat nodular areas of density in the right middle lobe and right basilar area. In the right lower lobe is a 2.46 cm nodular like  area. These findings may be more inflammatory infectious as well. There are no pleural effusions. The patient had prior cardiothoracic surgery. 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 There is moderate to severe coronary artery calcification.  CT abdomen pelvis: There is no definite abnormality of the liver. There may be a tiny gallstone within the gallbladder. There is no inflammation around this area. There are splenic calcifications. The pancreas is grossly unremarkable. The left kidney does not appear unusual. There is a cyst within the upper pole of the left kidney measuring 2 cm.  There is a nonobstructing calculus within the midpole the right kidney measuring 9.1 mm. There is some pelvocaliectasis on this side with some haziness in the fat around the renal pelvis. There is a stone measuring 8.3 mm within the right renal pelvis as well as a 1.1 cm stone. The findings could reflect some inflammation secondary to obstructive changes. There some calculation along the margin of the right kidney with a more hypodense area in the lower pole that could relate to sequela of old subcapsular collection.  There are some colonic diverticula. An acute bowel abnormality is not appreciated.  There are bladder diverticulum some thickening of the bladder that may relate to more chronic bladder outlet issues. The prostate is enlarged.  Atherosclerotic changes are noted  There is a compression fracture of T12 with marked narrowing of the vertebra more centrally. It looks like the patient might have had vertebral augmentation. This was noted on x-rays of the lumbar spine from 2015. Additionally there is compression of the L1 vertebral body more inferior endplate which has been previously suggested. There is slight compression of the L3 vertebral body which is probably chronic as well. There additionally is a compression deformity of T5 that may be more chronic as well.        Impression: 1.  There are some groundglass changes more  peripherally in the left lung. Sequela to Covid 19 pneumonia cannot be excluded. There are patchy somewhat nodular densities in the right middle lobe and right lower lobe that could reflect sequela to inflammatory infectious process within these areas. Follow-up would be recommended to document resolution. 2.  There is pelvocaliectasis on the right with stranding in the fat around the right renal pelvis that could be secondary to inflammation and could be a manifestation of some obstructive changes. There are stones within the right renal pelvis and a nonobstructing calculus within the right renal collecting system. 3.  There is calcification around a low attenuating area along the lower pole the right kidney that may relate to old subcapsular fluid collection. 4.  Left renal cyst. 5.  There is a tiny gallstone within the gallbladder. 6.  Thickening of the wall the bladder with bladder diverticula that could reflect sequela to more chronic bladder outlet issues. A cystitis could not be entirely excluded as there is some indistinctness to the fat planes around the bladder. 7.  Multiple compression fracture deformities in the thoracolumbar area which could be more chronic and should be correlated with clinical findings. 8.  Atherosclerotic changes are present.  This report was finalized on 1/25/2022 5:47 PM by Apolinar Fuentes MD.      CT Chest Without Contrast Diagnostic    Result Date: 1/25/2022   DATE OF EXAM: 1/25/2022 5:17 PM  PROCEDURE: CT CHEST WO CONTRAST DIAGNOSTIC-, CT ABDOMEN PELVIS WO CONTRAST-  INDICATIONS: WEAK  COMPARISON: No Comparisons Available  TECHNIQUE: Routine transaxial slices were obtained through the chest, abdomen and pelvis without the administration of intravenous contrast. Reconstructed coronal and sagittal images were also obtained. Automated exposure control and iterative construction methods were used.  FINDINGS: CT chest: There are scattered areas of density some groundglass within the  left upper and left lower lobe more peripherally. This could be seen with Covid pneumonia in the proper clinical setting. There are some additional somewhat nodular areas of density in the right middle lobe and right basilar area. In the right lower lobe is a 2.46 cm nodular like area. These findings may be more inflammatory infectious as well. There are no pleural effusions. The patient had prior cardiothoracic surgery. 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 There is moderate to severe coronary artery calcification.  CT abdomen pelvis: There is no definite abnormality of the liver. There may be a tiny gallstone within the gallbladder. There is no inflammation around this area. There are splenic calcifications. The pancreas is grossly unremarkable. The left kidney does not appear unusual. There is a cyst within the upper pole of the left kidney measuring 2 cm.  There is a nonobstructing calculus within the midpole the right kidney measuring 9.1 mm. There is some pelvocaliectasis on this side with some haziness in the fat around the renal pelvis. There is a stone measuring 8.3 mm within the right renal pelvis as well as a 1.1 cm stone. The findings could reflect some inflammation secondary to obstructive changes. There some calculation along the margin of the right kidney with a more hypodense area in the lower pole that could relate to sequela of old subcapsular collection.  There are some colonic diverticula. An acute bowel abnormality is not appreciated.  There are bladder diverticulum some thickening of the bladder that may relate to more chronic bladder outlet issues. The prostate is enlarged.  Atherosclerotic changes are noted  There is a compression fracture of T12 with marked narrowing of the vertebra more centrally. It looks like the patient might have had vertebral augmentation. This was noted on x-rays of the lumbar spine from 2015. Additionally there is compression of the L1 vertebral body more inferior  endplate which has been previously suggested. There is slight compression of the L3 vertebral body which is probably chronic as well. There additionally is a compression deformity of T5 that may be more chronic as well.        Impression: 1.  There are some groundglass changes more peripherally in the left lung. Sequela to Covid 19 pneumonia cannot be excluded. There are patchy somewhat nodular densities in the right middle lobe and right lower lobe that could reflect sequela to inflammatory infectious process within these areas. Follow-up would be recommended to document resolution. 2.  There is pelvocaliectasis on the right with stranding in the fat around the right renal pelvis that could be secondary to inflammation and could be a manifestation of some obstructive changes. There are stones within the right renal pelvis and a nonobstructing calculus within the right renal collecting system. 3.  There is calcification around a low attenuating area along the lower pole the right kidney that may relate to old subcapsular fluid collection. 4.  Left renal cyst. 5.  There is a tiny gallstone within the gallbladder. 6.  Thickening of the wall the bladder with bladder diverticula that could reflect sequela to more chronic bladder outlet issues. A cystitis could not be entirely excluded as there is some indistinctness to the fat planes around the bladder. 7.  Multiple compression fracture deformities in the thoracolumbar area which could be more chronic and should be correlated with clinical findings. 8.  Atherosclerotic changes are present.  This report was finalized on 1/25/2022 5:47 PM by Apolinar Fuentes MD.        Results for orders placed during the hospital encounter of 05/06/21    Adult Transthoracic Echo Complete W/ Cont if Necessary Per Protocol    Interpretation Summary  · Estimated left ventricular EF = 70% Left ventricular systolic function is normal.  · Left ventricular wall thickness is consistent with  concentric hypertrophy.  · Mild mitral valve stenosis is present.  · Mild dilation of the aortic root is present.  · Moderate pulmonic valve regurgitation is present.  · Estimated right ventricular systolic pressure from tricuspid regurgitation is normal (<35 mmHg).  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  · The right atrial cavity is dilated.  · Moderate mitral valve regurgitation is present.  · Mild aortic valve stenosis is present.  · Left atrial volume is severely increased.  · There is a prosthetic valve in the aortic position mean gradient 14 mmHg with a peak of 24 consistent with borderline mild aortic stenosis  · There is an eccentric jet of MR which is at least moderate but very well may be moderate to severe.  · There is heavy calcification of the mitral valve leaflets with a mean gradient across the mitral valve 5 mmHg consistent with mild mitral stenosis    Assessment/Plan   Assessment & Plan       Complicated UTI (urinary tract infection)    Type 2 diabetes mellitus with other circulatory complications (HCC)    Paroxysmal atrial fibrillation (HCC)    Hypothyroidism    Malignant tumor of urinary bladder (HCC)    Dementia without behavioral disturbance (HCC)    Leobardo Araujo is a 87 y.o. male with PMH significant for atrial fibrillation, basal cell carcinoma, HTN, depression, diabetes, CAD, dementia, Hx stroke, bladder cancer who presented to [facility] ED on 1/25/2022 with complaints of increased confusion and weight loss.  Complicated UTI   Malignant tumor of urinary bladder  --Previous urine culture positive for Morganella, enterococcus and E. Coli  --IV Merrem started  --IV vancomycin, pharmacy to dose started  --ID consult, consider urology eval  --UA and urine culture pending    T2DM w/A1c pending  --QACHS glucose  --SSI    PAF  --Continue home dose aspirin  --Eliquis was recently discontinued(not sure by whom)    Hypothyroidism  --TSH and free T4 remarkably normal  --Levothyroxine was discontinued  (not sure by home)    Dementia  --Continue Namenda and Aricept    COVID positive  -fatigue maybe only symptom  -labs are no alarming for inflammation  -could be old positive  -monitor for sumptoms  -daughter Addie absolutely requests that he DOES NOT RECEIVE REMDESIVIR>    DVT prophylaxis:  Hep sq    CODE STATUS:  Full code for now     This note has been completed as part of a split-shared workflow.     Electronically signed by SOLOMON Thurston, 01/25/22, 7:04 PM EST.  Attending   Admission Attestation       I have seen and examined the patient, performing an independent face-to-face diagnostic evaluation with plan of care reviewed and developed with the advanced practice clinician (APC).      Brief Summary Statement:     Leobardo Araujo is a 87 y.o. male with PMH significant for atrial fibrillation, basal cell carcinoma, HTN, depression, diabetes, CAD, dementia, Hx stroke, bladder cancer who presented to [facility] ED on 1/25/2022 with complaints of increased confusion and weight loss.  Poor appetite, weight loss, been off most of his meds    Remainder of detailed HPI is as noted by APC and has been reviewed and/or edited by me for completeness.    Attending Physical Exam:  Temp:  [99.2 °F (37.3 °C)] 99.2 °F (37.3 °C)  Heart Rate:  [] 84  Resp:  [12] 12  BP: (101-123)/(64-90) 117/76    Patient is alert and talkative in no distress at rest  Neck is without mass or JVD  Heart is Reg wo murmur  Lungs are clear wo wheeze or crackle  Abdomen is soft without HSM or mass, not tender or distended  MAEW, no edema  Skin is without rash  Neurologic exam is nonfocal   Mood is appropriate    Brief Assessment/Plan :  See detailed assessment and plan developed with APC which I have reviewed and/or edited for completeness.    I believe this patient meets INPATIENT status due to complicated UTi requiring IV ABX  I feel patient’s risk for adverse outcomes and need for care warrant INPATIENT evaluation and I predict the patient’s care  encounter to likely last beyond 2 midnights.      Electronically signed by Terri Noonan MD, 01/25/22, 11:05 PM EST.                     Electronically signed by Terri Noonan MD at 01/26/22 0117

## 2022-02-03 LAB
GLUCOSE BLDC GLUCOMTR-MCNC: 132 MG/DL (ref 70–130)
GLUCOSE BLDC GLUCOMTR-MCNC: 160 MG/DL (ref 70–130)
GLUCOSE BLDC GLUCOMTR-MCNC: 211 MG/DL (ref 70–130)
GLUCOSE BLDC GLUCOMTR-MCNC: 212 MG/DL (ref 70–130)

## 2022-02-03 PROCEDURE — 97110 THERAPEUTIC EXERCISES: CPT

## 2022-02-03 PROCEDURE — 25010000002 HEPARIN (PORCINE) PER 1000 UNITS: Performed by: INTERNAL MEDICINE

## 2022-02-03 PROCEDURE — 82962 GLUCOSE BLOOD TEST: CPT

## 2022-02-03 PROCEDURE — 92526 ORAL FUNCTION THERAPY: CPT

## 2022-02-03 PROCEDURE — 99233 SBSQ HOSP IP/OBS HIGH 50: CPT | Performed by: INTERNAL MEDICINE

## 2022-02-03 PROCEDURE — 63710000001 INSULIN LISPRO (HUMAN) PER 5 UNITS: Performed by: NURSE PRACTITIONER

## 2022-02-03 RX ORDER — LORAZEPAM 2 MG/ML
0.5 INJECTION INTRAMUSCULAR EVERY 4 HOURS PRN
Status: DISCONTINUED | OUTPATIENT
Start: 2022-02-03 | End: 2022-02-09 | Stop reason: HOSPADM

## 2022-02-03 RX ORDER — MORPHINE SULFATE 2 MG/ML
2 INJECTION, SOLUTION INTRAMUSCULAR; INTRAVENOUS
Status: DISCONTINUED | OUTPATIENT
Start: 2022-02-03 | End: 2022-02-09 | Stop reason: HOSPADM

## 2022-02-03 RX ADMIN — MEMANTINE 10 MG: 10 TABLET ORAL at 09:59

## 2022-02-03 RX ADMIN — ACETAMINOPHEN 650 MG: 325 TABLET, FILM COATED ORAL at 10:04

## 2022-02-03 RX ADMIN — Medication 250 MG: at 21:58

## 2022-02-03 RX ADMIN — SODIUM CHLORIDE, PRESERVATIVE FREE 10 ML: 5 INJECTION INTRAVENOUS at 09:59

## 2022-02-03 RX ADMIN — MIRTAZAPINE 30 MG: 15 TABLET, FILM COATED ORAL at 21:58

## 2022-02-03 RX ADMIN — FAMOTIDINE 20 MG: 20 TABLET, FILM COATED ORAL at 21:58

## 2022-02-03 RX ADMIN — ASPIRIN 81 MG: 81 TABLET, COATED ORAL at 09:58

## 2022-02-03 RX ADMIN — INSULIN LISPRO 3 UNITS: 100 INJECTION, SOLUTION INTRAVENOUS; SUBCUTANEOUS at 18:11

## 2022-02-03 RX ADMIN — Medication 250 MG: at 09:59

## 2022-02-03 RX ADMIN — AMPICILLIN 500 MG: 500 CAPSULE ORAL at 21:58

## 2022-02-03 RX ADMIN — MEMANTINE 10 MG: 10 TABLET ORAL at 21:59

## 2022-02-03 RX ADMIN — HEPARIN SODIUM 5000 UNITS: 5000 INJECTION, SOLUTION INTRAVENOUS; SUBCUTANEOUS at 06:42

## 2022-02-03 RX ADMIN — FAMOTIDINE 20 MG: 20 TABLET, FILM COATED ORAL at 09:59

## 2022-02-03 RX ADMIN — DONEPEZIL HYDROCHLORIDE 10 MG: 10 TABLET, FILM COATED ORAL at 09:58

## 2022-02-03 RX ADMIN — INSULIN LISPRO 3 UNITS: 100 INJECTION, SOLUTION INTRAVENOUS; SUBCUTANEOUS at 12:31

## 2022-02-03 RX ADMIN — HEPARIN SODIUM 5000 UNITS: 5000 INJECTION, SOLUTION INTRAVENOUS; SUBCUTANEOUS at 13:33

## 2022-02-03 RX ADMIN — HEPARIN SODIUM 5000 UNITS: 5000 INJECTION, SOLUTION INTRAVENOUS; SUBCUTANEOUS at 21:58

## 2022-02-03 RX ADMIN — TAMSULOSIN HYDROCHLORIDE 0.4 MG: 0.4 CAPSULE ORAL at 09:59

## 2022-02-03 RX ADMIN — AMPICILLIN 500 MG: 500 CAPSULE ORAL at 13:33

## 2022-02-03 RX ADMIN — AMPICILLIN 500 MG: 500 CAPSULE ORAL at 06:45

## 2022-02-03 NOTE — THERAPY TREATMENT NOTE
Acute Care - Speech Language Pathology   Swallow Treatment Note [facility]     Patient Name: Leobardo Araujo  : 1934  MRN: [id number]  Today's Date: 2/3/2022               Admit Date: 2022    Visit Dx:     ICD-10-CM ICD-9-CM   1. Acute UTI  N39.0 599.0   2. Generalized weakness  R53.1 780.79   3. Altered mental status, unspecified altered mental status type  R41.82 780.97   4. COVID-19 virus infection  U07.1 079.89   5. Weight loss, unintentional  R63.4 783.21   6. Generalized muscle weakness  M62.81 728.87   7. Complicated UTI (urinary tract infection)  N39.0 599.0   8. Dysphagia, unspecified type  R13.10 787.20     Patient Active Problem List   Diagnosis   • Memory loss   • YSABEL (obstructive sleep apnea)   • Recurrent major depressive disorder (HCC)   • Fatigue   • History of aortic valve stenosis   • Erectile dysfunction   • Hypokalemia   • Metabolic encephalopathy   • Valvular heart disease   • Abnormal findings on diagnostic imaging of lung   • History of aortic valve replacement with bioprosthetic valve   • Type 2 diabetes mellitus with other circulatory complications (HCC)   • Peripheral vascular disease (HCC)   • Benign essential hypertension   • Paroxysmal atrial fibrillation (HCC)   • Hyperlipidemia LDL goal <100   • B12 deficiency   • Abnormal liver function   • Allergic rhinitis   • BPH with urinary obstruction   • Disorder of mitral and aortic valves   • Hypothyroidism   • Kidney stone   • Malignant tumor of urinary bladder (HCC)   • Neck pain   • Numbness   • Palpitations   • Polyp of colon   • Recurrent UTI (urinary tract infection)   • Medicare annual wellness visit, subsequent   • Multiple nevi   • Osteopenia of hip   • Iron deficiency anemia   • Unsteady gait   • Failure to thrive in adult   • Weight loss, unintentional   • Generalized muscle weakness   • Complicated UTI (urinary tract infection)   • Dementia without behavioral disturbance (HCC)     Past Medical History:   Diagnosis  Date   • Abnormal findings on diagnostic imaging of lung    • Arthritis    • Atrial fibrillation (HCC)     Atrial fibrillation, October 2010; CHADS2-VASc = 3: Amiodarone therapy, discontinued, November 2013. Elke Sultana left atrial appendage closure, Dr. Edson Roque, 07/23/2012.     • B12 deficiency    • Basal cell carcinoma    • Bladder cancer (HCC)    • Central sleep apnea in conditions classified elsewhere    • Delirium 2/13/2019   • Depression    • Diarrhea 4/24/2018   • History of anxiety    • History of aortic valve stenosis    • History of coronary atherosclerosis    • History of sexual dysfunction in male    • Hypercholesteremia    • Hyperlipidemia LDL goal <100    • Hypertension 1/6/2017   • Memory loss    • Nausea, vomiting, and diarrhea 4/24/2018   • Obstructive sleep apnea    • Peripheral vascular disease (HCC)    • Renal insufficiency 4/24/2018   • Sepsis (HCC)     from UTI   • Stroke (HCC)    • Testicular mass    • Tobacco abuse     Remote tobacco abuse.   • Type 2 diabetes mellitus with other circulatory complications (HCC)    • UTI (urinary tract infection) 04/2018   • Valvular heart disease 4/24/2018     Past Surgical History:   Procedure Laterality Date   • AORTIC VALVE REPAIR/REPLACEMENT  2012   • BLADDER RESECTION LAPAROSCOPIC  2000    Bladder cancer resection, 2000.   • CATARACT EXTRACTION     • CYSTOSCOPY W/ LITHOLAPAXY / EHL     • EPIDIDYMECTOMY Right    • KIDNEY SURGERY     • ORCHIECTOMY      Orchiectomy, benign.       SLP Recommendation and Plan       Anticipated Discharge Disposition (SLP): anticipate therapy at next level of care, skilled nursing facility (02/03/22 1010)         Daily Summary of Progress (SLP): progress toward functional goals as expected (02/03/22 1010)        Treatment Assessment (SLP): Pt lethargic. Accepted a few PO trials. Coughing noted w/ thin via cup. No overt clinical s/sxs aspiration w/ thin via tsp, nectar via cup/straw, puree, or soft/ground solids. [facility]  soft/no mixed consistencies and nectar-thick liquids cont to seem most comfortable and safest for pt per clinical assessment. May consider thin liquid via tsp, as pt desires/tolerates. (02/03/22 1010)  Plan for Continued Treatment (SLP): continue treatment per plan of care (02/03/22 1010)         Plan of Care Reviewed With: patient  Progress: no change      SWALLOW EVALUATION (last 72 hours)     SLP Adult Swallow Evaluation     Row Name 02/03/22 1010 02/01/22 1350                Rehab Evaluation    Document Type therapy note (daily note)  -AC evaluation  -       Subjective Information no complaints  -AC no complaints  -CH       Patient Observations lethargic  -AC lethargic; cooperative; agree to therapy  -CH       Patient/Family/Caregiver Comments/Observations No family present  -AC No family present. Airborne precautions  -CH       Patient Effort poor  -AC adequate  -CH       Symptoms Noted During/After Treatment -- none  -CH                General Information    Patient Profile Reviewed -- yes  -CH       Pertinent History Of Current Problem -- Patient admitted w UTI and Covid 19. Clinical swallow evaluation ordered d/t RN concerns that patient is not tolerating thin liquids.  -       Current Method of Nutrition -- regular textures; thin liquids  -       Precautions/Limitations, Hearing -- WFL; for purposes of eval  -       Prior Level of Function-Communication -- unknown  -       Prior Level of Function-Swallowing -- unknown  -       Plans/Goals Discussed with -- patient; agreed upon  -       Barriers to Rehab -- cognitive status  -       Patient's Goals for Discharge -- patient did not state  -                Pain    Additional Documentation -- Pain Scale: FACES Pre/Post-Treatment (Group)  -                Pain Scale: Numbers Pre/Post-Treatment    Pretreatment Pain Rating -- 0/10 - no pain  -       Posttreatment Pain Rating -- 0/10 - no pain  -                Pain Scale: FACES  Pre/Post-Treatment    Pain: FACES Scale, Pretreatment 0-->no hurt  -AC 0-->no hurt  -CH       Posttreatment Pain Rating 0-->no hurt  -AC 0-->no hurt  -CH                Oral Motor Structure and Function    Dentition Assessment -- natural, present and adequate  -       Secretion Management -- WNL/WFL  -CH       Mucosal Quality -- moist, healthy  -       Volitional Swallow -- weak  -                Oral Musculature and Cranial Nerve Assessment    Oral Motor General Assessment -- generalized oral motor weakness  -                General Eating/Swallowing Observations    Respiratory Support Currently in Use -- room air  -       Eating/Swallowing Skills -- fed by SLP  -       Positioning During Eating -- upright 90 degree; upright in chair; needs frequent re-positioning  -       Utensils Used -- spoon; cup; straw  -       Consistencies Trialed -- ice chips; thin liquids; pureed; soft textures; regular textures  -       Pre SpO2 (%) -- 97  -CH       Post SpO2 (%) -- 96  -                Respiratory    Respiratory Status -- WFL; room air  -                Clinical Swallow Eval    Oral Prep Phase -- WFL  -       Oral Transit -- WFL  -       Oral Residue -- WFL  -CH       Pharyngeal Phase -- suspected pharyngeal impairment  -       Esophageal Phase -- unremarkable  -       Clinical Swallow Evaluation Summary -- Patient given trials of thin liquids via ice, spoon, cup and straw, pureed, soft solids and regular solid trials. throat clear and delayed cough observed following cup and straw sips of thin liquids. No overt s/s of aspiration with Nectar thick liquids via cup or straw sips or with pureed or solid consistencies. Recommend: lvl IV, Nectar thick liquids, straw OK. Meds whole with thick liquids or pureed. [facility] to further assess.  -                Pharyngeal Phase Concerns    Pharyngeal Phase Concerns -- cough; throat clear  -CH       Cough -- thin  -CH       Throat Clear -- thin  -CH                 SLP Evaluation Clinical Impression    SLP Swallowing Diagnosis -- suspected pharyngeal dysphagia  -       Functional Impact -- risk of aspiration/pneumonia  -       Rehab Potential/Prognosis, Swallowing -- good, to achieve stated therapy goals  -       Swallow Criteria for Skilled Therapeutic Interventions Met -- demonstrates skilled criteria  -                SLP Treatment Clinical Impressions    Treatment Assessment (SLP) Pt lethargic. Accepted a few PO trials. Coughing noted w/ thin via cup. No overt clinical s/sxs aspiration w/ thin via tsp, nectar via cup/straw, puree, or soft/ground solids. Mech soft/no mixed consistencies and nectar-thick liquids cont to seem most comfortable and safest for pt per clinical assessment. May consider thin liquid via tsp, as pt desires/tolerates.  -AC --       Daily Summary of Progress (SLP) progress toward functional goals as expected  - --       Barriers to Overall Progress (SLP) Lethargy; Cognitive status  -AC --       Plan for Continued Treatment (SLP) continue treatment per plan of care  - --       Care Plan Review evaluation/treatment results reviewed; care plan/treatment goals reviewed; risks/benefits reviewed; current/potential barriers reviewed; patient/other agree to care plan  - --                Recommendations    Predicted Duration Therapy Intervention (Days) -- until discharge  -       SLP Diet Recommendation -- mechanical soft with no mixed consistencies; nectar thick liquids  -       Recommended Diagnostics -- VFSS (MBS)  -       Recommended Precautions and Strategies -- general aspiration precautions  -       Oral Care Recommendations -- Oral Care BID/PRN; Swab  -       SLP Rec. for Method of Medication Administration -- meds whole; with thick liquids; with pudding or applesauce; as tolerated  -       Monitor for Signs of Aspiration -- yes; notify SLP if any concerns  -       Anticipated Discharge Disposition (SLP) anticipate  therapy at next level of care; skilled nursing facility  -AC unknown; anticipate therapy at next level of care  -CH             User Key  (r) = Recorded By, (t) = Taken By, (c) = Cosigned By    Initials Name Effective Dates    AC Lynn Johnson, MS CCC-SLP 06/16/21 -     CH Jackson Jamesa, MS CCC-SLP 06/16/21 -                 EDUCATION  The patient has been educated in the following areas:   Dysphagia (Swallowing Impairment) Oral Care/Hydration Modified Diet Instruction.        SLP GOALS     Row Name 02/03/22 1010             Oral Nutrition/Hydration Goal 1 (SLP)    Oral Nutrition/Hydration Goal 1, SLP LTG: Pt will tolerate soft/cohesive diet and nectar-thick liquid w/o s/sxs aspiration/distress 100% of the time w/ 1:1 assist.  -AC      Time Frame (Oral Nutrition/Hydration Goal 1, SLP) by discharge  -AC      Progress/Outcomes (Oral Nutrition/Hydration Goal 1, SLP) continuing progress toward goal  -AC              Oral Nutrition/Hydration Goal 2 (SLP)    Oral Nutrition/Hydration Goal 2, SLP Pt will tolerate trials of nectar-thick liquid and soft/cohesive solids w/o s/sxs aspiration/distress w/ 100% acc and 1:1 assist.  -AC      Time Frame (Oral Nutrition/Hydration Goal 2, SLP) short term goal (STG)  -AC      Barriers (Oral Nutrition/Hydration Goal 2, SLP) Increased oral prep and transit time w/ soft solid. Suspect lethargy & cog status affecting oral phase. No overt clinical s/sxs aspiration w/ nectar via cup/straw and small bites of crackers.  -AC      Progress/Outcomes (Oral Nutrition/Hydration Goal 2, SLP) continuing progress toward goal  -AC              Oral Nutrition/Hydration Goal (SLP)    Oral Nutrition/Hydration Goal, SLP Pt will tolerate therapeutic trials of thin H2O w/o s/sxs aspiration w/ 70% acc and 1:1 assist.  -AC      Time Frame (Oral Nutrition/Hydration Goal, SLP) short term goal (STG)  -AC      Barriers (Oral Nutrition/Hydration Goal, SLP) Coughing noted immediately w/ thin via cup sip. No  overt clinical s/sxs aspiration w/ thin via tsp.  -AC      Progress/Outcomes (Oral Nutrition/Hydration Goal, SLP) continuing progress toward goal  -AC            User Key  (r) = Recorded By, (t) = Taken By, (c) = Cosigned By    Initials Name Provider Type    Lynn Wong MS CCC-SLP Speech and Language Pathologist                   Time Calculation:    Time Calculation- SLP     Row Name 02/03/22 1221             Time Calculation- SLP    SLP Start Time 1010  -AC      SLP Received On 02/03/22  -AC              Untimed Charges    32892-LI Treatment Swallow Minutes 45  -AC              Total Minutes    Untimed Charges Total Minutes 45  -AC       Total Minutes 45  -AC            User Key  (r) = Recorded By, (t) = Taken By, (c) = Cosigned By    Initials Name Provider Type    Lynn Wong MS CCC-SLP Speech and Language Pathologist                Therapy Charges for Today     Code Description Service Date Service Provider Modifiers Qty    [id number] HC ST TREATMENT SWALLOW 3 2/3/2022 Lynn Johnson MS CCC-SLP GN 1        Patient was not wearing a face mask and did exhibit coughing during this therapy encounter.  Procedure performed was aerosolizing, involved close contact (within 6 feet for at least 15 minutes or longer), and involved contact with infectious secretions or specimens.  Therapist used appropriate personal protective equipment including gloves, standard procedure mask, eye protection, gown and N95 mask.  Appropriate PPE was worn during the entire therapy session.  Hand hygiene was completed before and after therapy session.          MS NAOMIE Walker  2/3/2022

## 2022-02-03 NOTE — PLAN OF CARE
Goal Outcome Evaluation:  Plan of Care Reviewed With: other (see comments) (pt unable to participate in POC review; Dr. Grider spoke with dtr Fadumo yesterday)        Progress: declining  Outcome Summary: Pt sleeping, no observable signs of discomfort at time of Palliative RN encounter. Per documentation, pt ineligible for inpatient hospice due to absence of unmanaged symptoms; today, CM note indicates acceptance of bed at [facility] for rehab.

## 2022-02-03 NOTE — PLAN OF CARE
Goal Outcome Evaluation:  Plan of Care Reviewed With: patient        Progress: no change   SLP treatment completed. Will continue to address swallowing in tx. Rec cont'd SLP services @ next level of care. Please see note for further details and recommendations.

## 2022-02-03 NOTE — PROGRESS NOTES
1       Leobardo Araujo  1934  [id number]  2/3/2022    CC: Altered mental status.    Leobardo Araujo is a 87 y.o. male here for enterococcal urinary tract infection.      Past medical history:  Past Medical History:   Diagnosis Date   • Abnormal findings on diagnostic imaging of lung    • Arthritis    • Atrial fibrillation (HCC)     Atrial fibrillation, October 2010; CHADS2-VASc = 3: Amiodarone therapy, discontinued, November 2013. Elke Sultana left atrial appendage closure, Dr. Edson Roque, 07/23/2012.     • B12 deficiency    • Basal cell carcinoma    • Bladder cancer (HCC)    • Central sleep apnea in conditions classified elsewhere    • Delirium 2/13/2019   • Depression    • Diarrhea 4/24/2018   • History of anxiety    • History of aortic valve stenosis    • History of coronary atherosclerosis    • History of sexual dysfunction in male    • Hypercholesteremia    • Hyperlipidemia LDL goal <100    • Hypertension 1/6/2017   • Memory loss    • Nausea, vomiting, and diarrhea 4/24/2018   • Obstructive sleep apnea    • Peripheral vascular disease (HCC)    • Renal insufficiency 4/24/2018   • Sepsis (HCC)     from UTI   • Stroke (HCC)    • Testicular mass    • Tobacco abuse     Remote tobacco abuse.   • Type 2 diabetes mellitus with other circulatory complications (HCC)    • UTI (urinary tract infection) 04/2018   • Valvular heart disease 4/24/2018       Medications:   Current Facility-Administered Medications:   •  acetaminophen (TYLENOL) tablet 650 mg, 650 mg, Oral, Q4H PRN, 650 mg at 02/03/22 1004 **OR** acetaminophen (TYLENOL) 160 MG/5ML solution 650 mg, 650 mg, Oral, Q4H PRN **OR** acetaminophen (TYLENOL) suppository 650 mg, 650 mg, Rectal, Q4H PRN, Liss Bell APRN  •  ampicillin (PRINCIPEN) capsule 500 mg, 500 mg, Oral, Q8H, Leonardo Beard MD, 500 mg at 02/03/22 0645  •  aspirin EC tablet 81 mg, 81 mg, Oral, Daily, Liss Bell, APRN, 81 mg at 02/03/22 0958  •  sennosides-docusate (PERICOLACE) 8.6-50  MG per tablet 2 tablet, 2 tablet, Oral, BID, 2 tablet at 02/01/22 0903 **AND** polyethylene glycol (MIRALAX) packet 17 g, 17 g, Oral, Daily PRN **AND** bisacodyl (DULCOLAX) EC tablet 5 mg, 5 mg, Oral, Daily PRN **AND** bisacodyl (DULCOLAX) suppository 10 mg, 10 mg, Rectal, Daily PRN, Liss Bell APRN  •  calcium carbonate (TUMS) chewable tablet 500 mg (200 mg elemental), 2 tablet, Oral, BID PRN, Liss Bell, APRGUCCI  •  dextrose (D50W) (25 g/50 mL) IV injection 25 g, 25 g, Intravenous, Q15 Min PRN, Liss Bell APRN  •  dextrose (GLUTOSE) oral gel 15 g, 15 g, Oral, Q15 Min PRN, Liss Bell, APRN  •  donepezil (ARICEPT) tablet 10 mg, 10 mg, Oral, Daily, Liss Bell APRN, 10 mg at 02/03/22 0958  •  famotidine (PEPCID) tablet 20 mg, 20 mg, Oral, BID, Liss Bell, APRN, 20 mg at 02/03/22 0959  •  glucagon (human recombinant) (GLUCAGEN DIAGNOSTIC) injection 1 mg, 1 mg, Subcutaneous, Q15 Min PRN, Liss Bell APRN  •  heparin (porcine) 5000 UNIT/ML injection 5,000 Units, 5,000 Units, Subcutaneous, Q8H, Emperatriz Mcgill MD, 5,000 Units at 02/03/22 0642  •  insulin lispro (humaLOG) injection 0-7 Units, 0-7 Units, Subcutaneous, TID AC, Liss Bell, APRN, 2 Units at 02/01/22 1727  •  LORazepam (ATIVAN) injection 0.5 mg, 0.5 mg, Intravenous, Q4H PRN, Anselmo Grider, DO  •  Magnesium Sulfate 2 gram Bolus, followed by 8 gram infusion (total Mg dose 10 grams)- Mg less than or equal to 1mg/dL, 2 g, Intravenous, PRN **OR** Magnesium Sulfate 2 gram / 50mL Infusion (GIVE X 3 BAGS TO EQUAL 6GM TOTAL DOSE) - Mg 1.1 - 1.5 mg/dl, 2 g, Intravenous, PRN **OR** Magnesium Sulfate 4 gram infusion- Mg 1.6-1.9 mg/dL, 4 g, Intravenous, PRN, Liss Bell APRN, Last Rate: 25 mL/hr at 01/29/22 1716, 4 g at 01/29/22 1716  •  memantine (NAMENDA) tablet 10 mg, 10 mg, Oral, BID, Liss Bell APRN, 10 mg at 02/03/22 0959  •  mirtazapine (REMERON) tablet 30 mg, 30 mg, Oral, Nightly, Placido Stokes MD, 30 mg at 02/02/22  2238  •  morphine injection 2 mg, 2 mg, Intravenous, Q1H PRN, Anselmo Grider, DO  •  ondansetron (ZOFRAN) tablet 4 mg, 4 mg, Oral, Q6H PRN **OR** ondansetron (ZOFRAN) injection 4 mg, 4 mg, Intravenous, Q6H PRN, Liss Bell APRGUCCI  •  potassium chloride (MICRO-K) CR capsule 40 mEq, 40 mEq, Oral, PRN **OR** potassium chloride (KLOR-CON) packet 40 mEq, 40 mEq, Oral, PRN, 40 mEq at 01/26/22 1734 **OR** potassium chloride 10 mEq in 100 mL IVPB, 10 mEq, Intravenous, Q1H PRN, Liss Bell APRN  •  QUEtiapine (SEROquel) tablet 12.5 mg, 12.5 mg, Oral, Q12H PRN, Emperatriz Mcgill MD, 12.5 mg at 02/02/22 2239  •  saccharomyces boulardii (FLORASTOR) capsule 250 mg, 250 mg, Oral, BID, Leonardo Beard MD, 250 mg at 02/03/22 0959  •  sodium chloride 0.9 % flush 10 mL, 10 mL, Intravenous, Q12H, Liss Bell APRN, 10 mL at 02/03/22 0959  •  sodium chloride 0.9 % flush 10 mL, 10 mL, Intravenous, PRN, Liss Bell APRGUCCI  •  tamsulosin (FLOMAX) 24 hr capsule 0.4 mg, 0.4 mg, Oral, Daily, Liss Bell APRN, 0.4 mg at 02/03/22 0959  Antibiotics:  Anti-Infectives (From admission, onward)    Ordered     Dose/Rate Route Frequency Start Stop    01/31/22 1053  ampicillin (PRINCIPEN) capsule 500 mg        Ordering Provider: Leonardo Beard MD    500 mg Oral Every 8 Hours Scheduled 01/31/22 1400 02/07/22 1359    01/27/22 2130  vancomycin 1250 mg/250 mL 0.9% NS IVPB ([facility])        Ordering Provider: Sami Bloom RPH    1,250 mg  over 75 Minutes Intravenous Once 01/27/22 2200 01/28/22 0024    01/25/22 1858  vancomycin 1250 mg/250 mL 0.9% NS IVPB ([facility])        Ordering Provider: Jyoti Chapman, EPHRAIM    20 mg/kg × 68.7 kg  over 90 Minutes Intravenous Once 01/25/22 1900 01/25/22 2158    01/25/22 1720  meropenem (MERREM) 1 g/100 mL 0.9% NS ([facility])        Ordering Provider: Terri Noonan MD    1 g  over 30 Minutes Intravenous Once 01/25/22 1722 01/25/22 2013          Allergies:  has No Known Allergies.    Review of Systems: All  other reviewed and negative except as per HPI    Blood pressure 113/90, pulse 95, temperature 100.1 °F (37.8 °C), temperature source Oral, resp. rate 16, SpO2 94 %.  GENERAL: Awake and alert, in no acute distress.  Confused.  Not agitated.  No fever chills or night sweats.  No cough or sputum production.  No nausea vomiting or diarrhea.  Denies suprapubic pain or flank discomfort.  No gross hematuria.  HEENT: Oropharynx without thrush. Sinuses nontender. No cervical adenopathy. No carotid bruits/ jugular venous distention.   EYES: PERRL. No conjunctival injection. No icterus. EOMI.  LYMPHATICS: No lymphadenopathy of the neck or axillary or inguinal regions.   HEART: No murmur, gallop, or pericardial friction rub.   LUNGS: Clear to auscultation anteriorly. No percussion dullness.  Few bibasilar dependent rales.  ABDOMEN: Soft, nontender, nondistended. No appreciable HSM.  No CVA tenderness to punch.  No suprapubic discomfort with deep palpation.  SKIN: Warm and dry without cutaneous eruptions. No embolic stigmata.   PSYCHIATRIC: Mental status lucid. Cranial nerve function intact.       DIAGNOSTICS:  Lab Results   Component Value Date    WBC 8.74 01/29/2022    HGB 10.5 (L) 01/29/2022    HCT 32.1 (L) 01/29/2022     01/29/2022     Lab Results   Component Value Date    CRP 1.49 (H) 01/27/2022     Lab Results   Component Value Date    SEDRATE 26 (H) 01/23/2020     Lab Results   Component Value Date    GLUCOSE 146 (H) 01/29/2022    BUN 12 01/29/2022    CREATININE 0.63 (L) 01/29/2022    EGFRIFNONA 120 01/29/2022    EGFRIFAFRI 108 01/17/2022    BCR 19.0 01/29/2022    CO2 25.0 01/29/2022    CALCIUM 8.1 (L) 01/29/2022    PROTENTOTREF 7.0 01/17/2022    ALBUMIN 3.10 (L) 01/27/2022    LABIL2 1.1 01/17/2022    AST 14 01/27/2022    ALT 8 01/27/2022       Microbiology: 10 to the fifth group D enterococci from urine cultures x2/ampicillin susceptible.  COVID-19 nasopharyngeal PCR positive.  Influenza A/B unremarkable.   Admission blood cultures x2 -.    RADIOLOGY:  Imaging Results (Last 72 Hours)     Procedure Component Value Units Date/Time    CT Head Without Contrast [[id number]] Collected: 02/02/22 0832     Updated: 02/02/22 0840    Narrative:      EXAMINATION: CT HEAD WO CONTRAST-      INDICATION: Stroke follow-up; N39.0-Urinary tract infection, site not  specified; R53.1-Weakness; R41.82-Altered mental status, unspecified;  U07.1-COVID-19; R63.4-Abnormal weight loss; M62.81-Muscle weakness  (generalized); N39.0-Urinary tract infection, site not specified     TECHNIQUE: Axial noncontrast CT of the head with multiplanar  reconstruction     The radiation dose reduction device was turned on for each scan per the  ALARA (As Low as Reasonably Achievable) protocol.     COMPARISON: 4/23/2018     FINDINGS: There is focal encephalomalacic change in the right frontal  lobe compatible with chronic appearing infarct, however new at least  since 2018 comparison. There is otherwise no evidence of intracranial  hemorrhage, mass or mass effect. There are redemonstrated advanced  age-related changes with generalized volume loss and typical  hypoattenuating white matter changes of chronic small vessel ischemia.  There is associated ex vacuo prominence of the lateral ventricles. The  orbits are unremarkable and the paranasal sinuses demonstrate some  mucosal thickening and small amount of aerated secretions in the right  maxillary sinus and sphenoid chambers.       Impression:      Right frontal lobe volume loss compatible with chronic  appearing infarct, however new since 2018 comparison. Advanced  age-related changes are otherwise stable from prior, without acute  intracranial abnormality.     Mucosal thickening and aerated secretions in the right maxillary sinus  and sphenoid chambers. Correlate with any clinical evidence of  sinusitis.        This report was finalized on 2/2/2022 8:37 AM by Hugo Wood.             Assessment and Plan:  Altered mental status.  Polymicrobial UTI as outpatient/Morganella species and group D enterococci.  Persistent enterococcal urinary tract infection/ampicillin susceptible.  Bilateral renal calculi/nonobstructing/no hydronephrosis.  COVID-19 nasopharyngeal PCR positive with normal chest radiograph/no arterial desaturation.  Baseline dementia.  Maximum temperature over 24 hours 99.6.  Currently 100.1.  Pulse 95, respiratory 16, blood pressure 113/90 with an O2 saturation of 94% on room air.  Serum creatinine 0.63.  Peripheral leukocyte count 8.7 with 74% segmented neutrophils.  CT scan yesterday with encephalomalacia involving right frontal lobe from previous infarction.  Some mucosal thickening and chronic changes of sinusitis in the right maxillary and sphenoid apertures/image reviewed.  Continues on oral ampicillin and probiotics.  Follow clinically.  Should remain on oral antibiotics through 2/8.      Leonardo Beard MD  2/3/2022

## 2022-02-03 NOTE — CASE MANAGEMENT/SOCIAL WORK
Case Management Discharge Note      Final Note: Patient is accepted for skilled rehab for [facility] for today. Nursing to call report to [phone]. I have spoken with both daughters and both agree. I will update patient. Family to transport around 1 pm.       Therapy has worked with therapy, patient needing stretcher transport. This is arranged with [facility] for tomorrow at 10 am. I have updated facility, daughter Addie and attempting to speak again with daughter Fadumo. Both daughters in agreement for [facility].          Selected Continued Care - Admitted Since 1/25/2022     Destination Coordination complete.    Service Provider Selected Services Address Phone Fax Patient Preferred    [facility]  [facility] [address] [id number] [phone] --          Durable Medical Equipment    No services have been selected for the patient.              Dialysis/Infusion    No services have been selected for the patient.              Home Medical Care Coordination complete.    Service Provider Selected Services Address Phone Fax Patient Preferred    [facility]  [facility] [address] [id number] [phone] --          Therapy    No services have been selected for the patient.              Community Resources    No services have been selected for the patient.              Community & DME    No services have been selected for the patient.                       Final Discharge Disposition Code: 03 - skilled nursing facility (SNF)

## 2022-02-03 NOTE — PLAN OF CARE
Goal Outcome Evaluation:  Plan of Care Reviewed With: patient        Progress: declining  Outcome Summary: Patient demonstrates declining independence w/ functional mobility, requiring increased assistance.  He required max A x 2/ dep A for bed mobility, transferred to chair via mechanical lift, and required max A for unsupported sitting balance in chair.  Unable to safely perform sit to stand transfers.  Pt. accepted PROM BUE/BLEs w/ no signs of discomfort.  PT recommends SNF rehab at D/C.

## 2022-02-03 NOTE — PROGRESS NOTES
[facility] Medicine Services  PROGRESS NOTE    Patient Name: Leobardo Araujo  : 1934  MRN: [id number]    Date of Admission: 2022  Primary Care Physician: Edy Goodman MD    Subjective   Subjective     CC:  Dementia, UTI    HPI:  Family agreeable to rehab versus home with hospice.  No acute events overnight    ROS:  Unable to obtain    Objective   Objective     Vital Signs:   Temp:  [97.9 °F (36.6 °C)-100.1 °F (37.8 °C)] 100.1 °F (37.8 °C)  Heart Rate:  [] 95  Resp:  [16-20] 16  BP: ()/(48-90) 113/90     Physical Exam:  With patient's consent, physical exam was conducted via visual telemedicine encounter due to patient's current isolation requirements in the interest of PPE conservation.    Constitutional: No acute distress, confused at baseline, comfortable laying in bed  HENT: NCAT, MMM, no conjunctival injection  Respiratory: Good effort, nonlabored respirations   Cardiovascular:  tele with NSR   Musculoskeletal: No edema, normal muscle tone and mass for age  Psychiatric: Unable to assess due to dementia  Neurologic: Confused, disoriented at baseline, difficulty with following commands  Skin: No visible rashes, no jaundice seen on exposed skin through window      Results Reviewed:  LAB RESULTS:      Lab 22  1552 22  0942 22   WBC 8.74 6.68 8.49   HEMOGLOBIN 10.5* 11.0* 11.4*   HEMATOCRIT 32.1* 38.7 35.8*   PLATELETS 254 244 286   NEUTROS ABS 6.47 4.09 5.60   IMMATURE GRANS (ABS) 0.02 0.02 0.03   LYMPHS ABS 1.22 1.54 1.86   MONOS ABS 0.98* 0.82 0.84   EOS ABS 0.03 0.17 0.12   MCV 81.3 94.4 82.9   CRP  --   --  1.49*   PROCALCITONIN  --   --  0.03         Lab 22  1258 22  1552 22  0942 22   SODIUM  --  136 139 139   POTASSIUM  --  3.8 3.8 3.8   CHLORIDE  --  100 106 104   CO2  --  25.0 23.0 25.0   ANION GAP  --  11.0 10.0 10.0   BUN  --  12 11 11   CREATININE  --  0.63* 0.56* 0.63*   GLUCOSE  --  146* 112*  125*   CALCIUM  --  8.1* 8.4* 8.6   MAGNESIUM 2.0 1.6 1.7 1.9   PHOSPHORUS  --   --  2.7 2.2*         Lab 01/27/22 2024   TOTAL PROTEIN 6.9   ALBUMIN 3.10*   GLOBULIN 3.8   ALT (SGPT) 8   AST (SGOT) 14   BILIRUBIN 0.4   ALK PHOS 87                     Brief Urine Lab Results  (Last result in the past 365 days)      Color   Clarity   Blood   Leuk Est   Nitrite   Protein   CREAT   Urine HCG        01/26/22 0347 Yellow   Turbid   Large (3+)   Large (3+)   Negative   30 mg/dL (1+)                 Microbiology Results Abnormal     Procedure Component Value - Date/Time    Blood Culture - Blood, Arm, Left [[id number]]  (Normal) Collected: 01/25/22 1700    Lab Status: Final result Specimen: Blood from Arm, Left Updated: 01/30/22 1900     Blood Culture No growth at 5 days    Blood Culture - Blood, Arm, Right [[id number]]  (Normal) Collected: 01/25/22 1810    Lab Status: Final result Specimen: Blood from Arm, Right Updated: 01/30/22 1900     Blood Culture No growth at 5 days          CT Head Without Contrast    Result Date: 2/2/2022  EXAMINATION: CT HEAD WO CONTRAST-  INDICATION: Stroke follow-up; N39.0-Urinary tract infection, site not specified; R53.1-Weakness; R41.82-Altered mental status, unspecified; U07.1-COVID-19; R63.4-Abnormal weight loss; M62.81-Muscle weakness (generalized); N39.0-Urinary tract infection, site not specified  TECHNIQUE: Axial noncontrast CT of the head with multiplanar reconstruction  The radiation dose reduction device was turned on for each scan per the ALARA (As Low as Reasonably Achievable) protocol.  COMPARISON: 4/23/2018  FINDINGS: There is focal encephalomalacic change in the right frontal lobe compatible with chronic appearing infarct, however new at least since 2018 comparison. There is otherwise no evidence of intracranial hemorrhage, mass or mass effect. There are redemonstrated advanced age-related changes with generalized volume loss and typical hypoattenuating white matter changes of  chronic small vessel ischemia. There is associated ex vacuo prominence of the lateral ventricles. The orbits are unremarkable and the paranasal sinuses demonstrate some mucosal thickening and small amount of aerated secretions in the right maxillary sinus and sphenoid chambers.      Impression: Right frontal lobe volume loss compatible with chronic appearing infarct, however new since 2018 comparison. Advanced age-related changes are otherwise stable from prior, without acute intracranial abnormality.  Mucosal thickening and aerated secretions in the right maxillary sinus and sphenoid chambers. Correlate with any clinical evidence of sinusitis.   This report was finalized on 2/2/2022 8:37 AM by Hugo Wood.        Results for orders placed during the hospital encounter of 05/06/21    Adult Transthoracic Echo Complete W/ Cont if Necessary Per Protocol    Interpretation Summary  · Estimated left ventricular EF = 70% Left ventricular systolic function is normal.  · Left ventricular wall thickness is consistent with concentric hypertrophy.  · Mild mitral valve stenosis is present.  · Mild dilation of the aortic root is present.  · Moderate pulmonic valve regurgitation is present.  · Estimated right ventricular systolic pressure from tricuspid regurgitation is normal (<35 mmHg).  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  · The right atrial cavity is dilated.  · Moderate mitral valve regurgitation is present.  · Mild aortic valve stenosis is present.  · Left atrial volume is severely increased.  · There is a prosthetic valve in the aortic position mean gradient 14 mmHg with a peak of 24 consistent with borderline mild aortic stenosis  · There is an eccentric jet of MR which is at least moderate but very well may be moderate to severe.  · There is heavy calcification of the mitral valve leaflets with a mean gradient across the mitral valve 5 mmHg consistent with mild mitral stenosis      I have reviewed the  medications:  Scheduled Meds:ampicillin, 500 mg, Oral, Q8H  aspirin, 81 mg, Oral, Daily  donepezil, 10 mg, Oral, Daily  famotidine, 20 mg, Oral, BID  heparin (porcine), 5,000 Units, Subcutaneous, Q8H  insulin lispro, 0-7 Units, Subcutaneous, TID AC  memantine, 10 mg, Oral, BID  mirtazapine, 30 mg, Oral, Nightly  saccharomyces boulardii, 250 mg, Oral, BID  senna-docusate sodium, 2 tablet, Oral, BID  sodium chloride, 10 mL, Intravenous, Q12H  tamsulosin, 0.4 mg, Oral, Daily      Continuous Infusions:   PRN Meds:.•  acetaminophen **OR** acetaminophen **OR** acetaminophen  •  senna-docusate sodium **AND** polyethylene glycol **AND** bisacodyl **AND** bisacodyl  •  calcium carbonate  •  dextrose  •  dextrose  •  glucagon (human recombinant)  •  magnesium sulfate **OR** magnesium sulfate **OR** magnesium sulfate  •  ondansetron **OR** ondansetron  •  potassium chloride **OR** potassium chloride **OR** potassium chloride  •  QUEtiapine  •  sodium chloride    Assessment/Plan   Assessment & Plan     Active Hospital Problems    Diagnosis  POA   • **Complicated UTI (urinary tract infection) [N39.0]  Yes   • Dementia without behavioral disturbance (HCC) [F03.90]  Yes   • Hypothyroidism [E03.9]  Yes   • Malignant tumor of urinary bladder (HCC) [C67.9]  Yes   • 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        Brief Hospital Course to date:  Leobardo Araujo is a 87 y.o. male with history of A. fib, basal cell carcinoma HTN, diabetes previous stroke, questionable bladder cancer admitted with increased confusion and weight loss.  Found to have UTI and treated with IV antibiotics, now on p.o. ampicillin per infectious disease    He continues to decline and now having difficulty with taking pills and swallowing, following commands but does not meet inpatient hospice criteria due  to uncontrolled symptom management.  Previously had tried to get him either home with hospice or inpatient hospice but now plan is for patient to be discharged tomorrow to [facility].         Confusion, worsening dementia  -Fadumo, who is POA, agreeable to palliative consult  -CODE STATUS changed  -Discussed with Dr. Grider and Dr. Stokes regarding family and goals of care  -Plan now is for rehab as patient does not meet criteria for inpatient hospice          UTI, complicated   -ID following, continue ampicillin per ID through 2/8  Possible urinary bladder tumor  -Urine cultures positive for Enterococcus faecalis     Covid infection, asymptomatic  -Family refused remdesivir  -no treatment, can dc isolation on 2/4 (10 days)     Encephalopathy, dementia  -With intermittent worsening confusion continue Namenda and Aricept  -Evaluated by neurology as well, guarded prognosis due to advanced dementia       Diabetes, A1c 7.4%  -Continue SSI     PAF  -Eliquis was DC'd prior to admission, suspect due to patient's family not realizing that he was not taking it.    -Was listed as active med on December 2021 PCP note    -Plan for comfort measures at discharge, poor candidate for anticoagulation due to severe dementia and fall risk       DVT prophylaxis:  Medical DVT prophylaxis orders are present.       AM-PAC 6 Clicks Score (PT): 8 (02/02/22 2000)    Disposition: I expect the patient to be discharged to [facility], ambulance scheduled at 10 AM tomorrow weather permitting    CODE STATUS:   Code Status and Medical Interventions:   Ordered at: 02/02/22 0951     Level Of Support Discussed With:    Health Care Surrogate     Code Status (Patient has no pulse and is not breathing):    No CPR (Do Not Attempt to Resuscitate)     Medical Interventions (Patient has pulse or is breathing):    Comfort Measures     Comments:    Fadumo Quintero, DO  02/03/22

## 2022-02-03 NOTE — THERAPY TREATMENT NOTE
Patient Name: Leobardo Araujo  : 1934    MRN: [id number]                              Today's Date: 2/3/2022       Admit Date: 2022    Visit Dx:     ICD-10-CM ICD-9-CM   1. Acute UTI  N39.0 599.0   2. Generalized weakness  R53.1 780.79   3. Altered mental status, unspecified altered mental status type  R41.82 780.97   4. COVID-19 virus infection  U07.1 079.89   5. Weight loss, unintentional  R63.4 783.21   6. Generalized muscle weakness  M62.81 728.87   7. Complicated UTI (urinary tract infection)  N39.0 599.0     Patient Active Problem List   Diagnosis   • Memory loss   • YSABEL (obstructive sleep apnea)   • Recurrent major depressive disorder (HCC)   • Fatigue   • History of aortic valve stenosis   • Erectile dysfunction   • Hypokalemia   • Metabolic encephalopathy   • Valvular heart disease   • Abnormal findings on diagnostic imaging of lung   • History of aortic valve replacement with bioprosthetic valve   • Type 2 diabetes mellitus with other circulatory complications (HCC)   • Peripheral vascular disease (HCC)   • Benign essential hypertension   • Paroxysmal atrial fibrillation (HCC)   • Hyperlipidemia LDL goal <100   • B12 deficiency   • Abnormal liver function   • Allergic rhinitis   • BPH with urinary obstruction   • Disorder of mitral and aortic valves   • Hypothyroidism   • Kidney stone   • Malignant tumor of urinary bladder (HCC)   • Neck pain   • Numbness   • Palpitations   • Polyp of colon   • Recurrent UTI (urinary tract infection)   • Medicare annual wellness visit, subsequent   • Multiple nevi   • Osteopenia of hip   • Iron deficiency anemia   • Unsteady gait   • Failure to thrive in adult   • Weight loss, unintentional   • Generalized muscle weakness   • Complicated UTI (urinary tract infection)   • Dementia without behavioral disturbance (HCC)     Past Medical History:   Diagnosis Date   • Abnormal findings on diagnostic imaging of lung    • Arthritis    • Atrial fibrillation (HCC)      Atrial fibrillation, October 2010; CHADS2-VASc = 3: Amiodarone therapy, discontinued, November 2013. Elke Sultana left atrial appendage closure, Dr. Edson Roque, 07/23/2012.     • B12 deficiency    • Basal cell carcinoma    • Bladder cancer (HCC)    • Central sleep apnea in conditions classified elsewhere    • Delirium 2/13/2019   • Depression    • Diarrhea 4/24/2018   • History of anxiety    • History of aortic valve stenosis    • History of coronary atherosclerosis    • History of sexual dysfunction in male    • Hypercholesteremia    • Hyperlipidemia LDL goal <100    • Hypertension 1/6/2017   • Memory loss    • Nausea, vomiting, and diarrhea 4/24/2018   • Obstructive sleep apnea    • Peripheral vascular disease (HCC)    • Renal insufficiency 4/24/2018   • Sepsis (HCC)     from UTI   • Stroke (HCC)    • Testicular mass    • Tobacco abuse     Remote tobacco abuse.   • Type 2 diabetes mellitus with other circulatory complications (HCC)    • UTI (urinary tract infection) 04/2018   • Valvular heart disease 4/24/2018     Past Surgical History:   Procedure Laterality Date   • AORTIC VALVE REPAIR/REPLACEMENT  2012   • BLADDER RESECTION LAPAROSCOPIC  2000    Bladder cancer resection, 2000.   • CATARACT EXTRACTION     • CYSTOSCOPY W/ LITHOLAPAXY / EHL     • EPIDIDYMECTOMY Right    • KIDNEY SURGERY     • ORCHIECTOMY      Orchiectomy, benign.      General Information     Row Name 02/03/22 0945          Physical Therapy Time and Intention    Document Type therapy note (daily note)  -MB     Mode of Treatment physical therapy  -MB     Row Name 02/03/22 0945          General Information    Patient Profile Reviewed yes  -MB     Existing Precautions/Restrictions fall; other (see comments)  baseline dementia  -MB     Barriers to Rehab previous functional deficit; cognitive status  -MB     Row Name 02/03/22 0945          Cognition    Orientation Status (Cognition) oriented to; person  -MB     Row Name 02/03/22 0999           Safety Issues, Functional Mobility    Safety Issues Affecting Function (Mobility) ability to follow commands; awareness of need for assistance; friction/shear risk; insight into deficits/self-awareness; judgment; problem-solving; safety precaution awareness; safety precautions follow-through/compliance; sequencing abilities  -MB     Impairments Affecting Function (Mobility) balance; cognition; coordination; endurance/activity tolerance; motor planning; strength; postural/trunk control  -MB           User Key  (r) = Recorded By, (t) = Taken By, (c) = Cosigned By    Initials Name Provider Type    Yulissa Hannah, PT Physical Therapist               Mobility     Row Name 02/03/22 0945          Bed Mobility    Bed Mobility rolling right; rolling left; sit-supine  -MB     Rolling Left [place] (Bed Mobility) maximum assist (25% patient effort); 2 person assist; verbal cues; nonverbal cues (demo/gesture)  -MB     Rolling Right [place] (Bed Mobility) maximum assist (25% patient effort); 2 person assist; verbal cues; nonverbal cues (demo/gesture)  -MB     Supine-Sit [place] (Bed Mobility) dependent (less than 25% patient effort)  -MB     Sit-Supine [place] (Bed Mobility) dependent (less than 25% patient effort)  -MB     Assistive Device (Bed Mobility) draw sheet; head of bed elevated; bed rails  -MB     Comment (Bed Mobility) Pt. dep for supine<>sit, required max A x 2 fo rolling to place mechanical lift sling.  -MB     Row Name 02/03/22 0945          Transfers    Comment (Transfers) Pt. transferred to chair via mechanical lift.  Unable to safely assess STS transfer d/t lethargy and weakness.  -MB     Row Name 02/03/22 0945          Bed-Chair Transfer    Bed-Chair [place] (Transfers) dependent (less than 25% patient effort)  -MB     Assistive Device (Bed-Chair Transfers) lift device  -MB     Row Name 02/03/22 0945          Sit-Stand Transfer    Sit-Stand [place] (Transfers) unable to  assess; not tested  -MB     Row Name 02/03/22 0945          Gait/Stairs (Locomotion)    [place] (Gait) not tested; unable to assess  -MB     Comment (Gait/Stairs) Not appropriate to assess.  -MB           User Key  (r) = Recorded By, (t) = Taken By, (c) = Cosigned By    Initials Name Provider Type    Yulissa Hannah PT Physical Therapist               Obj/Interventions     Row Name 02/03/22 0945          Motor Skills    Therapeutic Exercise shoulder; hip; knee; ankle  -MB     Row Name 02/03/22 0945          Shoulder (Therapeutic Exercise)    Shoulder PROM (Therapeutic Exercise) bilateral; flexion; aBduction; 10 repetitions  elbow f/e  -MB     Row Name 02/03/22 0945          Hip (Therapeutic Exercise)    Hip (Therapeutic Exercise) PROM (passive range of motion)  -MB     Hip PROM (Therapeutic Exercise) bilateral; flexion; extension; aBduction; 10 repetitions  -MB     Row Name 02/03/22 0945          Knee (Therapeutic Exercise)    Knee (Therapeutic Exercise) PROM (passive range of motion)  -MB     Knee PROM (Therapeutic Exercise) bilateral; flexion; extension; 10 repetitions  -MB     Row Name 02/03/22 0945          Ankle (Therapeutic Exercise)    Ankle (Therapeutic Exercise) PROM (passive range of motion)  -MB     Ankle PROM (Therapeutic Exercise) bilateral; dorsiflexion; plantarflexion; 10 repetitions  -MB     Row Name 02/03/22 0945          Balance    Static Sitting Balance severe impairment; unsupported; sitting, edge of bed  -MB     Dynamic Sitting Balance severe impairment; supported; sitting in chair  -MB     Static Standing Balance unable to perform activity  -MB           User Key  (r) = Recorded By, (t) = Taken By, (c) = Cosigned By    Initials Name Provider Type    Yulissa Hannah, PT Physical Therapist               Goals/Plan    No documentation.                Clinical Impression     Row Name 02/03/22 0945          Pain    Additional Documentation Pain Scale: FACES  Pre/Post-Treatment (Group)  -MB     Row Name 02/03/22 3663          Pain Scale: FACES Pre/Post-Treatment    Pain: FACES Scale, Pretreatment 0-->no hurt  -MB     Posttreatment Pain Rating 0-->no hurt  -MB     Row Name 02/03/22 6968          Plan of Care Review    Plan of Care Reviewed With patient  -MB     Progress declining  -MB     Outcome Summary Patient demonstrates declining independence w/ functional mobility, requiring increased assistance.  He required max A x 2/ dep A for bed mobility, transferred to chair via mechanical lift, and required max A for unsupported sitting balance in chair.  Unable to safely perform sit to stand transfers.  Pt. accepted PROM BUE/BLEs w/ no signs of discomfort.  PT recommends SNF rehab at D/C.  -MB     Row Name 02/03/22 09          Vital Signs    Pre Systolic BP Rehab --  VSS.  RN cleared for PT.  -MB     Pre Patient Position Supine  -MB     Intra Patient Position Sitting  -MB     Post Patient Position Sitting  -MB     Row Name 02/03/22 0949          Positioning and Restraints    Pre-Treatment Position in bed  -MB     Post Treatment Position chair  -MB     In Chair notified nsg; reclined; call light within reach; encouraged to call for assist; exit alarm on; waffle cushion; on mechanical lift sling; legs elevated; heels elevated  -MB           User Key  (r) = Recorded By, (t) = Taken By, (c) = Cosigned By    Initials Name Provider Type    Yulissa Hannah, PT Physical Therapist               Outcome Measures     Row Name 02/03/22 3293          How much help from another person do you currently need...    Turning from your back to your side while in flat bed without using bedrails? 2  -MB     Moving from lying on back to sitting on the side of a flat bed without bedrails? 1  -MB     Moving to and from a bed to a chair (including a wheelchair)? 1  -MB     Standing up from a chair using your arms (e.g., wheelchair, bedside chair)? 1  -MB     Climbing 3-5 steps with a  railing? 1  -MB     To walk in hospital room? 1  -MB     AM-PAC 6 Clicks Score (PT) 7  -MB     Row Name 02/03/22 1050          Functional Assessment    Outcome Measure Options AM-PAC 6 Clicks Basic Mobility (PT)  -MB           User Key  (r) = Recorded By, (t) = Taken By, (c) = Cosigned By    Initials Name Provider Type    MB Yulissa Cadet, PT Physical Therapist                             Physical Therapy Education                 Title: PT OT SLP Therapies (In Progress)     Topic: Physical Therapy (In Progress)     Point: Mobility training (In Progress)     Learning Progress Summary           Patient Acceptance, E, NR by ML at 1/29/2022 1433    Acceptance, E, VU,NR by NS at 1/27/2022 1155                   Point: Home exercise program (In Progress)     Learning Progress Summary           Patient Acceptance, E, NR by ML at 1/29/2022 1433                   Point: Body mechanics (In Progress)     Learning Progress Summary           Patient Acceptance, E, NR by ML at 1/29/2022 1433    Acceptance, E, VU,NR by NS at 1/27/2022 1155                   Point: Precautions (In Progress)     Learning Progress Summary           Patient Acceptance, E, NR by ML at 1/29/2022 1433    Acceptance, E, VU,NR by NS at 1/27/2022 1155                               User Key     Initials Effective Dates Name Provider Type Discipline    NS 06/16/21 -  Stephanie Landis, PT Physical Therapist PT     04/22/21 -  Samanta Penaloza Physical Therapist PT              PT Recommendation and Plan     Plan of Care Reviewed With: patient  Progress: declining  Outcome Summary: Patient demonstrates declining independence w/ functional mobility, requiring increased assistance.  He required max A x 2/ dep A for bed mobility, transferred to chair via mechanical lift, and required max A for unsupported sitting balance in chair.  Unable to safely perform sit to stand transfers.  Pt. accepted PROM BUE/BLEs w/ no signs of discomfort.  PT recommends SNF rehab at  D/C.     Time Calculation:    PT Charges     Row Name 02/03/22 1051             Time Calculation    Start Time 0945  -MB      PT Received On 02/03/22  -MB      PT Goal Re-Cert Due Date 02/06/22  -MB              Time Calculation- PT    Total Timed Code Minutes- PT 45 minute(s)  -MB              Timed Charges    50543 - PT Therapeutic Exercise Minutes 45  -MB              Total Minutes    Timed Charges Total Minutes 45  -MB       Total Minutes 45  -MB            User Key  (r) = Recorded By, (t) = Taken By, (c) = Cosigned By    Initials Name Provider Type    Yulissa Hannah, PT Physical Therapist              Therapy Charges for Today     Code Description Service Date Service Provider Modifiers Qty    46540164524 HC PT THER PROC EA 15 MIN 2/3/2022 Yulissa Cadet, PT GP 3    52842440155 HC PT THER SUPP EA 15 MIN 2/3/2022 Yulissa Cadet, PT GP 1          PT G-Codes  Outcome Measure Options: AM-PAC 6 Clicks Basic Mobility (PT)  AM-PAC 6 Clicks Score (PT): 7  AM-PAC 6 Clicks Score (OT): 11    Yulissa Cadet, PT  2/3/2022

## 2022-02-03 NOTE — PLAN OF CARE
Goal Outcome Evaluation:  Plan of Care Reviewed With: patient        Progress: no change  Patient vital signs stable and on room air. Patient denies pain, nausea, and shortness of air. Patient had an incontinent bowel movement last night on 2/2/2022. Patient appeared calm and asleep during the night. Fall and safety precautions maintained.

## 2022-02-04 ENCOUNTER — APPOINTMENT (OUTPATIENT)
Dept: CT IMAGING | Facility: HOSPITAL | Age: 87
End: 2022-02-04

## 2022-02-04 LAB
GLUCOSE BLDC GLUCOMTR-MCNC: 148 MG/DL (ref 70–130)
GLUCOSE BLDC GLUCOMTR-MCNC: 191 MG/DL (ref 70–130)
GLUCOSE BLDC GLUCOMTR-MCNC: 214 MG/DL (ref 70–130)
GLUCOSE BLDC GLUCOMTR-MCNC: 216 MG/DL (ref 70–130)

## 2022-02-04 PROCEDURE — 99232 SBSQ HOSP IP/OBS MODERATE 35: CPT | Performed by: NURSE PRACTITIONER

## 2022-02-04 PROCEDURE — 82962 GLUCOSE BLOOD TEST: CPT

## 2022-02-04 PROCEDURE — 63710000001 INSULIN LISPRO (HUMAN) PER 5 UNITS: Performed by: NURSE PRACTITIONER

## 2022-02-04 PROCEDURE — 25010000002 HEPARIN (PORCINE) PER 1000 UNITS: Performed by: INTERNAL MEDICINE

## 2022-02-04 PROCEDURE — 92526 ORAL FUNCTION THERAPY: CPT

## 2022-02-04 RX ORDER — AMPICILLIN 500 MG/1
500 CAPSULE ORAL EVERY 8 HOURS SCHEDULED
Qty: 9 CAPSULE | Refills: 0
Start: 2022-02-04 | End: 2022-02-09 | Stop reason: HOSPADM

## 2022-02-04 RX ORDER — ACETAMINOPHEN 325 MG/1
650 TABLET ORAL EVERY 4 HOURS PRN
Start: 2022-02-04

## 2022-02-04 RX ORDER — SACCHAROMYCES BOULARDII 250 MG
250 CAPSULE ORAL 2 TIMES DAILY
Start: 2022-02-04 | End: 2022-03-24

## 2022-02-04 RX ORDER — ATORVASTATIN CALCIUM 40 MG/1
TABLET, FILM COATED ORAL
Qty: 90 TABLET | Refills: 1 | Status: SHIPPED | OUTPATIENT
Start: 2022-02-04 | End: 2022-03-24

## 2022-02-04 RX ORDER — QUETIAPINE FUMARATE 25 MG/1
12.5 TABLET, FILM COATED ORAL EVERY 12 HOURS PRN
Start: 2022-02-04 | End: 2022-03-24

## 2022-02-04 RX ADMIN — AMPICILLIN 500 MG: 500 CAPSULE ORAL at 23:28

## 2022-02-04 RX ADMIN — AMPICILLIN 500 MG: 500 CAPSULE ORAL at 13:19

## 2022-02-04 RX ADMIN — INSULIN LISPRO 2 UNITS: 100 INJECTION, SOLUTION INTRAVENOUS; SUBCUTANEOUS at 13:19

## 2022-02-04 RX ADMIN — INSULIN LISPRO 3 UNITS: 100 INJECTION, SOLUTION INTRAVENOUS; SUBCUTANEOUS at 17:44

## 2022-02-04 RX ADMIN — Medication 250 MG: at 22:02

## 2022-02-04 RX ADMIN — FAMOTIDINE 20 MG: 20 TABLET, FILM COATED ORAL at 22:02

## 2022-02-04 RX ADMIN — Medication 250 MG: at 09:23

## 2022-02-04 RX ADMIN — AMPICILLIN 500 MG: 500 CAPSULE ORAL at 06:01

## 2022-02-04 RX ADMIN — HEPARIN SODIUM 5000 UNITS: 5000 INJECTION, SOLUTION INTRAVENOUS; SUBCUTANEOUS at 13:19

## 2022-02-04 RX ADMIN — TAMSULOSIN HYDROCHLORIDE 0.4 MG: 0.4 CAPSULE ORAL at 09:23

## 2022-02-04 RX ADMIN — HEPARIN SODIUM 5000 UNITS: 5000 INJECTION, SOLUTION INTRAVENOUS; SUBCUTANEOUS at 22:03

## 2022-02-04 RX ADMIN — ASPIRIN 81 MG: 81 TABLET, COATED ORAL at 09:23

## 2022-02-04 RX ADMIN — SENNOSIDES AND DOCUSATE SODIUM 2 TABLET: 50; 8.6 TABLET ORAL at 22:02

## 2022-02-04 RX ADMIN — SENNOSIDES AND DOCUSATE SODIUM 2 TABLET: 50; 8.6 TABLET ORAL at 09:23

## 2022-02-04 RX ADMIN — HEPARIN SODIUM 5000 UNITS: 5000 INJECTION, SOLUTION INTRAVENOUS; SUBCUTANEOUS at 06:01

## 2022-02-04 RX ADMIN — MEMANTINE 10 MG: 10 TABLET ORAL at 09:23

## 2022-02-04 RX ADMIN — DONEPEZIL HYDROCHLORIDE 10 MG: 10 TABLET, FILM COATED ORAL at 09:23

## 2022-02-04 RX ADMIN — FAMOTIDINE 20 MG: 20 TABLET, FILM COATED ORAL at 09:23

## 2022-02-04 RX ADMIN — MIRTAZAPINE 30 MG: 15 TABLET, FILM COATED ORAL at 22:02

## 2022-02-04 RX ADMIN — MEMANTINE 10 MG: 10 TABLET ORAL at 22:02

## 2022-02-04 NOTE — PLAN OF CARE
Goal Outcome Evaluation:  Plan of Care Reviewed With: other (see comments) (chart review only)        Progress: no change  Outcome Summary: Pt was scheduled for discharge to [facility] this morning; per documentation, family has requested long-term care placement and discharge has been delayed while pursuing same. If pt discharges to a facility where Palliative Care is available, will send referral at that time.    1330 Palliative IDT meeting: WIN Gallardo RN, PN; GIRISH Grider DO; MIRZA Chapman, [facility], [facility]-; SAMMY Chester RN, CHPN

## 2022-02-04 NOTE — PLAN OF CARE
Goal Outcome Evaluation:    SLP treatment completed. Will sign-off as patient is tolerating recommended comfort diet without s/s of aspiration or discomfort at this time. Please see note for further details and recommendations.

## 2022-02-04 NOTE — DISCHARGE PLACEMENT REQUEST
"Leobardo Pereira (87 y.o. Male)   D/C Summary            Date of Birth Social Security Number Address Home Phone MRN    1934  [id number]  [facility] [address] [phone] [id number]    Nondenominational Marital Status             Mandaeism        Admission Date Admission Type Admitting Provider Attending Provider Department, Room/Bed    22 Emergency Kriss Acuña MD Hunter, Sarah M, MD [facility] 6A, N618/1    Discharge Date Discharge Disposition Discharge Destination                         Attending Provider: Kriss Acuña MD    Allergies: No Known Allergies    Isolation: Contact Air   Infection: COVID (confirmed) (22)   Code Status: No CPR   Advance Care Planning Activity    Ht: 182.9 cm (72.01\")   Wt: 68.7 kg (151 lb 6.4 oz)    Admission Cmt: None   Principal Problem: Complicated UTI (urinary tract infection) [N39.0]                 Active Insurance as of 2022     Primary Coverage     Payor Plan Insurance Group Employer/Plan Group    HUMANA MEDICARE REPLACEMENT HUMANA MEDICARE REPLACEMENT [id number]     Payor Plan Address Payor Plan Phone Number Payor Plan Fax Number Effective Dates    [address] [address] [phone]  2021 - None Entered    [facility] [address]       Subscriber Name Subscriber Birth Date Member ID       LEOBARDO PEREIRA 1934 [id number]                 Emergency Contacts      (Rel.) Home Phone Work Phone Mobile Phone    Addie Pereira (Daughter) [phone] -- [phone]    RANDALLFRANCO DUCKWORTH (Daughter) [phone] -- --    RANDALLKOREY DUCKWORTH (Son) [phone] -- [phone]                 Discharge Summary      Devika Cedeno, APRN at 22 38              [facility] Medicine Services  DISCHARGE SUMMARY    Patient Name: Leobardo Pereira  : 1934  MRN: [id number]    Date of Admission: 2022  4:05 PM  Date of Discharge:  2022  Primary Care Physician: Edy Goodman MD    Consults     Date and " Time Order Name Status Description    2/2/2022  9:51 AM Inpatient Palliative Care MD Consult Completed     2/1/2022  2:14 PM Inpatient Neurology Consult General Completed     1/26/2022 12:23 AM Inpatient Infectious Diseases Consult Completed           Hospital Course     Presenting Problem:   Complicated UTI (urinary tract infection) [N39.0]    Active Hospital Problems    Diagnosis  POA   • **Complicated UTI (urinary tract infection) [N39.0]  Yes   • Dementia without behavioral disturbance (HCC) [F03.90]  Yes   • Hypothyroidism [E03.9]  Yes   • Malignant tumor of urinary bladder (HCC) [C67.9]  Yes   • 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          Hospital Course:  Leobardo Araujo is a 87 y.o. male with history of A. fib, basal cell carcinoma HTN, diabetes previous stroke, questionable bladder cancer admitted with increased confusion and weight loss.  Found to have UTI and treated with IV antibiotics, now on p.o. ampicillin per infectious disease. Patient had recently had a lot of medications stopped prior to admit. We have not restarted any of those at this time.      He continues to decline and now having difficulty with taking pills and swallowing, following commands but does not meet inpatient hospice criteria due to uncontrolled symptom management.  Previously had tried to get him either home with hospice or inpatient hospice but now plan is for patient to be discharged tomorrow to [facility].      Confusion, worsening dementia  -Fadumo, who is POA, agreeable to palliative and hospice consult  -CODE STATUS changed to comfort measures   -Discussed with Dr. Grider and Dr. Stokes regarding family and goals of care  -Plan now is for rehab as patient does not meet criteria for inpatient hospice    Hypothyroidism  --TSH and free T4   normal  --Levothyroxine was discontinued (not sure by home)  -- repeat TSH in 4-6 weeks if family wants to reassess TSH and see if patient needs restarted back on synthroid       UTI, complicated   -ID following, continue ampicillin per ID through 2/8  Possible urinary bladder tumor  -Urine cultures positive for Enterococcus faecalis     Covid infection, asymptomatic  -Family refused remdesivir  -no treatment, can dc isolation on 2/4 (10 days)     Encephalopathy, dementia  -With intermittent worsening confusion continue Namenda and Aricept  -Evaluated by neurology as well, guarded prognosis due to advanced dementia        Diabetes, A1c 7.4%  -Continue SSI  -- will stop oral meds at dc due to increased oral intake  -- continue SSI as needed     PAF  -Eliquis was DC'd prior to admission, suspect due to patient's family not realizing that he was not taking it.   -Was listed as active med on December 2021 PCP note   -Plan for comfort measures at discharge, poor candidate for anticoagulation due to severe dementia and fall risk    Patient has remained clinically stable and will be discharged to facility today.  patient will need palliative and hospice to follow at facility to help with GOC.      Discharge Follow Up Recommendations for outpatient labs/diagnostics:   follow up with pcp after dc from rehab   Repeat TSH in 4-6 weeks     Day of Discharge     HPI:   Patient sleeping in NAD. Awakens when name called. No acute events overnight per nursing. Plan for discharge this am     Review of Systems  Unable to obtain     Vital Signs:   Temp:  [98.1 °F (36.7 °C)-99.1 °F (37.3 °C)] 99.1 °F (37.3 °C)  Heart Rate:  [] 98  Resp:  [16-22] 22  BP: (105-132)/(65-92) 132/92      Physical Exam:  Constitutional: No acute distress, awake, alert  HENT: NCAT, mucous membranes moist  Respiratory: Clear to auscultation bilaterally, respiratory effort normal room air 93%  Cardiovascular: RRR, no murmurs, rubs, or  gallops  Gastrointestinal: Positive bowel sounds, soft, nontender, nondistended  Musculoskeletal: No bilateral ankle edema  Psychiatric: calm  Neurologic: confused, disorientated at baseline  Skin: No rashes      Pertinent  and/or Most Recent Results     LAB RESULTS:      Lab 01/29/22  1552 01/28/22  0942   WBC 8.74 6.68   HEMOGLOBIN 10.5* 11.0*   HEMATOCRIT 32.1* 38.7   PLATELETS 254 244   NEUTROS ABS 6.47 4.09   IMMATURE GRANS (ABS) 0.02 0.02   LYMPHS ABS 1.22 1.54   MONOS ABS 0.98* 0.82   EOS ABS 0.03 0.17   MCV 81.3 94.4         Lab 01/30/22  1258 01/29/22  1552 01/28/22  0942   SODIUM  --  136 139   POTASSIUM  --  3.8 3.8   CHLORIDE  --  100 106   CO2  --  25.0 23.0   ANION GAP  --  11.0 10.0   BUN  --  12 11   CREATININE  --  0.63* 0.56*   GLUCOSE  --  146* 112*   CALCIUM  --  8.1* 8.4*   MAGNESIUM 2.0 1.6 1.7   PHOSPHORUS  --   --  2.7                         Brief Urine Lab Results  (Last result in the past 365 days)      Color   Clarity   Blood   Leuk Est   Nitrite   Protein   CREAT   Urine HCG        01/26/22 0347 Yellow   Turbid   Large (3+)   Large (3+)   Negative   30 mg/dL (1+)               Microbiology Results (last 10 days)     Procedure Component Value - Date/Time    Urine Culture - Urine, Urine, Catheter [[id number]]  (Abnormal)  (Susceptibility) Collected: 01/26/22 0347    Lab Status: Final result Specimen: Urine, Catheter Updated: 01/28/22 1252     Urine Culture >100,000 CFU/mL Enterococcus faecalis    Susceptibility      Enterococcus faecalis     FRENCH     Ampicillin Susceptible     Levofloxacin Resistant     Nitrofurantoin Susceptible     Tetracycline Resistant     Vancomycin Susceptible                         Urine Culture - Urine, Urine, Catheter [[id number]]  (Abnormal)  (Susceptibility) Collected: 01/25/22 2032    Lab Status: Final result Specimen: Urine, Catheter Updated: 01/28/22 1252     Urine Culture >100,000 CFU/mL Enterococcus faecalis      >100,000 CFU/mL Mixed Yessenia Isolated     Narrative:      Specimen contains mixed organisms of questionable pathogenicity which indicates contamination with commensal trevon.  Further identification is unlikely to provide clinically useful information.  Suggest recollection.    Susceptibility      Enterococcus faecalis     FRENCH     Ampicillin Susceptible     Levofloxacin Resistant     Nitrofurantoin Susceptible     Tetracycline Resistant     Vancomycin Susceptible                         COVID PRE-OP / PRE-PROCEDURE SCREENING ORDER (NO ISOLATION) - Swab, Nasopharynx [[id number]]  (Abnormal) Collected: 01/25/22 1825    Lab Status: Final result Specimen: Swab from Nasopharynx Updated: 01/25/22 1941    Narrative:      The following orders were created for panel order COVID PRE-OP / PRE-PROCEDURE SCREENING ORDER (NO ISOLATION) - Swab, Nasopharynx.  Procedure                               Abnormality         Status                     ---------                               -----------         ------                     COVID-19 and FLU A/B PCR...[[id number]]  Abnormal            Final result                 Please view results for these tests on the individual orders.    COVID-19 and FLU A/B PCR - Swab, Nasopharynx [[id number]]  (Abnormal) Collected: 01/25/22 1825    Lab Status: Final result Specimen: Swab from Nasopharynx Updated: 01/25/22 1941     COVID19 Detected     Influenza A PCR Not Detected     Influenza B PCR Not Detected    Narrative:      Fact sheet for providers: https://www.fda.gov/media/280355/download    Fact sheet for patients: https://www.fda.gov/media/063961/download    Test performed by PCR.  Influenza A and Influenza B negative results should be considered presumptive in samples that have a positive SARS-CoV-2 result.    Competitive inhibition studies showed that SARS-CoV-2 virus, when present at concentrations above 3.6E+04 copies/mL, can inhibit the detection and amplification of influenza A and influenza B virus RNA if present at or below  1.8E+02 copies/mL or 4.9E+02 copies/mL, respectively, and may lead to false negative influenza virus results. If co-infection with influenza A or influenza B virus is suspected in samples with a positive SARS-CoV-2 result, the sample should be re-tested with another FDA cleared, approved, or authorized influenza test, if influenza virus detection would change clinical management.    Blood Culture - Blood, Arm, Right [[id number]]  (Normal) Collected: 01/25/22 1810    Lab Status: Final result Specimen: Blood from Arm, Right Updated: 01/30/22 1900     Blood Culture No growth at 5 days    Blood Culture - Blood, Arm, Left [[id number]]  (Normal) Collected: 01/25/22 1700    Lab Status: Final result Specimen: Blood from Arm, Left Updated: 01/30/22 1900     Blood Culture No growth at 5 days          CT Abdomen Pelvis Without Contrast    Result Date: 1/25/2022   DATE OF EXAM: 1/25/2022 5:17 PM  PROCEDURE: CT CHEST WO CONTRAST DIAGNOSTIC-, CT ABDOMEN PELVIS WO CONTRAST-  INDICATIONS: WEAK  COMPARISON: No Comparisons Available  TECHNIQUE: Routine transaxial slices were obtained through the chest, abdomen and pelvis without the administration of intravenous contrast. Reconstructed coronal and sagittal images were also obtained. Automated exposure control and iterative construction methods were used.  FINDINGS: CT chest: There are scattered areas of density some groundglass within the left upper and left lower lobe more peripherally. This could be seen with Covid pneumonia in the proper clinical setting. There are some additional somewhat nodular areas of density in the right middle lobe and right basilar area. In the right lower lobe is a 2.46 cm nodular like area. These findings may be more inflammatory infectious as well. There are no pleural effusions. The patient had prior cardiothoracic surgery. 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 There is moderate to severe coronary artery calcification.  CT abdomen pelvis: There is no  definite abnormality of the liver. There may be a tiny gallstone within the gallbladder. There is no inflammation around this area. There are splenic calcifications. The pancreas is grossly unremarkable. The left kidney does not appear unusual. There is a cyst within the upper pole of the left kidney measuring 2 cm.  There is a nonobstructing calculus within the midpole the right kidney measuring 9.1 mm. There is some pelvocaliectasis on this side with some haziness in the fat around the renal pelvis. There is a stone measuring 8.3 mm within the right renal pelvis as well as a 1.1 cm stone. The findings could reflect some inflammation secondary to obstructive changes. There some calculation along the margin of the right kidney with a more hypodense area in the lower pole that could relate to sequela of old subcapsular collection.  There are some colonic diverticula. An acute bowel abnormality is not appreciated.  There are bladder diverticulum some thickening of the bladder that may relate to more chronic bladder outlet issues. The prostate is enlarged.  Atherosclerotic changes are noted  There is a compression fracture of T12 with marked narrowing of the vertebra more centrally. It looks like the patient might have had vertebral augmentation. This was noted on x-rays of the lumbar spine from 2015. Additionally there is compression of the L1 vertebral body more inferior endplate which has been previously suggested. There is slight compression of the L3 vertebral body which is probably chronic as well. There additionally is a compression deformity of T5 that may be more chronic as well.        1.  There are some groundglass changes more peripherally in the left lung. Sequela to Covid 19 pneumonia cannot be excluded. There are patchy somewhat nodular densities in the right middle lobe and right lower lobe that could reflect sequela to inflammatory infectious process within these areas. Follow-up would be recommended  to document resolution. 2.  There is pelvocaliectasis on the right with stranding in the fat around the right renal pelvis that could be secondary to inflammation and could be a manifestation of some obstructive changes. There are stones within the right renal pelvis and a nonobstructing calculus within the right renal collecting system. 3.  There is calcification around a low attenuating area along the lower pole the right kidney that may relate to old subcapsular fluid collection. 4.  Left renal cyst. 5.  There is a tiny gallstone within the gallbladder. 6.  Thickening of the wall the bladder with bladder diverticula that could reflect sequela to more chronic bladder outlet issues. A cystitis could not be entirely excluded as there is some indistinctness to the fat planes around the bladder. 7.  Multiple compression fracture deformities in the thoracolumbar area which could be more chronic and should be correlated with clinical findings. 8.  Atherosclerotic changes are present.  This report was finalized on 1/25/2022 5:47 PM by Apolinar Fuentes MD.      CT Head Without Contrast    Result Date: 2/2/2022  EXAMINATION: CT HEAD WO CONTRAST-  INDICATION: Stroke follow-up; N39.0-Urinary tract infection, site not specified; R53.1-Weakness; R41.82-Altered mental status, unspecified; U07.1-COVID-19; R63.4-Abnormal weight loss; M62.81-Muscle weakness (generalized); N39.0-Urinary tract infection, site not specified  TECHNIQUE: Axial noncontrast CT of the head with multiplanar reconstruction  The radiation dose reduction device was turned on for each scan per the ALARA (As Low as Reasonably Achievable) protocol.  COMPARISON: 4/23/2018  FINDINGS: There is focal encephalomalacic change in the right frontal lobe compatible with chronic appearing infarct, however new at least since 2018 comparison. There is otherwise no evidence of intracranial hemorrhage, mass or mass effect. There are redemonstrated advanced age-related changes  with generalized volume loss and typical hypoattenuating white matter changes of chronic small vessel ischemia. There is associated ex vacuo prominence of the lateral ventricles. The orbits are unremarkable and the paranasal sinuses demonstrate some mucosal thickening and small amount of aerated secretions in the right maxillary sinus and sphenoid chambers.      Right frontal lobe volume loss compatible with chronic appearing infarct, however new since 2018 comparison. Advanced age-related changes are otherwise stable from prior, without acute intracranial abnormality.  Mucosal thickening and aerated secretions in the right maxillary sinus and sphenoid chambers. Correlate with any clinical evidence of sinusitis.   This report was finalized on 2/2/2022 8:37 AM by Hugo Wood.      CT Chest Without Contrast Diagnostic    Result Date: 1/25/2022   DATE OF EXAM: 1/25/2022 5:17 PM  PROCEDURE: CT CHEST WO CONTRAST DIAGNOSTIC-, CT ABDOMEN PELVIS WO CONTRAST-  INDICATIONS: WEAK  COMPARISON: No Comparisons Available  TECHNIQUE: Routine transaxial slices were obtained through the chest, abdomen and pelvis without the administration of intravenous contrast. Reconstructed coronal and sagittal images were also obtained. Automated exposure control and iterative construction methods were used.  FINDINGS: CT chest: There are scattered areas of density some groundglass within the left upper and left lower lobe more peripherally. This could be seen with Covid pneumonia in the proper clinical setting. There are some additional somewhat nodular areas of density in the right middle lobe and right basilar area. In the right lower lobe is a 2.46 cm nodular like area. These findings may be more inflammatory infectious as well. There are no pleural effusions. The patient had prior cardiothoracic surgery. 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 There is moderate to severe coronary artery calcification.  CT abdomen pelvis: There is no  to document resolution. 2.  There is pelvocaliectasis on the right with stranding in the fat around the right renal pelvis that could be secondary to inflammation and could be a manifestation of some obstructive changes. There are stones within the right renal pelvis and a nonobstructing calculus within the right renal collecting system. 3.  There is calcification around a low attenuating area along the lower pole the right kidney that may relate to old subcapsular fluid collection. 4.  Left renal cyst. 5.  There is a tiny gallstone within the gallbladder. 6.  Thickening of the wall the bladder with bladder diverticula that could reflect sequela to more chronic bladder outlet issues. A cystitis could not be entirely excluded as there is some indistinctness to the fat planes around the bladder. 7.  Multiple compression fracture deformities in the thoracolumbar area which could be more chronic and should be correlated with clinical findings. 8.  Atherosclerotic changes are present.  This report was finalized on 1/25/2022 5:47 PM by Apolinar Fuentes MD.                Results for orders placed during the hospital encounter of 05/06/21    Adult Transthoracic Echo Complete W/ Cont if Necessary Per Protocol    Interpretation Summary  · Estimated left ventricular EF = 70% Left ventricular systolic function is normal.  · Left ventricular wall thickness is consistent with concentric hypertrophy.  · Mild mitral valve stenosis is present.  · Mild dilation of the aortic root is present.  · Moderate pulmonic valve regurgitation is present.  · Estimated right ventricular systolic pressure from tricuspid regurgitation is normal (<35 mmHg).  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  · The right atrial cavity is dilated.  · Moderate mitral valve regurgitation is present.  · Mild aortic valve stenosis is present.  · Left atrial volume is severely increased.  · There is a prosthetic valve in the aortic position mean gradient 14 mmHg  with a peak of 24 consistent with borderline mild aortic stenosis  · There is an eccentric jet of MR which is at least moderate but very well may be moderate to severe.  · There is heavy calcification of the mitral valve leaflets with a mean gradient across the mitral valve 5 mmHg consistent with mild mitral stenosis      Plan for Follow-up of Pending Labs/Results: TSH in 4-6 weeks     Discharge Details        Discharge Medications      New Medications      Instructions Start Date   acetaminophen 325 MG tablet  Commonly known as: TYLENOL   650 mg, Oral, Every 4 Hours PRN      ampicillin 500 MG capsule  Commonly known as: PRINCIPEN   500 mg, Oral, Every 8 Hours Scheduled      insulin lispro 100 UNIT/ML injection  Commonly known as: humaLOG   0-7 Units, Subcutaneous, 3 Times Daily Before Meals      QUEtiapine 25 MG tablet  Commonly known as: SEROquel   12.5 mg, Oral, Every 12 Hours PRN      saccharomyces boulardii 250 MG capsule  Commonly known as: FLORASTOR   250 mg, Oral, 2 Times Daily         Continue These Medications      Instructions Start Date   aspirin 81 MG tablet   Oral, Daily      donepezil 10 MG tablet  Commonly known as: ARICEPT   TAKE 1 TABLET EVERY DAY      famotidine 20 MG tablet  Commonly known as: PEPCID   TAKE 1 TABLET TWICE DAILY      memantine 10 MG tablet  Commonly known as: NAMENDA   10 mg, Oral, 2 Times Daily      mirtazapine 30 MG tablet  Commonly known as: Remeron   30 mg, Oral, Nightly      tamsulosin 0.4 MG capsule 24 hr capsule  Commonly known as: FLOMAX   TAKE 1 CAPSULE EVERY DAY         Stop These Medications    amLODIPine 5 MG tablet  Commonly known as: NORVASC     apixaban 5 MG tablet tablet  Commonly known as: Eliquis     atorvastatin 40 MG tablet  Commonly known as: LIPITOR     finasteride 1 MG tablet  Commonly known as: PROPECIA     levothyroxine 50 MCG tablet  Commonly known as: SYNTHROID, LEVOTHROID     losartan 100 MG tablet  Commonly known as: COZAAR     metFORMIN 500 MG  tablet  Commonly known as: GLUCOPHAGE            No Known Allergies      Discharge Disposition:  Rehab Facility or Unit (DC - External)    Diet:  Hospital:  Diet Order   Procedures   • Diet Dysphagia; IV - Mechanical Soft No Mixed Consistencies; Nectar / Syrup Thick; Cardiac, Consistent Carbohydrate       Activity:  Activity Instructions     Activity as Tolerated      Measure Blood Pressure            Restrictions or Other Recommendations:         CODE STATUS:    Code Status and Medical Interventions:   Ordered at: 02/02/22 0951     Level Of Support Discussed With:    Health Care Surrogate     Code Status (Patient has no pulse and is not breathing):    No CPR (Do Not Attempt to Resuscitate)     Medical Interventions (Patient has pulse or is breathing):    Comfort Measures     Comments:    Fadumo MAHMOOD       Future Appointments   Date Time Provider Department Center   5/12/2022  2:30 PM Shelly Johnson PA-C [facility]   8/8/2022 12:45 PM Edy Goodman MD [facility]   10/12/2022  1:30 PM Linda Tom MD [facility]       Additional Instructions for the Follow-ups that You Need to Schedule     Ambulatory Referral to Home Health   As directed      Face to Face Visit Date: 1/28/2022    Follow-up provider for Plan of Care?: I treated the patient in an acute care facility and will not continue treatment after discharge.    Follow-up provider: EDY GOODMAN [[id number]]    Reason/Clinical Findings: atrial fibrillation, basal cell carcinoma, essential hypertension, depression, type II diabetes, coronary artery disease, dementia,  history of stroke, questionable bladder cancer who presented to [facility] ED on 1/25/2022 with complaints of increased confusio    Describe mobility limitations that make leaving home difficult: impaired mobility, impaired ADLs, impaired gait/balance    Nursing/Therapeutic Services Requested: Skilled Nursing Physical Therapy Occupational Therapy    Skilled nursing orders:  Medication education Cardiopulmonary assessments Neurovascular assessments    PT orders: Transfer training Strengthening Therapeutic exercise Gait Training Electrical stimulation Total joint pathway Home safety assessment    Weight Bearing Status: As Tolerated    Occupational orders: Activities of daily living Energy conservation Strengthening Cognition Home safety assessment         Discharge Follow-up with PCP   As directed       Currently Documented PCP:    Edy Goodman MD    PCP Phone Number:    [phone]     Follow Up Details: follow up with pcp after dc from rehab         Discharge Follow-up with Specified Provider: palliataive and hospice to follow at rehab to help with GOC   As directed      To: palliataive and hospice to follow at rehab to help with GOC                     SOLOMON Smith  02/04/22      Time Spent on Discharge:  I spent  35  minutes on this discharge activity which included: face-to-face encounter with the patient, reviewing the data in the system, coordination of the care with the nursing staff as well as consultants, documentation, and entering orders.            Electronically signed by Devika Cedeno APRN at 02/04/22 0756

## 2022-02-04 NOTE — CASE MANAGEMENT/SOCIAL WORK
Continued Stay Note  [facility]     Patient Name: Leobardo Araujo  MRN: [id number]  Today's Date: 2/4/2022    Admit Date: 1/25/2022     Discharge Plan     Row Name 02/04/22 1146       Plan    Plan Placement    Plan Comments Cassie Gutierrez RN with [facility] spoke with his daughter Fadumo over the phone yesterday. Fadumo is needing long term placement and not just rehab to home. [facility] is still interested in receiving him; however, they need to wait until a long term care bed is opened to bring him in. Transportation is canceled for today.     In addition to [facility], Fadumo wishes a referral to [facility] ([place] and [place]), [facility], and  [facility]. Referrals are made. Tenisha Cerrato with [facility] tells me both of these facilities are on admissions hold, [facility] are unable to assist due to patient needing to wait for 20 days after first testing covid +. I am waiting to hear from [facility].      I reviewed with Fadumo, patient would need to be participating with therapy for insurance to pay at the initial entry into a nursing facility if going in under rehab. I asked rehab to see on Sunday. If not working with therapy, would be self pay initially in a facility. Fadumo tells me he is financially able to be self pay.     Case management will speak with Fadumo again on Monday with update. [facility] appearing to be the most favorable option at this time, but just waiting a bed.     Final Discharge Disposition Code 03 - skilled nursing facility (SNF)               Discharge Codes    No documentation.               Expected Discharge Date and Time     Expected Discharge Date Expected Discharge Time    Feb 4, 2022             Kriss Garcia RN

## 2022-02-04 NOTE — THERAPY DISCHARGE NOTE
Acute Care - Speech Language Pathology   Swallow Treatment Note [facility]     Patient Name: Leobardo Araujo  : 1934  MRN: [id number]  Today's Date: 2022               Admit Date: 2022    Visit Dx:     ICD-10-CM ICD-9-CM   1. Acute UTI  N39.0 599.0   2. Generalized weakness  R53.1 780.79   3. Altered mental status, unspecified altered mental status type  R41.82 780.97   4. COVID-19 virus infection  U07.1 079.89   5. Weight loss, unintentional  R63.4 783.21   6. Generalized muscle weakness  M62.81 728.87   7. Complicated UTI (urinary tract infection)  N39.0 599.0   8. Dysphagia, unspecified type  R13.10 787.20     Patient Active Problem List   Diagnosis   • Memory loss   • YSABEL (obstructive sleep apnea)   • Recurrent major depressive disorder (HCC)   • Fatigue   • History of aortic valve stenosis   • Erectile dysfunction   • Hypokalemia   • Metabolic encephalopathy   • Valvular heart disease   • Abnormal findings on diagnostic imaging of lung   • History of aortic valve replacement with bioprosthetic valve   • Type 2 diabetes mellitus with other circulatory complications (HCC)   • Peripheral vascular disease (HCC)   • Benign essential hypertension   • Paroxysmal atrial fibrillation (HCC)   • Hyperlipidemia LDL goal <100   • B12 deficiency   • Abnormal liver function   • Allergic rhinitis   • BPH with urinary obstruction   • Disorder of mitral and aortic valves   • Hypothyroidism   • Kidney stone   • Malignant tumor of urinary bladder (HCC)   • Neck pain   • Numbness   • Palpitations   • Polyp of colon   • Recurrent UTI (urinary tract infection)   • Medicare annual wellness visit, subsequent   • Multiple nevi   • Osteopenia of hip   • Iron deficiency anemia   • Unsteady gait   • Failure to thrive in adult   • Weight loss, unintentional   • Generalized muscle weakness   • Complicated UTI (urinary tract infection)   • Dementia without behavioral disturbance (HCC)     Past Medical History:   Diagnosis  Date   • Abnormal findings on diagnostic imaging of lung    • Arthritis    • Atrial fibrillation (HCC)     Atrial fibrillation, October 2010; CHADS2-VASc = 3: Amiodarone therapy, discontinued, November 2013. Elke Sultana left atrial appendage closure, Dr. Edson Roque, 07/23/2012.     • B12 deficiency    • Basal cell carcinoma    • Bladder cancer (HCC)    • Central sleep apnea in conditions classified elsewhere    • Delirium 2/13/2019   • Depression    • Diarrhea 4/24/2018   • History of anxiety    • History of aortic valve stenosis    • History of coronary atherosclerosis    • History of sexual dysfunction in male    • Hypercholesteremia    • Hyperlipidemia LDL goal <100    • Hypertension 1/6/2017   • Memory loss    • Nausea, vomiting, and diarrhea 4/24/2018   • Obstructive sleep apnea    • Peripheral vascular disease (HCC)    • Renal insufficiency 4/24/2018   • Sepsis (HCC)     from UTI   • Stroke (HCC)    • Testicular mass    • Tobacco abuse     Remote tobacco abuse.   • Type 2 diabetes mellitus with other circulatory complications (HCC)    • UTI (urinary tract infection) 04/2018   • Valvular heart disease 4/24/2018     Past Surgical History:   Procedure Laterality Date   • AORTIC VALVE REPAIR/REPLACEMENT  2012   • BLADDER RESECTION LAPAROSCOPIC  2000    Bladder cancer resection, 2000.   • CATARACT EXTRACTION     • CYSTOSCOPY W/ LITHOLAPAXY / EHL     • EPIDIDYMECTOMY Right    • KIDNEY SURGERY     • ORCHIECTOMY      Orchiectomy, benign.       SLP Recommendation and Plan  SLP Swallowing Diagnosis: suspected pharyngeal dysphagia (02/04/22 0900)  SLP Diet Recommendation: mechanical soft with no mixed consistencies, nectar thick liquids (02/04/22 0900)  Recommended Precautions and Strategies: general aspiration precautions (02/04/22 0900)  SLP Rec. for Method of Medication Administration: meds whole, with thick liquids, with pudding or applesauce, as tolerated (02/04/22 0900)     Monitor for Signs of Aspiration:  yes, notify SLP if any concerns (02/04/22 0900)     Swallow Criteria for Skilled Therapeutic Interventions Met: demonstrates skilled criteria (02/04/22 0900)  Anticipated Discharge Disposition (SLP): anticipate therapy at next level of care, skilled nursing facility (02/04/22 0900)  Rehab Potential/Prognosis, Swallowing: good, to achieve stated therapy goals (02/04/22 0900)           Daily Summary of Progress (SLP): prepare for discharge (02/04/22 0900)               Treatment Assessment (SLP): Patient tolerated soft solid and NT liquids via cup w/o s/s of aspiration. Throat clear with thin liquids via spoon 2/2 trials. Mech soft/no mixed consistencies and nectar-thick liquids cont to seem most comfortable and safest for pt. (02/04/22 0900)  Plan for Continued Treatment (SLP): treatment no longer indicated as all goals met (02/04/22 0900)                SWALLOW EVALUATION (last 72 hours)     SLP Adult Swallow Evaluation     Row Name 02/04/22 0900 02/03/22 1010 02/01/22 1350             Rehab Evaluation    Document Type therapy note (daily note)  -CH therapy note (daily note)  -AC evaluation  -CH      Subjective Information no complaints  -CH no complaints  -AC no complaints  -CH      Patient Observations lethargic  -CH lethargic  -AC lethargic; cooperative; agree to therapy  -CH      Patient/Family/Caregiver Comments/Observations No family present  -CH No family present  -AC No family present. Airborne precautions  -CH      Patient Effort fair  -CH poor  -AC adequate  -CH      Symptoms Noted During/After Treatment none  -CH -- none  -CH              General Information    Patient Profile Reviewed yes  -CH -- yes  -CH      Pertinent History Of Current Problem -- -- Patient admitted w UTI and Covid 19. Clinical swallow evaluation ordered d/t RN concerns that patient is not tolerating thin liquids.  -CH      Current Method of Nutrition -- -- regular textures; thin liquids  -CH      Precautions/Limitations, Hearing --  -- WFL; for purposes of eval  -      Prior Level of Function-Communication -- -- unknown  -      Prior Level of Function-Swallowing -- -- unknown  -      Plans/Goals Discussed with -- -- patient; agreed upon  -      Barriers to Rehab -- -- cognitive status  -      Patient's Goals for Discharge -- -- patient did not state  -              Pain    Additional Documentation Pain Scale: FACES Pre/Post-Treatment (Group)  - -- Pain Scale: FACES Pre/Post-Treatment (Group)  -              Pain Scale: Numbers Pre/Post-Treatment    Pretreatment Pain Rating -- -- 0/10 - no pain  -CH      Posttreatment Pain Rating -- -- 0/10 - no pain  -CH              Pain Scale: FACES Pre/Post-Treatment    Pain: FACES Scale, Pretreatment 0-->no hurt  -CH 0-->no hurt  -AC 0-->no hurt  -CH      Posttreatment Pain Rating 0-->no hurt  -CH 0-->no hurt  -AC 0-->no hurt  -CH              Oral Motor Structure and Function    Dentition Assessment -- -- natural, present and adequate  -      Secretion Management -- -- WNL/WFL  -      Mucosal Quality -- -- moist, healthy  -      Volitional Swallow -- -- weak  -              Oral Musculature and Cranial Nerve Assessment    Oral Motor General Assessment -- -- generalized oral motor weakness  -              General Eating/Swallowing Observations    Respiratory Support Currently in Use -- -- room air  -      Eating/Swallowing Skills -- -- fed by SLP  -      Positioning During Eating -- -- upright 90 degree; upright in chair; needs frequent re-positioning  -      Utensils Used -- -- spoon; cup; straw  -      Consistencies Trialed -- -- ice chips; thin liquids; pureed; soft textures; regular textures  -      Pre SpO2 (%) -- -- 97  -CH      Post SpO2 (%) -- -- 96  -CH              Respiratory    Respiratory Status -- -- WFL; room air  -              Clinical Swallow Eval    Oral Prep Phase -- -- WFL  -CH      Oral Transit -- -- WFL  -      Oral Residue -- -- WFL  -CH       Pharyngeal Phase -- -- suspected pharyngeal impairment  -CH      Esophageal Phase -- -- unremarkable  -      Clinical Swallow Evaluation Summary -- -- Patient given trials of thin liquids via ice, spoon, cup and straw, pureed, soft solids and regular solid trials. throat clear and delayed cough observed following cup and straw sips of thin liquids. No overt s/s of aspiration with Nectar thick liquids via cup or straw sips or with pureed or solid consistencies. Recommend: lvl IV, Nectar thick liquids, straw OK. Meds whole with thick liquids or pureed. [facility] to further assess.  -              Pharyngeal Phase Concerns    Pharyngeal Phase Concerns -- -- cough; throat clear  -CH      Cough -- -- thin  -CH      Throat Clear -- -- thin  -CH              SLP Evaluation Clinical Impression    SLP Swallowing Diagnosis suspected pharyngeal dysphagia  - -- suspected pharyngeal dysphagia  -      Functional Impact risk of aspiration/pneumonia  - -- risk of aspiration/pneumonia  -      Rehab Potential/Prognosis, Swallowing good, to achieve stated therapy goals  - -- good, to achieve stated therapy goals  -      Swallow Criteria for Skilled Therapeutic Interventions Met demonstrates skilled criteria  - -- demonstrates skilled criteria  -              SLP Treatment Clinical Impressions    Treatment Assessment (SLP) Patient tolerated soft solid and NT liquids via cup w/o s/s of aspiration. Throat clear with thin liquids via spoon 2/2 trials. [facility] soft/no mixed consistencies and nectar-thick liquids cont to seem most comfortable and safest for pt.  - Pt lethargic. Accepted a few PO trials. Coughing noted w/ thin via cup. No overt clinical s/sxs aspiration w/ thin via tsp, nectar via cup/straw, puree, or soft/ground solids. [facility] soft/no mixed consistencies and nectar-thick liquids cont to seem most comfortable and safest for pt per clinical assessment. May consider thin liquid via tsp, as pt desires/tolerates.  -  --      Daily Summary of Progress (SLP) prepare for discharge  - progress toward functional goals as expected  - --      Barriers to Overall Progress (SLP) Lethargy; Cognitive status  - Lethargy; Cognitive status  -AC --      Plan for Continued Treatment (SLP) treatment no longer indicated as all goals met  - continue treatment per plan of care  - --      Care Plan Review care plan/treatment goals reviewed  - evaluation/treatment results reviewed; care plan/treatment goals reviewed; risks/benefits reviewed; current/potential barriers reviewed; patient/other agree to care plan  - --              Recommendations    Predicted Duration Therapy Intervention (Days) -- -- until discharge  -      SLP Diet Recommendation mechanical soft with no mixed consistencies; nectar thick liquids  - -- mechanical soft with no mixed consistencies; nectar thick liquids  -      Recommended Diagnostics -- -- VFSS (MBS)  -      Recommended Precautions and Strategies general aspiration precautions  - -- general aspiration precautions  -      Oral Care Recommendations Oral Care BID/PRN; Swab  - -- Oral Care BID/PRN; Swab  -      SLP Rec. for Method of Medication Administration meds whole; with thick liquids; with pudding or applesauce; as tolerated  - -- meds whole; with thick liquids; with pudding or applesauce; as tolerated  -      Monitor for Signs of Aspiration yes; notify SLP if any concerns  - -- yes; notify SLP if any concerns  -      Anticipated Discharge Disposition (SLP) anticipate therapy at next level of care; skilled nursing facility  - anticipate therapy at next level of care; skilled nursing facility  - unknown; anticipate therapy at next level of care  -            User Key  (r) = Recorded By, (t) = Taken By, (c) = Cosigned By    Initials Name Effective Dates    AC Lynn Johnson MS CCC-SLP 06/16/21 -     CH Jane James MS CCC-SLP 06/16/21 -                 EDUCATION  The  patient has been educated in the following areas:   Dysphagia (Swallowing Impairment) Oral Care/Hydration Modified Diet Instruction.        SLP GOALS     Row Name 02/04/22 0900 02/03/22 1010          Oral Nutrition/Hydration Goal 1 (SLP)    Oral Nutrition/Hydration Goal 1, SLP LTG: Pt will tolerate soft/cohesive diet and nectar-thick liquid w/o s/sxs aspiration/distress 100% of the time w/ 1:1 assist.  -CH LTG: Pt will tolerate soft/cohesive diet and nectar-thick liquid w/o s/sxs aspiration/distress 100% of the time w/ 1:1 assist.  -AC     Time Frame (Oral Nutrition/Hydration Goal 1, SLP) by discharge  -CH by discharge  -AC     Barriers (Oral Nutrition/Hydration Goal 1, SLP) No s/s of aspiration or discomfort with soft solids or NT liquids via cup sips  -CH --     Progress/Outcomes (Oral Nutrition/Hydration Goal 1, SLP) goal met  -CH continuing progress toward goal  -AC            Oral Nutrition/Hydration Goal 2 (SLP)    Oral Nutrition/Hydration Goal 2, SLP Pt will tolerate trials of nectar-thick liquid and soft/cohesive solids w/o s/sxs aspiration/distress w/ 100% acc and 1:1 assist.  -CH Pt will tolerate trials of nectar-thick liquid and soft/cohesive solids w/o s/sxs aspiration/distress w/ 100% acc and 1:1 assist.  -AC     Time Frame (Oral Nutrition/Hydration Goal 2, SLP) short term goal (STG)  -CH short term goal (STG)  -AC     Barriers (Oral Nutrition/Hydration Goal 2, SLP) No s/s of aspiration or discomfort with soft solids or NT liquids via cup sips  -CH Increased oral prep and transit time w/ soft solid. Suspect lethargy & cog status affecting oral phase. No overt clinical s/sxs aspiration w/ nectar via cup/straw and small bites of crackers.  -AC     Progress/Outcomes (Oral Nutrition/Hydration Goal 2, SLP) goal met  -CH continuing progress toward goal  -AC            Oral Nutrition/Hydration Goal (SLP)    Oral Nutrition/Hydration Goal, SLP Pt will tolerate therapeutic trials of thin H2O w/o s/sxs aspiration  w/ 70% acc and 1:1 assist.  -CH Pt will tolerate therapeutic trials of thin H2O w/o s/sxs aspiration w/ 70% acc and 1:1 assist.  -AC     Time Frame (Oral Nutrition/Hydration Goal, SLP) short term goal (STG)  -CH short term goal (STG)  -AC     Barriers (Oral Nutrition/Hydration Goal, SLP) Coughing noted immediately w/ thin via cup sip. Throat clear  w/ thin via tsp.  -CH Coughing noted immediately w/ thin via cup sip. No overt clinical s/sxs aspiration w/ thin via tsp.  -AC     Progress/Outcomes (Oral Nutrition/Hydration Goal, SLP) other (see comments); unable to make needed progress  -CH continuing progress toward goal  -AC           User Key  (r) = Recorded By, (t) = Taken By, (c) = Cosigned By    Initials Name Provider Type    Lynn Wong MS CCC-SLP Speech and Language Pathologist    Jane Saldaña MS CCC-SLP Speech and Language Pathologist                   Time Calculation:    Time Calculation- SLP     Row Name 02/04/22 1026             Time Calculation- SLP    SLP Start Time 0900  -      SLP Received On 02/04/22  -CH              Untimed Charges    [id number] Treatment Swallow Minutes 30  -CH              Total Minutes    Untimed Charges Total Minutes 30  -CH       Total Minutes 30  -CH            User Key  (r) = Recorded By, (t) = Taken By, (c) = Cosigned By    Initials Name Provider Type    Jane Saldaña MS CCC-SLP Speech and Language Pathologist                Therapy Charges for Today     Code Description Service Date Service Provider Modifiers Qty    [id number]  ST TREATMENT SWALLOW 2 2/4/2022 Jane James MS CCC-SLP GN 1               Jane James MS CCC-SLP  2/4/2022     Patient was not wearing a face mask and did exhibit coughing during this therapy encounter.  Procedure performed was aerosolizing, involved close contact (within 6 feet for at least 15 minutes or longer), and did not involve contact with infectious secretions or specimens.  Therapist used appropriate  personal protective equipment including gloves, standard procedure mask, eye protection, gown and N95 mask.  Appropriate PPE was worn during the entire therapy session.  Hand hygiene was completed before and after therapy session.

## 2022-02-04 NOTE — DISCHARGE SUMMARY
[facility] Medicine Services  DISCHARGE SUMMARY    Patient Name: Leobardo Araujo  : 1934  MRN: [id number]    Date of Admission: 2022  4:05 PM  Date of Discharge:  2022  Primary Care Physician: Edy Goodman MD    Consults     Date and Time Order Name Status Description    2022  9:51 AM Inpatient Palliative Care MD Consult Completed     2022  2:14 PM Inpatient Neurology Consult General Completed     2022 12:23 AM Inpatient Infectious Diseases Consult Completed           Hospital Course     Presenting Problem:   Complicated UTI (urinary tract infection) [N39.0]    Active Hospital Problems    Diagnosis  POA   • **Complicated UTI (urinary tract infection) [N39.0]  Yes   • Dementia without behavioral disturbance (HCC) [F03.90]  Yes   • Hypothyroidism [E03.9]  Yes   • Malignant tumor of urinary bladder (HCC) [C67.9]  Yes   • 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          Hospital Course:  Leobardo Araujo is a 87 y.o. male with history of A. fib, basal cell carcinoma HTN, diabetes previous stroke, questionable bladder cancer admitted with increased confusion and weight loss.  Found to have UTI and treated with IV antibiotics, now on p.o. ampicillin per infectious disease. Patient had recently had a lot of medications stopped prior to admit. We have not restarted any of those at this time.      He continues to decline and now having difficulty with taking pills and swallowing, following commands but does not meet inpatient hospice criteria due to uncontrolled symptom management.  Previously had tried to get him either home with hospice or inpatient hospice but now plan is for patient to be discharged tomorrow to [facility].      Confusion, worsening dementia  -Fadumo, who is POA, agreeable to  palliative and hospice consult  -CODE STATUS changed to comfort measures   -Discussed with Dr. Grider and Dr. Stokes regarding family and goals of care  -Plan now is for rehab as patient does not meet criteria for inpatient hospice    Hypothyroidism  --TSH and free T4  normal  --Levothyroxine was discontinued (not sure by home)  -- repeat TSH in 4-6 weeks if family wants to reassess TSH and see if patient needs restarted back on synthroid       UTI, complicated   -ID following, continue ampicillin per ID through 2/8  Possible urinary bladder tumor  -Urine cultures positive for Enterococcus faecalis     Covid infection, asymptomatic  -Family refused remdesivir  -no treatment, can dc isolation on 2/4 (10 days)     Encephalopathy, dementia  -With intermittent worsening confusion continue Namenda and Aricept  -Evaluated by neurology as well, guarded prognosis due to advanced dementia        Diabetes, A1c 7.4%  -Continue SSI  -- will stop oral meds at dc due to increased oral intake  -- continue SSI as needed     PAF  -Eliquis was DC'd prior to admission, suspect due to patient's family not realizing that he was not taking it.   -Was listed as active med on December 2021 PCP note   -Plan for comfort measures at discharge, poor candidate for anticoagulation due to severe dementia and fall risk    Patient has remained clinically stable and will be discharged to facility today.  patient will need palliative and hospice to follow at facility to help with GOC.      Discharge Follow Up Recommendations for outpatient labs/diagnostics:   follow up with pcp after dc from rehab   Repeat TSH in 4-6 weeks     Day of Discharge     HPI:   Patient sleeping in NAD. Awakens when name called. No acute events overnight per nursing. Plan for discharge this am     Review of Systems  Unable to obtain     Vital Signs:   Temp:  [98.1 °F (36.7 °C)-99.1 °F (37.3 °C)] 99.1 °F (37.3 °C)  Heart Rate:  [] 98  Resp:  [16-22] 22  BP:  (105-132)/(65-92) 132/92      Physical Exam:  Constitutional: No acute distress, awake, alert  HENT: NCAT, mucous membranes moist  Respiratory: Clear to auscultation bilaterally, respiratory effort normal room air 93%  Cardiovascular: RRR, no murmurs, rubs, or gallops  Gastrointestinal: Positive bowel sounds, soft, nontender, nondistended  Musculoskeletal: No bilateral ankle edema  Psychiatric: calm  Neurologic: confused, disorientated at baseline  Skin: No rashes      Pertinent  and/or Most Recent Results     LAB RESULTS:      Lab 01/29/22  1552 01/28/22  0942   WBC 8.74 6.68   HEMOGLOBIN 10.5* 11.0*   HEMATOCRIT 32.1* 38.7   PLATELETS 254 244   NEUTROS ABS 6.47 4.09   IMMATURE GRANS (ABS) 0.02 0.02   LYMPHS ABS 1.22 1.54   MONOS ABS 0.98* 0.82   EOS ABS 0.03 0.17   MCV 81.3 94.4         Lab 01/30/22  1258 01/29/22  1552 01/28/22  0942   SODIUM  --  136 139   POTASSIUM  --  3.8 3.8   CHLORIDE  --  100 106   CO2  --  25.0 23.0   ANION GAP  --  11.0 10.0   BUN  --  12 11   CREATININE  --  0.63* 0.56*   GLUCOSE  --  146* 112*   CALCIUM  --  8.1* 8.4*   MAGNESIUM 2.0 1.6 1.7   PHOSPHORUS  --   --  2.7                         Brief Urine Lab Results  (Last result in the past 365 days)      Color   Clarity   Blood   Leuk Est   Nitrite   Protein   CREAT   Urine HCG        01/26/22 0347 Yellow   Turbid   Large (3+)   Large (3+)   Negative   30 mg/dL (1+)               Microbiology Results (last 10 days)     Procedure Component Value - Date/Time    Urine Culture - Urine, Urine, Catheter [[id number]]  (Abnormal)  (Susceptibility) Collected: 01/26/22 0347    Lab Status: Final result Specimen: Urine, Catheter Updated: 01/28/22 1252     Urine Culture >100,000 CFU/mL Enterococcus faecalis    Susceptibility      Enterococcus faecalis     FRENCH     Ampicillin Susceptible     Levofloxacin Resistant     Nitrofurantoin Susceptible     Tetracycline Resistant     Vancomycin Susceptible                         Urine Culture - Urine, Urine,  Catheter [[id number]]  (Abnormal)  (Susceptibility) Collected: 01/25/22 2032    Lab Status: Final result Specimen: Urine, Catheter Updated: 01/28/22 1252     Urine Culture >100,000 CFU/mL Enterococcus faecalis      >100,000 CFU/mL Mixed Trevon Isolated    Narrative:      Specimen contains mixed organisms of questionable pathogenicity which indicates contamination with commensal trevon.  Further identification is unlikely to provide clinically useful information.  Suggest recollection.    Susceptibility      Enterococcus faecalis     FRENCH     Ampicillin Susceptible     Levofloxacin Resistant     Nitrofurantoin Susceptible     Tetracycline Resistant     Vancomycin Susceptible                         COVID PRE-OP / PRE-PROCEDURE SCREENING ORDER (NO ISOLATION) - Swab, Nasopharynx [[id number]]  (Abnormal) Collected: 01/25/22 1825    Lab Status: Final result Specimen: Swab from Nasopharynx Updated: 01/25/22 1941    Narrative:      The following orders were created for panel order COVID PRE-OP / PRE-PROCEDURE SCREENING ORDER (NO ISOLATION) - Swab, Nasopharynx.  Procedure                               Abnormality         Status                     ---------                               -----------         ------                     COVID-19 and FLU A/B PCR...[[id number]]  Abnormal            Final result                 Please view results for these tests on the individual orders.    COVID-19 and FLU A/B PCR - Swab, Nasopharynx [[id number]]  (Abnormal) Collected: 01/25/22 1825    Lab Status: Final result Specimen: Swab from Nasopharynx Updated: 01/25/22 1941     COVID19 Detected     Influenza A PCR Not Detected     Influenza B PCR Not Detected    Narrative:      Fact sheet for providers: https://www.fda.gov/media/442630/download    Fact sheet for patients: https://www.fda.gov/media/338251/download    Test performed by PCR.  Influenza A and Influenza B negative results should be considered presumptive in samples that have a  positive SARS-CoV-2 result.    Competitive inhibition studies showed that SARS-CoV-2 virus, when present at concentrations above 3.6E+04 copies/mL, can inhibit the detection and amplification of influenza A and influenza B virus RNA if present at or below 1.8E+02 copies/mL or 4.9E+02 copies/mL, respectively, and may lead to false negative influenza virus results. If co-infection with influenza A or influenza B virus is suspected in samples with a positive SARS-CoV-2 result, the sample should be re-tested with another FDA cleared, approved, or authorized influenza test, if influenza virus detection would change clinical management.    Blood Culture - Blood, Arm, Right [[id number]]  (Normal) Collected: 01/25/22 1810    Lab Status: Final result Specimen: Blood from Arm, Right Updated: 01/30/22 1900     Blood Culture No growth at 5 days    Blood Culture - Blood, Arm, Left [[id number]]  (Normal) Collected: 01/25/22 1700    Lab Status: Final result Specimen: Blood from Arm, Left Updated: 01/30/22 1900     Blood Culture No growth at 5 days          CT Abdomen Pelvis Without Contrast    Result Date: 1/25/2022   DATE OF EXAM: 1/25/2022 5:17 PM  PROCEDURE: CT CHEST WO CONTRAST DIAGNOSTIC-, CT ABDOMEN PELVIS WO CONTRAST-  INDICATIONS: WEAK  COMPARISON: No Comparisons Available  TECHNIQUE: Routine transaxial slices were obtained through the chest, abdomen and pelvis without the administration of intravenous contrast. Reconstructed coronal and sagittal images were also obtained. Automated exposure control and iterative construction methods were used.  FINDINGS: CT chest: There are scattered areas of density some groundglass within the left upper and left lower lobe more peripherally. This could be seen with Covid pneumonia in the proper clinical setting. There are some additional somewhat nodular areas of density in the right middle lobe and right basilar area. In the right lower lobe is a 2.46 cm nodular like area. These findings  may be more inflammatory infectious as well. There are no pleural effusions. The patient had prior cardiothoracic surgery. 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 There is moderate to severe coronary artery calcification.  CT abdomen pelvis: There is no definite abnormality of the liver. There may be a tiny gallstone within the gallbladder. There is no inflammation around this area. There are splenic calcifications. The pancreas is grossly unremarkable. The left kidney does not appear unusual. There is a cyst within the upper pole of the left kidney measuring 2 cm.  There is a nonobstructing calculus within the midpole the right kidney measuring 9.1 mm. There is some pelvocaliectasis on this side with some haziness in the fat around the renal pelvis. There is a stone measuring 8.3 mm within the right renal pelvis as well as a 1.1 cm stone. The findings could reflect some inflammation secondary to obstructive changes. There some calculation along the margin of the right kidney with a more hypodense area in the lower pole that could relate to sequela of old subcapsular collection.  There are some colonic diverticula. An acute bowel abnormality is not appreciated.  There are bladder diverticulum some thickening of the bladder that may relate to more chronic bladder outlet issues. The prostate is enlarged.  Atherosclerotic changes are noted  There is a compression fracture of T12 with marked narrowing of the vertebra more centrally. It looks like the patient might have had vertebral augmentation. This was noted on x-rays of the lumbar spine from 2015. Additionally there is compression of the L1 vertebral body more inferior endplate which has been previously suggested. There is slight compression of the L3 vertebral body which is probably chronic as well. There additionally is a compression deformity of T5 that may be more chronic as well.        1.  There are some groundglass changes more peripherally in the left lung.  Sequela to Covid 19 pneumonia cannot be excluded. There are patchy somewhat nodular densities in the right middle lobe and right lower lobe that could reflect sequela to inflammatory infectious process within these areas. Follow-up would be recommended to document resolution. 2.  There is pelvocaliectasis on the right with stranding in the fat around the right renal pelvis that could be secondary to inflammation and could be a manifestation of some obstructive changes. There are stones within the right renal pelvis and a nonobstructing calculus within the right renal collecting system. 3.  There is calcification around a low attenuating area along the lower pole the right kidney that may relate to old subcapsular fluid collection. 4.  Left renal cyst. 5.  There is a tiny gallstone within the gallbladder. 6.  Thickening of the wall the bladder with bladder diverticula that could reflect sequela to more chronic bladder outlet issues. A cystitis could not be entirely excluded as there is some indistinctness to the fat planes around the bladder. 7.  Multiple compression fracture deformities in the thoracolumbar area which could be more chronic and should be correlated with clinical findings. 8.  Atherosclerotic changes are present.  This report was finalized on 1/25/2022 5:47 PM by Apolinar Fuentes MD.      CT Head Without Contrast    Result Date: 2/2/2022  EXAMINATION: CT HEAD WO CONTRAST-  INDICATION: Stroke follow-up; N39.0-Urinary tract infection, site not specified; R53.1-Weakness; R41.82-Altered mental status, unspecified; U07.1-COVID-19; R63.4-Abnormal weight loss; M62.81-Muscle weakness (generalized); N39.0-Urinary tract infection, site not specified  TECHNIQUE: Axial noncontrast CT of the head with multiplanar reconstruction  The radiation dose reduction device was turned on for each scan per the ALARA (As Low as Reasonably Achievable) protocol.  COMPARISON: 4/23/2018  FINDINGS: There is focal encephalomalacic  change in the right frontal lobe compatible with chronic appearing infarct, however new at least since 2018 comparison. There is otherwise no evidence of intracranial hemorrhage, mass or mass effect. There are redemonstrated advanced age-related changes with generalized volume loss and typical hypoattenuating white matter changes of chronic small vessel ischemia. There is associated ex vacuo prominence of the lateral ventricles. The orbits are unremarkable and the paranasal sinuses demonstrate some mucosal thickening and small amount of aerated secretions in the right maxillary sinus and sphenoid chambers.      Right frontal lobe volume loss compatible with chronic appearing infarct, however new since 2018 comparison. Advanced age-related changes are otherwise stable from prior, without acute intracranial abnormality.  Mucosal thickening and aerated secretions in the right maxillary sinus and sphenoid chambers. Correlate with any clinical evidence of sinusitis.   This report was finalized on 2/2/2022 8:37 AM by Hugo Wood.      CT Chest Without Contrast Diagnostic    Result Date: 1/25/2022   DATE OF EXAM: 1/25/2022 5:17 PM  PROCEDURE: CT CHEST WO CONTRAST DIAGNOSTIC-, CT ABDOMEN PELVIS WO CONTRAST-  INDICATIONS: WEAK  COMPARISON: No Comparisons Available  TECHNIQUE: Routine transaxial slices were obtained through the chest, abdomen and pelvis without the administration of intravenous contrast. Reconstructed coronal and sagittal images were also obtained. Automated exposure control and iterative construction methods were used.  FINDINGS: CT chest: There are scattered areas of density some groundglass within the left upper and left lower lobe more peripherally. This could be seen with Covid pneumonia in the proper clinical setting. There are some additional somewhat nodular areas of density in the right middle lobe and right basilar area. In the right lower lobe is a 2.46 cm nodular like area. These findings may  be more inflammatory infectious as well. There are no pleural effusions. The patient had prior cardiothoracic surgery. 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 There is moderate to severe coronary artery calcification.  CT abdomen pelvis: There is no definite abnormality of the liver. There may be a tiny gallstone within the gallbladder. There is no inflammation around this area. There are splenic calcifications. The pancreas is grossly unremarkable. The left kidney does not appear unusual. There is a cyst within the upper pole of the left kidney measuring 2 cm.  There is a nonobstructing calculus within the midpole the right kidney measuring 9.1 mm. There is some pelvocaliectasis on this side with some haziness in the fat around the renal pelvis. There is a stone measuring 8.3 mm within the right renal pelvis as well as a 1.1 cm stone. The findings could reflect some inflammation secondary to obstructive changes. There some calculation along the margin of the right kidney with a more hypodense area in the lower pole that could relate to sequela of old subcapsular collection.  There are some colonic diverticula. An acute bowel abnormality is not appreciated.  There are bladder diverticulum some thickening of the bladder that may relate to more chronic bladder outlet issues. The prostate is enlarged.  Atherosclerotic changes are noted  There is a compression fracture of T12 with marked narrowing of the vertebra more centrally. It looks like the patient might have had vertebral augmentation. This was noted on x-rays of the lumbar spine from 2015. Additionally there is compression of the L1 vertebral body more inferior endplate which has been previously suggested. There is slight compression of the L3 vertebral body which is probably chronic as well. There additionally is a compression deformity of T5 that may be more chronic as well.        1.  There are some groundglass changes more peripherally in the left lung. Sequela  to Covid 19 pneumonia cannot be excluded. There are patchy somewhat nodular densities in the right middle lobe and right lower lobe that could reflect sequela to inflammatory infectious process within these areas. Follow-up would be recommended to document resolution. 2.  There is pelvocaliectasis on the right with stranding in the fat around the right renal pelvis that could be secondary to inflammation and could be a manifestation of some obstructive changes. There are stones within the right renal pelvis and a nonobstructing calculus within the right renal collecting system. 3.  There is calcification around a low attenuating area along the lower pole the right kidney that may relate to old subcapsular fluid collection. 4.  Left renal cyst. 5.  There is a tiny gallstone within the gallbladder. 6.  Thickening of the wall the bladder with bladder diverticula that could reflect sequela to more chronic bladder outlet issues. A cystitis could not be entirely excluded as there is some indistinctness to the fat planes around the bladder. 7.  Multiple compression fracture deformities in the thoracolumbar area which could be more chronic and should be correlated with clinical findings. 8.  Atherosclerotic changes are present.  This report was finalized on 1/25/2022 5:47 PM by Apolinar Fuentes MD.                Results for orders placed during the hospital encounter of 05/06/21    Adult Transthoracic Echo Complete W/ Cont if Necessary Per Protocol    Interpretation Summary  · Estimated left ventricular EF = 70% Left ventricular systolic function is normal.  · Left ventricular wall thickness is consistent with concentric hypertrophy.  · Mild mitral valve stenosis is present.  · Mild dilation of the aortic root is present.  · Moderate pulmonic valve regurgitation is present.  · Estimated right ventricular systolic pressure from tricuspid regurgitation is normal (<35 mmHg).  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  · The  right atrial cavity is dilated.  · Moderate mitral valve regurgitation is present.  · Mild aortic valve stenosis is present.  · Left atrial volume is severely increased.  · There is a prosthetic valve in the aortic position mean gradient 14 mmHg with a peak of 24 consistent with borderline mild aortic stenosis  · There is an eccentric jet of MR which is at least moderate but very well may be moderate to severe.  · There is heavy calcification of the mitral valve leaflets with a mean gradient across the mitral valve 5 mmHg consistent with mild mitral stenosis      Plan for Follow-up of Pending Labs/Results: TSH in 4-6 weeks     Discharge Details        Discharge Medications      New Medications      Instructions Start Date   acetaminophen 325 MG tablet  Commonly known as: TYLENOL   650 mg, Oral, Every 4 Hours PRN      ampicillin 500 MG capsule  Commonly known as: PRINCIPEN   500 mg, Oral, Every 8 Hours Scheduled      insulin lispro 100 UNIT/ML injection  Commonly known as: humaLOG   0-7 Units, Subcutaneous, 3 Times Daily Before Meals      QUEtiapine 25 MG tablet  Commonly known as: SEROquel   12.5 mg, Oral, Every 12 Hours PRN      saccharomyces boulardii 250 MG capsule  Commonly known as: FLORASTOR   250 mg, Oral, 2 Times Daily         Continue These Medications      Instructions Start Date   aspirin 81 MG tablet   Oral, Daily      donepezil 10 MG tablet  Commonly known as: ARICEPT   TAKE 1 TABLET EVERY DAY      famotidine 20 MG tablet  Commonly known as: PEPCID   TAKE 1 TABLET TWICE DAILY      memantine 10 MG tablet  Commonly known as: NAMENDA   10 mg, Oral, 2 Times Daily      mirtazapine 30 MG tablet  Commonly known as: Remeron   30 mg, Oral, Nightly      tamsulosin 0.4 MG capsule 24 hr capsule  Commonly known as: FLOMAX   TAKE 1 CAPSULE EVERY DAY         Stop These Medications    amLODIPine 5 MG tablet  Commonly known as: NORVASC     apixaban 5 MG tablet tablet  Commonly known as: Eliquis     atorvastatin 40 MG  tablet  Commonly known as: LIPITOR     finasteride 1 MG tablet  Commonly known as: PROPECIA     levothyroxine 50 MCG tablet  Commonly known as: SYNTHROID, LEVOTHROID     losartan 100 MG tablet  Commonly known as: COZAAR     metFORMIN 500 MG tablet  Commonly known as: GLUCOPHAGE            No Known Allergies      Discharge Disposition:  Rehab Facility or Unit (DC - External)    Diet:  Hospital:  Diet Order   Procedures   • Diet Dysphagia; IV - Mechanical Soft No Mixed Consistencies; Nectar / Syrup Thick; Cardiac, Consistent Carbohydrate       Activity:  Activity Instructions     Activity as Tolerated      Measure Blood Pressure            Restrictions or Other Recommendations:         CODE STATUS:    Code Status and Medical Interventions:   Ordered at: 02/02/22 0951     Level Of Support Discussed With:    Health Care Surrogate     Code Status (Patient has no pulse and is not breathing):    No CPR (Do Not Attempt to Resuscitate)     Medical Interventions (Patient has pulse or is breathing):    Comfort Measures     Comments:    Fadumo ELA       Future Appointments   Date Time Provider Department Center   5/12/2022  2:30 PM Shelly Johnson PA-C [facility]   8/8/2022 12:45 PM Edy Goodman MD [facility]   10/12/2022  1:30 PM Linda Tom MD [facility]       Additional Instructions for the Follow-ups that You Need to Schedule     Ambulatory Referral to Home Health   As directed      Face to Face Visit Date: 1/28/2022    Follow-up provider for Plan of Care?: I treated the patient in an acute care facility and will not continue treatment after discharge.    Follow-up provider: EDY GOODMAN [[id number]]    Reason/Clinical Findings: atrial fibrillation, basal cell carcinoma, essential hypertension, depression, type II diabetes, coronary artery disease, dementia,  history of stroke, questionable bladder cancer who presented to [facility] ED on 1/25/2022 with complaints of increased confusio    Describe  mobility limitations that make leaving home difficult: impaired mobility, impaired ADLs, impaired gait/balance    Nursing/Therapeutic Services Requested: Skilled Nursing Physical Therapy Occupational Therapy    Skilled nursing orders: Medication education Cardiopulmonary assessments Neurovascular assessments    PT orders: Transfer training Strengthening Therapeutic exercise Gait Training Electrical stimulation Total joint pathway Home safety assessment    Weight Bearing Status: As Tolerated    Occupational orders: Activities of daily living Energy conservation Strengthening Cognition Home safety assessment         Discharge Follow-up with PCP   As directed       Currently Documented PCP:    Edy Goodman MD    PCP Phone Number:    [phone]     Follow Up Details: follow up with pcp after dc from rehab         Discharge Follow-up with Specified Provider: palliataive and hospice to follow at rehab to help with GOC   As directed      To: palliataive and hospice to follow at rehab to help with GOC                     Devika Cedeno, SOLOMON  02/04/22      Time Spent on Discharge:  I spent  35  minutes on this discharge activity which included: face-to-face encounter with the patient, reviewing the data in the system, coordination of the care with the nursing staff as well as consultants, documentation, and entering orders.

## 2022-02-04 NOTE — PLAN OF CARE
Goal Outcome Evaluation:  Plan of Care Reviewed With: patient        Progress: no change     Patient vital signs stable and on room air. Patient denies pain, nausea, and shortness of air. Patient appeared asleep and calm during the night. Fall and safety precautions maintained.

## 2022-02-05 LAB
GLUCOSE BLDC GLUCOMTR-MCNC: 133 MG/DL (ref 70–130)
GLUCOSE BLDC GLUCOMTR-MCNC: 158 MG/DL (ref 70–130)
GLUCOSE BLDC GLUCOMTR-MCNC: 254 MG/DL (ref 70–130)
GLUCOSE BLDC GLUCOMTR-MCNC: 298 MG/DL (ref 70–130)

## 2022-02-05 PROCEDURE — 99232 SBSQ HOSP IP/OBS MODERATE 35: CPT | Performed by: NURSE PRACTITIONER

## 2022-02-05 PROCEDURE — 82962 GLUCOSE BLOOD TEST: CPT

## 2022-02-05 PROCEDURE — 25010000002 HEPARIN (PORCINE) PER 1000 UNITS: Performed by: INTERNAL MEDICINE

## 2022-02-05 PROCEDURE — 63710000001 INSULIN LISPRO (HUMAN) PER 5 UNITS: Performed by: NURSE PRACTITIONER

## 2022-02-05 RX ADMIN — SENNOSIDES AND DOCUSATE SODIUM 2 TABLET: 50; 8.6 TABLET ORAL at 09:18

## 2022-02-05 RX ADMIN — MEMANTINE 10 MG: 10 TABLET ORAL at 21:09

## 2022-02-05 RX ADMIN — ASPIRIN 81 MG: 81 TABLET, COATED ORAL at 09:18

## 2022-02-05 RX ADMIN — Medication 250 MG: at 21:09

## 2022-02-05 RX ADMIN — FAMOTIDINE 20 MG: 20 TABLET, FILM COATED ORAL at 09:18

## 2022-02-05 RX ADMIN — DONEPEZIL HYDROCHLORIDE 10 MG: 10 TABLET, FILM COATED ORAL at 09:18

## 2022-02-05 RX ADMIN — HEPARIN SODIUM 5000 UNITS: 5000 INJECTION, SOLUTION INTRAVENOUS; SUBCUTANEOUS at 05:02

## 2022-02-05 RX ADMIN — HEPARIN SODIUM 5000 UNITS: 5000 INJECTION, SOLUTION INTRAVENOUS; SUBCUTANEOUS at 16:24

## 2022-02-05 RX ADMIN — AMPICILLIN 500 MG: 500 CAPSULE ORAL at 21:11

## 2022-02-05 RX ADMIN — MIRTAZAPINE 30 MG: 15 TABLET, FILM COATED ORAL at 21:09

## 2022-02-05 RX ADMIN — HEPARIN SODIUM 5000 UNITS: 5000 INJECTION, SOLUTION INTRAVENOUS; SUBCUTANEOUS at 21:08

## 2022-02-05 RX ADMIN — AMPICILLIN 500 MG: 500 CAPSULE ORAL at 05:02

## 2022-02-05 RX ADMIN — SENNOSIDES AND DOCUSATE SODIUM 2 TABLET: 50; 8.6 TABLET ORAL at 21:09

## 2022-02-05 RX ADMIN — AMPICILLIN 500 MG: 500 CAPSULE ORAL at 16:27

## 2022-02-05 RX ADMIN — Medication 250 MG: at 09:18

## 2022-02-05 RX ADMIN — FAMOTIDINE 20 MG: 20 TABLET, FILM COATED ORAL at 21:09

## 2022-02-05 RX ADMIN — MEMANTINE 10 MG: 10 TABLET ORAL at 09:18

## 2022-02-05 RX ADMIN — TAMSULOSIN HYDROCHLORIDE 0.4 MG: 0.4 CAPSULE ORAL at 09:18

## 2022-02-05 RX ADMIN — INSULIN LISPRO 4 UNITS: 100 INJECTION, SOLUTION INTRAVENOUS; SUBCUTANEOUS at 17:27

## 2022-02-05 NOTE — PROGRESS NOTES
1       Leobardo Araujo  1934  [id number]  2/5/2022    CC: Altered mental status.      Leobardo Araujo is a 87 y.o. male here for enterococcal UTI and positive COVID-19 nasopharyngeal PCR.      Past medical history:  Past Medical History:   Diagnosis Date   • Abnormal findings on diagnostic imaging of lung    • Arthritis    • Atrial fibrillation (HCC)     Atrial fibrillation, October 2010; CHADS2-VASc = 3: Amiodarone therapy, discontinued, November 2013. Elke Sultana left atrial appendage closure, Dr. Edson Roque, 07/23/2012.     • B12 deficiency    • Basal cell carcinoma    • Bladder cancer (HCC)    • Central sleep apnea in conditions classified elsewhere    • Delirium 2/13/2019   • Depression    • Diarrhea 4/24/2018   • History of anxiety    • History of aortic valve stenosis    • History of coronary atherosclerosis    • History of sexual dysfunction in male    • Hypercholesteremia    • Hyperlipidemia LDL goal <100    • Hypertension 1/6/2017   • Memory loss    • Nausea, vomiting, and diarrhea 4/24/2018   • Obstructive sleep apnea    • Peripheral vascular disease (HCC)    • Renal insufficiency 4/24/2018   • Sepsis (HCC)     from UTI   • Stroke (HCC)    • Testicular mass    • Tobacco abuse     Remote tobacco abuse.   • Type 2 diabetes mellitus with other circulatory complications (HCC)    • UTI (urinary tract infection) 04/2018   • Valvular heart disease 4/24/2018       Medications:   Current Facility-Administered Medications:   •  acetaminophen (TYLENOL) tablet 650 mg, 650 mg, Oral, Q4H PRN, 650 mg at 02/03/22 1004 **OR** acetaminophen (TYLENOL) 160 MG/5ML solution 650 mg, 650 mg, Oral, Q4H PRN **OR** acetaminophen (TYLENOL) suppository 650 mg, 650 mg, Rectal, Q4H PRN, Liss Bell APRN  •  ampicillin (PRINCIPEN) capsule 500 mg, 500 mg, Oral, Q8H, Leonardo Beard MD, 500 mg at 02/05/22 0502  •  aspirin EC tablet 81 mg, 81 mg, Oral, Daily, Liss Bell APRN, 81 mg at 02/05/22 0918  •   sennosides-docusate (PERICOLACE) 8.6-50 MG per tablet 2 tablet, 2 tablet, Oral, BID, 2 tablet at 02/05/22 0918 **AND** polyethylene glycol (MIRALAX) packet 17 g, 17 g, Oral, Daily PRN **AND** bisacodyl (DULCOLAX) EC tablet 5 mg, 5 mg, Oral, Daily PRN **AND** bisacodyl (DULCOLAX) suppository 10 mg, 10 mg, Rectal, Daily PRN, Liss Bell APRN  •  calcium carbonate (TUMS) chewable tablet 500 mg (200 mg elemental), 2 tablet, Oral, BID PRN, Liss Bell APRN  •  dextrose (D50W) (25 g/50 mL) IV injection 25 g, 25 g, Intravenous, Q15 Min PRN, Liss Bell APRN  •  dextrose (GLUTOSE) oral gel 15 g, 15 g, Oral, Q15 Min PRN, Liss Bell APRN  •  donepezil (ARICEPT) tablet 10 mg, 10 mg, Oral, Daily, Liss Bell APRN, 10 mg at 02/05/22 0918  •  famotidine (PEPCID) tablet 20 mg, 20 mg, Oral, BID, Liss Bell APRN, 20 mg at 02/05/22 0918  •  glucagon (human recombinant) (GLUCAGEN DIAGNOSTIC) injection 1 mg, 1 mg, Subcutaneous, Q15 Min PRN, Liss Bell APRN  •  heparin (porcine) 5000 UNIT/ML injection 5,000 Units, 5,000 Units, Subcutaneous, Q8H, Emperatriz Mcgill MD, 5,000 Units at 02/05/22 0502  •  insulin lispro (humaLOG) injection 0-7 Units, 0-7 Units, Subcutaneous, TID AC, Liss Bell APRN, 3 Units at 02/04/22 1744  •  LORazepam (ATIVAN) injection 0.5 mg, 0.5 mg, Intravenous, Q4H PRN, Anselmo Grider, DO  •  Magnesium Sulfate 2 gram Bolus, followed by 8 gram infusion (total Mg dose 10 grams)- Mg less than or equal to 1mg/dL, 2 g, Intravenous, PRN **OR** Magnesium Sulfate 2 gram / 50mL Infusion (GIVE X 3 BAGS TO EQUAL 6GM TOTAL DOSE) - Mg 1.1 - 1.5 mg/dl, 2 g, Intravenous, PRN **OR** Magnesium Sulfate 4 gram infusion- Mg 1.6-1.9 mg/dL, 4 g, Intravenous, PRN, Liss Bell, APRN, Last Rate: 25 mL/hr at 01/29/22 1716, 4 g at 01/29/22 1716  •  memantine (NAMENDA) tablet 10 mg, 10 mg, Oral, BID, Liss Bell, APRN, 10 mg at 02/05/22 0918  •  mirtazapine (REMERON) tablet 30 mg, 30 mg, Oral, Nightly,  Placido Stokes MD, 30 mg at 02/04/22 2202  •  morphine injection 2 mg, 2 mg, Intravenous, Q1H PRN, Anselmo Grider, DO  •  ondansetron (ZOFRAN) tablet 4 mg, 4 mg, Oral, Q6H PRN **OR** ondansetron (ZOFRAN) injection 4 mg, 4 mg, Intravenous, Q6H PRN, Liss Bell APRN  •  potassium chloride (MICRO-K) CR capsule 40 mEq, 40 mEq, Oral, PRN **OR** potassium chloride (KLOR-CON) packet 40 mEq, 40 mEq, Oral, PRN, 40 mEq at 01/26/22 1734 **OR** potassium chloride 10 mEq in 100 mL IVPB, 10 mEq, Intravenous, Q1H PRN, Liss Bell APRGUCCI  •  QUEtiapine (SEROquel) tablet 12.5 mg, 12.5 mg, Oral, Q12H PRN, Emperatriz Mcgill MD, 12.5 mg at 02/02/22 2239  •  saccharomyces boulardii (FLORASTOR) capsule 250 mg, 250 mg, Oral, BID, Leonardo Beard MD, 250 mg at 02/05/22 0918  •  sodium chloride 0.9 % flush 10 mL, 10 mL, Intravenous, Q12H, Liss Bell APRN, 10 mL at 02/03/22 0959  •  sodium chloride 0.9 % flush 10 mL, 10 mL, Intravenous, PRN, Liss Bell APRN  •  tamsulosin (FLOMAX) 24 hr capsule 0.4 mg, 0.4 mg, Oral, Daily, Liss Bell APRN, 0.4 mg at 02/05/22 0918  Antibiotics:  Anti-Infectives (From admission, onward)    Ordered     Dose/Rate Route Frequency Start Stop    02/04/22 0755  ampicillin (PRINCIPEN) 500 MG capsule        Ordering Provider: Devika Cedeno APRN    500 mg Oral Every 8 Hours Scheduled 02/04/22 0000 02/07/22 2739    01/31/22 1053  ampicillin (PRINCIPEN) capsule 500 mg        Ordering Provider: Leonardo Beard MD    500 mg Oral Every 8 Hours Scheduled 01/31/22 1400 02/07/22 1359    01/27/22 2130  vancomycin 1250 mg/250 mL 0.9% NS IVPB ([facility])        Ordering Provider: Sami Bloom RPH    1,250 mg  over 75 Minutes Intravenous Once 01/27/22 2200 01/28/22 0024    01/25/22 1858  vancomycin 1250 mg/250 mL 0.9% NS IVPB ([facility])        Ordering Provider: Jyoti Chapman RPH    20 mg/kg × 68.7 kg  over 90 Minutes Intravenous Once 01/25/22 1900 01/25/22 2158    01/25/22 1720  meropenem  (MERREM) 1 g/100 mL 0.9% NS ([facility])        Ordering Provider: Terri Noonan MD    1 g  over 30 Minutes Intravenous Once 01/25/22 1722 01/25/22 2013          Allergies:  has No Known Allergies.    Review of Systems: All other reviewed and negative except as per HPI    Blood pressure 123/78, pulse 88, temperature 98.2 °F (36.8 °C), temperature source Oral, resp. rate 16, SpO2 94 %.  GENERAL: Awake and alert, in no acute distress.  Elderly gentleman.  Confused and disoriented.  Not clinically toxic.  HEENT: Oropharynx without thrush. Sinuses nontender. No cervical adenopathy. No carotid bruits/ jugular venous distention.   EYES: PERRL. No conjunctival injection. No icterus. EOMI.  LYMPHATICS: No lymphadenopathy of the neck or axillary or inguinal regions.   HEART: No murmur, gallop, or pericardial friction rub.   LUNGS: Clear to auscultation anteriorly. No percussion dullness.   ABDOMEN: Soft, nontender, nondistended. No appreciable HSM.  No peritoneal findings rebound or guarding.  No suprapubic discomfort with deep palpation.  No CVA tenderness to punch.  SKIN: Warm and dry without cutaneous eruptions. No embolic stigmata.   PSYCHIATRIC: Baseline dementia/advanced..       DIAGNOSTICS:  Lab Results   Component Value Date    WBC 8.74 01/29/2022    HGB 10.5 (L) 01/29/2022    HCT 32.1 (L) 01/29/2022     01/29/2022     Lab Results   Component Value Date    CRP 1.49 (H) 01/27/2022     Lab Results   Component Value Date    SEDRATE 26 (H) 01/23/2020     Lab Results   Component Value Date    GLUCOSE 146 (H) 01/29/2022    BUN 12 01/29/2022    CREATININE 0.63 (L) 01/29/2022    EGFRIFNONA 120 01/29/2022    EGFRIFAFRI 108 01/17/2022    BCR 19.0 01/29/2022    CO2 25.0 01/29/2022    CALCIUM 8.1 (L) 01/29/2022    PROTENTOTREF 7.0 01/17/2022    ALBUMIN 3.10 (L) 01/27/2022    LABIL2 1.1 01/17/2022    AST 14 01/27/2022    ALT 8 01/27/2022       Microbiology: COVID-19 nasopharyngeal PCR positive on admission.  Influenza A/B-.   Admission blood cultures x2 -.  Urine culture with 10 to the fifth Enterococcus faecalis with preserved susceptibility to ampicillin.    RADIOLOGY:  Imaging Results (Last 72 Hours)     ** No results found for the last 72 hours. **          Assessment and Plan: Altered mental status.  Baseline dementia.  Polymicrobial UTI as outpatient.  10 to the fifth CFU of Enterococcus faecalis and urine cultures/ampicillin susceptible.  Maximum temperature over 24 hours 99.1.  Currently 98.2.  Pulse 88, respiratory 16, blood pressure 123/78 mmHg with an O2 saturation of 94% on room air.  Peripheral leukocyte count 8.7 with 74% segmented neutrophils.  Serum creatinine 0.63.  Continues on oral ampicillin and probiotics.  Anticipate continuation of ampicillin through 2/7.  I will see on rounds on Monday.  Uncertain what delay is in return to skilled nursing facility.      Leonardo Beard MD  2/5/2022

## 2022-02-05 NOTE — PROGRESS NOTES
[facility] Medicine Services  PROGRESS NOTE    Patient Name: Leobardo Araujo  : 1934  MRN: [id number]    Date of Admission: 2022  Primary Care Physician: Edy Goodman MD    Subjective   Subjective     CC:  Dementia, UTI    HPI:  Resting in bed, occasionally answers questions when asked.  No events reported overnight    ROS:  Unable to obtain    Objective   Objective     Vital Signs:   Temp:  [97.5 °F (36.4 °C)-99.1 °F (37.3 °C)] 98.2 °F (36.8 °C)  Heart Rate:  [80-94] 88  Resp:  [16-22] 16  BP: (110-123)/(69-79) 123/78     Physical Exam:  Constitutional: Awake, NAD resting in bed  HENT: NCAT, mucous membranes moist  Respiratory: Clear to auscultation bilaterally, nonlabored respirations   Cardiovascular: RRR, no murmurs, rubs, or gallops  Gastrointestinal: Positive bowel sounds, soft, nontender, nondistended  Musculoskeletal: No bilateral ankle edema  Psychiatric: Flat affect, cooperative  Neurologic: Diffuse, otherwise, no neuro focal deficit noted, speech clear  Skin: No rashes    Results Reviewed:  LAB RESULTS:      Lab 22  1552   WBC 8.74   HEMOGLOBIN 10.5*   HEMATOCRIT 32.1*   PLATELETS 254   NEUTROS ABS 6.47   IMMATURE GRANS (ABS) 0.02   LYMPHS ABS 1.22   MONOS ABS 0.98*   EOS ABS 0.03   MCV 81.3         Lab 22  1258 22  1552   SODIUM  --  136   POTASSIUM  --  3.8   CHLORIDE  --  100   CO2  --  25.0   ANION GAP  --  11.0   BUN  --  12   CREATININE  --  0.63*   GLUCOSE  --  146*   CALCIUM  --  8.1*   MAGNESIUM 2.0 1.6                         Brief Urine Lab Results  (Last result in the past 365 days)      Color   Clarity   Blood   Leuk Est   Nitrite   Protein   CREAT   Urine HCG        22 0347 Yellow   Turbid   Large (3+)   Large (3+)   Negative   30 mg/dL (1+)                 Microbiology Results Abnormal     Procedure Component Value - Date/Time    Blood Culture - Blood, Arm, Left [[id number]]  (Normal) Collected: 22 1700    Lab Status:  Final result Specimen: Blood from Arm, Left Updated: 01/30/22 1900     Blood Culture No growth at 5 days    Blood Culture - Blood, Arm, Right [[id number]]  (Normal) Collected: 01/25/22 1810    Lab Status: Final result Specimen: Blood from Arm, Right Updated: 01/30/22 1900     Blood Culture No growth at 5 days          No radiology results from the last 24 hrs    Results for orders placed during the hospital encounter of 05/06/21    Adult Transthoracic Echo Complete W/ Cont if Necessary Per Protocol    Interpretation Summary  · Estimated left ventricular EF = 70% Left ventricular systolic function is normal.  · Left ventricular wall thickness is consistent with concentric hypertrophy.  · Mild mitral valve stenosis is present.  · Mild dilation of the aortic root is present.  · Moderate pulmonic valve regurgitation is present.  · Estimated right ventricular systolic pressure from tricuspid regurgitation is normal (<35 mmHg).  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  · The right atrial cavity is dilated.  · Moderate mitral valve regurgitation is present.  · Mild aortic valve stenosis is present.  · Left atrial volume is severely increased.  · There is a prosthetic valve in the aortic position mean gradient 14 mmHg with a peak of 24 consistent with borderline mild aortic stenosis  · There is an eccentric jet of MR which is at least moderate but very well may be moderate to severe.  · There is heavy calcification of the mitral valve leaflets with a mean gradient across the mitral valve 5 mmHg consistent with mild mitral stenosis      I have reviewed the medications:  Scheduled Meds:ampicillin, 500 mg, Oral, Q8H  aspirin, 81 mg, Oral, Daily  donepezil, 10 mg, Oral, Daily  famotidine, 20 mg, Oral, BID  heparin (porcine), 5,000 Units, Subcutaneous, Q8H  insulin lispro, 0-7 Units, Subcutaneous, TID AC  memantine, 10 mg, Oral, BID  mirtazapine, 30 mg, Oral, Nightly  saccharomyces boulardii, 250 mg, Oral,  BID  senna-docusate sodium, 2 tablet, Oral, BID  sodium chloride, 10 mL, Intravenous, Q12H  tamsulosin, 0.4 mg, Oral, Daily      Continuous Infusions:   PRN Meds:.•  acetaminophen **OR** acetaminophen **OR** acetaminophen  •  senna-docusate sodium **AND** polyethylene glycol **AND** bisacodyl **AND** bisacodyl  •  calcium carbonate  •  dextrose  •  dextrose  •  glucagon (human recombinant)  •  LORazepam  •  magnesium sulfate **OR** magnesium sulfate **OR** magnesium sulfate  •  Morphine  •  ondansetron **OR** ondansetron  •  potassium chloride **OR** potassium chloride **OR** potassium chloride  •  QUEtiapine  •  sodium chloride    Assessment/Plan   Assessment & Plan     Active Hospital Problems    Diagnosis  POA   • **Complicated UTI (urinary tract infection) [N39.0]  Yes   • Dementia without behavioral disturbance (HCC) [F03.90]  Yes   • Hypothyroidism [E03.9]  Yes   • Malignant tumor of urinary bladder (HCC) [C67.9]  Yes   • 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     Brief Hospital Course to date:  Leobardo Araujo is a 87 y.o. male with history of A. fib, basal cell carcinoma HTN, diabetes previous stroke, questionable bladder cancer admitted with increased confusion and weight loss.  Found to have UTI and treated with IV antibiotics, now on p.o. ampicillin per infectious disease.  Marta, positive for Covid but asymptomatic.  Patient declined remdesivir.  Patient with dementia compounded by encephalopathy during hospitalization.  Neurology consulted recommended to continue Namenda and Aricept.    He continues to decline and now having difficulty with taking pills and swallowing, following commands but does not meet inpatient hospice criteria due to uncontrolled symptom management.  Previously had tried to get him either home with hospice or inpatient  hospice but now plan is for patient to be discharged tomorrow to [place] rehab.         Confusion, worsening dementia  -Fadumo, who is POA, agreeable to palliative consult  -CODE STATUS changed  -Discussed with Dr. Grider and Dr. Stokes regarding family and goals of care  -Initially, plan was for rehab; however, it is noted that the patient's daughter Fadumo needs long-term placement.  Still planning to go to [place] when a bed is open.      UTI, complicated   -ID following, continue ampicillin per ID through 2/8  Possible urinary bladder tumor  -Urine cultures positive for Enterococcus faecalis     Covid infection, asymptomatic  -Family refused remdesivir  -no treatment, can dc isolation on 2/4 (10 days)     Encephalopathy, dementia  -With intermittent worsening confusion continue Namenda and Aricept  -Evaluated by neurology as well, guarded prognosis due to advanced dementia    Diabetes, A1c 7.4%  -Continue SSI     PAF  -Eliquis was DC'd prior to admission, suspect due to patient's family not realizing that he was not taking it.    -Was listed as active med on December 2021 PCP note    -Plan for comfort measures at discharge, poor candidate for anticoagulation due to severe dementia and fall risk       DVT prophylaxis:  Medical DVT prophylaxis orders are present.       AM-PAC 6 Clicks Score (PT): 7 (02/03/22 2000)    Disposition: I expect the patient to be discharged to [place] rehab when a long-term bed becomes available.    CODE STATUS:   Code Status and Medical Interventions:   Ordered at: 02/02/22 0951     Level Of Support Discussed With:    Health Care Surrogate     Code Status (Patient has no pulse and is not breathing):    No CPR (Do Not Attempt to Resuscitate)     Medical Interventions (Patient has pulse or is breathing):    Comfort Measures     Comments:    Fadumo Veras, APRN  02/05/22

## 2022-02-05 NOTE — PLAN OF CARE
Problem: Adult Inpatient Plan of Care  Goal: Plan of Care Review  Outcome: Ongoing, Progressing  >: VSS. Disoriented x 4. Speech incomprehensible (baseline). RA.  >: Tolerated pills crushed and administered in applesauce.  >: Sleeping for most of shift.   >: Urine output adequate, but tea-colored and malodorous.  >: Essentially, no acute changes over the course of this shift. Belongings and call light at the bedside and within reach. Bed alarm active. Continuing to monitor.     Juana Diez RN  05:50 EST  02/05/22

## 2022-02-05 NOTE — PLAN OF CARE
Goal Outcome Evaluation:            Resting comfortably in bed, no complaints/visual cues of pain. Anticipating D/C to [place] tomorrow (2/6/2022) per APRN note. Will continue to monitor.

## 2022-02-06 LAB
GLUCOSE BLDC GLUCOMTR-MCNC: 157 MG/DL (ref 70–130)
GLUCOSE BLDC GLUCOMTR-MCNC: 164 MG/DL (ref 70–130)
GLUCOSE BLDC GLUCOMTR-MCNC: 264 MG/DL (ref 70–130)

## 2022-02-06 PROCEDURE — 97530 THERAPEUTIC ACTIVITIES: CPT

## 2022-02-06 PROCEDURE — 25010000002 HEPARIN (PORCINE) PER 1000 UNITS: Performed by: INTERNAL MEDICINE

## 2022-02-06 PROCEDURE — 82962 GLUCOSE BLOOD TEST: CPT

## 2022-02-06 PROCEDURE — 97535 SELF CARE MNGMENT TRAINING: CPT

## 2022-02-06 PROCEDURE — 63710000001 INSULIN LISPRO (HUMAN) PER 5 UNITS: Performed by: NURSE PRACTITIONER

## 2022-02-06 PROCEDURE — 97110 THERAPEUTIC EXERCISES: CPT

## 2022-02-06 PROCEDURE — 99232 SBSQ HOSP IP/OBS MODERATE 35: CPT | Performed by: INTERNAL MEDICINE

## 2022-02-06 RX ADMIN — INSULIN LISPRO 4 UNITS: 100 INJECTION, SOLUTION INTRAVENOUS; SUBCUTANEOUS at 12:33

## 2022-02-06 RX ADMIN — Medication 250 MG: at 22:04

## 2022-02-06 RX ADMIN — HEPARIN SODIUM 5000 UNITS: 5000 INJECTION, SOLUTION INTRAVENOUS; SUBCUTANEOUS at 05:04

## 2022-02-06 RX ADMIN — HEPARIN SODIUM 5000 UNITS: 5000 INJECTION, SOLUTION INTRAVENOUS; SUBCUTANEOUS at 14:30

## 2022-02-06 RX ADMIN — HEPARIN SODIUM 5000 UNITS: 5000 INJECTION, SOLUTION INTRAVENOUS; SUBCUTANEOUS at 22:04

## 2022-02-06 RX ADMIN — SENNOSIDES AND DOCUSATE SODIUM 2 TABLET: 50; 8.6 TABLET ORAL at 22:04

## 2022-02-06 RX ADMIN — AMPICILLIN 500 MG: 500 CAPSULE ORAL at 22:04

## 2022-02-06 RX ADMIN — FAMOTIDINE 20 MG: 20 TABLET, FILM COATED ORAL at 22:04

## 2022-02-06 RX ADMIN — MIRTAZAPINE 30 MG: 15 TABLET, FILM COATED ORAL at 22:04

## 2022-02-06 RX ADMIN — MEMANTINE 10 MG: 10 TABLET ORAL at 22:04

## 2022-02-06 RX ADMIN — INSULIN LISPRO 2 UNITS: 100 INJECTION, SOLUTION INTRAVENOUS; SUBCUTANEOUS at 16:52

## 2022-02-06 NOTE — THERAPY PROGRESS REPORT/RE-CERT
Patient Name: Leobardo Araujo  : 1934    MRN: [id number]                              Today's Date: 2022       Admit Date: 2022    Visit Dx:     ICD-10-CM ICD-9-CM   1. Acute UTI  N39.0 599.0   2. Generalized weakness  R53.1 780.79   3. Altered mental status, unspecified altered mental status type  R41.82 780.97   4. COVID-19 virus infection  U07.1 079.89   5. Weight loss, unintentional  R63.4 783.21   6. Generalized muscle weakness  M62.81 728.87   7. Complicated UTI (urinary tract infection)  N39.0 599.0   8. Dysphagia, unspecified type  R13.10 787.20     Patient Active Problem List   Diagnosis   • Memory loss   • YSABEL (obstructive sleep apnea)   • Recurrent major depressive disorder (HCC)   • Fatigue   • History of aortic valve stenosis   • Erectile dysfunction   • Hypokalemia   • Metabolic encephalopathy   • Valvular heart disease   • Abnormal findings on diagnostic imaging of lung   • History of aortic valve replacement with bioprosthetic valve   • Type 2 diabetes mellitus with other circulatory complications (HCC)   • Peripheral vascular disease (HCC)   • Benign essential hypertension   • Paroxysmal atrial fibrillation (HCC)   • Hyperlipidemia LDL goal <100   • B12 deficiency   • Abnormal liver function   • Allergic rhinitis   • BPH with urinary obstruction   • Disorder of mitral and aortic valves   • Hypothyroidism   • Kidney stone   • Malignant tumor of urinary bladder (HCC)   • Neck pain   • Numbness   • Palpitations   • Polyp of colon   • Recurrent UTI (urinary tract infection)   • Medicare annual wellness visit, subsequent   • Multiple nevi   • Osteopenia of hip   • Iron deficiency anemia   • Unsteady gait   • Failure to thrive in adult   • Weight loss, unintentional   • Generalized muscle weakness   • Complicated UTI (urinary tract infection)   • Dementia without behavioral disturbance (HCC)     Past Medical History:   Diagnosis Date   • Abnormal findings on diagnostic imaging of lung    •  Arthritis    • Atrial fibrillation (HCC)     Atrial fibrillation, October 2010; CHADS2-VASc = 3: Amiodarone therapy, discontinued, November 2013. Elke Sultana left atrial appendage closure, Dr. dEson Roque, 07/23/2012.     • B12 deficiency    • Basal cell carcinoma    • Bladder cancer (HCC)    • Central sleep apnea in conditions classified elsewhere    • Delirium 2/13/2019   • Depression    • Diarrhea 4/24/2018   • History of anxiety    • History of aortic valve stenosis    • History of coronary atherosclerosis    • History of sexual dysfunction in male    • Hypercholesteremia    • Hyperlipidemia LDL goal <100    • Hypertension 1/6/2017   • Memory loss    • Nausea, vomiting, and diarrhea 4/24/2018   • Obstructive sleep apnea    • Peripheral vascular disease (HCC)    • Renal insufficiency 4/24/2018   • Sepsis (HCC)     from UTI   • Stroke (HCC)    • Testicular mass    • Tobacco abuse     Remote tobacco abuse.   • Type 2 diabetes mellitus with other circulatory complications (HCC)    • UTI (urinary tract infection) 04/2018   • Valvular heart disease 4/24/2018     Past Surgical History:   Procedure Laterality Date   • AORTIC VALVE REPAIR/REPLACEMENT  2012   • BLADDER RESECTION LAPAROSCOPIC  2000    Bladder cancer resection, 2000.   • CATARACT EXTRACTION     • CYSTOSCOPY W/ LITHOLAPAXY / EHL     • EPIDIDYMECTOMY Right    • KIDNEY SURGERY     • ORCHIECTOMY      Orchiectomy, benign.      General Information     Row Name 02/06/22 134          OT Time and Intention    Document Type therapy note (daily note)  -MR     Mode of Treatment occupational therapy  -MR     Row Name 02/06/22 1912          General Information    Patient Profile Reviewed yes  -MR     Existing Precautions/Restrictions fall; other (see comments)  baseline dementia  -MR     Barriers to Rehab previous functional deficit; cognitive status  -MR     Row Name 02/06/22 134          Living Environment    Lives With alone  -MR     Row Name 02/06/22 8885           Cognition    Orientation Status (Cognition) oriented to; person  -MR     Row Name 02/06/22 1346          Safety Issues, Functional Mobility    Safety Issues Affecting Function (Mobility) ability to follow commands; awareness of need for assistance; friction/shear risk; insight into deficits/self-awareness; judgment; problem-solving; safety precaution awareness; safety precautions follow-through/compliance; sequencing abilities  -MR     Impairments Affecting Function (Mobility) balance; cognition; coordination; endurance/activity tolerance; motor planning; strength; postural/trunk control  -MR     Cognitive Impairments, Mobility Safety/Performance awareness, need for assistance; insight into deficits/self-awareness; safety precaution awareness; safety precaution follow-through; sequencing abilities  -MR           User Key  (r) = Recorded By, (t) = Taken By, (c) = Cosigned By    Initials Name Provider Type     Arleth Norton, OT Occupational Therapist                 Mobility/ADL's     Row Name 02/06/22 1347          Bed Mobility    Bed Mobility rolling left; rolling right; scooting/bridging  -MR     Rolling Left [place] (Bed Mobility) maximum assist (25% patient effort); 2 person assist; verbal cues; nonverbal cues (demo/gesture)  -MR     Rolling Right [place] (Bed Mobility) maximum assist (25% patient effort); 2 person assist; verbal cues; nonverbal cues (demo/gesture)  -MR     Scooting/Bridging [place] (Bed Mobility) dependent (less than 25% patient effort); 2 person assist; verbal cues; nonverbal cues (demo/gesture)  -MR     Comment (Bed Mobility) OOB/EOB deferred d/t cognitive status and alertness  -MR     Row Name 02/06/22 1347          Transfers    Comment (Transfers) OOB/EOB deferred d/t cognitive status and alertness  -MR     Row Name 02/06/22 1347          Activities of Daily Living    BADL Assessment/Intervention toileting; upper body dressing  -MR     Row Name 02/06/22 1341           Toileting Assessment/Training    [place] Level (Toileting) adjust/manage clothing; maximum assist (25% patient effort)  -MR     Assistive Devices (Toileting) other (see comments)  episode of incontinence in bed  -MR     Position (Toileting) supported standing  -MR     Row Name 02/06/22 1347          Upper Body Dressing Assessment/Training    [place] Level (Upper Body Dressing) doff; other (see comments); maximum assist (25% patient effort)  hospital gown  -MR     Position (Upper Body Dressing) supine  -MR           User Key  (r) = Recorded By, (t) = Taken By, (c) = Cosigned By    Initials Name Provider Type    Arleth Martel OT Occupational Therapist               Obj/Interventions     Row Name 02/06/22 1349          Shoulder (Therapeutic Exercise)    Shoulder (Therapeutic Exercise) AAROM (active assistive range of motion)  -MR     Shoulder AAROM (Therapeutic Exercise) bilateral; flexion; extension; aBduction; aDduction; external rotation; internal rotation; scapular protraction; scapular retraction; supine; 10 repetitions  -MR     Row Name 02/06/22 1349          Elbow/Forearm (Therapeutic Exercise)    Elbow/Forearm (Therapeutic Exercise) AAROM (active assistive range of motion)  -MR     Elbow/Forearm AROM (Therapeutic Exercise) bilateral; flexion; extension; 10 repetitions; supine  -MR     Row Name 02/06/22 1349          Motor Skills    Motor Skills therapeutic exercise  -MR     Row Name 02/06/22 1349          Balance    Comment, Balance deferred this date d/t cognition and alertness  -MR     Row Name 02/06/22 1349          Therapeutic Exercise    Therapeutic Exercise shoulder; elbow/forearm  -MR           User Key  (r) = Recorded By, (t) = Taken By, (c) = Cosigned By    Initials Name Provider Type    Arleth Martel OT Occupational Therapist               Goals/Plan     Row Name 02/06/22 1435          Transfer Goal 1 (OT)    Activity/Assistive Device (Transfer Goal 1, OT)  bed-to-chair/chair-to-bed; toilet; commode  -MR     [place] Level/Cues Needed (Transfer Goal 1, OT) moderate assist (50-74% patient effort)  -MR     Time Frame (Transfer Goal 1, OT) long term goal (LTG); 10 days  -MR     Progress/Outcome (Transfer Goal 1, OT) goal revised this date  -     Row Name 02/06/22 1434          Dressing Goal 1 (OT)    Activity/Device (Dressing Goal 1, OT) lower body dressing  don/doff socks w/ AAD  -MR     [place]/Cues Needed (Dressing Goal 1, OT) moderate assist (50-74% patient effort); verbal cues required  -MR     Time Frame (Dressing Goal 1, OT) long term goal (LTG); 10 days  -MR     Progress/Outcome (Dressing Goal 1, OT) goal ongoing  -     Row Name 02/06/22 1434          Toileting Goal 1 (OT)    Activity/Device (Toileting Goal 1, OT) adjust/manage clothing; perform perineal hygiene; commode; grab bar/safety frame  -MR     [place] Level/Cues Needed (Toileting Goal 1, OT) contact guard assist  -MR     Time Frame (Toileting Goal 1, OT) long term goal (LTG); 10 days  -MR     Progress/Outcome (Toileting Goal 1, OT) goal ongoing  -     Row Name 02/06/22 1434          Grooming Goal 1 (OT)    Activity/Device (Grooming Goal 1, OT) hair care; oral care; wash face, hands  -MR     [place] (Grooming Goal 1, OT) set-up required; verbal cues required  -MR     Time Frame (Grooming Goal 1, OT) long term goal (LTG); 10 days  -MR     Progress/Outcome (Grooming Goal 1, OT) goal ongoing  -     Row Name 02/06/22 1434          Therapy Assessment/Plan (OT)    Planned Therapy Interventions (OT) activity tolerance training; adaptive equipment training; BADL retraining; IADL retraining; occupation/activity based interventions; patient/caregiver education/training; transfer/mobility retraining; wheelchair assessment/training; strengthening exercise; functional balance retraining  -MR           User Key  (r) = Recorded By, (t) = Taken By, (c) = Cosigned By    Initials Name Provider  Type    Arleth Martel, OT Occupational Therapist               Clinical Impression     Row Name 02/06/22 1350          Pain Assessment    Additional Documentation Pain Scale: Numbers Pre/Post-Treatment (Group)  -     Row Name 02/06/22 1350          Pain Scale: Numbers Pre/Post-Treatment    Pretreatment Pain Rating 0/10 - no pain  -MR     Posttreatment Pain Rating 0/10 - no pain  -MR     Pre/Posttreatment Pain Comment Denied pain pre and post session  -MR     Pain Intervention(s) Ambulation/increased activity; Repositioned  -     Row Name 02/06/22 1350          Plan of Care Review    Plan of Care Reviewed With patient  -MR     Progress no change  -MR     Outcome Summary Pt extremely lethargic, minimal time spent w/ eyes open. OOB/EOB deferred d/t lethargy and alertness, DEP for ADLs, MaxA x 2 for rolling L and R, DEP x 2 to pull pt up in bed. Continue per current POC. Recommendation upon d/c for SNF.  -     Row Name 02/06/22 1350          Therapy Plan Review/Discharge Plan (OT)    Anticipated Discharge Disposition (OT) skilled nursing facility  -MR     Row Name 02/06/22 1350          Vital Signs    Pre Systolic BP Rehab 112  -MR     Pre Treatment Diastolic BP 75  -MR     Pretreatment Heart Rate (beats/min) 95  -MR     Posttreatment Heart Rate (beats/min) 102  -MR     Pre SpO2 (%) 97  -MR     O2 Delivery Pre Treatment room air  -MR     Intra SpO2 (%) 95  -MR     O2 Delivery Intra Treatment room air  -MR     Post SpO2 (%) 97  -MR     O2 Delivery Post Treatment room air  -MR     Pre Patient Position Supine  -MR     Intra Patient Position Side Lying  -MR     Post Patient Position Supine  -MR     Row Name 02/06/22 1350          Positioning and Restraints    Pre-Treatment Position in bed  -MR     Post Treatment Position bed  -MR     In Bed supine; with PT  -MR           User Key  (r) = Recorded By, (t) = Taken By, (c) = Cosigned By    Initials Name Provider Type     Arleth Norton, OT Occupational Therapist                Outcome Measures     Row Name 02/06/22 1353          How much help from another is currently needed...    Putting on and taking off regular lower body clothing? 1  -MR     Bathing (including washing, rinsing, and drying) 2  -MR     Toileting (which includes using toilet bed pan or urinal) 1  -MR     Putting on and taking off regular upper body clothing 2  -MR     Taking care of personal grooming (such as brushing teeth) 2  -MR     Eating meals 2  -MR     AM-PAC 6 Clicks Score (OT) 10  -MR     Row Name 02/06/22 1356          Functional Assessment    Outcome Measure Options AM-PAC 6 Clicks Daily Activity (OT)  -MR           User Key  (r) = Recorded By, (t) = Taken By, (c) = Cosigned By    Initials Name Provider Type    Arleth Martel, OT Occupational Therapist                Occupational Therapy Education                 Title: PT OT SLP Therapies (In Progress)     Topic: Occupational Therapy (In Progress)     Point: ADL training (In Progress)     Description:   Instruct learner(s) on proper safety adaptation and remediation techniques during self care or transfers.   Instruct in proper use of assistive devices.              Learning Progress Summary           Patient Acceptance, E, NR by MR at 2/6/2022 1433    Acceptance, E, NR by MA at 2/1/2022 0827    Acceptance, E, VU by MA at 1/27/2022 1348                   Point: Home exercise program (In Progress)     Description:   Instruct learner(s) on appropriate technique for monitoring, assisting and/or progressing therapeutic exercises/activities.              Learning Progress Summary           Patient Acceptance, E, NR by MR at 2/6/2022 1433    Acceptance, E, VU by MA at 1/27/2022 1348                   Point: Precautions (In Progress)     Description:   Instruct learner(s) on prescribed precautions during self-care and functional transfers.              Learning Progress Summary           Patient Acceptance, E, NR by MR at 2/6/2022 1433    Acceptance,  E, NR by MA at 2/1/2022 0827    Acceptance, E, VU by MA at 1/27/2022 1348                   Point: Body mechanics (In Progress)     Description:   Instruct learner(s) on proper positioning and spine alignment during self-care, functional mobility activities and/or exercises.              Learning Progress Summary           Patient Acceptance, E, NR by  at 2/6/2022 1433    Acceptance, E, NR by MA at 2/1/2022 0827    Acceptance, E, VU by MA at 1/27/2022 1348                               User Key     Initials Effective Dates Name Provider Type Discipline    MA 11/19/20 -  Char Parrish, OT Occupational Therapist OT     10/06/21 -  Arleth Norton, MELONY Occupational Therapist OT              OT Recommendation and Plan  Planned Therapy Interventions (OT): activity tolerance training, adaptive equipment training, BADL retraining, IADL retraining, occupation/activity based interventions, patient/caregiver education/training, transfer/mobility retraining, wheelchair assessment/training, strengthening exercise, functional balance retraining  Plan of Care Review  Plan of Care Reviewed With: patient  Progress: no change  Outcome Summary: Pt extremely lethargic, minimal time spent w/ eyes open. OOB/EOB deferred d/t lethargy and alertness, DEP for ADLs, MaxA x 2 for rolling L and R, DEP x 2 to pull pt up in bed. Continue per current POC. Recommendation upon d/c for SNF.     Time Calculation:    Time Calculation- OT     Row Name 02/06/22 1433             Time Calculation- OT    OT Start Time 1312  -MR      OT Received On 02/06/22  -      OT Goal Re-Cert Due Date 02/16/22  -MR              Timed Charges    11791 - OT Therapeutic Exercise Minutes 15  -MR      32211 - OT Self Care/Mgmt Minutes 15  -MR              Total Minutes    Timed Charges Total Minutes 30  -MR       Total Minutes 30  -MR            User Key  (r) = Recorded By, (t) = Taken By, (c) = Cosigned By    Initials Name Provider Type    Arleth Martel, OT  Occupational Therapist              Therapy Charges for Today     Code Description Service Date Service Provider Modifiers Qty    72768600876 HC OT THER PROC EA 15 MIN 2/6/2022 Trent Norton OT GO 1    34984763773 HC OT SELF CARE/MGMT/TRAIN EA 15 MIN 2/6/2022 Trent Norton OT GO 1               TRENT NORTON OT  2/6/2022

## 2022-02-06 NOTE — PLAN OF CARE
Problem: Adult Inpatient Plan of Care  Goal: Plan of Care Review  Outcome: Ongoing, Progressing  >: VSS. Disoriented x 4. Speech incomprehensible (baseline). RA.  >: Tolerated pills crushed and administered with success.  >: Sleeping for most of shift.   >: Urine output adequate, but tea-colored and malodorous.  >: Essentially, no acute changes over the course of this shift. Belongings and call light at the bedside and within reach. Bed alarm active. Continuing to monitor.     Juana Diez RN  07:03 EST  02/06/22

## 2022-02-06 NOTE — PROGRESS NOTES
[facility] Medicine Services  PROGRESS NOTE    Patient Name: Leobardo Araujo  : 1934  MRN: [id number]    Date of Admission: 2022  Primary Care Physician: Edy Goodman MD    Subjective   Subjective     CC:  Dementia, UTI    HPI:  Resting in bed, did not wake. No nursing concerns     ROS:  Unable to obtain    Objective   Objective     Vital Signs:   Temp:  [98.2 °F (36.8 °C)-98.8 °F (37.1 °C)] 98.8 °F (37.1 °C)  Heart Rate:  [] 89  Resp:  [16-20] 16  BP: (107-123)/(66-95) 123/95     Physical Exam:  Constitutional: Awake, NAD resting in bed  HENT: NCAT, mucous membranes moist  Respiratory: Clear to auscultation bilaterally, nonlabored respirations   Cardiovascular: RRR, no murmurs, rubs, or gallops  Gastrointestinal: Positive bowel sounds, soft, nontender, nondistended  Musculoskeletal: No bilateral ankle edema  Psychiatric: Flat affect, cooperative  Neurologic: Diffuse, otherwise, no neuro focal deficit noted, speech clear  Skin: No rashes    Results Reviewed:  LAB RESULTS:          Lab 22  1258   MAGNESIUM 2.0                         Brief Urine Lab Results  (Last result in the past 365 days)      Color   Clarity   Blood   Leuk Est   Nitrite   Protein   CREAT   Urine HCG        22 0347 Yellow   Turbid   Large (3+)   Large (3+)   Negative   30 mg/dL (1+)                 Microbiology Results Abnormal     Procedure Component Value - Date/Time    Blood Culture - Blood, Arm, Left [[id number]]  (Normal) Collected: 22 1700    Lab Status: Final result Specimen: Blood from Arm, Left Updated: 22     Blood Culture No growth at 5 days    Blood Culture - Blood, Arm, Right [[id number]]  (Normal) Collected: 22 1810    Lab Status: Final result Specimen: Blood from Arm, Right Updated: 22     Blood Culture No growth at 5 days          No radiology results from the last 24 hrs    Results for orders placed during the hospital encounter of  05/06/21    Adult Transthoracic Echo Complete W/ Cont if Necessary Per Protocol    Interpretation Summary  · Estimated left ventricular EF = 70% Left ventricular systolic function is normal.  · Left ventricular wall thickness is consistent with concentric hypertrophy.  · Mild mitral valve stenosis is present.  · Mild dilation of the aortic root is present.  · Moderate pulmonic valve regurgitation is present.  · Estimated right ventricular systolic pressure from tricuspid regurgitation is normal (<35 mmHg).  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  · The right atrial cavity is dilated.  · Moderate mitral valve regurgitation is present.  · Mild aortic valve stenosis is present.  · Left atrial volume is severely increased.  · There is a prosthetic valve in the aortic position mean gradient 14 mmHg with a peak of 24 consistent with borderline mild aortic stenosis  · There is an eccentric jet of MR which is at least moderate but very well may be moderate to severe.  · There is heavy calcification of the mitral valve leaflets with a mean gradient across the mitral valve 5 mmHg consistent with mild mitral stenosis      I have reviewed the medications:  Scheduled Meds:ampicillin, 500 mg, Oral, Q8H  aspirin, 81 mg, Oral, Daily  donepezil, 10 mg, Oral, Daily  famotidine, 20 mg, Oral, BID  heparin (porcine), 5,000 Units, Subcutaneous, Q8H  insulin lispro, 0-7 Units, Subcutaneous, TID AC  memantine, 10 mg, Oral, BID  mirtazapine, 30 mg, Oral, Nightly  saccharomyces boulardii, 250 mg, Oral, BID  senna-docusate sodium, 2 tablet, Oral, BID  sodium chloride, 10 mL, Intravenous, Q12H  tamsulosin, 0.4 mg, Oral, Daily      Continuous Infusions:   PRN Meds:.•  acetaminophen **OR** acetaminophen **OR** acetaminophen  •  senna-docusate sodium **AND** polyethylene glycol **AND** bisacodyl **AND** bisacodyl  •  calcium carbonate  •  dextrose  •  dextrose  •  glucagon (human recombinant)  •  LORazepam  •  magnesium sulfate **OR**  magnesium sulfate **OR** magnesium sulfate  •  Morphine  •  ondansetron **OR** ondansetron  •  potassium chloride **OR** potassium chloride **OR** potassium chloride  •  QUEtiapine  •  sodium chloride    Assessment/Plan   Assessment & Plan     Active Hospital Problems    Diagnosis  POA   • **Complicated UTI (urinary tract infection) [N39.0]  Yes   • Dementia without behavioral disturbance (HCC) [F03.90]  Yes   • Hypothyroidism [E03.9]  Yes   • Malignant tumor of urinary bladder (HCC) [C67.9]  Yes   • 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     Brief Hospital Course to date:  Leobardo Araujo is a 87 y.o. male with history of A. fib, basal cell carcinoma HTN, diabetes previous stroke, questionable bladder cancer admitted with increased confusion and weight loss.  Found to have UTI and treated with IV antibiotics, now on p.o. ampicillin per infectious disease.  Marta, positive for Covid but asymptomatic.  Patient declined remdesivir.  Patient with dementia compounded by encephalopathy during hospitalization.  Neurology consulted recommended to continue Namenda and Aricept.    He continues to decline and now having difficulty with taking pills and swallowing, following commands but does not meet inpatient hospice criteria due to uncontrolled symptom management.  Previously had tried to get him either home with hospice or inpatient hospice but now plan is for patient to be discharged tomorrow to [facility] rehab.    This patient's problems and plans were partially entered by my partner and updated as appropriate by me 02/06/22.         Confusion, worsening dementia  -Fadumo, who is POA, agreeable to palliative consult  -CODE STATUS changed  -My partner discussed with Dr. Grider and Dr. Stokes regarding family and goals of care  -Initially, plan was for rehab;  however, it is noted that the patient's daughter Fadumo needs long-term placement.  Still planning to go to [facility] when a bed is open.      UTI, complicated   -ID following, continue ampicillin per ID through 2/8  Possible urinary bladder tumor  -Urine cultures positive for Enterococcus faecalis     Covid infection, asymptomatic  -Family refused remdesivir  -no treatment, can dc isolation on 2/4 (10 days)     Encephalopathy, dementia  -With intermittent worsening confusion continue Namenda and Aricept  -Evaluated by neurology as well, guarded prognosis due to advanced dementia    Diabetes, A1c 7.4%  -Continue SSI     PAF  -Eliquis was DC'd prior to admission, suspect due to patient's family not realizing that he was not taking it.    -Was listed as active med on December 2021 PCP note    -Plan for comfort measures at discharge, poor candidate for anticoagulation due to severe dementia and fall risk       DVT prophylaxis:  Medical DVT prophylaxis orders are present.       AM-PAC 6 Clicks Score (PT): 7 (02/05/22 0814)    Disposition: I expect the patient to be discharged to [facility] rehab when a long-term bed becomes available.    CODE STATUS:   Code Status and Medical Interventions:   Ordered at: 02/02/22 0951     Level Of Support Discussed With:    Health Care Surrogate     Code Status (Patient has no pulse and is not breathing):    No CPR (Do Not Attempt to Resuscitate)     Medical Interventions (Patient has pulse or is breathing):    Comfort Measures     Comments:    Fadumo Acuña MD  02/06/22

## 2022-02-06 NOTE — THERAPY PROGRESS REPORT/RE-CERT
Patient Name: Leobardo Araujo  : 1934    MRN: [id number]                              Today's Date: 2022       Admit Date: 2022    Visit Dx:     ICD-10-CM ICD-9-CM   1. Acute UTI  N39.0 599.0   2. Generalized weakness  R53.1 780.79   3. Altered mental status, unspecified altered mental status type  R41.82 780.97   4. COVID-19 virus infection  U07.1 079.89   5. Weight loss, unintentional  R63.4 783.21   6. Generalized muscle weakness  M62.81 728.87   7. Complicated UTI (urinary tract infection)  N39.0 599.0   8. Dysphagia, unspecified type  R13.10 787.20     Patient Active Problem List   Diagnosis   • Memory loss   • YSABEL (obstructive sleep apnea)   • Recurrent major depressive disorder (HCC)   • Fatigue   • History of aortic valve stenosis   • Erectile dysfunction   • Hypokalemia   • Metabolic encephalopathy   • Valvular heart disease   • Abnormal findings on diagnostic imaging of lung   • History of aortic valve replacement with bioprosthetic valve   • Type 2 diabetes mellitus with other circulatory complications (HCC)   • Peripheral vascular disease (HCC)   • Benign essential hypertension   • Paroxysmal atrial fibrillation (HCC)   • Hyperlipidemia LDL goal <100   • B12 deficiency   • Abnormal liver function   • Allergic rhinitis   • BPH with urinary obstruction   • Disorder of mitral and aortic valves   • Hypothyroidism   • Kidney stone   • Malignant tumor of urinary bladder (HCC)   • Neck pain   • Numbness   • Palpitations   • Polyp of colon   • Recurrent UTI (urinary tract infection)   • Medicare annual wellness visit, subsequent   • Multiple nevi   • Osteopenia of hip   • Iron deficiency anemia   • Unsteady gait   • Failure to thrive in adult   • Weight loss, unintentional   • Generalized muscle weakness   • Complicated UTI (urinary tract infection)   • Dementia without behavioral disturbance (HCC)     Past Medical History:   Diagnosis Date   • Abnormal findings on diagnostic imaging of lung    •  Arthritis    • Atrial fibrillation (HCC)     Atrial fibrillation, October 2010; CHADS2-VASc = 3: Amiodarone therapy, discontinued, November 2013. [facility] left atrial appendage closure, Dr. Edson Roque, 07/23/2012.     • B12 deficiency    • Basal cell carcinoma    • Bladder cancer (HCC)    • Central sleep apnea in conditions classified elsewhere    • Delirium 2/13/2019   • Depression    • Diarrhea 4/24/2018   • History of anxiety    • History of aortic valve stenosis    • History of coronary atherosclerosis    • History of sexual dysfunction in male    • Hypercholesteremia    • Hyperlipidemia LDL goal <100    • Hypertension 1/6/2017   • Memory loss    • Nausea, vomiting, and diarrhea 4/24/2018   • Obstructive sleep apnea    • Peripheral vascular disease (HCC)    • Renal insufficiency 4/24/2018   • Sepsis (HCC)     from UTI   • Stroke (HCC)    • Testicular mass    • Tobacco abuse     Remote tobacco abuse.   • Type 2 diabetes mellitus with other circulatory complications (HCC)    • UTI (urinary tract infection) 04/2018   • Valvular heart disease 4/24/2018     Past Surgical History:   Procedure Laterality Date   • AORTIC VALVE REPAIR/REPLACEMENT  2012   • BLADDER RESECTION LAPAROSCOPIC  2000    Bladder cancer resection, 2000.   • CATARACT EXTRACTION     • CYSTOSCOPY W/ LITHOLAPAXY / EHL     • EPIDIDYMECTOMY Right    • KIDNEY SURGERY     • ORCHIECTOMY      Orchiectomy, benign.      General Information     Row Name 02/06/22 7888          Physical Therapy Time and Intention    Document Type therapy note (daily note); progress note/recertification  -LS     Mode of Treatment physical therapy  -     Row Name 02/06/22 2621          General Information    Patient Profile Reviewed yes  -LS     Existing Precautions/Restrictions fall  baseline dementia  -LS     Row Name 02/06/22 6057          Cognition    Orientation Status (Cognition) oriented to; person  -LS           User Key  (r) = Recorded By, (t) = Taken By, (c)  = Cosigned By    Initials Name Provider Type    Va Layne, DORIAN Physical Therapist               Mobility     Row Name 02/06/22 1330          Bed Mobility    Bed Mobility rolling left; rolling right  -LS     Rolling Left [place] (Bed Mobility) verbal cues; maximum assist (25% patient effort); 2 person assist; nonverbal cues (demo/gesture)  -LS     Rolling Right [place] (Bed Mobility) verbal cues; maximum assist (25% patient effort); 2 person assist; nonverbal cues (demo/gesture)  -LS     Scooting/Bridging [place] (Bed Mobility) verbal cues; nonverbal cues (demo/gesture); dependent (less than 25% patient effort); 2 person assist  -LS     Comment (Bed Mobility) pt declined sitting EOB or getting OOB  -LS           User Key  (r) = Recorded By, (t) = Taken By, (c) = Cosigned By    Initials Name Provider Type    Va Layne PT Physical Therapist               Obj/Interventions     Row Name 02/06/22 1330          Hip (Therapeutic Exercise)    Hip (Therapeutic Exercise) AAROM (active assistive range of motion)  -     Hip AAROM (Therapeutic Exercise) right; left; flexion; extension; 10 repetitions  -     Row Name 02/06/22 1330          Knee (Therapeutic Exercise)    Knee (Therapeutic Exercise) AAROM (active assistive range of motion)  -     Knee AAROM (Therapeutic Exercise) right; left; flexion; extension; 10 repetitions  -     Row Name 02/06/22 1330          Ankle (Therapeutic Exercise)    Ankle (Therapeutic Exercise) AAROM (active assistive range of motion)  -     Ankle PROM (Therapeutic Exercise) right; left; dorsiflexion; plantarflexion; 10 repetitions  -           User Key  (r) = Recorded By, (t) = Taken By, (c) = Cosigned By    Initials Name Provider Type    Va Layne PT Physical Therapist               Goals/Plan     Row Name 02/06/22 1330          Bed Mobility Goal 1 (PT)    Activity/Assistive Device (Bed Mobility Goal 1, PT) sit to supine/supine to  sit  -LS     [place] Level/Cues Needed (Bed Mobility Goal 1, PT) maximum assist (25-49% patient effort)  -LS     Time Frame (Bed Mobility Goal 1, PT) long term goal (LTG); 10 days  -LS     Progress/Outcomes (Bed Mobility Goal 1, PT) goal revised this date; goal ongoing  -LS     Row Name 02/06/22 1330          Transfer Goal 1 (PT)    Activity/Assistive Device (Transfer Goal 1, PT) bed-to-chair/chair-to-bed  -LS     [place] Level/Cues Needed (Transfer Goal 1, PT) maximum assist (25-49% patient effort)  -LS     Time Frame (Transfer Goal 1, PT) long term goal (LTG); 10 days  -LS     Progress/Outcome (Transfer Goal 1, PT) goal revised this date; goal ongoing  -LS     Row Name 02/06/22 1330          Gait Training Goal 1 (PT)    Activity/Assistive Device (Gait Training Goal 1, PT) gait (walking locomotion); assistive device use  -LS     [place] Level (Gait Training Goal 1, PT) supervision required  -LS     Distance (Gait Training Goal 1, PT) 100  -LS     Time Frame (Gait Training Goal 1, PT) long term goal (LTG); 2 weeks  -LS     Progress/Outcome (Gait Training Goal 1, PT) goal not met; goal no longer appropriate  DISCONTINUE THIS GOAL  -LS     Row Name 02/06/22 2330          Patient Education Goal (PT)    Activity (Patient Education Goal, PT) Pt will sit EOB with min A for balance.  -LS     Time Frame (Patient Education Goal, PT) long term goal (LTG); 10 days  -LS     Progress/Outcome (Patient Education Goal, PT) goal ongoing  -LS           User Key  (r) = Recorded By, (t) = Taken By, (c) = Cosigned By    Initials Name Provider Type    LS Va Wasserman, PT Physical Therapist               Clinical Impression     Row Name 02/06/22 7804          Pain    Additional Documentation Pain Scale: Numbers Pre/Post-Treatment (Group)  -LS     Row Name 02/06/22 3080          Pain Scale: Numbers Pre/Post-Treatment    Pretreatment Pain Rating 0/10 - no pain  -LS     Posttreatment Pain Rating 0/10 - no pain  -LS      Row Name 02/06/22 1330          Plan of Care Review    Outcome Summary Pt needs significatnt assistance for bed mobility (rolling supine to R/L sidelying max A X 2 and dependent X 2 to scoot up in bed). He declined sitting EOB or getting OOB. He did participate in LE therex for AAROM. Goals reviewed and revised this date. Cont. P.T. per P.O.C.  -LS     Row Name 02/06/22 1330          Positioning and Restraints    Pre-Treatment Position in bed  -LS     Post Treatment Position bed  -LS     In Bed side lying left; exit alarm on; encouraged to call for assist; call light within reach  -LS           User Key  (r) = Recorded By, (t) = Taken By, (c) = Cosigned By    Initials Name Provider Type    Va Layne, PT Physical Therapist               Outcome Measures     Row Name 02/06/22 1330          How much help from another person do you currently need...    Turning from your back to your side while in flat bed without using bedrails? 2  -LS     Moving from lying on back to sitting on the side of a flat bed without bedrails? 1  -LS     Moving to and from a bed to a chair (including a wheelchair)? 1  -LS     Standing up from a chair using your arms (e.g., wheelchair, bedside chair)? 1  -LS     Climbing 3-5 steps with a railing? 1  -LS     To walk in hospital room? 1  -LS     AM-PAC 6 Clicks Score (PT) 7  -LS     Row Name 02/06/22 1353 02/06/22 1330       Functional Assessment    Outcome Measure Options AM-PAC 6 Clicks Daily Activity (OT)  -MR AM-PAC 6 Clicks Basic Mobility (PT)  -LS          User Key  (r) = Recorded By, (t) = Taken By, (c) = Cosigned By    Initials Name Provider Type    Va Layne, PT Physical Therapist    Arleth Martel, OT Occupational Therapist                             Physical Therapy Education                 Title: PT OT SLP Therapies (In Progress)     Topic: Physical Therapy (In Progress)     Point: Mobility training (In Progress)     Learning Progress Summary            Patient Acceptance, E, NR by LS at 2/6/2022 1330    Acceptance, E, NR by ML at 1/29/2022 1433    Acceptance, E, VU,NR by NS at 1/27/2022 1155                   Point: Home exercise program (In Progress)     Learning Progress Summary           Patient Acceptance, E, NR by ML at 1/29/2022 1433                   Point: Body mechanics (In Progress)     Learning Progress Summary           Patient Acceptance, E, NR by ML at 1/29/2022 1433    Acceptance, E, VU,NR by NS at 1/27/2022 1155                   Point: Precautions (In Progress)     Learning Progress Summary           Patient Acceptance, E, NR by ML at 1/29/2022 1433    Acceptance, E, VU,NR by NS at 1/27/2022 1155                               User Key     Initials Effective Dates Name Provider Type Discipline     06/16/21 -  Va Wasserman, PT Physical Therapist PT    NS 06/16/21 -  Stephanie Landis, PT Physical Therapist PT     04/22/21 -  Samanta Penaloza Physical Therapist PT              PT Recommendation and Plan  Planned Therapy Interventions (PT): balance training, bed mobility training, home exercise program, patient/family education, neuromuscular re-education, strengthening, transfer training  Plan of Care Reviewed With: patient  Progress: no change  Outcome Summary: Pt needs significatnt assistance for bed mobility (rolling supine to R/L sidelying max A X 2 and dependent X 2 to scoot up in bed). He declined sitting EOB or getting OOB. He did participate in LE [employer] for AAROM. Goals reviewed and revised this date. Cont. P.T. per P.O.C.     Time Calculation:    PT Charges     Row Name 02/06/22 1330             Time Calculation    Start Time 1330  -      PT Received On 02/06/22  -      PT Goal Re-Cert Due Date 02/16/22  -              Time Calculation- PT    Total Timed Code Minutes- PT 20 minute(s)  -LS              Timed Charges    50976 - PT Therapeutic Exercise Minutes 5  -LS      20129 - PT Therapeutic Activity Minutes 15  -LS               Total Minutes    Timed Charges Total Minutes 20  -LS       Total Minutes 20  -LS            User Key  (r) = Recorded By, (t) = Taken By, (c) = Cosigned By    Initials Name Provider Type    Va Layne, PT Physical Therapist              Therapy Charges for Today     Code Description Service Date Service Provider Modifiers Qty    [id number]  PT THERAPEUTIC ACT EA 15 MIN 2/6/2022 Va Wasserman, PT GP 1          PT G-Codes  Outcome Measure Options: AM-PAC 6 Clicks Daily Activity (OT)  AM-PAC 6 Clicks Score (PT): 7  AM-PAC 6 Clicks Score (OT): 10    Va Wasserman, PT  2/6/2022

## 2022-02-06 NOTE — PLAN OF CARE
Goal Outcome Evaluation:  Plan of Care Reviewed With: patient        Progress: no change  Outcome Summary: Pt needs significatnt assistance for bed mobility (rolling supine to R/L sidelying max A X 2 and dependent X 2 to scoot up in bed). He declined sitting EOB or getting OOB. He did participate in LE therex for AAROM. Goals reviewed and revised this date. Cont. P.T. per P.O.C.

## 2022-02-06 NOTE — PLAN OF CARE
Goal Outcome Evaluation:  Plan of Care Reviewed With: patient        Progress: no change  Outcome Summary: Pt extremely lethargic, minimal time spent w/ eyes open. OOB/EOB deferred d/t lethargy and alertness, DEP for ADLs, MaxA x 2 for rolling L and R, DEP x 2 to pull pt up in bed. Continue per current POC. Recommendation upon d/c for SNF.

## 2022-02-07 LAB
GLUCOSE BLDC GLUCOMTR-MCNC: 184 MG/DL (ref 70–130)
GLUCOSE BLDC GLUCOMTR-MCNC: 207 MG/DL (ref 70–130)
GLUCOSE BLDC GLUCOMTR-MCNC: 299 MG/DL (ref 70–130)

## 2022-02-07 PROCEDURE — 25010000002 HEPARIN (PORCINE) PER 1000 UNITS: Performed by: INTERNAL MEDICINE

## 2022-02-07 PROCEDURE — 63710000001 INSULIN LISPRO (HUMAN) PER 5 UNITS: Performed by: NURSE PRACTITIONER

## 2022-02-07 PROCEDURE — 82962 GLUCOSE BLOOD TEST: CPT

## 2022-02-07 PROCEDURE — 99232 SBSQ HOSP IP/OBS MODERATE 35: CPT | Performed by: INTERNAL MEDICINE

## 2022-02-07 RX ADMIN — INSULIN LISPRO 3 UNITS: 100 INJECTION, SOLUTION INTRAVENOUS; SUBCUTANEOUS at 17:47

## 2022-02-07 RX ADMIN — HEPARIN SODIUM 5000 UNITS: 5000 INJECTION, SOLUTION INTRAVENOUS; SUBCUTANEOUS at 05:34

## 2022-02-07 RX ADMIN — HEPARIN SODIUM 5000 UNITS: 5000 INJECTION, SOLUTION INTRAVENOUS; SUBCUTANEOUS at 14:50

## 2022-02-07 RX ADMIN — ASPIRIN 81 MG: 81 TABLET, COATED ORAL at 08:26

## 2022-02-07 RX ADMIN — FAMOTIDINE 20 MG: 20 TABLET, FILM COATED ORAL at 21:54

## 2022-02-07 RX ADMIN — Medication 250 MG: at 08:26

## 2022-02-07 RX ADMIN — MIRTAZAPINE 30 MG: 15 TABLET, FILM COATED ORAL at 21:54

## 2022-02-07 RX ADMIN — INSULIN LISPRO 2 UNITS: 100 INJECTION, SOLUTION INTRAVENOUS; SUBCUTANEOUS at 08:33

## 2022-02-07 RX ADMIN — MEMANTINE 10 MG: 10 TABLET ORAL at 08:26

## 2022-02-07 RX ADMIN — FAMOTIDINE 20 MG: 20 TABLET, FILM COATED ORAL at 08:26

## 2022-02-07 RX ADMIN — AMPICILLIN 500 MG: 500 CAPSULE ORAL at 05:34

## 2022-02-07 RX ADMIN — DONEPEZIL HYDROCHLORIDE 10 MG: 10 TABLET, FILM COATED ORAL at 08:26

## 2022-02-07 RX ADMIN — HEPARIN SODIUM 5000 UNITS: 5000 INJECTION, SOLUTION INTRAVENOUS; SUBCUTANEOUS at 21:54

## 2022-02-07 RX ADMIN — TAMSULOSIN HYDROCHLORIDE 0.4 MG: 0.4 CAPSULE ORAL at 08:25

## 2022-02-07 RX ADMIN — Medication 250 MG: at 21:55

## 2022-02-07 RX ADMIN — INSULIN LISPRO 4 UNITS: 100 INJECTION, SOLUTION INTRAVENOUS; SUBCUTANEOUS at 12:23

## 2022-02-07 RX ADMIN — SENNOSIDES AND DOCUSATE SODIUM 2 TABLET: 50; 8.6 TABLET ORAL at 21:55

## 2022-02-07 RX ADMIN — MEMANTINE 10 MG: 10 TABLET ORAL at 21:54

## 2022-02-07 RX ADMIN — Medication 10 MG: at 05:34

## 2022-02-07 NOTE — PROGRESS NOTES
1       Leobardo Araujo  1934  [id number]  2/7/2022    CC: Altered mental status.    Leobardo Araujo is a 87 y.o. male here for enterococcal urinary tract infection with COVID-19 positive nasopharyngeal PCR.      Past medical history:  Past Medical History:   Diagnosis Date   • Abnormal findings on diagnostic imaging of lung    • Arthritis    • Atrial fibrillation (HCC)     Atrial fibrillation, October 2010; CHADS2-VASc = 3: Amiodarone therapy, discontinued, November 2013. [address] left atrial appendage closure, Dr. Edson Roque, 07/23/2012.     • B12 deficiency    • Basal cell carcinoma    • Bladder cancer (HCC)    • Central sleep apnea in conditions classified elsewhere    • Delirium 2/13/2019   • Depression    • Diarrhea 4/24/2018   • History of anxiety    • History of aortic valve stenosis    • History of coronary atherosclerosis    • History of sexual dysfunction in male    • Hypercholesteremia    • Hyperlipidemia LDL goal <100    • Hypertension 1/6/2017   • Memory loss    • Nausea, vomiting, and diarrhea 4/24/2018   • Obstructive sleep apnea    • Peripheral vascular disease (HCC)    • Renal insufficiency 4/24/2018   • Sepsis (HCC)     from UTI   • Stroke (HCC)    • Testicular mass    • Tobacco abuse     Remote tobacco abuse.   • Type 2 diabetes mellitus with other circulatory complications (HCC)    • UTI (urinary tract infection) 04/2018   • Valvular heart disease 4/24/2018       Medications:   Current Facility-Administered Medications:   •  acetaminophen (TYLENOL) tablet 650 mg, 650 mg, Oral, Q4H PRN, 650 mg at 02/03/22 1004 **OR** acetaminophen (TYLENOL) 160 MG/5ML solution 650 mg, 650 mg, Oral, Q4H PRN **OR** acetaminophen (TYLENOL) suppository 650 mg, 650 mg, Rectal, Q4H PRN, Liss Bell APRN  •  ampicillin (PRINCIPEN) capsule 500 mg, 500 mg, Oral, Q8H, Leonardo Beard MD, 500 mg at 02/07/22 0534  •  aspirin EC tablet 81 mg, 81 mg, Oral, Daily, Liss Bell APRN, 81 mg at 02/07/22 0826  •   sennosides-docusate (PERICOLACE) 8.6-50 MG per tablet 2 tablet, 2 tablet, Oral, BID, 2 tablet at 02/06/22 2204 **AND** polyethylene glycol (MIRALAX) packet 17 g, 17 g, Oral, Daily PRN **AND** bisacodyl (DULCOLAX) EC tablet 5 mg, 5 mg, Oral, Daily PRN **AND** bisacodyl (DULCOLAX) suppository 10 mg, 10 mg, Rectal, Daily PRN, Liss Bell, APRN, 10 mg at 02/07/22 0534  •  calcium carbonate (TUMS) chewable tablet 500 mg (200 mg elemental), 2 tablet, Oral, BID PRN, Liss Bell APRN  •  dextrose (D50W) (25 g/50 mL) IV injection 25 g, 25 g, Intravenous, Q15 Min PRN, Liss Bell APRN  •  dextrose (GLUTOSE) oral gel 15 g, 15 g, Oral, Q15 Min PRN, Liss Bell APRN  •  donepezil (ARICEPT) tablet 10 mg, 10 mg, Oral, Daily, Liss Bell, APRN, 10 mg at 02/07/22 0826  •  famotidine (PEPCID) tablet 20 mg, 20 mg, Oral, BID, Liss Bell, APRN, 20 mg at 02/07/22 0826  •  glucagon (human recombinant) (GLUCAGEN DIAGNOSTIC) injection 1 mg, 1 mg, Subcutaneous, Q15 Min PRN, Liss Bell APRGUCCI  •  heparin (porcine) 5000 UNIT/ML injection 5,000 Units, 5,000 Units, Subcutaneous, Q8H, Emperatriz Mcgill MD, 5,000 Units at 02/07/22 0534  •  insulin lispro (humaLOG) injection 0-7 Units, 0-7 Units, Subcutaneous, TID AC, Liss Bell APRN, 2 Units at 02/07/22 0833  •  LORazepam (ATIVAN) injection 0.5 mg, 0.5 mg, Intravenous, Q4H PRN, Anselmo Grider, DO  •  Magnesium Sulfate 2 gram Bolus, followed by 8 gram infusion (total Mg dose 10 grams)- Mg less than or equal to 1mg/dL, 2 g, Intravenous, PRN **OR** Magnesium Sulfate 2 gram / 50mL Infusion (GIVE X 3 BAGS TO EQUAL 6GM TOTAL DOSE) - Mg 1.1 - 1.5 mg/dl, 2 g, Intravenous, PRN **OR** Magnesium Sulfate 4 gram infusion- Mg 1.6-1.9 mg/dL, 4 g, Intravenous, PRN, Liss Bell, SOLOMON, Last Rate: 25 mL/hr at 01/29/22 1716, 4 g at 01/29/22 1716  •  memantine (NAMENDA) tablet 10 mg, 10 mg, Oral, BID, Liss Bell, APRN, 10 mg at 02/07/22 0826  •  mirtazapine (REMERON) tablet 30 mg, 30  mg, Oral, Nightly, Placido Stokes MD, 30 mg at 02/06/22 2204  •  morphine injection 2 mg, 2 mg, Intravenous, Q1H PRN, Anselmo Grider, DO  •  ondansetron (ZOFRAN) tablet 4 mg, 4 mg, Oral, Q6H PRN **OR** ondansetron (ZOFRAN) injection 4 mg, 4 mg, Intravenous, Q6H PRN, Liss Bell APRN  •  potassium chloride (MICRO-K) CR capsule 40 mEq, 40 mEq, Oral, PRN **OR** potassium chloride (KLOR-CON) packet 40 mEq, 40 mEq, Oral, PRN, 40 mEq at 01/26/22 1734 **OR** potassium chloride 10 mEq in 100 mL IVPB, 10 mEq, Intravenous, Q1H PRN, Liss Bell APRN  •  QUEtiapine (SEROquel) tablet 12.5 mg, 12.5 mg, Oral, Q12H PRN, Emperatriz Mcgill MD, 12.5 mg at 02/02/22 2239  •  saccharomyces boulardii (FLORASTOR) capsule 250 mg, 250 mg, Oral, BID, Leonardo Beard MD, 250 mg at 02/07/22 0826  •  sodium chloride 0.9 % flush 10 mL, 10 mL, Intravenous, Q12H, Liss Bell APRN, 10 mL at 02/03/22 0959  •  sodium chloride 0.9 % flush 10 mL, 10 mL, Intravenous, PRN, Liss Bell APRN  •  tamsulosin (FLOMAX) 24 hr capsule 0.4 mg, 0.4 mg, Oral, Daily, Liss Bell APRN, 0.4 mg at 02/07/22 0825  Antibiotics:  Anti-Infectives (From admission, onward)    Ordered     Dose/Rate Route Frequency Start Stop    02/04/22 0755  ampicillin (PRINCIPEN) 500 MG capsule        Ordering Provider: Devika Cedeno APRN    500 mg Oral Every 8 Hours Scheduled 02/04/22 0000 02/07/22 4989    01/31/22 1053  ampicillin (PRINCIPEN) capsule 500 mg        Ordering Provider: Leonardo Beard MD    500 mg Oral Every 8 Hours Scheduled 01/31/22 1400 02/07/22 1359    01/27/22 2130  vancomycin 1250 mg/250 mL 0.9% NS IVPB ([facility])        Ordering Provider: Sami Bloom RPH    1,250 mg  over 75 Minutes Intravenous Once 01/27/22 2200 01/28/22 0024    01/25/22 1858  vancomycin 1250 mg/250 mL 0.9% NS IVPB ([facility])        Ordering Provider: Jyoti Chapman RPH    20 mg/kg × 68.7 kg  over 90 Minutes Intravenous Once 01/25/22 1900 01/25/22 2158    01/25/22  1720  meropenem (MERREM) 1 g/100 mL 0.9% NS ([facility])        Ordering Provider: Terri Noonan MD    1 g  over 30 Minutes Intravenous Once 01/25/22 1722 01/25/22 2013          Allergies:  has No Known Allergies.    Review of Systems: All other reviewed and negative except as per HPI    Blood pressure 125/75, pulse 84, temperature 98.7 °F (37.1 °C), temperature source Axillary, resp. rate 16, SpO2 93 %.  GENERAL: No acute distress.  Elderly.  Frail.  Somewhat difficult to arouse.  HEENT: Oropharynx without thrush. Sinuses nontender. Dentition in good repair. No cervical adenopathy. No carotid bruits/ jugular venous distention.   EYES: PERRL. No conjunctival injection. No icterus. EOMI.  LYMPHATICS: No lymphadenopathy of the neck or axillary or inguinal regions.   HEART: No murmur, gallop, or pericardial friction rub.   LUNGS: Clear to auscultation anteriorly. No percussion dullness.   ABDOMEN: Soft, nontender, nondistended. No appreciable HSM.  No CVA tenderness to punch.  No suprapubic discomfort with deep palpation.  No peritoneal findings rebound or guarding.    SKIN: Warm and dry without cutaneous eruptions. No embolic stigmata.   PSYCHIATRIC: Mental status lucid. Cranial nerve function intact.       DIAGNOSTICS:  Lab Results   Component Value Date    WBC 8.74 01/29/2022    HGB 10.5 (L) 01/29/2022    HCT 32.1 (L) 01/29/2022     01/29/2022     Lab Results   Component Value Date    CRP 1.49 (H) 01/27/2022     Lab Results   Component Value Date    SEDRATE 26 (H) 01/23/2020     Lab Results   Component Value Date    GLUCOSE 146 (H) 01/29/2022    BUN 12 01/29/2022    CREATININE 0.63 (L) 01/29/2022    EGFRIFNONA 120 01/29/2022    EGFRIFAFRI 108 01/17/2022    BCR 19.0 01/29/2022    CO2 25.0 01/29/2022    CALCIUM 8.1 (L) 01/29/2022    PROTENTOTREF 7.0 01/17/2022    ALBUMIN 3.10 (L) 01/27/2022    LABIL2 1.1 01/17/2022    AST 14 01/27/2022    ALT 8 01/27/2022       Microbiology: COVID-19 nasopharyngeal PCR on  admission positive.  Influenza A/B unremarkable.  Urine culture with 10 to the fifth colony-forming units of group D enterococci/ampicillin susceptible.    RADIOLOGY:  Imaging Results (Last 72 Hours)     ** No results found for the last 72 hours. **          Assessment and Plan: Altered mental status.  Baseline dementia.  Polymicrobial UTI at nursing home/group D enterococci and Morganella species.  Persistent group D enterococcal urinary tract infection and significant colony counts.  Maximum temperature over 24 hours 98.2.  Currently 98.7.  Pulse 84, respiratory rate 16, blood pressure 125/75 mmHg with an O2 saturation of 93% on room air.  Creatinine 0.63.  Peripheral leukocyte count 8.7 with 74% segmented neutrophils.  No new radiographic imaging studies.  Adequate course of therapy for enterococcal urinary tract infection.  Ampicillin discontinued.  I will sign off.  Available for further ID issues.  Nursing home placement pending.      Leonardo Beard MD  2/7/2022

## 2022-02-07 NOTE — PLAN OF CARE
Problem: Adult Inpatient Plan of Care  Goal: Plan of Care Review  Outcome: Ongoing, Progressing  Flowsheets (Taken 2/7/2022 0205)  Outcome Summary: Patient continues on RA.  Patient responds to voice but spends most of his time this shift asleep.  Patient was able to take his nighttime meds crushed in applesauce.  D/c'd tele per order this shift.  Plan to d/c to [facility] for LTC when bed available.  VSS.

## 2022-02-07 NOTE — PROGRESS NOTES
[facility] Medicine Services  PROGRESS NOTE    Patient Name: Leobardo Araujo  : 1934  MRN: [id number]    Date of Admission: 2022  Primary Care Physician: Edy Goodman MD    Subjective   Subjective     CC:  Dementia, UTI    HPI:  Resting in bed, did not wake. No nursing concerns     ROS:  Unable to obtain    Objective   Objective     Vital Signs:   Temp:  [98.2 °F (36.8 °C)-98.7 °F (37.1 °C)] 98.7 °F (37.1 °C)  Heart Rate:  [76-91] 84  Resp:  [16] 16  BP: (104-130)/(56-90) 125/75     Physical Exam:  With patient's consent, physical exam was conducted via visual telemedicine encounter due to patient's current isolation requirements in the interest of PPE conservation.    Constitutional: No acute distress, awake, alert, nontoxic, normal body habitus  HENT: NCAT, MMM, no conjunctival injection  Respiratory: Good effort, nonlabored respirations   Cardiovascular:  tele with NSR  Musculoskeletal: No edema, normal muscle tone and mass for age  Skin: No visible rashes, no jaundice seen on exposed skin through window        Results Reviewed:  LAB RESULTS:                              Brief Urine Lab Results  (Last result in the past 365 days)      Color   Clarity   Blood   Leuk Est   Nitrite   Protein   CREAT   Urine HCG        22 0347 Yellow   Turbid   Large (3+)   Large (3+)   Negative   30 mg/dL (1+)                 Microbiology Results Abnormal     Procedure Component Value - Date/Time    Blood Culture - Blood, Arm, Left [[id number]]  (Normal) Collected: 22 1700    Lab Status: Final result Specimen: Blood from Arm, Left Updated: 22     Blood Culture No growth at 5 days    Blood Culture - Blood, Arm, Right [[id number]]  (Normal) Collected: 22 1810    Lab Status: Final result Specimen: Blood from Arm, Right Updated: 22     Blood Culture No growth at 5 days          No radiology results from the last 24 hrs    Results for orders placed during  the hospital encounter of 05/06/21    Adult Transthoracic Echo Complete W/ Cont if Necessary Per Protocol    Interpretation Summary  · Estimated left ventricular EF = 70% Left ventricular systolic function is normal.  · Left ventricular wall thickness is consistent with concentric hypertrophy.  · Mild mitral valve stenosis is present.  · Mild dilation of the aortic root is present.  · Moderate pulmonic valve regurgitation is present.  · Estimated right ventricular systolic pressure from tricuspid regurgitation is normal (<35 mmHg).  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  · The right atrial cavity is dilated.  · Moderate mitral valve regurgitation is present.  · Mild aortic valve stenosis is present.  · Left atrial volume is severely increased.  · There is a prosthetic valve in the aortic position mean gradient 14 mmHg with a peak of 24 consistent with borderline mild aortic stenosis  · There is an eccentric jet of MR which is at least moderate but very well may be moderate to severe.  · There is heavy calcification of the mitral valve leaflets with a mean gradient across the mitral valve 5 mmHg consistent with mild mitral stenosis      I have reviewed the medications:  Scheduled Meds:aspirin, 81 mg, Oral, Daily  donepezil, 10 mg, Oral, Daily  famotidine, 20 mg, Oral, BID  heparin (porcine), 5,000 Units, Subcutaneous, Q8H  insulin lispro, 0-7 Units, Subcutaneous, TID AC  memantine, 10 mg, Oral, BID  mirtazapine, 30 mg, Oral, Nightly  saccharomyces boulardii, 250 mg, Oral, BID  senna-docusate sodium, 2 tablet, Oral, BID  sodium chloride, 10 mL, Intravenous, Q12H  tamsulosin, 0.4 mg, Oral, Daily      Continuous Infusions:   PRN Meds:.•  acetaminophen **OR** acetaminophen **OR** acetaminophen  •  senna-docusate sodium **AND** polyethylene glycol **AND** bisacodyl **AND** bisacodyl  •  calcium carbonate  •  dextrose  •  dextrose  •  glucagon (human recombinant)  •  LORazepam  •  magnesium sulfate **OR** magnesium  sulfate **OR** magnesium sulfate  •  Morphine  •  ondansetron **OR** ondansetron  •  potassium chloride **OR** potassium chloride **OR** potassium chloride  •  QUEtiapine  •  sodium chloride    Assessment/Plan   Assessment & Plan     Active Hospital Problems    Diagnosis  POA   • **Complicated UTI (urinary tract infection) [N39.0]  Yes   • Dementia without behavioral disturbance (HCC) [F03.90]  Yes   • Hypothyroidism [E03.9]  Yes   • Malignant tumor of urinary bladder (HCC) [C67.9]  Yes   • 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     Brief Hospital Course to date:  Leobardo Araujo is a 87 y.o. male with history of A. fib, basal cell carcinoma HTN, diabetes previous stroke, questionable bladder cancer admitted with increased confusion and weight loss.  Found to have UTI and treated with IV antibiotics, now on p.o. ampicillin per infectious disease.  Marta, positive for Covid but asymptomatic.  Patient declined remdesivir.  Patient with dementia compounded by encephalopathy during hospitalization.  Neurology consulted recommended to continue Namenda and Aricept.    He continues to decline and now having difficulty with taking pills and swallowing, following commands but does not meet inpatient hospice criteria due to uncontrolled symptom management.  Previously had tried to get him either home with hospice or inpatient hospice but now plan is for patient to be discharged tomorrow to [facility].    This patient's problems and plans were partially entered by my partner and updated as appropriate by me 02/07/22.         Confusion, worsening dementia  -Fadumo, who is POA, agreeable to palliative consult  -CODE STATUS changed  -My partner discussed with Dr. Grider and Dr. Stokes regarding family and goals of care  -Initially, plan was for rehab; however, it is  noted that the patient's daughter Fadumo needs long-term placement.  Still planning to go to [facility] when a bed is open.      UTI, complicated   -ID following, has now completed abx, they have s/o   Possible urinary bladder tumor  -Urine cultures positive for Enterococcus faecalis     Covid infection, asymptomatic  -Family refused remdesivir  -no treatment, now out of isolation     Encephalopathy, dementia  -With intermittent worsening confusion continue Namenda and Aricept  -Evaluated by neurology as well, guarded prognosis due to advanced dementia    Diabetes, A1c 7.4%  -Continue SSI     PAF  -Eliquis was DC'd prior to admission, suspect due to patient's family not realizing that he was not taking it.  -Was listed as active med on December 2021 PCP note  -Plan for comfort measures at discharge, poor candidate for anticoagulation due to severe dementia and fall risk       DVT prophylaxis:  Medical DVT prophylaxis orders are present.       AM-PAC 6 Clicks Score (PT): 7 (02/06/22 1330)    Disposition: I expect the patient to be discharged to [facility] when a long-term bed becomes available.    CODE STATUS:   Code Status and Medical Interventions:   Ordered at: 02/02/22 0951     Level Of Support Discussed With:    Health Care Surrogate     Code Status (Patient has no pulse and is not breathing):    No CPR (Do Not Attempt to Resuscitate)     Medical Interventions (Patient has pulse or is breathing):    Comfort Measures     Comments:    Fadumo Acuña MD  02/07/22

## 2022-02-07 NOTE — PLAN OF CARE
Goal Outcome Evaluation:  Plan of Care Reviewed With: daughter (dtr Addie at bedside today)        Progress: no change  Outcome Summary: Pt drowsy, did arouse to loud voice and light touch from dtr, denied pain or discomfort, requested to be left alone to go back to sleep. Per documentation, CM pursuing rehab/transition to LTC bed per dtr Fadumo (HCS) request. Dtr Addie present today, is financial POA, stated she is uncertain whether funds are sufficient for private pay for facility. Addie stated she and her brother would be willing and able to take pt to her home for care, had several questions about hospice support at home. Advised Addie to discuss financial concerns with facility ([facility]), as if private pay is not an option and pt unable to go to facility for skilled services, home with hospice may be an option. Family dynamics complicating disposition as dtr Fadumo is HCS and dtr Addie is financial POA, and they appear to have differing views on best interventions and plan.    1330 Palliative IDT meeting: WIN Gallardo RN, CHKIRBY; GIRISH Grider DO; MIRZA Chapman, [facility], [facility]; SAMMY Chester, JAZMIN, CHPN; SHAUN Xavier, JAZMIN, CHKIRBY; SHAUN Butler, SOLOMON; MARÍA ELENA Zelaya, EMERITAW

## 2022-02-07 NOTE — CASE MANAGEMENT/SOCIAL WORK
Continued Stay Note  [facility]     Patient Name: Leobardo Araujo  MRN: [id number]  Today's Date: 2/7/2022    Admit Date: 1/25/2022     Discharge Plan     Row Name 02/07/22 1503       Plan    Plan CM update    Plan Comments ALEX is still working on long term care placement - Carole says [facility] does not have any LTC beds open at this time and [facility] will not consider until 20 past first + covid test. Tenisha is following with [facility] but they don't have any open beds either. Spoke with Addie who has control over patient's financials, she is concerned that he might not be able to afford private pay LTC placement - gave her the business office at [facility] to speak to in order to get a better cost estimate and then she plans to determine if LTC is even an option or if they will have to decide on Home with hospice - Will follow up with Addie tomorrow and then further discuss placement with his other daughter Fadumo seth [phone]    Row Name 02/07/22 6450       Plan    Plan Long term care    Plan Comments Hospice liaison continues to follow on the periphery while placement is found. Please call [phone] if can be of further assistance.               Discharge Codes    No documentation.               Expected Discharge Date and Time     Expected Discharge Date Expected Discharge Time    Feb 4, 2022             Mona Coffey RN

## 2022-02-07 NOTE — PROGRESS NOTES
Continued Stay Note  [facility]     Patient Name: Leobardo Araujo  MRN: [id number]  Today's Date: 2/7/2022    Admit Date: 1/25/2022     Discharge Plan     Row Name 02/07/22 1450       Plan    Plan Long term care    Plan Comments Hospice liaison continues to follow on the periphery while placement is found. Please call [phone] if can be of further assistance.               Discharge Codes    No documentation.               Expected Discharge Date and Time     Expected Discharge Date Expected Discharge Time    Feb 4, 2022             Allison Chester RN

## 2022-02-07 NOTE — NURSING NOTE
Woc follow up for ignacio scale < or = to 14    Current Ignacio Score: 14    Skin issues: No issues per RN    Specialty bed ordered: Not needed patient close to discharge.    Pressure Injury Protocol (initiate for Ignacio Score of 18 or less):   *Maintain good skin care, keep dry, turn q 2 hr, keep heels elevated and offloaded with heel boots.    *Apply z-guard to sacrococcygeal area/ perineal area BID or daily and PRN per incontinent episodes.  *Follow C.A.R.E protocol if medical devices (Bipap, spence, Ng tube, etc) are being used.     Please contact [facility] with any new questions or concerns.

## 2022-02-08 LAB
GLUCOSE BLDC GLUCOMTR-MCNC: 154 MG/DL (ref 70–130)
GLUCOSE BLDC GLUCOMTR-MCNC: 174 MG/DL (ref 70–130)
GLUCOSE BLDC GLUCOMTR-MCNC: 180 MG/DL (ref 70–130)

## 2022-02-08 PROCEDURE — 82962 GLUCOSE BLOOD TEST: CPT

## 2022-02-08 PROCEDURE — 97110 THERAPEUTIC EXERCISES: CPT

## 2022-02-08 PROCEDURE — 99232 SBSQ HOSP IP/OBS MODERATE 35: CPT | Performed by: INTERNAL MEDICINE

## 2022-02-08 PROCEDURE — 25010000002 HEPARIN (PORCINE) PER 1000 UNITS: Performed by: INTERNAL MEDICINE

## 2022-02-08 PROCEDURE — 63710000001 INSULIN LISPRO (HUMAN) PER 5 UNITS: Performed by: NURSE PRACTITIONER

## 2022-02-08 RX ADMIN — HEPARIN SODIUM 5000 UNITS: 5000 INJECTION, SOLUTION INTRAVENOUS; SUBCUTANEOUS at 22:14

## 2022-02-08 RX ADMIN — FAMOTIDINE 20 MG: 20 TABLET, FILM COATED ORAL at 09:19

## 2022-02-08 RX ADMIN — MIRTAZAPINE 30 MG: 15 TABLET, FILM COATED ORAL at 22:14

## 2022-02-08 RX ADMIN — TAMSULOSIN HYDROCHLORIDE 0.4 MG: 0.4 CAPSULE ORAL at 09:19

## 2022-02-08 RX ADMIN — INSULIN LISPRO 2 UNITS: 100 INJECTION, SOLUTION INTRAVENOUS; SUBCUTANEOUS at 09:19

## 2022-02-08 RX ADMIN — MEMANTINE 10 MG: 10 TABLET ORAL at 09:19

## 2022-02-08 RX ADMIN — ASPIRIN 81 MG: 81 TABLET, COATED ORAL at 09:19

## 2022-02-08 RX ADMIN — SENNOSIDES AND DOCUSATE SODIUM 2 TABLET: 50; 8.6 TABLET ORAL at 22:14

## 2022-02-08 RX ADMIN — FAMOTIDINE 20 MG: 20 TABLET, FILM COATED ORAL at 22:14

## 2022-02-08 RX ADMIN — Medication 250 MG: at 22:14

## 2022-02-08 RX ADMIN — HEPARIN SODIUM 5000 UNITS: 5000 INJECTION, SOLUTION INTRAVENOUS; SUBCUTANEOUS at 06:00

## 2022-02-08 RX ADMIN — INSULIN LISPRO 2 UNITS: 100 INJECTION, SOLUTION INTRAVENOUS; SUBCUTANEOUS at 17:14

## 2022-02-08 RX ADMIN — DONEPEZIL HYDROCHLORIDE 10 MG: 10 TABLET, FILM COATED ORAL at 09:20

## 2022-02-08 RX ADMIN — MEMANTINE 10 MG: 10 TABLET ORAL at 22:14

## 2022-02-08 RX ADMIN — Medication 250 MG: at 09:19

## 2022-02-08 RX ADMIN — INSULIN LISPRO 2 UNITS: 100 INJECTION, SOLUTION INTRAVENOUS; SUBCUTANEOUS at 11:38

## 2022-02-08 NOTE — THERAPY TREATMENT NOTE
Patient Name: Leobardo Araujo  : 1934    MRN: [id number]                              Today's Date: 2022       Admit Date: 2022    Visit Dx:     ICD-10-CM ICD-9-CM   1. Acute UTI  N39.0 599.0   2. Generalized weakness  R53.1 780.79   3. Altered mental status, unspecified altered mental status type  R41.82 780.97   4. COVID-19 virus infection  U07.1 079.89   5. Weight loss, unintentional  R63.4 783.21   6. Generalized muscle weakness  M62.81 728.87   7. Complicated UTI (urinary tract infection)  N39.0 599.0   8. Dysphagia, unspecified type  R13.10 787.20     Patient Active Problem List   Diagnosis   • Memory loss   • YSABEL (obstructive sleep apnea)   • Recurrent major depressive disorder (HCC)   • Fatigue   • History of aortic valve stenosis   • Erectile dysfunction   • Hypokalemia   • Metabolic encephalopathy   • Valvular heart disease   • Abnormal findings on diagnostic imaging of lung   • History of aortic valve replacement with bioprosthetic valve   • Type 2 diabetes mellitus with other circulatory complications (HCC)   • Peripheral vascular disease (HCC)   • Benign essential hypertension   • Paroxysmal atrial fibrillation (HCC)   • Hyperlipidemia LDL goal <100   • B12 deficiency   • Abnormal liver function   • Allergic rhinitis   • BPH with urinary obstruction   • Disorder of mitral and aortic valves   • Hypothyroidism   • Kidney stone   • Malignant tumor of urinary bladder (HCC)   • Neck pain   • Numbness   • Palpitations   • Polyp of colon   • Recurrent UTI (urinary tract infection)   • Medicare annual wellness visit, subsequent   • Multiple nevi   • Osteopenia of hip   • Iron deficiency anemia   • Unsteady gait   • Failure to thrive in adult   • Weight loss, unintentional   • Generalized muscle weakness   • Complicated UTI (urinary tract infection)   • Dementia without behavioral disturbance (HCC)     Past Medical History:   Diagnosis Date   • Abnormal findings on diagnostic imaging of lung    •  Arthritis    • Atrial fibrillation (HCC)     Atrial fibrillation, October 2010; CHADS2-VASc = 3: Amiodarone therapy, discontinued, November 2013. Elke Sultana left atrial appendage closure, Dr. Edson Roque, 07/23/2012.     • B12 deficiency    • Basal cell carcinoma    • Bladder cancer (HCC)    • Central sleep apnea in conditions classified elsewhere    • Delirium 2/13/2019   • Depression    • Diarrhea 4/24/2018   • History of anxiety    • History of aortic valve stenosis    • History of coronary atherosclerosis    • History of sexual dysfunction in male    • Hypercholesteremia    • Hyperlipidemia LDL goal <100    • Hypertension 1/6/2017   • Memory loss    • Nausea, vomiting, and diarrhea 4/24/2018   • Obstructive sleep apnea    • Peripheral vascular disease (HCC)    • Renal insufficiency 4/24/2018   • Sepsis (HCC)     from UTI   • Stroke (HCC)    • Testicular mass    • Tobacco abuse     Remote tobacco abuse.   • Type 2 diabetes mellitus with other circulatory complications (HCC)    • UTI (urinary tract infection) 04/2018   • Valvular heart disease 4/24/2018     Past Surgical History:   Procedure Laterality Date   • AORTIC VALVE REPAIR/REPLACEMENT  2012   • BLADDER RESECTION LAPAROSCOPIC  2000    Bladder cancer resection, 2000.   • CATARACT EXTRACTION     • CYSTOSCOPY W/ LITHOLAPAXY / EHL     • EPIDIDYMECTOMY Right    • KIDNEY SURGERY     • ORCHIECTOMY      Orchiectomy, benign.      General Information     Row Name 02/08/22 1624          Physical Therapy Time and Intention    Document Type therapy note (daily note)  -     Mode of Treatment physical therapy  -     Row Name 02/08/22 1628          General Information    Patient Profile Reviewed yes  -     Existing Precautions/Restrictions fall; other (see comments)  baseline dementia  -     Row Name 02/08/22 4482          Cognition    Orientation Status (Cognition) oriented to; person; disoriented to; place; situation  -     Row Name 02/08/22 8056           Safety Issues, Functional Mobility    Impairments Affecting Function (Mobility) balance; cognition; coordination; endurance/activity tolerance; motor planning; strength; postural/trunk control  -           User Key  (r) = Recorded By, (t) = Taken By, (c) = Cosigned By    Initials Name Provider Type    Gilda Mccracken PT Physical Therapist               Mobility     Row Name 02/08/22 1626          Bed Mobility    Comment (Bed Mobility) pt deferred despite EOB/OOB maximal encouragement from PT and son.  -     Row Name 02/08/22 1626          Transfers    Comment (Transfers) pt deferred despite EOB/OOB maximal encouragement from PT and son.  -     Row Name 02/08/22 1626          Gait/Stairs (Locomotion)    Comment (Gait/Stairs) pt deferred despite EOB/OOB maximal encouragement from PT and son.  -           User Key  (r) = Recorded By, (t) = Taken By, (c) = Cosigned By    Initials Name Provider Type     Gilda Robbins PT Physical Therapist               Obj/Interventions     Row Name 02/08/22 1627          Motor Skills    Motor Skills therapeutic exercise  -     Therapeutic Exercise hip; knee; ankle; other (see comments)  Mass extension in BLE in supine x3 against mild resistance. Difficult to assess strength due to decreased command following  -     Row Name 02/08/22 1627          Hip (Therapeutic Exercise)    Hip (Therapeutic Exercise) PROM (passive range of motion)  -     Hip PROM (Therapeutic Exercise) bilateral; flexion; 10 repetitions  -     Row Name 02/08/22 1627          Knee (Therapeutic Exercise)    Knee (Therapeutic Exercise) PROM (passive range of motion)  -     Knee PROM (Therapeutic Exercise) bilateral; flexion; extension; 10 repetitions  -     Row Name 02/08/22 1627          Ankle (Therapeutic Exercise)    Ankle (Therapeutic Exercise) AROM (active range of motion); PROM (passive range of motion)  -     Ankle AROM (Therapeutic Exercise) bilateral; dorsiflexion; plantarflexion;  10 repetitions  -HP     Ankle PROM (Therapeutic Exercise) bilateral; plantarflexion; dorsiflexion; 10 repetitions  -HP           User Key  (r) = Recorded By, (t) = Taken By, (c) = Cosigned By    Initials Name Provider Type    Gilda Mccracken PT Physical Therapist               Goals/Plan    No documentation.                Clinical Impression     Row Name 02/08/22 1628          Pain    Additional Documentation Pain Scale: Numbers Pre/Post-Treatment (Group)  -HP     Row Name 02/08/22 1628          Pain Scale: Numbers Pre/Post-Treatment    Pretreatment Pain Rating 0/10 - no pain  -HP     Posttreatment Pain Rating 0/10 - no pain  -HP     Row Name 02/08/22 1628          Plan of Care Review    Plan of Care Reviewed With patient; son  -     Progress no change  -HP     Outcome Summary Pt deferred despite EOB/OOB maximal encouragement from PT and son. Pt oriented to self only. Pt tolerated PROM and actively participated in ther-ex activities. Continue with PT POC as pt tolerates. Recommend SNF vs LTC at discharge.  -HP     Row Name 02/08/22 1628          Vital Signs    Pre Systolic BP Rehab --  VSS; RN cleared for therapy  -HP     Pre Patient Position Supine  -HP     Intra Patient Position Supine  -HP     Post Patient Position Supine  -HP     Row Name 02/08/22 1628          Positioning and Restraints    Pre-Treatment Position in bed  -HP     Post Treatment Position bed  -HP     In Bed notified nsg; fowlers; call light within reach; encouraged to call for assist; exit alarm on; with family/caregiver; side rails up x3  -HP           User Key  (r) = Recorded By, (t) = Taken By, (c) = Cosigned By    Initials Name Provider Type     Gilda Robbins PT Physical Therapist               Outcome Measures     Row Name 02/08/22 1630          How much help from another person do you currently need...    Turning from your back to your side while in flat bed without using bedrails? 2  -HP     Moving from lying on back to sitting on  the side of a flat bed without bedrails? 1  -HP     Moving to and from a bed to a chair (including a wheelchair)? 1  -HP     Standing up from a chair using your arms (e.g., wheelchair, bedside chair)? 1  -HP     Climbing 3-5 steps with a railing? 1  -HP     To walk in hospital room? 1  -HP     AM-PAC 6 Clicks Score (PT) 7  -HP     Row Name 02/08/22 1630          Functional Assessment    Outcome Measure Options AM-PAC 6 Clicks Basic Mobility (PT)  -HP           User Key  (r) = Recorded By, (t) = Taken By, (c) = Cosigned By    Initials Name Provider Type    Gilda Mccracken PT Physical Therapist                             Physical Therapy Education                 Title: PT OT SLP Therapies (In Progress)     Topic: Physical Therapy (Done)     Point: Mobility training (Done)     Learning Progress Summary           Patient Acceptance, E,D, VU,NR by HP at 2/8/2022 1631    Acceptance, E, NR by LS at 2/6/2022 1330    Acceptance, E, NR by ML at 1/29/2022 1433    Acceptance, E, VU,NR by NS at 1/27/2022 1155   Family Acceptance, E,D, VU,NR by HP at 2/8/2022 1631                   Point: Home exercise program (Done)     Learning Progress Summary           Patient Acceptance, E,D, VU,NR by HP at 2/8/2022 1631    Acceptance, E, NR by ML at 1/29/2022 1433   Family Acceptance, E,D, VU,NR by HP at 2/8/2022 1631                   Point: Body mechanics (Done)     Learning Progress Summary           Patient Acceptance, E,D, VU,NR by HP at 2/8/2022 1631    Acceptance, E, NR by ML at 1/29/2022 1433    Acceptance, E, VU,NR by NS at 1/27/2022 1155   Family Acceptance, E,D, VU,NR by HP at 2/8/2022 1631                   Point: Precautions (Done)     Learning Progress Summary           Patient Acceptance, E,D, VU,NR by HP at 2/8/2022 1631    Acceptance, E, NR by ML at 1/29/2022 1433    Acceptance, E, VU,NR by NS at 1/27/2022 1155   Family Acceptance, E,D, VU,NR by HP at 2/8/2022 8913                               User Key     Initials  Effective Dates Name Provider Type Discipline    LS 06/16/21 -  Va Wasserman, PT Physical Therapist PT    NS 06/16/21 -  Stephanie Landis, DORIAN Physical Therapist PT    ML 04/22/21 -  Samanta Penaloza Physical Therapist PT    HP 06/01/21 -  Gilda Robbins PT Physical Therapist PT              PT Recommendation and Plan     Plan of Care Reviewed With: patient, son  Progress: no change  Outcome Summary: Pt deferred despite EOB/OOB maximal encouragement from PT and son. Pt oriented to self only. Pt tolerated PROM and actively participated in ther-ex activities. Continue with PT POC as pt tolerates. Recommend SNF vs LTC at discharge.     Time Calculation:    PT Charges     Row Name 02/08/22 1539             Time Calculation    Start Time 1539  -HP      PT Received On 02/08/22  -HP              Time Calculation- PT    Total Timed Code Minutes- PT 15 minute(s)  -HP              Timed Charges    56287 - PT Therapeutic Exercise Minutes 15  -HP              Total Minutes    Timed Charges Total Minutes 15  -HP       Total Minutes 15  -HP            User Key  (r) = Recorded By, (t) = Taken By, (c) = Cosigned By    Initials Name Provider Type     Gilda Robbins, DORIAN Physical Therapist              Therapy Charges for Today     Code Description Service Date Service Provider Modifiers Qty    82991671588 HC PT THER PROC EA 15 MIN 2/8/2022 Gilda Robbins, PT GP 1          PT G-Codes  Outcome Measure Options: AM-PAC 6 Clicks Basic Mobility (PT)  AM-PAC 6 Clicks Score (PT): 7  AM-PAC 6 Clicks Score (OT): 10    Gilda Robbins PT  2/8/2022

## 2022-02-08 NOTE — PLAN OF CARE
Goal Outcome Evaluation:  Plan of Care Reviewed With: other (see comments) (No family present at times of Palliative encounters today)        Progress: no change  Outcome Summary: Pt sleeping most of the time, but does appear to be eating some per documentation. Dtr Addie planned to discuss financial concerns with facility regarding private pay for LTC; outcome may affect plan for placement.    1330 Palliative IDT meeting: WIN Gallardo RN, KIRBY; GIRISH Grider DO; MIRZA Chapman, [facility], [facility]; SHAUN Xavier, JAZMIN, KIRBY; MARÍA ELENA Zelaya, GABINO; MARÍA ELENA Lee RN; CHYNA Muñiz RN, KIRBY; MARÍA ELENA Zelaya, YAMILETH; SAMMY Rod, APRN    Problem: Palliative Care  Goal: Enhanced Quality of Life  Outcome: Ongoing, Progressing

## 2022-02-08 NOTE — PROGRESS NOTES
[facility] Medicine Services  PROGRESS NOTE    Patient Name: Leobardo Araujo  : 1934  MRN: [id number]    Date of Admission: 2022  Primary Care Physician: Edy Goodman MD    Subjective   Subjective     CC:  Dementia, UTI    HPI:  Resting in bed. Sitting up and eating some breakfast.    ROS:  Unable to obtain secondary to mental status    Objective   Objective     Vital Signs:   Temp:  [98 °F (36.7 °C)-98.2 °F (36.8 °C)] 98.2 °F (36.8 °C)  Heart Rate:  [75-85] 75  Resp:  [16-18] 16  BP: (116-123)/(70-80) 123/79  Flow (L/min):  [2] 2     Physical Exam:    Constitutional: No acute distress, awake, alert, nontoxic, normal body habitus  HENT: NCAT, MMM  Respiratory: breathing appears nonlabored on room air  Cardiovascular: NSR  Psychiatric: Appropriate affect, confused  Neurologic: appears to move all extremities  Skin: No visible rashes seen on exposed skin    Results Reviewed:  LAB RESULTS:                              Brief Urine Lab Results  (Last result in the past 365 days)      Color   Clarity   Blood   Leuk Est   Nitrite   Protein   CREAT   Urine HCG        22 0347 Yellow   Turbid   Large (3+)   Large (3+)   Negative   30 mg/dL (1+)                 Microbiology Results Abnormal     Procedure Component Value - Date/Time    Blood Culture - Blood, Arm, Left [[id number]]  (Normal) Collected: 22 1700    Lab Status: Final result Specimen: Blood from Arm, Left Updated: 22 190     Blood Culture No growth at 5 days    Blood Culture - Blood, Arm, Right [[id number]]  (Normal) Collected: 22 1810    Lab Status: Final result Specimen: Blood from Arm, Right Updated: 22 190     Blood Culture No growth at 5 days          No radiology results from the last 24 hrs    Results for orders placed during the hospital encounter of 21    Adult Transthoracic Echo Complete W/ Cont if Necessary Per Protocol    Interpretation Summary  · Estimated left ventricular  EF = 70% Left ventricular systolic function is normal.  · Left ventricular wall thickness is consistent with concentric hypertrophy.  · Mild mitral valve stenosis is present.  · Mild dilation of the aortic root is present.  · Moderate pulmonic valve regurgitation is present.  · Estimated right ventricular systolic pressure from tricuspid regurgitation is normal (<35 mmHg).  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  · The right atrial cavity is dilated.  · Moderate mitral valve regurgitation is present.  · Mild aortic valve stenosis is present.  · Left atrial volume is severely increased.  · There is a prosthetic valve in the aortic position mean gradient 14 mmHg with a peak of 24 consistent with borderline mild aortic stenosis  · There is an eccentric jet of MR which is at least moderate but very well may be moderate to severe.  · There is heavy calcification of the mitral valve leaflets with a mean gradient across the mitral valve 5 mmHg consistent with mild mitral stenosis      I have reviewed the medications:  Scheduled Meds:aspirin, 81 mg, Oral, Daily  donepezil, 10 mg, Oral, Daily  famotidine, 20 mg, Oral, BID  heparin (porcine), 5,000 Units, Subcutaneous, Q8H  insulin lispro, 0-7 Units, Subcutaneous, TID AC  memantine, 10 mg, Oral, BID  mirtazapine, 30 mg, Oral, Nightly  saccharomyces boulardii, 250 mg, Oral, BID  senna-docusate sodium, 2 tablet, Oral, BID  sodium chloride, 10 mL, Intravenous, Q12H  tamsulosin, 0.4 mg, Oral, Daily      Continuous Infusions:   PRN Meds:.•  acetaminophen **OR** acetaminophen **OR** acetaminophen  •  senna-docusate sodium **AND** polyethylene glycol **AND** bisacodyl **AND** bisacodyl  •  calcium carbonate  •  dextrose  •  dextrose  •  glucagon (human recombinant)  •  LORazepam  •  magnesium sulfate **OR** magnesium sulfate **OR** magnesium sulfate  •  Morphine  •  ondansetron **OR** ondansetron  •  potassium chloride **OR** potassium chloride **OR** potassium chloride  •   QUEtiapine  •  sodium chloride    Assessment/Plan   Assessment & Plan     Active Hospital Problems    Diagnosis  POA   • **Complicated UTI (urinary tract infection) [N39.0]  Yes   • Dementia without behavioral disturbance (HCC) [F03.90]  Yes   • Hypothyroidism [E03.9]  Yes   • Malignant tumor of urinary bladder (HCC) [C67.9]  Yes   • 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     Brief Hospital Course to date:  Leobardo Araujo is a 87 y.o. male with history of A. fib, basal cell carcinoma HTN, diabetes previous stroke, questionable bladder cancer admitted with increased confusion and weight loss.  Found to have UTI and treated with IV antibiotics, now on p.o. ampicillin per infectious disease.  Marta, positive for Covid but asymptomatic.  Patient declined remdesivir.  Patient with dementia compounded by encephalopathy during hospitalization.  Neurology consulted recommended to continue Namenda and Aricept.    He continues to decline and now having difficulty with taking pills and swallowing, following commands but does not meet inpatient hospice criteria due to uncontrolled symptom management.  Previously had tried to get him either home with hospice or inpatient hospice but now plan is for patient to be discharged tomorrow to [facility].    This patient's problems and plans were partially entered by my partner and updated as appropriate by me 02/08/22.    Confusion, worsening dementia  - Palliative care and Hospice following  - possible plan is LTC @[facility] vs. Possible home with hospice care    UTI, complicated   -ID following, has now completed abx and they have signed off  -Urine cultures positive for Enterococcus faecalis  - ?bladder Tumor     Covid infection, asymptomatic  -Family refused remdesivir   -no treatment, now out of  isolation     Encephalopathy, dementia  -With intermittent worsening confusion continue Namenda and Aricept  -Evaluated by neurology as well, guarded prognosis due to advanced dementia    Diabetes, A1c 7.4%  -Continue SSI     PAF  -Matt was DC'd prior to admission  -Plan for comfort measures at discharge, poor candidate for anticoagulation due to severe dementia and fall risk      DVT prophylaxis:  Medical DVT prophylaxis orders are present.       AM-PAC 6 Clicks Score (PT): 7 (02/06/22 1330)    Disposition: I expect the patient to be discharged to [facility] when a long-term bed becomes available. Family working on options. May end up going home with hospice?    CODE STATUS:   Code Status and Medical Interventions:   Ordered at: 02/02/22 0951     Level Of Support Discussed With:    Health Care Surrogate     Code Status (Patient has no pulse and is not breathing):    No CPR (Do Not Attempt to Resuscitate)     Medical Interventions (Patient has pulse or is breathing):    Comfort Measures     Comments:    Fadumo Groves, DO  02/08/22

## 2022-02-08 NOTE — CASE MANAGEMENT/SOCIAL WORK
Continued Stay Note  [facility]     Patient Name: Leobardo Araujo  MRN: [id number]  Today's Date: 2/8/2022    Admit Date: 1/25/2022     Discharge Plan     Row Name 02/08/22 1617       Plan    Plan CM update    Plan Comments Spoke with family regarding rehab/long term care options - [facility] has now offered a bed and all are in agreement with this facility - He will go skilled rehab with possible transition to long term care. CM will see if their is an ambulance available tomorrow (possible availability at 3PM) and if so, he can go via EMS once final approval by [facility] (should know in the AM) and if unable to get EMS scheduled, family said they are willing to transport (Addie) - Mona [phone]    Final Discharge Disposition Code 03 - skilled nursing facility (SNF)               Discharge Codes    No documentation.               Expected Discharge Date and Time     Expected Discharge Date Expected Discharge Time    Feb 4, 2022             Mona Coffey RN

## 2022-02-08 NOTE — PLAN OF CARE
Goal Outcome Evaluation:  Plan of Care Reviewed With: patient, son        Progress: no change  Outcome Summary: Pt deferred despite EOB/OOB maximal encouragement from PT and son. Pt oriented to self only. Pt tolerated PROM and actively participated in ther-ex activities. Continue with PT POC as pt tolerates. Recommend SNF vs LTC at discharge.

## 2022-02-08 NOTE — PLAN OF CARE
Problem: Adult Inpatient Plan of Care  Goal: Plan of Care Review  Outcome: Ongoing, Progressing  Flowsheets (Taken 2/8/2022 0238)  Outcome Summary: Patient now on 2L NC while sleeping, patient started to desat into the 70's while sleeping.  When awake, patient's oxygen is in the high 90's.  Patient responds to voice, able to take all of his po meds.  Awaiting d/c disposition per family.  VSS.

## 2022-02-09 VITALS
RESPIRATION RATE: 18 BRPM | OXYGEN SATURATION: 96 % | TEMPERATURE: 98.4 F | HEART RATE: 81 BPM | DIASTOLIC BLOOD PRESSURE: 68 MMHG | SYSTOLIC BLOOD PRESSURE: 104 MMHG

## 2022-02-09 PROBLEM — D89.831 CYTOKINE RELEASE SYNDROME, GRADE 1: Status: ACTIVE | Noted: 2022-02-09

## 2022-02-09 LAB
GLUCOSE BLDC GLUCOMTR-MCNC: 138 MG/DL (ref 70–130)
GLUCOSE BLDC GLUCOMTR-MCNC: 154 MG/DL (ref 70–130)

## 2022-02-09 PROCEDURE — 25010000002 HEPARIN (PORCINE) PER 1000 UNITS: Performed by: INTERNAL MEDICINE

## 2022-02-09 PROCEDURE — 99239 HOSP IP/OBS DSCHRG MGMT >30: CPT | Performed by: INTERNAL MEDICINE

## 2022-02-09 PROCEDURE — 82962 GLUCOSE BLOOD TEST: CPT

## 2022-02-09 PROCEDURE — 63710000001 INSULIN LISPRO (HUMAN) PER 5 UNITS: Performed by: NURSE PRACTITIONER

## 2022-02-09 RX ORDER — ASPIRIN 81 MG/1
81 TABLET, CHEWABLE ORAL DAILY
Status: DISCONTINUED | OUTPATIENT
Start: 2022-02-10 | End: 2022-02-09 | Stop reason: HOSPADM

## 2022-02-09 RX ADMIN — TAMSULOSIN HYDROCHLORIDE 0.4 MG: 0.4 CAPSULE ORAL at 08:39

## 2022-02-09 RX ADMIN — INSULIN LISPRO 2 UNITS: 100 INJECTION, SOLUTION INTRAVENOUS; SUBCUTANEOUS at 08:38

## 2022-02-09 RX ADMIN — ASPIRIN 81 MG: 81 TABLET, COATED ORAL at 08:39

## 2022-02-09 RX ADMIN — Medication 250 MG: at 08:39

## 2022-02-09 RX ADMIN — DONEPEZIL HYDROCHLORIDE 10 MG: 10 TABLET, FILM COATED ORAL at 08:39

## 2022-02-09 RX ADMIN — SENNOSIDES AND DOCUSATE SODIUM 2 TABLET: 50; 8.6 TABLET ORAL at 08:39

## 2022-02-09 RX ADMIN — ACETAMINOPHEN 650 MG: 325 TABLET, FILM COATED ORAL at 04:53

## 2022-02-09 RX ADMIN — FAMOTIDINE 20 MG: 20 TABLET, FILM COATED ORAL at 08:39

## 2022-02-09 RX ADMIN — HEPARIN SODIUM 5000 UNITS: 5000 INJECTION, SOLUTION INTRAVENOUS; SUBCUTANEOUS at 04:53

## 2022-02-09 RX ADMIN — HEPARIN SODIUM 5000 UNITS: 5000 INJECTION, SOLUTION INTRAVENOUS; SUBCUTANEOUS at 13:02

## 2022-02-09 RX ADMIN — MEMANTINE 10 MG: 10 TABLET ORAL at 08:39

## 2022-02-09 NOTE — PLAN OF CARE
Goal Outcome Evaluation:  Plan of Care Reviewed With: patient        Progress: improving  Outcome Summary: Patient with discharge/transfer orders to [facility] pt to be picked up at 1500.

## 2022-02-09 NOTE — DISCHARGE SUMMARY
[facility] Medicine Services  DISCHARGE SUMMARY    Patient Name: Leobardo Araujo  : 1934  MRN: [id number]    Date of Admission: 2022  4:05 PM  Date of Discharge:  2022  Primary Care Physician: Edy Goodman MD    Consults     Date and Time Order Name Status Description    2022  9:51 AM Inpatient Palliative Care MD Consult Completed     2022  2:14 PM Inpatient Neurology Consult General Completed     2022 12:23 AM Inpatient Infectious Diseases Consult Completed           Hospital Course     Presenting Problem:   Complicated UTI (urinary tract infection) [N39.0]    Active Hospital Problems    Diagnosis  POA   • **Complicated UTI (urinary tract infection) [N39.0]  Yes   • Cytokine release syndrome, 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          Hospital Course:  Leobardo Araujo is a 87 y.o. male  with history of A. fib, basal cell carcinoma HTN, diabetes previous stroke, questionable bladder cancer admitted with increased confusion and weight loss.  Found to have UTI and treated with IV antibiotics, now on p.o. ampicillin per infectious disease.  Marta, positive for Covid but asymptomatic.  Patient declined remdesivir.  Patient with dementia compounded by encephalopathy during hospitalization.  Neurology consulted recommended to continue Namenda and Aricept.    Confusion, worsening dementia;  - Palliative care and Hospice following, currently comfort measures, plan to f/u at facility     Enterococcus UTI, complicated   -ID following, has now completed abx and they have signed off     Covid infection, asymptomatic  -Family  refused remdesivir   -no treatment, now out of isolation     Encephalopathy, dementia  -With intermittent worsening confusion continue Namenda and Aricept  -Evaluated by neurology as well, guarded prognosis due to advanced dementia  -plan for hospice evaluation at facility     Diabetes, A1c 7.4%  -Continue SSI at discharge     PAF  -Eliquis was DC'd prior to admission  -Plan for comfort measures at discharge, poor candidate for anticoagulation due to severe dementia and fall risk, thus will not restart eliquis at discharge      Discharge Follow Up Recommendations for outpatient labs/diagnostics:  - f/u with PCP or facility providers PRN  - plan palliative/hospice to follow-up at facility    Day of Discharge     HPI:   Patient resting in bed. Sleeping. No complaints. No issues overnight.    Review of Systems  Unable to obtain secondary to dementia    Vital Signs:   Temp:  [98.2 °F (36.8 °C)-99.7 °F (37.6 °C)] 98.4 °F (36.9 °C)  Heart Rate:  [79-92] 81  Resp:  [16-18] 18  BP: (104-124)/(64-78) 104/68      Physical Exam:  Constitutional: No acute distress, awake, alert  HENT: NCAT, mucous membranes moist  Respiratory: Clear to auscultation bilaterally, respiratory effort normal   Cardiovascular: RRR, no murmurs, rubs, or gallops  Gastrointestinal: Positive bowel sounds, soft, nontender, nondistended  Musculoskeletal: No bilateral ankle edema  Psychiatric: Appropriate affect, cooperative  Neurologic: drowsy/sleeping, demented, strength symmetric in all extremities, Cranial Nerves grossly intact to confrontation, speech clear  Skin: No rashes      Pertinent  and/or Most Recent Results     LAB RESULTS:                              Brief Urine Lab Results  (Last result in the past 365 days)      Color   Clarity   Blood   Leuk Est   Nitrite   Protein   CREAT   Urine HCG        01/26/22 0347 Yellow   Turbid   Large (3+)   Large (3+)   Negative   30 mg/dL (1+)               Microbiology Results (last 10 days)     ** No  results found for the last 240 hours. **          CT Head Without Contrast    Result Date: 2/2/2022  EXAMINATION: CT HEAD WO CONTRAST-  INDICATION: Stroke follow-up; N39.0-Urinary tract infection, site not specified; R53.1-Weakness; R41.82-Altered mental status, unspecified; U07.1-COVID-19; R63.4-Abnormal weight loss; M62.81-Muscle weakness (generalized); N39.0-Urinary tract infection, site not specified  TECHNIQUE: Axial noncontrast CT of the head with multiplanar reconstruction  The radiation dose reduction device was turned on for each scan per the ALARA (As Low as Reasonably Achievable) protocol.  COMPARISON: 4/23/2018  FINDINGS: There is focal encephalomalacic change in the right frontal lobe compatible with chronic appearing infarct, however new at least since 2018 comparison. There is otherwise no evidence of intracranial hemorrhage, mass or mass effect. There are redemonstrated advanced age-related changes with generalized volume loss and typical hypoattenuating white matter changes of chronic small vessel ischemia. There is associated ex vacuo prominence of the lateral ventricles. The orbits are unremarkable and the paranasal sinuses demonstrate some mucosal thickening and small amount of aerated secretions in the right maxillary sinus and sphenoid chambers.      Right frontal lobe volume loss compatible with chronic appearing infarct, however new since 2018 comparison. Advanced age-related changes are otherwise stable from prior, without acute intracranial abnormality.  Mucosal thickening and aerated secretions in the right maxillary sinus and sphenoid chambers. Correlate with any clinical evidence of sinusitis.   This report was finalized on 2/2/2022 8:37 AM by Hugo Wood.                Results for orders placed during the hospital encounter of 05/06/21    Adult Transthoracic Echo Complete W/ Cont if Necessary Per Protocol    Interpretation Summary  · Estimated left ventricular EF = 70% Left  ventricular systolic function is normal.  · Left ventricular wall thickness is consistent with concentric hypertrophy.  · Mild mitral valve stenosis is present.  · Mild dilation of the aortic root is present.  · Moderate pulmonic valve regurgitation is present.  · Estimated right ventricular systolic pressure from tricuspid regurgitation is normal (<35 mmHg).  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  · The right atrial cavity is dilated.  · Moderate mitral valve regurgitation is present.  · Mild aortic valve stenosis is present.  · Left atrial volume is severely increased.  · There is a prosthetic valve in the aortic position mean gradient 14 mmHg with a peak of 24 consistent with borderline mild aortic stenosis  · There is an eccentric jet of MR which is at least moderate but very well may be moderate to severe.  · There is heavy calcification of the mitral valve leaflets with a mean gradient across the mitral valve 5 mmHg consistent with mild mitral stenosis      Plan for Follow-up of Pending Labs/Results:     Discharge Details        Discharge Medications      New Medications      Instructions Start Date   acetaminophen 325 MG tablet  Commonly known as: TYLENOL   650 mg, Oral, Every 4 Hours PRN      insulin lispro 100 UNIT/ML injection  Commonly known as: humaLOG   0-7 Units, Subcutaneous, 3 Times Daily Before Meals      QUEtiapine 25 MG tablet  Commonly known as: SEROquel   12.5 mg, Oral, Every 12 Hours PRN      saccharomyces boulardii 250 MG capsule  Commonly known as: FLORASTOR   250 mg, Oral, 2 Times Daily         Continue These Medications      Instructions Start Date   aspirin 81 MG tablet   Oral, Daily      atorvastatin 40 MG tablet  Commonly known as: LIPITOR   TAKE 1 TABLET EVERY DAY      donepezil 10 MG tablet  Commonly known as: ARICEPT   TAKE 1 TABLET EVERY DAY      famotidine 20 MG tablet  Commonly known as: PEPCID   TAKE 1 TABLET TWICE DAILY      memantine 10 MG tablet  Commonly known as:  NAMENDA   10 mg, Oral, 2 Times Daily      mirtazapine 30 MG tablet  Commonly known as: Remeron   30 mg, Oral, Nightly      tamsulosin 0.4 MG capsule 24 hr capsule  Commonly known as: FLOMAX   TAKE 1 CAPSULE EVERY DAY         Stop These Medications    amLODIPine 5 MG tablet  Commonly known as: NORVASC     apixaban 5 MG tablet tablet  Commonly known as: Eliquis     finasteride 1 MG tablet  Commonly known as: PROPECIA     levothyroxine 50 MCG tablet  Commonly known as: SYNTHROID, LEVOTHROID     losartan 100 MG tablet  Commonly known as: COZAAR     metFORMIN 500 MG tablet  Commonly known as: GLUCOPHAGE            No Known Allergies      Discharge Disposition:  Rehab Facility or Unit (DC - External)    Diet:  Hospital:  Diet Order   Procedures   • Diet Dysphagia; IV - Mechanical Soft No Mixed Consistencies; Nectar / Syrup Thick; Cardiac, Consistent Carbohydrate       Activity:  Activity Instructions     Activity as Tolerated      Measure Blood Pressure            Restrictions or Other Recommendations:  - none       CODE STATUS:    Code Status and Medical Interventions:   Ordered at: 02/02/22 0951     Level Of Support Discussed With:    Health Care Surrogate     Code Status (Patient has no pulse and is not breathing):    No CPR (Do Not Attempt to Resuscitate)     Medical Interventions (Patient has pulse or is breathing):    Comfort Measures     Comments:    Fadumo MAHMOOD       Future Appointments   Date Time Provider Department Center   5/12/2022  2:30 PM Shelly Johnson PA-C [facility]   8/8/2022 12:45 PM Edy Goodman MD [facility]   10/12/2022  1:30 PM Linda Tom MD [facility]       Additional Instructions for the Follow-ups that You Need to Schedule     Ambulatory Referral to Home Health   As directed      Face to Face Visit Date: 1/28/2022    Follow-up provider for Plan of Care?: I treated the patient in an acute care facility and will not continue treatment after discharge.    Follow-up  provider: EDY GOODMAN [[id number]]    Reason/Clinical Findings: atrial fibrillation, basal cell carcinoma, essential hypertension, depression, type II diabetes, coronary artery disease, dementia,  history of stroke, questionable bladder cancer who presented to [facility] ED on 1/25/2022 with complaints of increased confusio    Describe mobility limitations that make leaving home difficult: impaired mobility, impaired ADLs, impaired gait/balance    Nursing/Therapeutic Services Requested: Skilled Nursing Physical Therapy Occupational Therapy    Skilled nursing orders: Medication education Cardiopulmonary assessments Neurovascular assessments    PT orders: Transfer training Strengthening Therapeutic exercise Gait Training Electrical stimulation Total joint pathway Home safety assessment    Weight Bearing Status: As Tolerated    Occupational orders: Activities of daily living Energy conservation Strengthening Cognition Home safety assessment         Discharge Follow-up with PCP   As directed       Currently Documented PCP:    Edy Goodman MD    PCP Phone Number:    [phone]     Follow Up Details: follow up with pcp after dc from rehab         Discharge Follow-up with Specified Provider: palliataive and hospice to follow at rehab to help with GOC   As directed      To: palliataive and hospice to follow at rehab to help with GOC                     Ebony Groves DO  02/09/22      Time Spent on Discharge:  I spent  31  minutes on this discharge activity which included: face-to-face encounter with the patient, reviewing the data in the system, coordination of the care with the nursing staff as well as consultants, documentation, and entering orders.

## 2022-02-09 NOTE — PLAN OF CARE
Problem: Adult Inpatient Plan of Care  Goal: Plan of Care Review  Outcome: Ongoing, Progressing  Flowsheets (Taken 2/9/2022 0159)  Outcome Summary: Patient continues to be confused but is more alert and interactive this shift.  Patient now back on RA after having to wear 2L NC last night.  Case management found placement for patient at [facility], however family toured [place] yesterday and decided they want him to go there.  Case management was already gone for the day when the family decided this, so will let Mona know first thing in am.  VSS.

## 2022-02-09 NOTE — DISCHARGE PLACEMENT REQUEST
"Leobardo Pereira (87 y.o. Male)             Date of Birth Social Security Number Address Home Phone MRN    [id number]  [id number]  [facility] [id number] [phone] [id number]    Denominational Marital Status             Catholic        Admission Date Admission Type Admitting Provider Attending Provider Department, Room/Bed    22 Emergency Ebony Groves DO Barbato, Hayley R, DO [facility] 6A, N618/1    Discharge Date Discharge Disposition Discharge Destination           Rehab Facility or Unit (DC - External)              Attending Provider: Ebony Groves DO    Allergies: No Known Allergies    Isolation: None   Infection: COVID (History) (22)   Code Status: No CPR   Advance Care Planning Activity    Ht: 182.9 cm (72.01\")   Wt: 68.7 kg (151 lb 6.4 oz)    Admission Cmt: None   Principal Problem: Complicated UTI (urinary tract infection) [N39.0]                 Active Insurance as of 2022     Primary Coverage     Payor Plan Insurance Group Employer/Plan Group    HUMANA MEDICARE REPLACEMENT HUMANA MEDICARE REPLACEMENT [id number]     Payor Plan Address Payor Plan Phone Number Payor Plan Fax Number Effective Dates    [address] [address] [phone]  2021 - None Entered    [facility] [address]       Subscriber Name Subscriber Birth Date Member ID       LEOBARDO PEREIRA 1934 [id number]                 Emergency Contacts      (Rel.) Home Phone Work Phone Mobile Phone    Addie Pereira (Daughter) [phone] -- [phone]    RANDALLFRANCO DUCKWORTH (Daughter) [phone] -- --    RANDALLKOREY DUCKWORTH (Son) [phone] -- [phone]                 Discharge Summary      Devika Cedeno, SOLOMON at 22 0738     Attestation signed by Kriss Acuña MD at 22 1342    I have reviewed this documentation and agree.                        [facility] Medicine Services  DISCHARGE SUMMARY    Patient Name: Leobardo Pereira  : 1934  MRN: " [id number]    Date of Admission: 1/25/2022  4:05 PM  Date of Discharge:  2/4/2022  Primary Care Physician: Edy Goodman MD    Consults     Date and Time Order Name Status Description    2/2/2022  9:51 AM Inpatient Palliative Care MD Consult Completed     2/1/2022  2:14 PM Inpatient Neurology Consult General Completed     1/26/2022 12:23 AM Inpatient Infectious Diseases Consult Completed           Hospital Course     Presenting Problem:   Complicated UTI (urinary tract infection) [N39.0]    Active Hospital Problems    Diagnosis  POA   • **Complicated UTI (urinary tract infection) [N39.0]  Yes   • Dementia without behavioral disturbance (HCC) [F03.90]  Yes   • Hypothyroidism [E03.9]  Yes   • Malignant tumor of urinary bladder (HCC) [C67.9]  Yes   • 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          Hospital Course:  Leobardo Araujo is a 87 y.o. male with history of A. fib, basal cell carcinoma HTN, diabetes previous stroke, questionable bladder cancer admitted with increased confusion and weight loss.  Found to have UTI and treated with IV antibiotics, now on p.o. ampicillin per infectious disease. Patient had recently had a lot of medications stopped prior to admit. We have not restarted any of those at this time.      He continues to decline and now having difficulty with taking pills and swallowing, following commands but does not meet inpatient hospice criteria due to uncontrolled symptom management.  Previously had tried to get him either home with hospice or inpatient hospice but now plan is for patient to be discharged tomorrow to [facility].      Confusion, worsening dementia  -Fadumo, who is POA, agreeable to palliative and hospice consult  -CODE STATUS changed to comfort measures   -Discussed with Dr. Grider and Dr. Stokes regarding family  and goals of care  -Plan now is for rehab as patient does not meet criteria for inpatient hospice    Hypothyroidism  --TSH and free T4  normal  --Levothyroxine was discontinued (not sure by home)  -- repeat TSH in 4-6 weeks if family wants to reassess TSH and see if patient needs restarted back on synthroid       UTI, complicated   -ID following, continue ampicillin per ID through 2/8  Possible urinary bladder tumor  -Urine cultures positive for Enterococcus faecalis     Covid infection, asymptomatic  -Family refused remdesivir  -no treatment, can dc isolation on 2/4 (10 days)     Encephalopathy, dementia  -With intermittent worsening confusion continue Namenda and Aricept  -Evaluated by neurology as well, guarded prognosis due to advanced dementia        Diabetes, A1c 7.4%  -Continue SSI  -- will stop oral meds at dc due to increased oral intake  -- continue SSI as needed     PAF  -Eliquis was DC'd prior to admission, suspect due to patient's family not realizing that he was not taking it.   -Was listed as active med on December 2021 PCP note   -Plan for comfort measures at discharge, poor candidate for anticoagulation due to severe dementia and fall risk    Patient has remained clinically stable and will be discharged to facility today.  patient will need palliative and hospice to follow at facility to help with GOC.      Discharge Follow Up Recommendations for outpatient labs/diagnostics:   follow up with pcp after dc from rehab   Repeat TSH in 4-6 weeks     Day of Discharge     HPI:   Patient sleeping in NAD. Awakens when name called. No acute events overnight per nursing. Plan for discharge this am     Review of Systems  Unable to obtain     Vital Signs:   Temp:  [98.1 °F (36.7 °C)-99.1 °F (37.3 °C)] 99.1 °F (37.3 °C)  Heart Rate:  [] 98  Resp:  [16-22] 22  BP: (105-132)/(65-92) 132/92      Physical Exam:  Constitutional: No acute distress, awake, alert  HENT: NCAT, mucous membranes moist  Respiratory:  Clear to auscultation bilaterally, respiratory effort normal room air 93%  Cardiovascular: RRR, no murmurs, rubs, or gallops  Gastrointestinal: Positive bowel sounds, soft, nontender, nondistended  Musculoskeletal: No bilateral ankle edema  Psychiatric: calm  Neurologic: confused, disorientated at baseline  Skin: No rashes      Pertinent  and/or Most Recent Results     LAB RESULTS:      Lab 01/29/22  1552 01/28/22  0942   WBC 8.74 6.68   HEMOGLOBIN 10.5* 11.0*   HEMATOCRIT 32.1* 38.7   PLATELETS 254 244   NEUTROS ABS 6.47 4.09   IMMATURE GRANS (ABS) 0.02 0.02   LYMPHS ABS 1.22 1.54   MONOS ABS 0.98* 0.82   EOS ABS 0.03 0.17   MCV 81.3 94.4         Lab 01/30/22  1258 01/29/22  1552 01/28/22  0942   SODIUM  --  136 139   POTASSIUM  --  3.8 3.8   CHLORIDE  --  100 106   CO2  --  25.0 23.0   ANION GAP  --  11.0 10.0   BUN  --  12 11   CREATININE  --  0.63* 0.56*   GLUCOSE  --  146* 112*   CALCIUM  --  8.1* 8.4*   MAGNESIUM 2.0 1.6 1.7   PHOSPHORUS  --   --  2.7                         Brief Urine Lab Results  (Last result in the past 365 days)      Color   Clarity   Blood   Leuk Est   Nitrite   Protein   CREAT   Urine HCG        01/26/22 0347 Yellow   Turbid   Large (3+)   Large (3+)   Negative   30 mg/dL (1+)               Microbiology Results (last 10 days)     Procedure Component Value - Date/Time    Urine Culture - Urine, Urine, Catheter [[id number]]  (Abnormal)  (Susceptibility) Collected: 01/26/22 0347    Lab Status: Final result Specimen: Urine, Catheter Updated: 01/28/22 1252     Urine Culture >100,000 CFU/mL Enterococcus faecalis    Susceptibility      Enterococcus faecalis     FRENCH     Ampicillin Susceptible     Levofloxacin Resistant     Nitrofurantoin Susceptible     Tetracycline Resistant     Vancomycin Susceptible                         Urine Culture - Urine, Urine, Catheter [[id number]]  (Abnormal)  (Susceptibility) Collected: 01/25/22 2032    Lab Status: Final result Specimen: Urine, Catheter Updated:  01/28/22 1252     Urine Culture >100,000 CFU/mL Enterococcus faecalis      >100,000 CFU/mL Mixed Trevon Isolated    Narrative:      Specimen contains mixed organisms of questionable pathogenicity which indicates contamination with commensal trevon.  Further identification is unlikely to provide clinically useful information.  Suggest recollection.    Susceptibility      Enterococcus faecalis     FRENCH     Ampicillin Susceptible     Levofloxacin Resistant     Nitrofurantoin Susceptible     Tetracycline Resistant     Vancomycin Susceptible                         COVID PRE-OP / PRE-PROCEDURE SCREENING ORDER (NO ISOLATION) - Swab, Nasopharynx [[id number]]  (Abnormal) Collected: 01/25/22 1825    Lab Status: Final result Specimen: Swab from Nasopharynx Updated: 01/25/22 1941    Narrative:      The following orders were created for panel order COVID PRE-OP / PRE-PROCEDURE SCREENING ORDER (NO ISOLATION) - Swab, Nasopharynx.  Procedure                               Abnormality         Status                     ---------                               -----------         ------                     COVID-19 and FLU A/B PCR...[[id number]]  Abnormal            Final result                 Please view results for these tests on the individual orders.    COVID-19 and FLU A/B PCR - Swab, Nasopharynx [[id number]]  (Abnormal) Collected: 01/25/22 1825    Lab Status: Final result Specimen: Swab from Nasopharynx Updated: 01/25/22 1941     COVID19 Detected     Influenza A PCR Not Detected     Influenza B PCR Not Detected    Narrative:      Fact sheet for providers: https://www.fda.gov/media/560157/download    Fact sheet for patients: https://www.fda.gov/media/540148/download    Test performed by PCR.  Influenza A and Influenza B negative results should be considered presumptive in samples that have a positive SARS-CoV-2 result.    Competitive inhibition studies showed that SARS-CoV-2 virus, when present at concentrations above 3.6E+04  copies/mL, can inhibit the detection and amplification of influenza A and influenza B virus RNA if present at or below 1.8E+02 copies/mL or 4.9E+02 copies/mL, respectively, and may lead to false negative influenza virus results. If co-infection with influenza A or influenza B virus is suspected in samples with a positive SARS-CoV-2 result, the sample should be re-tested with another FDA cleared, approved, or authorized influenza test, if influenza virus detection would change clinical management.    Blood Culture - Blood, Arm, Right [[id number]]  (Normal) Collected: 01/25/22 1810    Lab Status: Final result Specimen: Blood from Arm, Right Updated: 01/30/22 1900     Blood Culture No growth at 5 days    Blood Culture - Blood, Arm, Left [[id number]]  (Normal) Collected: 01/25/22 1700    Lab Status: Final result Specimen: Blood from Arm, Left Updated: 01/30/22 1900     Blood Culture No growth at 5 days          CT Abdomen Pelvis Without Contrast    Result Date: 1/25/2022   DATE OF EXAM: 1/25/2022 5:17 PM  PROCEDURE: CT CHEST WO CONTRAST DIAGNOSTIC-, CT ABDOMEN PELVIS WO CONTRAST-  INDICATIONS: WEAK  COMPARISON: No Comparisons Available  TECHNIQUE: Routine transaxial slices were obtained through the chest, abdomen and pelvis without the administration of intravenous contrast. Reconstructed coronal and sagittal images were also obtained. Automated exposure control and iterative construction methods were used.  FINDINGS: CT chest: There are scattered areas of density some groundglass within the left upper and left lower lobe more peripherally. This could be seen with Covid pneumonia in the proper clinical setting. There are some additional somewhat nodular areas of density in the right middle lobe and right basilar area. In the right lower lobe is a 2.46 cm nodular like area. These findings may be more inflammatory infectious as well. There are no pleural effusions. The patient had prior cardiothoracic surgery. 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 There is moderate to severe coronary artery calcification.  CT abdomen pelvis: There is no definite abnormality of the liver. There may be a tiny gallstone within the gallbladder. There is no inflammation around this area. There are splenic calcifications. The pancreas is grossly unremarkable. The left kidney does not appear unusual. There is a cyst within the upper pole of the left kidney measuring 2 cm.  There is a nonobstructing calculus within the midpole the right kidney measuring 9.1 mm. There is some pelvocaliectasis on this side with some haziness in the fat around the renal pelvis. There is a stone measuring 8.3 mm within the right renal pelvis as well as a 1.1 cm stone. The findings could reflect some inflammation secondary to obstructive changes. There some calculation along the margin of the right kidney with a more hypodense area in the lower pole that could relate to sequela of old subcapsular collection.  There are some colonic diverticula. An acute bowel abnormality is not appreciated.  There are bladder diverticulum some thickening of the bladder that may relate to more chronic bladder outlet issues. The prostate is enlarged.  Atherosclerotic changes are noted  There is a compression fracture of T12 with marked narrowing of the vertebra more centrally. It looks like the patient might have had vertebral augmentation. This was noted on x-rays of the lumbar spine from 2015. Additionally there is compression of the L1 vertebral body more inferior endplate which has been previously suggested. There is slight compression of the L3 vertebral body which is probably chronic as well. There additionally is a compression deformity of T5 that may be more chronic as well.        1.  There are some groundglass changes more peripherally in the left lung. Sequela to Covid 19 pneumonia cannot be excluded. There are patchy somewhat nodular densities in the right middle lobe and right lower  lobe that could reflect sequela to inflammatory infectious process within these areas. Follow-up would be recommended to document resolution. 2.  There is pelvocaliectasis on the right with stranding in the fat around the right renal pelvis that could be secondary to inflammation and could be a manifestation of some obstructive changes. There are stones within the right renal pelvis and a nonobstructing calculus within the right renal collecting system. 3.  There is calcification around a low attenuating area along the lower pole the right kidney that may relate to old subcapsular fluid collection. 4.  Left renal cyst. 5.  There is a tiny gallstone within the gallbladder. 6.  Thickening of the wall the bladder with bladder diverticula that could reflect sequela to more chronic bladder outlet issues. A cystitis could not be entirely excluded as there is some indistinctness to the fat planes around the bladder. 7.  Multiple compression fracture deformities in the thoracolumbar area which could be more chronic and should be correlated with clinical findings. 8.  Atherosclerotic changes are present.  This report was finalized on 1/25/2022 5:47 PM by Apolinar Fuentes MD.      CT Head Without Contrast    Result Date: 2/2/2022  EXAMINATION: CT HEAD WO CONTRAST-  INDICATION: Stroke follow-up; N39.0-Urinary tract infection, site not specified; R53.1-Weakness; R41.82-Altered mental status, unspecified; U07.1-COVID-19; R63.4-Abnormal weight loss; M62.81-Muscle weakness (generalized); N39.0-Urinary tract infection, site not specified  TECHNIQUE: Axial noncontrast CT of the head with multiplanar reconstruction  The radiation dose reduction device was turned on for each scan per the ALARA (As Low as Reasonably Achievable) protocol.  COMPARISON: 4/23/2018  FINDINGS: There is focal encephalomalacic change in the right frontal lobe compatible with chronic appearing infarct, however new at least since 2018 comparison. There is otherwise  no evidence of intracranial hemorrhage, mass or mass effect. There are redemonstrated advanced age-related changes with generalized volume loss and typical hypoattenuating white matter changes of chronic small vessel ischemia. There is associated ex vacuo prominence of the lateral ventricles. The orbits are unremarkable and the paranasal sinuses demonstrate some mucosal thickening and small amount of aerated secretions in the right maxillary sinus and sphenoid chambers.      Right frontal lobe volume loss compatible with chronic appearing infarct, however new since 2018 comparison. Advanced age-related changes are otherwise stable from prior, without acute intracranial abnormality.  Mucosal thickening and aerated secretions in the right maxillary sinus and sphenoid chambers. Correlate with any clinical evidence of sinusitis.   This report was finalized on 2/2/2022 8:37 AM by Hugo Wood.      CT Chest Without Contrast Diagnostic    Result Date: 1/25/2022   DATE OF EXAM: 1/25/2022 5:17 PM  PROCEDURE: CT CHEST WO CONTRAST DIAGNOSTIC-, CT ABDOMEN PELVIS WO CONTRAST-  INDICATIONS: WEAK  COMPARISON: No Comparisons Available  TECHNIQUE: Routine transaxial slices were obtained through the chest, abdomen and pelvis without the administration of intravenous contrast. Reconstructed coronal and sagittal images were also obtained. Automated exposure control and iterative construction methods were used.  FINDINGS: CT chest: There are scattered areas of density some groundglass within the left upper and left lower lobe more peripherally. This could be seen with Covid pneumonia in the proper clinical setting. There are some additional somewhat nodular areas of density in the right middle lobe and right basilar area. In the right lower lobe is a 2.46 cm nodular like area. These findings may be more inflammatory infectious as well. There are no pleural effusions. The patient had prior cardiothoracic surgery. There is an aortic  prosthetic valve. There is moderate to severe coronary artery calcification.  CT abdomen pelvis: There is no definite abnormality of the liver. There may be a tiny gallstone within the gallbladder. There is no inflammation around this area. There are splenic calcifications. The pancreas is grossly unremarkable. The left kidney does not appear unusual. There is a cyst within the upper pole of the left kidney measuring 2 cm.  There is a nonobstructing calculus within the midpole the right kidney measuring 9.1 mm. There is some pelvocaliectasis on this side with some haziness in the fat around the renal pelvis. There is a stone measuring 8.3 mm within the right renal pelvis as well as a 1.1 cm stone. The findings could reflect some inflammation secondary to obstructive changes. There some calculation along the margin of the right kidney with a more hypodense area in the lower pole that could relate to sequela of old subcapsular collection.  There are some colonic diverticula. An acute bowel abnormality is not appreciated.  There are bladder diverticulum some thickening of the bladder that may relate to more chronic bladder outlet issues. The prostate is enlarged.  Atherosclerotic changes are noted  There is a compression fracture of T12 with marked narrowing of the vertebra more centrally. It looks like the patient might have had vertebral augmentation. This was noted on x-rays of the lumbar spine from 2015. Additionally there is compression of the L1 vertebral body more inferior endplate which has been previously suggested. There is slight compression of the L3 vertebral body which is probably chronic as well. There additionally is a compression deformity of T5 that may be more chronic as well.        1.  There are some groundglass changes more peripherally in the left lung. Sequela to Covid 19 pneumonia cannot be excluded. There are patchy somewhat nodular densities in the right middle lobe and right lower lobe that  could reflect sequela to inflammatory infectious process within these areas. Follow-up would be recommended to document resolution. 2.  There is pelvocaliectasis on the right with stranding in the fat around the right renal pelvis that could be secondary to inflammation and could be a manifestation of some obstructive changes. There are stones within the right renal pelvis and a nonobstructing calculus within the right renal collecting system. 3.  There is calcification around a low attenuating area along the lower pole the right kidney that may relate to old subcapsular fluid collection. 4.  Left renal cyst. 5.  There is a tiny gallstone within the gallbladder. 6.  Thickening of the wall the bladder with bladder diverticula that could reflect sequela to more chronic bladder outlet issues. A cystitis could not be entirely excluded as there is some indistinctness to the fat planes around the bladder. 7.  Multiple compression fracture deformities in the thoracolumbar area which could be more chronic and should be correlated with clinical findings. 8.  Atherosclerotic changes are present.  This report was finalized on 1/25/2022 5:47 PM by Apolinar Fuentes MD.                Results for orders placed during the hospital encounter of 05/06/21    Adult Transthoracic Echo Complete W/ Cont if Necessary Per Protocol    Interpretation Summary  · Estimated left ventricular EF = 70% Left ventricular systolic function is normal.  · Left ventricular wall thickness is consistent with concentric hypertrophy.  · Mild mitral valve stenosis is present.  · Mild dilation of the aortic root is present.  · Moderate pulmonic valve regurgitation is present.  · Estimated right ventricular systolic pressure from tricuspid regurgitation is normal (<35 mmHg).  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  · The right atrial cavity is dilated.  · Moderate mitral valve regurgitation is present.  · Mild aortic valve stenosis is present.  · Left  atrial volume is severely increased.  · There is a prosthetic valve in the aortic position mean gradient 14 mmHg with a peak of 24 consistent with borderline mild aortic stenosis  · There is an eccentric jet of MR which is at least moderate but very well may be moderate to severe.  · There is heavy calcification of the mitral valve leaflets with a mean gradient across the mitral valve 5 mmHg consistent with mild mitral stenosis      Plan for Follow-up of Pending Labs/Results: TSH in 4-6 weeks     Discharge Details        Discharge Medications      New Medications      Instructions Start Date   acetaminophen 325 MG tablet  Commonly known as: TYLENOL   650 mg, Oral, Every 4 Hours PRN      ampicillin 500 MG capsule  Commonly known as: PRINCIPEN   500 mg, Oral, Every 8 Hours Scheduled      insulin lispro 100 UNIT/ML injection  Commonly known as: humaLOG   0-7 Units, Subcutaneous, 3 Times Daily Before Meals      QUEtiapine 25 MG tablet  Commonly known as: SEROquel   12.5 mg, Oral, Every 12 Hours PRN      saccharomyces boulardii 250 MG capsule  Commonly known as: FLORASTOR   250 mg, Oral, 2 Times Daily         Continue These Medications      Instructions Start Date   aspirin 81 MG tablet   Oral, Daily      donepezil 10 MG tablet  Commonly known as: ARICEPT   TAKE 1 TABLET EVERY DAY      famotidine 20 MG tablet  Commonly known as: PEPCID   TAKE 1 TABLET TWICE DAILY      memantine 10 MG tablet  Commonly known as: NAMENDA   10 mg, Oral, 2 Times Daily      mirtazapine 30 MG tablet  Commonly known as: Remeron   30 mg, Oral, Nightly      tamsulosin 0.4 MG capsule 24 hr capsule  Commonly known as: FLOMAX   TAKE 1 CAPSULE EVERY DAY         Stop These Medications    amLODIPine 5 MG tablet  Commonly known as: NORVASC     apixaban 5 MG tablet tablet  Commonly known as: Eliquis     atorvastatin 40 MG tablet  Commonly known as: LIPITOR     finasteride 1 MG tablet  Commonly known as: PROPECIA     levothyroxine 50 MCG tablet  Commonly  known as: SYNTHROID, LEVOTHROID     losartan 100 MG tablet  Commonly known as: COZAAR     metFORMIN 500 MG tablet  Commonly known as: GLUCOPHAGE            No Known Allergies      Discharge Disposition:  Rehab Facility or Unit (DC - External)    Diet:  Hospital:  Diet Order   Procedures   • Diet Dysphagia; IV - Mechanical Soft No Mixed Consistencies; Nectar / Syrup Thick; Cardiac, Consistent Carbohydrate       Activity:  Activity Instructions     Activity as Tolerated      Measure Blood Pressure            Restrictions or Other Recommendations:         CODE STATUS:    Code Status and Medical Interventions:   Ordered at: 02/02/22 0951     Level Of Support Discussed With:    Health Care Surrogate     Code Status (Patient has no pulse and is not breathing):    No CPR (Do Not Attempt to Resuscitate)     Medical Interventions (Patient has pulse or is breathing):    Comfort Measures     Comments:    Fadumo MAHMOOD       Future Appointments   Date Time Provider Department Center   5/12/2022  2:30 PM Shelly Johnson PA-C [facility]   8/8/2022 12:45 PM Edy Goodman MD [facility]   10/12/2022  1:30 PM Linda Tom MD [facility]       Additional Instructions for the Follow-ups that You Need to Schedule     Ambulatory Referral to Home Health   As directed      Face to Face Visit Date: 1/28/2022    Follow-up provider for Plan of Care?: I treated the patient in an acute care facility and will not continue treatment after discharge.    Follow-up provider: EDY GOODMAN [[id number]]    Reason/Clinical Findings: atrial fibrillation, basal cell carcinoma, essential hypertension, depression, type II diabetes, coronary artery disease, dementia,  history of stroke, questionable bladder cancer who presented to [facility] ED on 1/25/2022 with complaints of increased confusio    Describe mobility limitations that make leaving home difficult: impaired mobility, impaired ADLs, impaired gait/balance    Nursing/Therapeutic  Services Requested: Skilled Nursing Physical Therapy Occupational Therapy    Skilled nursing orders: Medication education Cardiopulmonary assessments Neurovascular assessments    PT orders: Transfer training Strengthening Therapeutic exercise Gait Training Electrical stimulation Total joint pathway Home safety assessment    Weight Bearing Status: As Tolerated    Occupational orders: Activities of daily living Energy conservation Strengthening Cognition Home safety assessment         Discharge Follow-up with PCP   As directed       Currently Documented PCP:    Edy Goodman MD    PCP Phone Number:    [phone]     Follow Up Details: follow up with pcp after dc from rehab         Discharge Follow-up with Specified Provider: palliataive and hospice to follow at rehab to help with GOC   As directed      To: palliataive and hospice to follow at rehab to help with GOC                     SOLOMON Smith  22      Time Spent on Discharge:  I spent  35  minutes on this discharge activity which included: face-to-face encounter with the patient, reviewing the data in the system, coordination of the care with the nursing staff as well as consultants, documentation, and entering orders.            Electronically signed by Kriss Acuña MD at 22 1342     Ebony Groves DO at 22 1311              [facility] Medicine Services  DISCHARGE SUMMARY    Patient Name: Leobardo Araujo  : 1934  MRN: [id number]    Date of Admission: 2022  4:05 PM  Date of Discharge:  2022  Primary Care Physician: Edy Goodman MD    Consults     Date and Time Order Name Status Description    2022  9:51 AM Inpatient Palliative Care MD Consult Completed     2022  2:14 PM Inpatient Neurology Consult General Completed     2022 12:23 AM Inpatient Infectious Diseases Consult Completed           Hospital Course     Presenting Problem:   Complicated UTI (urinary tract  infection) [N39.0]    Active Hospital Problems    Diagnosis  POA   • **Complicated UTI (urinary tract infection) [N39.0]  Yes   • Cytokine release syndrome, 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          Hospital Course:  Leobardo Araujo is a 87 y.o. male  with history of A. fib, basal cell carcinoma HTN, diabetes previous stroke, questionable bladder cancer admitted with increased confusion and weight loss.  Found to have UTI and treated with IV antibiotics, now on p.o. ampicillin per infectious disease.  Marta, positive for Covid but asymptomatic.  Patient declined remdesivir.  Patient with dementia compounded by encephalopathy during hospitalization.  Neurology consulted recommended to continue Namenda and Aricept.    Confusion, worsening dementia;  - Palliative care and Hospice following, currently comfort measures, plan to f/u at facility     Enterococcus UTI, complicated   -ID following, has now completed abx and they have signed off     Covid infection, asymptomatic  -Family refused remdesivir   -no treatment, now out of isolation     Encephalopathy, dementia  -With intermittent worsening confusion continue Namenda and Aricept  -Evaluated by neurology as well, guarded prognosis due to advanced dementia  -plan for hospice evaluation at facility     Diabetes, A1c 7.4%  -Continue SSI at discharge     PAF  -Eliquis was DC'd prior to admission  -Plan for comfort measures at discharge, poor candidate for anticoagulation due to severe dementia and fall risk, thus will not restart eliquis at discharge      Discharge Follow Up Recommendations for outpatient labs/diagnostics:  - f/u  with PCP or facility providers PRN  - plan palliative/hospice to follow-up at facility    Day of Discharge     HPI:   Patient resting in bed. Sleeping. No complaints. No issues overnight.    Review of Systems  Unable to obtain secondary to dementia    Vital Signs:   Temp:  [98.2 °F (36.8 °C)-99.7 °F (37.6 °C)] 98.4 °F (36.9 °C)  Heart Rate:  [79-92] 81  Resp:  [16-18] 18  BP: (104-124)/(64-78) 104/68      Physical Exam:  Constitutional: No acute distress, awake, alert  HENT: NCAT, mucous membranes moist  Respiratory: Clear to auscultation bilaterally, respiratory effort normal   Cardiovascular: RRR, no murmurs, rubs, or gallops  Gastrointestinal: Positive bowel sounds, soft, nontender, nondistended  Musculoskeletal: No bilateral ankle edema  Psychiatric: Appropriate affect, cooperative  Neurologic: drowsy/sleeping, demented, strength symmetric in all extremities, Cranial Nerves grossly intact to confrontation, speech clear  Skin: No rashes      Pertinent  and/or Most Recent Results     LAB RESULTS:                              Brief Urine Lab Results  (Last result in the past 365 days)      Color   Clarity   Blood   Leuk Est   Nitrite   Protein   CREAT   Urine HCG        01/26/22 0347 Yellow   Turbid   Large (3+)   Large (3+)   Negative   30 mg/dL (1+)               Microbiology Results (last 10 days)     ** No results found for the last 240 hours. **          CT Head Without Contrast    Result Date: 2/2/2022  EXAMINATION: CT HEAD WO CONTRAST-  INDICATION: Stroke follow-up; N39.0-Urinary tract infection, site not specified; R53.1-Weakness; R41.82-Altered mental status, unspecified; U07.1-COVID-19; R63.4-Abnormal weight loss; M62.81-Muscle weakness (generalized); N39.0-Urinary tract infection, site not specified  TECHNIQUE: Axial noncontrast CT of the head with multiplanar reconstruction  The radiation dose reduction device was turned on for each scan per the ALARA (As Low as Reasonably Achievable) protocol.   COMPARISON: 4/23/2018  FINDINGS: There is focal encephalomalacic change in the right frontal lobe compatible with chronic appearing infarct, however new at least since 2018 comparison. There is otherwise no evidence of intracranial hemorrhage, mass or mass effect. There are redemonstrated advanced age-related changes with generalized volume loss and typical hypoattenuating white matter changes of chronic small vessel ischemia. There is associated ex vacuo prominence of the lateral ventricles. The orbits are unremarkable and the paranasal sinuses demonstrate some mucosal thickening and small amount of aerated secretions in the right maxillary sinus and sphenoid chambers.      Right frontal lobe volume loss compatible with chronic appearing infarct, however new since 2018 comparison. Advanced age-related changes are otherwise stable from prior, without acute intracranial abnormality.  Mucosal thickening and aerated secretions in the right maxillary sinus and sphenoid chambers. Correlate with any clinical evidence of sinusitis.   This report was finalized on 2/2/2022 8:37 AM by Hugo Wood.                Results for orders placed during the hospital encounter of 05/06/21    Adult Transthoracic Echo Complete W/ Cont if Necessary Per Protocol    Interpretation Summary  · Estimated left ventricular EF = 70% Left ventricular systolic function is normal.  · Left ventricular wall thickness is consistent with concentric hypertrophy.  · Mild mitral valve stenosis is present.  · Mild dilation of the aortic root is present.  · Moderate pulmonic valve regurgitation is present.  · Estimated right ventricular systolic pressure from tricuspid regurgitation is normal (<35 mmHg).  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  · The right atrial cavity is dilated.  · Moderate mitral valve regurgitation is present.  · Mild aortic valve stenosis is present.  · Left atrial volume is severely increased.  · There is a prosthetic  valve in the aortic position mean gradient 14 mmHg with a peak of 24 consistent with borderline mild aortic stenosis  · There is an eccentric jet of MR which is at least moderate but very well may be moderate to severe.  · There is heavy calcification of the mitral valve leaflets with a mean gradient across the mitral valve 5 mmHg consistent with mild mitral stenosis      Plan for Follow-up of Pending Labs/Results:     Discharge Details        Discharge Medications      New Medications      Instructions Start Date   acetaminophen 325 MG tablet  Commonly known as: TYLENOL   650 mg, Oral, Every 4 Hours PRN      insulin lispro 100 UNIT/ML injection  Commonly known as: humaLOG   0-7 Units, Subcutaneous, 3 Times Daily Before Meals      QUEtiapine 25 MG tablet  Commonly known as: SEROquel   12.5 mg, Oral, Every 12 Hours PRN      saccharomyces boulardii 250 MG capsule  Commonly known as: FLORASTOR   250 mg, Oral, 2 Times Daily         Continue These Medications      Instructions Start Date   aspirin 81 MG tablet   Oral, Daily      atorvastatin 40 MG tablet  Commonly known as: LIPITOR   TAKE 1 TABLET EVERY DAY      donepezil 10 MG tablet  Commonly known as: ARICEPT   TAKE 1 TABLET EVERY DAY      famotidine 20 MG tablet  Commonly known as: PEPCID   TAKE 1 TABLET TWICE DAILY      memantine 10 MG tablet  Commonly known as: NAMENDA   10 mg, Oral, 2 Times Daily      mirtazapine 30 MG tablet  Commonly known as: Remeron   30 mg, Oral, Nightly      tamsulosin 0.4 MG capsule 24 hr capsule  Commonly known as: FLOMAX   TAKE 1 CAPSULE EVERY DAY         Stop These Medications    amLODIPine 5 MG tablet  Commonly known as: NORVASC     apixaban 5 MG tablet tablet  Commonly known as: Eliquis     finasteride 1 MG tablet  Commonly known as: PROPECIA     levothyroxine 50 MCG tablet  Commonly known as: SYNTHROID, LEVOTHROID     losartan 100 MG tablet  Commonly known as: COZAAR     metFORMIN 500 MG tablet  Commonly known as: GLUCOPHAGE             No Known Allergies      Discharge Disposition:  Rehab Facility or Unit (DC - External)    Diet:  Hospital:  Diet Order   Procedures   • Diet Dysphagia; IV - Mechanical Soft No Mixed Consistencies; Nectar / Syrup Thick; Cardiac, Consistent Carbohydrate       Activity:  Activity Instructions     Activity as Tolerated      Measure Blood Pressure            Restrictions or Other Recommendations:  - none       CODE STATUS:    Code Status and Medical Interventions:   Ordered at: 02/02/22 09     Level Of Support Discussed With:    Health Care Surrogate     Code Status (Patient has no pulse and is not breathing):    No CPR (Do Not Attempt to Resuscitate)     Medical Interventions (Patient has pulse or is breathing):    Comfort Measures     Comments:    Fadumo MAHMOOD       Future Appointments   Date Time Provider Department Center   5/12/2022  2:30 PM Shelly Johnson PA-C [facility]   8/8/2022 12:45 PM Edy Goodman MD [facility]   10/12/2022  1:30 PM Linda Tom MD [facility]       Additional Instructions for the Follow-ups that You Need to Schedule     Ambulatory Referral to Home Health   As directed      Face to Face Visit Date: 1/28/2022    Follow-up provider for Plan of Care?: I treated the patient in an acute care facility and will not continue treatment after discharge.    Follow-up provider: EDY GOODMAN [[id number]]    Reason/Clinical Findings: atrial fibrillation, basal cell carcinoma, essential hypertension, depression, type II diabetes, coronary artery disease, dementia,  history of stroke, questionable bladder cancer who presented to [facility] ED on 1/25/2022 with complaints of increased confusio    Describe mobility limitations that make leaving home difficult: impaired mobility, impaired ADLs, impaired gait/balance    Nursing/Therapeutic Services Requested: Skilled Nursing Physical Therapy Occupational Therapy    Skilled nursing orders: Medication education Cardiopulmonary  assessments Neurovascular assessments    PT orders: Transfer training Strengthening Therapeutic exercise Gait Training Electrical stimulation Total joint pathway Home safety assessment    Weight Bearing Status: As Tolerated    Occupational orders: Activities of daily living Energy conservation Strengthening Cognition Home safety assessment         Discharge Follow-up with PCP   As directed       Currently Documented PCP:    Edy Goodman MD    PCP Phone Number:    [phone]     Follow Up Details: follow up with pcp after dc from rehab         Discharge Follow-up with Specified Provider: palliataive and hospice to follow at rehab to help with GOC   As directed      To: palliataive and hospice to follow at rehab to help with GOC                     Ebony Groves DO  02/09/22      Time Spent on Discharge:  I spent  31  minutes on this discharge activity which included: face-to-face encounter with the patient, reviewing the data in the system, coordination of the care with the nursing staff as well as consultants, documentation, and entering orders.            Electronically signed by Ebony Groves DO at 02/09/22 6270

## 2022-02-09 NOTE — CASE MANAGEMENT/SOCIAL WORK
Case Management Discharge Note      Final Note: Patient has been accepted to [facility] and they can accept him today. Report to be called to [phone]. CM will fax DC summary to [phone]. Daughters have been updated on the dc plan - no other needs identified - dorinda [phone]         Selected Continued Care - Admitted Since 1/25/2022     Destination Coordination complete.    Service Provider Selected Services Address Phone Fax Patient Preferred    [facility]  Skilled Nursing [facility] -- -- --          Durable Medical Equipment    No services have been selected for the patient.              Dialysis/Infusion    No services have been selected for the patient.              Home Medical Care Coordination complete.    Service Provider Selected Services Address Phone Fax Patient Preferred    [facility]  [facility] [address] [id number] [phone] --          Therapy    No services have been selected for the patient.              Community [facility]    No services have been selected for the patient.              [facility]    [place] services have been selected for the patient.                  Transportation Services  Ambulance: [facility]    Final Discharge Disposition Code: 03 - skilled nursing facility (SNF)

## 2022-03-16 ENCOUNTER — TRANSITIONAL CARE MANAGEMENT TELEPHONE ENCOUNTER (OUTPATIENT)
Dept: CALL CENTER | Facility: HOSPITAL | Age: 87
End: 2022-03-16

## 2022-03-16 ENCOUNTER — READMISSION MANAGEMENT (OUTPATIENT)
Dept: CALL CENTER | Facility: HOSPITAL | Age: 87
End: 2022-03-16

## 2022-03-16 NOTE — OUTREACH NOTE
Call Center TCM Note    Flowsheet Row Responses   [facility] patient discharged from? Non-   Does the patient have one of the following disease processes/diagnoses(primary or secondary)? Other   TCM attempt successful? No   Unsuccessful attempts Attempt 2   Call Status Voice mail issues          Masha Sheriff RN    3/16/2022, 14:59 EDT

## 2022-03-16 NOTE — OUTREACH NOTE
Call Center TCM Note    Flowsheet Row Responses   [facility] patient discharged from? Non-BH   Does the patient have one of the following disease processes/diagnoses(primary or secondary)? Other   TCM attempt successful? No   Unsuccessful attempts Attempt 1          Masha Sheriff RN    3/16/2022, 14:23 EDT

## 2022-03-16 NOTE — OUTREACH NOTE
Prep Survey    Flowsheet Row Responses   [facility] discharged from? Non-BH   Is LACE score < 7 ? Non-BH Discharge   Emergency Room discharge w/ pulse ox? No   Eligibility [facility]    Date of Admission 02/09/22   Date of Discharge 03/15/22   Does the patient have one of the following disease processes/diagnoses(primary or secondary)? Other   Prep survey completed? Yes          VEENA DE LUNA - Registered Nurse

## 2022-03-17 ENCOUNTER — TRANSITIONAL CARE MANAGEMENT TELEPHONE ENCOUNTER (OUTPATIENT)
Dept: CALL CENTER | Facility: HOSPITAL | Age: 87
End: 2022-03-17

## 2022-03-17 ENCOUNTER — TELEPHONE (OUTPATIENT)
Dept: INTERNAL MEDICINE | Facility: CLINIC | Age: 87
End: 2022-03-17

## 2022-03-17 ENCOUNTER — HOME CARE VISIT (OUTPATIENT)
Dept: HOME HEALTH SERVICES | Facility: HOME HEALTHCARE | Age: 87
End: 2022-03-17

## 2022-03-17 ENCOUNTER — HOME HEALTH ADMISSION (OUTPATIENT)
Dept: HOME HEALTH SERVICES | Facility: HOME HEALTHCARE | Age: 87
End: 2022-03-17

## 2022-03-17 ENCOUNTER — TRANSCRIBE ORDERS (OUTPATIENT)
Dept: HOME HEALTH SERVICES | Facility: HOME HEALTHCARE | Age: 87
End: 2022-03-17

## 2022-03-17 DIAGNOSIS — E11.9 TYPE 2 DIABETES MELLITUS WITHOUT COMPLICATION, UNSPECIFIED WHETHER LONG TERM INSULIN USE: Primary | ICD-10-CM

## 2022-03-17 PROCEDURE — G0299 HHS/HOSPICE OF RN EA 15 MIN: HCPCS

## 2022-03-17 NOTE — OUTREACH NOTE
Call Center TCM Note    Flowsheet Row Responses   [facility] patient discharged from? Non-   Does the patient have one of the following disease processes/diagnoses(primary or secondary)? Other   TCM attempt successful? Yes   Call start time 1518   Call end time 1523   Meds reviewed with patient/caregiver? Yes   Is the patient having any side effects they believe may be caused by any medication additions or changes? No   Does the patient have all medications ordered at discharge? Yes   Is the patient taking all medications as directed (includes completed medication regime)? Yes   Does the patient have a primary care provider?  Yes   Does the patient have an appointment with their PCP within 7 days of discharge? No   What is preventing the patient from scheduling follow up appointments within 7 days of discharge? --  [Daughter did not want to get pt out. She is going to talk to  regarding a dr to come to the house]   Nursing Interventions Educated patient on importance of making appointment, Advised patient to make appointment   Has the patient kept scheduled appointments due by today? N/A   What is the Home health agency?  [facility]   Has home health visited the patient within 72 hours of discharge? Yes   Psychosocial issues? No   Did the patient receive a copy of their discharge instructions? Yes   Nursing interventions Reviewed instructions with patient   What is the patient's perception of their health status since discharge? Improving   Is the patient/caregiver able to teach back signs and symptoms related to disease process for when to call PCP? Yes   Is the patient/caregiver able to teach back signs and symptoms related to disease process for when to call 911? Yes   Is the patient/caregiver able to teach back the hierarchy of who to call/visit for symptoms/problems? PCP, Specialist, Home health nurse, Urgent Care, ED, 911 Yes   TCM call completed? Yes          DEAN LIM RN    3/17/2022, 15:24  EDT

## 2022-03-17 NOTE — TELEPHONE ENCOUNTER
Caller: JOSE A     Relationship: [facility]    Best call back number: [phone]    What was the call regarding: JOSE A WITH [facility] STATES THAT THEY WILL BE GOING TO SEE THE PATIENT TODAY.

## 2022-03-18 ENCOUNTER — TELEPHONE (OUTPATIENT)
Dept: INTERNAL MEDICINE | Facility: CLINIC | Age: 87
End: 2022-03-18

## 2022-03-18 VITALS
RESPIRATION RATE: 16 BRPM | TEMPERATURE: 97.4 F | SYSTOLIC BLOOD PRESSURE: 120 MMHG | BODY MASS INDEX: 21.48 KG/M2 | HEART RATE: 64 BPM | DIASTOLIC BLOOD PRESSURE: 70 MMHG | WEIGHT: 145 LBS | OXYGEN SATURATION: 97 % | HEIGHT: 69 IN

## 2022-03-18 NOTE — TELEPHONE ENCOUNTER
Caller: JOSE A    Relationship: HOME HEALTH     Best call back number: [phone]    What orders are you requesting (i.e. lab or imaging): EXTENSION OF PHYSICAL THERAPY AND SPEECH FOR NEXT Wednesday 03/23/22.    HOSPITAL BED WITH TWO RAILS AND AN OVERLAY FOR THE MATTRESS. THIS CAN BE FAXED [phone] AT [facility]    In what timeframe would the patient need to come in: NA

## 2022-03-18 NOTE — TELEPHONE ENCOUNTER
Left voicemail for daughter Addie Araujo [phone] to call office to schedule a video visit with Dr. Goodman (30 minutes)

## 2022-03-18 NOTE — HOME HEALTH
Pt is an 88 yo white male admitted to [facility] for services of SN/PT/OT/SLP/HHA due to recent hospitalization with bad UTI per son, then went to [facility] for therapy and came home Monday night, 3/14/22. CG stated that pt did live alone and was very independent, but son noticed that pt was staying in bed all the time and he decided to take pt to the ER. Pt is now living at his daughters house, [facility], who assist pt as needed. Pt has a hospital bed and CG assist pt to ambulate to the bathroom and ambulates in house with walker and assist x1 due to increased weakness.

## 2022-03-19 DIAGNOSIS — G30.9 ALZHEIMER'S DEMENTIA WITHOUT BEHAVIORAL DISTURBANCE, UNSPECIFIED TIMING OF DEMENTIA ONSET: ICD-10-CM

## 2022-03-19 DIAGNOSIS — R62.7 FAILURE TO THRIVE IN ADULT: ICD-10-CM

## 2022-03-19 DIAGNOSIS — R26.81 UNSTEADY GAIT: ICD-10-CM

## 2022-03-19 DIAGNOSIS — M62.81 GENERALIZED MUSCLE WEAKNESS: ICD-10-CM

## 2022-03-19 DIAGNOSIS — F02.80 ALZHEIMER'S DEMENTIA WITHOUT BEHAVIORAL DISTURBANCE, UNSPECIFIED TIMING OF DEMENTIA ONSET: ICD-10-CM

## 2022-03-19 DIAGNOSIS — E11.59 TYPE 2 DIABETES MELLITUS WITH OTHER CIRCULATORY COMPLICATIONS: Primary | ICD-10-CM

## 2022-03-21 ENCOUNTER — HOME CARE VISIT (OUTPATIENT)
Dept: HOME HEALTH SERVICES | Facility: HOME HEALTHCARE | Age: 87
End: 2022-03-21

## 2022-03-21 PROCEDURE — G0152 HHCP-SERV OF OT,EA 15 MIN: HCPCS

## 2022-03-22 ENCOUNTER — HOME CARE VISIT (OUTPATIENT)
Dept: HOME HEALTH SERVICES | Facility: HOME HEALTHCARE | Age: 87
End: 2022-03-22

## 2022-03-22 ENCOUNTER — TELEPHONE (OUTPATIENT)
Dept: INTERNAL MEDICINE | Facility: CLINIC | Age: 87
End: 2022-03-22

## 2022-03-22 VITALS
HEART RATE: 79 BPM | DIASTOLIC BLOOD PRESSURE: 76 MMHG | SYSTOLIC BLOOD PRESSURE: 126 MMHG | RESPIRATION RATE: 18 BRPM | OXYGEN SATURATION: 96 % | TEMPERATURE: 95.9 F

## 2022-03-22 PROCEDURE — G0300 HHS/HOSPICE OF LPN EA 15 MIN: HCPCS

## 2022-03-22 PROCEDURE — G0156 HHCP-SVS OF AIDE,EA 15 MIN: HCPCS

## 2022-03-22 PROCEDURE — G0151 HHCP-SERV OF PT,EA 15 MIN: HCPCS

## 2022-03-23 VITALS
DIASTOLIC BLOOD PRESSURE: 70 MMHG | RESPIRATION RATE: 20 BRPM | SYSTOLIC BLOOD PRESSURE: 118 MMHG | OXYGEN SATURATION: 97 % | TEMPERATURE: 97.1 F | HEART RATE: 79 BPM

## 2022-03-23 VITALS
DIASTOLIC BLOOD PRESSURE: 70 MMHG | RESPIRATION RATE: 16 BRPM | HEART RATE: 67 BPM | TEMPERATURE: 96.7 F | OXYGEN SATURATION: 98 % | SYSTOLIC BLOOD PRESSURE: 120 MMHG

## 2022-03-23 NOTE — HOME HEALTH
Pt is an 88 yo male who was recently hospitalized 1/25-2/9/22 due to a UTI, then went to subacute rehab 2/9-3/16/22 following hospitalization.  Pt was previously living independently in his own home but has now moved in with his daughter.  Pt has a hospital bed, a FWW and a 4WW.  Pt currently requires CGA/min A for functional mobility in home.  Plan to see pt 1wk1, 2wk2, 1wk4 for HHPT

## 2022-03-23 NOTE — HOME HEALTH
Pt is a 88 yo male who has moved into [facility]'s home following hospitalization + rehab w/ UTI and PMH . Pt has 1 step to enter from garage to den area and 1 step up into his living area. Pt has a hospital bed, versa frame for commode, shower chair and RW. Pt/family ed on use of equipment, safety in the home, benefit from chair w/ arms to sit for meals or recliner vs current chair that is low and w/o arms. Safety ed for removal of rugs- son picked up and moved items in bathroom to increase access to commode/walk in shower, recommend grab bar for shower transfer and BSC over toilet to increase ht. Family is very supportive.

## 2022-03-24 ENCOUNTER — TELEMEDICINE (OUTPATIENT)
Dept: INTERNAL MEDICINE | Facility: CLINIC | Age: 87
End: 2022-03-24

## 2022-03-24 DIAGNOSIS — F02.80 LATE ONSET ALZHEIMER'S DEMENTIA WITHOUT BEHAVIORAL DISTURBANCE: Chronic | ICD-10-CM

## 2022-03-24 DIAGNOSIS — G30.1 LATE ONSET ALZHEIMER'S DEMENTIA WITHOUT BEHAVIORAL DISTURBANCE: Chronic | ICD-10-CM

## 2022-03-24 DIAGNOSIS — R26.81 UNSTEADY GAIT: ICD-10-CM

## 2022-03-24 DIAGNOSIS — E11.59 TYPE 2 DIABETES MELLITUS WITH OTHER CIRCULATORY COMPLICATIONS: ICD-10-CM

## 2022-03-24 DIAGNOSIS — N39.0 COMPLICATED UTI (URINARY TRACT INFECTION): Primary | ICD-10-CM

## 2022-03-24 PROCEDURE — G0180 MD CERTIFICATION HHA PATIENT: HCPCS | Performed by: INTERNAL MEDICINE

## 2022-03-24 PROCEDURE — 99214 OFFICE O/P EST MOD 30 MIN: CPT | Performed by: INTERNAL MEDICINE

## 2022-03-24 PROCEDURE — 1111F DSCHRG MED/CURRENT MED MERGE: CPT | Performed by: INTERNAL MEDICINE

## 2022-03-24 NOTE — PROGRESS NOTES
Chief Complaint   Patient presents with   • Hospital Follow Up Visit   This was an audio and video enabled telemedicine encounter.This visit was carried out via [employer] video with daughter and patient and they gave yes to performing it and was done without issues.     History of Present Illness  87 y.o. male presents for follow up from [facility] from 1/25/2022 to 2/9/2022 and then rehab [facility] 2/9/2022 to 3/15/2022 of UTI with cytokine release syndrome weakness.He was given IV antibiotics per ID and got better. He was recommended to stay on namenda and aricept. And then transferred to for rehab for weakness and confusion.    Review of Systems   Musculoskeletal: Positive for gait problem.   Psychiatric/Behavioral: Positive for decreased concentration and dysphoric mood.     .    PMSFH:  The following portions of the patient's history were reviewed and updated as appropriate: allergies, current medications, past family history, past medical history, past social history, past surgical history and problem list.      Current Outpatient Medications:   •  acetaminophen (TYLENOL) 325 MG tablet, Take 2 tablets by mouth Every 4 (Four) Hours As Needed for Mild Pain ., Disp: , Rfl:   •  aspirin 81 MG chewable tablet, Chew 81 mg Daily., Disp: , Rfl:   •  donepezil (ARICEPT) 10 MG tablet, TAKE 1 TABLET EVERY DAY, Disp: 90 tablet, Rfl: 1  •  famotidine (PEPCID) 20 MG tablet, TAKE 1 TABLET TWICE DAILY, Disp: 180 tablet, Rfl: 1  •  mirtazapine (Remeron) 30 MG tablet, Take 1 tablet by mouth Every Night., Disp: 30 tablet, Rfl: 11  •  Misc. Devices (Commode Bedside) misc, He needs bedside commode due to fall risk and episodes urinary urgency, Disp: 1 each, Rfl: 0  •  tamsulosin (FLOMAX) 0.4 MG capsule 24 hr capsule, TAKE 1 CAPSULE EVERY DAY, Disp: 90 capsule, Rfl: 1    VITALS:  There were no vitals taken for this visit.    Physical Exam  Neurological:      Mental Status: He is alert.      Comments:  Awake and alert and able to get up with assistance of 2 care givers and walk with walker    Psychiatric:         Mood and Affect: Mood normal.         LABS:      Procedures       Current outpatient and discharge medications have been reconciled for the patient.  Reviewed by: Edy Goodman MD  ASSESSMENT/PLAN:  Problems Addressed this Visit        Endocrine and Metabolic    Type 2 diabetes mellitus with other circulatory complications (HCC)     Monitor without medications and will follow up in 2 months or sooner if sugars start going >200              Genitourinary and Reproductive     Complicated UTI (urinary tract infection) - Primary     87 y.o. male presents for follow up from [facility] from 1/25/2022 to 2/9/2022 and then rehab [facility] 2/9/2022 to 3/15/2022 of UTI with cytokine release syndrome weakness.He was given IV antibiotics per ID and got better. He was recommended to stay on namenda and aricept. And then transferred to for rehab for weakness and confusion.I have gone over all his medications and his hospital and rehab stays with he and his daughter and they voiced understanding. He is getting Home Health with [facility] for PT, OT and nursing and nurse aide for bathing. He is doing ok on only fraction of his medications and by report blood pressure and blood sugar ok with systolic's < 140 and blood sugars 150's or less. He has gotten up at night and concern for fall and will need portable bedside commode. He is high risk for another ER stay with his numerous problems and now on less medications but hopefully his extended rehab and home health and very attentive daughter will reduce that risk.            Relevant Medications    Misc. Devices (Commode Bedside) misc       Neuro    Dementia without behavioral disturbance (HCC) (Chronic)     He is doing better and doing ok without medications and encouraged to resume medications He needs bedside commode due to fall risk and  episodes urinary urgency            Relevant Medications    Misc. Devices (Commode Bedside) misc       Symptoms and Signs    Unsteady gait     Encourage to get up slowly and get bearings before taking first step. Keep home well lit with clear floors to avoid tripping. Use assist devices for all walking especially from bed to bathroom. PT and get home portable toilet           Relevant Medications    Misc. 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      Electronically signed by:    Edy Goodman MD

## 2022-03-25 ENCOUNTER — HOME CARE VISIT (OUTPATIENT)
Dept: HOME HEALTH SERVICES | Facility: HOME HEALTHCARE | Age: 87
End: 2022-03-25

## 2022-03-25 ENCOUNTER — TELEPHONE (OUTPATIENT)
Dept: INTERNAL MEDICINE | Facility: CLINIC | Age: 87
End: 2022-03-25

## 2022-03-25 VITALS
TEMPERATURE: 97.2 F | SYSTOLIC BLOOD PRESSURE: 126 MMHG | RESPIRATION RATE: 22 BRPM | HEART RATE: 76 BPM | DIASTOLIC BLOOD PRESSURE: 76 MMHG

## 2022-03-25 VITALS
HEART RATE: 77 BPM | TEMPERATURE: 98.6 F | OXYGEN SATURATION: 94 % | SYSTOLIC BLOOD PRESSURE: 130 MMHG | DIASTOLIC BLOOD PRESSURE: 86 MMHG | RESPIRATION RATE: 16 BRPM

## 2022-03-25 PROCEDURE — G0153 HHCP-SVS OF S/L PATH,EA 15MN: HCPCS

## 2022-03-25 NOTE — CASE COMMUNICATION
Patient will not require a nursing visit on 3. 25.22    Pts daughter states a nurse made a visit earlier this week and there have been many other homecare professionals making visits this week.  She does not feel a second nursing visit is needed today.

## 2022-03-25 NOTE — ASSESSMENT & PLAN NOTE
He is doing better and doing ok without medications and encouraged to resume medications He needs bedside commode due to fall risk and episodes urinary urgency

## 2022-03-25 NOTE — ASSESSMENT & PLAN NOTE
Encourage to get up slowly and get bearings before taking first step. Keep home well lit with clear floors to avoid tripping. Use assist devices for all walking especially from bed to bathroom. PT and get home portable toilet

## 2022-03-25 NOTE — ASSESSMENT & PLAN NOTE
87 y.o. male presents for follow up from [facility] from 1/25/2022 to 2/9/2022 and then rehab [facility] 2/9/2022 to 3/15/2022 of UTI with cytokine release syndrome weakness.He was given IV antibiotics per ID and got better. He was recommended to stay on namenda and aricept. And then transferred to for rehab for weakness and confusion.I have gone over all his medications and his hospital and rehab stays with he and his daughter and they voiced understanding. He is getting Home Health with [facility] [place] for PT, OT and nursing and nurse aide for bathing. He is doing ok on only fraction of his medications and by report blood pressure and blood sugar ok with systolic's < 140 and blood sugars 150's or less. He has gotten up at night and concern for fall and will need portable bedside commode. He is high risk for another ER stay with his numerous problems and now on less medications but hopefully his extended rehab and home health and very attentive daughter will reduce that risk.

## 2022-03-25 NOTE — TELEPHONE ENCOUNTER
Pharmacy Name: [facility] [address] [place] [address] [address] [facility] - [phone] [facility] [phone]      Pharmacy representative name: LYNDA     Pharmacy representative phone number: [phone]    What medication are you calling in regards to: Misc. Devices (Commode Bedside) misc    What question does the pharmacy have: PHARMACY IS CALLING BECAUSE THEY ARE UNABLE TO FILL PRESCRIPTIONS FOR DME    Who is the provider that prescribed the medication: DR VIVEROS

## 2022-03-28 ENCOUNTER — HOME CARE VISIT (OUTPATIENT)
Dept: HOME HEALTH SERVICES | Facility: HOME HEALTHCARE | Age: 87
End: 2022-03-28

## 2022-03-28 ENCOUNTER — TELEPHONE (OUTPATIENT)
Dept: INTERNAL MEDICINE | Facility: CLINIC | Age: 87
End: 2022-03-28

## 2022-03-28 NOTE — TELEPHONE ENCOUNTER
Nurse with hospice called to speak with someone. To see if patient is appropriate for Hospice .  Masha her call back number [phone]

## 2022-03-28 NOTE — TELEPHONE ENCOUNTER
Masha notified  is OOO this week so she is going to have hospice doctor evaluate him and maybe reevaluate next week when  is back.

## 2022-03-29 ENCOUNTER — PATIENT OUTREACH (OUTPATIENT)
Dept: CASE MANAGEMENT | Facility: OTHER | Age: 87
End: 2022-03-29

## 2022-03-29 ENCOUNTER — HOME CARE VISIT (OUTPATIENT)
Dept: HOME HEALTH SERVICES | Facility: HOME HEALTHCARE | Age: 87
End: 2022-03-29

## 2022-03-29 ENCOUNTER — TELEPHONE (OUTPATIENT)
Dept: INTERNAL MEDICINE | Facility: CLINIC | Age: 87
End: 2022-03-29

## 2022-03-29 PROCEDURE — G0299 HHS/HOSPICE OF RN EA 15 MIN: HCPCS

## 2022-03-29 NOTE — TELEPHONE ENCOUNTER
Caller: Addie Araujo    Relationship to patient: Emergency Contact    Best call back number: [phone]    What is the call regarding:  PATIENT'S DAIUGHTER WOULD LIKE TO TALK WITH DR VIVEROS AS SOON AS POSSIBLE.

## 2022-03-29 NOTE — OUTREACH NOTE
"AMBULATORY CASE MANAGEMENT NOTE    Name and Relationship of Patient/Support Person: Addie Araujo - Emergency Contact    Adult Patient Profile  Questions/Answers    Flowsheet Row Most Recent Value   Equipment Currently Used at Home walker, standard, shower chair  [hospital bed]   People in Home child(juan ramon), adult  [pt lives with daughter]   Current Living Arrangements home        Patient Outreach    Addie Amado returned call from message left by RNSTEFFANIE earlier today. Role of Ambulatory Nurse  explained.   States he is \"doing pretty good.  He is up getting ready to eat lunch right now.\"  She states he lives with her and she is his transportation, however does not really plan on getting him out any time soon.  Had telemedicine visit with PCP 3/24/22.  Addie manages his medicines and reports pt is compliant.  He ambulates with walker.  He has [facility] for nursing, PT, OT and aid to assist with bathing.  They do have hospital bed, however not pleased with it.  States the side rails are too long and hinders getting him out of bed. [facility] health was going to check on this.  RN-CHITRAM will call to check.  She does state that Hospice came yest to assess, however has not heard if he meets criteria.  [facility] 24/7 Nurse Call Center number explained and number provided.  Denies any needs that RNVIJAYAM could assist her with today.      Care Coordination    [facility] notified.  Talked with SUYAPA Joseph.  She will send message to check on the hospital bed     SDOH updated and reviewed with the patient during this program:  Financial Resource Strain: Low Risk    • Difficulty of Paying Living Expenses: Not very hard      Food Insecurity: No Food Insecurity   • Worried About Running Out of Food in the Last Year: Never true   • Ran Out of Food in the Last Year: Never true      Transportation Needs: No Transportation Needs   • Lack of Transportation (Medical): No   • Lack of Transportation (Non-Medical): No "      Housing Stability: Low Risk    • Unable to Pay for Housing in the Last Year: No   • Number of Places Lived in the Last Year: 1   • Unstable Housing in the Last Year: No       EUNICE CANCHOLA  Ambulatory Case Management    3/29/2022, 15:21 EDT

## 2022-03-30 ENCOUNTER — HOME CARE VISIT (OUTPATIENT)
Dept: HOME HEALTH SERVICES | Facility: HOME HEALTHCARE | Age: 87
End: 2022-03-30

## 2022-03-30 VITALS
HEART RATE: 64 BPM | TEMPERATURE: 97.7 F | DIASTOLIC BLOOD PRESSURE: 74 MMHG | SYSTOLIC BLOOD PRESSURE: 118 MMHG | RESPIRATION RATE: 18 BRPM | OXYGEN SATURATION: 96 %

## 2022-03-30 NOTE — HOME HEALTH
Nursing Visit: Scheduled visit previous night with patients daughter, Fadumo, for time 1:00-1:30 arrival.  After multiple attempts knocking at front door there was no answer.  I then called Fadumo.  She was upset that no one was coming to the door, and said her father was bedfast and that someone was to be with him at all times, and further referred that she had talked with her sister about this.  Parked in the circular driveway  was a blue car.  She asked me to go to the back of the house and check on her father, as that was where he stayed, in a room near the [facility]. I entered the gate and went to the back of the house, There were 2 doors, one set of sliding doors, and one regular door.  Both were unlocked, however the sliders entered into the dining area, and one of the dogs inside was aggresive towards me.  I knocked at the regular door and is was also unlocked, The patient was laying in a hospital type bed immediately inside this room, with a TV going very loud.  I  stepped in side the doorway, spoke to him and he woke up.  I introduced myself to him and he did acknowledge me.  I stepped to the doorway that entered the hallway and yelled several times to see if anyone else was at home.  No answer back.  I was unable to go further because of the very aggitated dog. I returned a call to Fadumo.  I had been under the impression that this was the home of Fadumo, and she had hired someone to care for her father, ( thinking the car in driveway, belonged to this person).  After further conversation with his daughter, Fadumo, she informed me that this was her sisters house, and she was going to call her.  I proceeded to care for the patient, I called my nursing supervisor, Monica, to discuss the situation.  After speaking with her, I called APS to report the situation, of a bed bound patient at home unattended.  I filed an official report with APS ( case # [id number]), completed my nursing assessment, offerd the patient a  drink of water that was on his bedside table, he declined any food, and I checked to see if he had any need toileting assistance.   I returned a call to daughter, Fadumo.  She said she had also spoke with APS, and they advised her to call the police department for assistance.  She said she had called and they were would come to do a wellness check.

## 2022-03-31 ENCOUNTER — HOME CARE VISIT (OUTPATIENT)
Dept: HOME HEALTH SERVICES | Facility: HOME HEALTHCARE | Age: 87
End: 2022-03-31

## 2022-03-31 VITALS
RESPIRATION RATE: 16 BRPM | SYSTOLIC BLOOD PRESSURE: 134 MMHG | TEMPERATURE: 98.6 F | DIASTOLIC BLOOD PRESSURE: 82 MMHG | HEART RATE: 89 BPM | OXYGEN SATURATION: 94 %

## 2022-03-31 PROCEDURE — G0153 HHCP-SVS OF S/L PATH,EA 15MN: HCPCS

## 2022-03-31 NOTE — HOME HEALTH
Therapy interventions focused on word retrieval and speech intelligibility in order to improve pt's ability to express his thoughts and ideas.  ESPAM utilized to assess overall speech intelligibility with a score of XX%.  During session SLP noted pt. to cough and throat clear during meals.  Daughter not home at the time- will discuss with her risk of aspiration and s/s of aspiration during next visit.  Next visit will also cover clinical bedside swallow exam as initial exam swallow function was reported as normal.

## 2022-04-01 ENCOUNTER — HOME CARE VISIT (OUTPATIENT)
Dept: HOME HEALTH SERVICES | Facility: HOME HEALTHCARE | Age: 87
End: 2022-04-01

## 2022-04-01 VITALS
HEART RATE: 56 BPM | RESPIRATION RATE: 16 BRPM | TEMPERATURE: 98.6 F | SYSTOLIC BLOOD PRESSURE: 112 MMHG | DIASTOLIC BLOOD PRESSURE: 58 MMHG | OXYGEN SATURATION: 96 %

## 2022-04-01 PROCEDURE — G0157 HHC PT ASSISTANT EA 15: HCPCS

## 2022-04-01 PROCEDURE — G0152 HHCP-SERV OF OT,EA 15 MIN: HCPCS

## 2022-04-02 ENCOUNTER — NURSE TRIAGE (OUTPATIENT)
Dept: CALL CENTER | Facility: HOSPITAL | Age: 87
End: 2022-04-02

## 2022-04-02 NOTE — TELEPHONE ENCOUNTER
Reviewed guideline with caller, states they do have HH for pt. Caller given phone number for [facility] to contact HHN for evaluation of pt confusion and cloudy urine. Caller agrees to call HH nurse to evaluate pt. Advised, if HH cannot come out today he needs to be evaluated within 4 hours for the confusion. Caller agrees to follow care advice.     Reason for Disposition  • All other urine symptoms  • [1] Acting confused (e.g., disoriented, slurred speech) AND [2] brief (now gone)    Additional Information  • Negative: Shock suspected (e.g., cold/pale/clammy skin, too weak to stand, low BP, rapid pulse)  • Negative: Sounds like a life-threatening emergency to the triager  • Negative: Followed a female genital area injury (e.g., vagina, vulva)  • Negative: Followed a male genital area injury (e.g., penis, scrotum)  • Negative: Vaginal discharge  • Negative: Pus (white, yellow) or bloody discharge from end of penis  • Negative: [1] Taking antibiotic for urinary tract infection (UTI) AND [2] female  • Negative: [1] Taking antibiotic for urinary tract infection (UTI) AND [2] male  • Negative: [1] Discomfort (pain, burning or stinging) when passing urine AND [2] pregnant  • Negative: [1] Discomfort (pain, burning or stinging) when passing urine AND [2] postpartum (from 0 to 6 weeks after delivery)  • Negative: [1] Discomfort (pain, burning or stinging) when passing urine AND [2] female  • Negative: [1] Discomfort (pain, burning or stinging) when passing urine AND [2] male  • Negative: Pain or itching in the vulvar area  • Negative: Pain in scrotum is main symptom  • Negative: Blood in the urine is main symptom  • Negative: Symptoms arising from use of a urinary catheter (Zamora or Coude)  • Negative: [1] Unable to urinate (or only a few drops) > 4 hours AND [2] bladder feels very full (e.g., palpable bladder or strong urge to urinate)  • Negative: [1] Decreased urination and [2] drinking very little AND [2] dehydration  suspected (e.g., dark urine, no urine > 12 hours, very dry mouth, very lightheaded)  • Negative: Patient sounds very sick or weak to the triager  • Negative: Fever > 100.4 F (38.0 C)  • Negative: Side (flank) or lower back pain present  • Negative: [1] Can't control passage of urine (i.e., urinary incontinence) AND [2] new-onset (< 2 weeks) or worsening  • Negative: Urinating more frequently than usual (i.e., frequency)  • Negative: Bad or foul-smelling urine  • Negative: [1] Can't control passage of urine (i.e., urinary incontinence, wetting self) AND [2] present > 2 weeks  • Negative: Urination is difficult to start (i.e., hesitancy) or straining  • Negative: Dribbling (losing urine) just after finishing urination (i.e., post-void dribbling)  • Negative: Has to get out of bed to urinate > 2 times a night (i.e., nocturia)  • Negative: [1] Difficult to awaken or acting confused (e.g., disoriented, slurred speech) AND [2] present now AND [3] diabetes mellitus  • Negative: [1] Difficult to awaken or acting confused (e.g., disoriented, slurred speech) AND [2] present now AND [3] new-onset  • Negative: [1] Weakness of the face, arm, or leg on one side of the body AND [2] new-onset  • Negative: [1] Numbness of the face, arm, or leg on one side of the body AND [2] new-onset  • Negative: [1] Loss of speech or garbled speech AND [2] new-onset  • Negative: Difficulty breathing or bluish lips  • Negative: Shock suspected (e.g., cold/pale/clammy skin, too weak to stand, low BP, rapid pulse)  • Negative: Seeing, hearing, or feeling things that are not there (i.e., visual, auditory, or tactile hallucinations)  • Negative: Followed a head injury  • Negative: Drug overdose suspected  • Negative: Sounds like a life-threatening emergency to the triager  • Negative: Questions or concerns about alcohol use, unhealthy alcohol use, binge drinking, intoxication, or withdrawal  • Negative: Questions or concerns about substance use (drug  "use), unhealthy drug use, intoxication, or withdrawal  • Negative: [1] Diabetes mellitus AND [2] confusion from low blood sugar (i.e., < 60 mg/dl or 3.5 mmol/l)  • Negative: Headache or vomiting  • Negative: Stiff neck (can't touch chin to chest)  • Negative: Very strange or paranoid behavior  • Negative: Fever > 100.4 F (38.0 C)  • Negative: Patient sounds very sick or weak to the triager    Answer Assessment - Initial Assessment Questions  1. SYMPTOM: \"What's the main symptom you're concerned about?\" (e.g., frequency, incontinence)      Cloudy urine, normally incontinent   2. ONSET: \"When did the  Cloudy urine  start?\"      Last several days  3. PAIN: \"Is there any pain?\" If Yes, ask: \"How bad is it?\" (Scale: 1-10; mild, moderate, severe)      no  4. CAUSE: \"What do you think is causing the symptoms?\"      Possible UTI  5. OTHER SYMPTOMS: \"Do you have any other symptoms?\" (e.g., fever, flank pain, blood in urine, pain with urination)      confusion  6. PREGNANCY: \"Is there any chance you are pregnant?\" \"When was your last menstrual period?\"      na    Answer Assessment - Initial Assessment Questions  1. LEVEL OF CONSCIOUSNESS: \"How is he (she, the patient) acting right now?\" (e.g., alert-oriented, confused, lethargic, stuporous, comatose)      Confused,  2. ONSET: \"When did the confusion start?\"  (minutes, hours, days)      Noticed a little bit yesterday   3. PATTERN \"Does this come and go, or has it been constant since it started?\"  \"Is it present now?\"      constant  4. ALCOHOL or DRUGS: \"Has he been drinking alcohol or taking any drugs?\"       no  5. NARCOTIC MEDICATIONS: \"Has he been receiving any narcotic medications?\" (e.g., morphine, Vicodin)      no  6. CAUSE: \"What do you think is causing the confusion?\"       Possible UTI  7. OTHER SYMPTOMS: \"Are there any other symptoms?\" (e.g., difficulty breathing, headache, fever, weakness)      Cloudy urine    Protocols used: URINARY SYMPTOMS-ADULT-AH, CONFUSION - " DELIRIUM-ADULT-AH

## 2022-04-03 VITALS
DIASTOLIC BLOOD PRESSURE: 70 MMHG | HEART RATE: 60 BPM | SYSTOLIC BLOOD PRESSURE: 120 MMHG | RESPIRATION RATE: 16 BRPM | OXYGEN SATURATION: 93 % | TEMPERATURE: 97.4 F

## 2022-04-03 NOTE — HOME HEALTH
OT focus on functional transfers w/ pushing up from surface and reaching back to sit. Pt needs max assist w/ vc/tactile cues. Balance task w/ grooming tasks at bathroom sink. Recommendations provided to increase safety in home.    Plan for the next visit: ADL retraining, functional transfes, home safety

## 2022-04-04 ENCOUNTER — HOME CARE VISIT (OUTPATIENT)
Dept: HOME HEALTH SERVICES | Facility: HOME HEALTHCARE | Age: 87
End: 2022-04-04

## 2022-04-04 ENCOUNTER — TELEPHONE (OUTPATIENT)
Dept: INTERNAL MEDICINE | Facility: CLINIC | Age: 87
End: 2022-04-04

## 2022-04-04 ENCOUNTER — OUTSIDE FACILITY SERVICE (OUTPATIENT)
Dept: INTERNAL MEDICINE | Facility: CLINIC | Age: 87
End: 2022-04-04

## 2022-04-04 VITALS
HEART RATE: 76 BPM | DIASTOLIC BLOOD PRESSURE: 60 MMHG | OXYGEN SATURATION: 93 % | SYSTOLIC BLOOD PRESSURE: 110 MMHG | RESPIRATION RATE: 16 BRPM | TEMPERATURE: 97.9 F

## 2022-04-04 PROCEDURE — OUTSIDEPOS PR OUTSIDE POS PLACEHOLDER: Performed by: INTERNAL MEDICINE

## 2022-04-04 PROCEDURE — G0156 HHCP-SVS OF AIDE,EA 15 MIN: HCPCS

## 2022-04-04 PROCEDURE — G0157 HHC PT ASSISTANT EA 15: HCPCS

## 2022-04-04 NOTE — TELEPHONE ENCOUNTER
I called and talked to her and he has had some confusion and is urine has been cloudy. He took a dose of cipro this am. I explained that we do not even know he has a UTI and history of complicated UTI and we agreed he will get urinalysis and culture end of this week and then determine plan of action and hold cipro now and go to ER if worsens. Please have HH get a clean catch urinalysis and culture 4/7 or 4/8/2022 for UTI

## 2022-04-04 NOTE — TELEPHONE ENCOUNTER
Notified daughter no allergies are listed on his chart. She said his urine is real cloudy and has some confusion. The  nurse was sending in some cipro. Daughter would like to talk to Dr. Goodman. Please call Addie at [phone]

## 2022-04-04 NOTE — HOME HEALTH
Physical therapy addressed transfers, bed mobility and gait today.  Plan to establish HEP if appropriate next visit.

## 2022-04-04 NOTE — HOME HEALTH
Physical therapy addressed gait and transfer training, education w/ caregiver on importance of frequent position changes and sittnig up out of bed regularly.  Plan to work on standing balance next visit.

## 2022-04-05 VITALS
DIASTOLIC BLOOD PRESSURE: 66 MMHG | TEMPERATURE: 96.8 F | HEART RATE: 84 BPM | RESPIRATION RATE: 22 BRPM | SYSTOLIC BLOOD PRESSURE: 124 MMHG

## 2022-04-06 ENCOUNTER — HOME CARE VISIT (OUTPATIENT)
Dept: HOME HEALTH SERVICES | Facility: HOME HEALTHCARE | Age: 87
End: 2022-04-06

## 2022-04-06 ENCOUNTER — TELEPHONE (OUTPATIENT)
Dept: INTERNAL MEDICINE | Facility: CLINIC | Age: 87
End: 2022-04-06

## 2022-04-06 PROCEDURE — G0152 HHCP-SERV OF OT,EA 15 MIN: HCPCS

## 2022-04-06 NOTE — TELEPHONE ENCOUNTER
JOSE A REQUESTING ORDERS TO EXTEND PHYSICAL THERAPY FOR 1 TIME PER WEEK FOR 4 WEEKS    CALL BACK [phone]

## 2022-04-07 ENCOUNTER — HOME CARE VISIT (OUTPATIENT)
Dept: HOME HEALTH SERVICES | Facility: HOME HEALTHCARE | Age: 87
End: 2022-04-07

## 2022-04-07 VITALS
SYSTOLIC BLOOD PRESSURE: 106 MMHG | DIASTOLIC BLOOD PRESSURE: 62 MMHG | RESPIRATION RATE: 12 BRPM | OXYGEN SATURATION: 97 % | HEART RATE: 62 BPM

## 2022-04-07 VITALS
DIASTOLIC BLOOD PRESSURE: 80 MMHG | SYSTOLIC BLOOD PRESSURE: 130 MMHG | RESPIRATION RATE: 16 BRPM | OXYGEN SATURATION: 99 % | HEART RATE: 86 BPM

## 2022-04-07 VITALS
HEART RATE: 91 BPM | RESPIRATION RATE: 16 BRPM | OXYGEN SATURATION: 91 % | TEMPERATURE: 97.8 F | SYSTOLIC BLOOD PRESSURE: 120 MMHG | DIASTOLIC BLOOD PRESSURE: 62 MMHG

## 2022-04-07 PROCEDURE — G0157 HHC PT ASSISTANT EA 15: HCPCS

## 2022-04-07 PROCEDURE — G0495 RN CARE TRAIN/EDU IN HH: HCPCS

## 2022-04-07 NOTE — HOME HEALTH
Pt participated in OT services for pt/dtr ed w/ functional transfers using RW safely, HEP upgraded to include wt w/ elbow flex/ext, equipment needs in shower and ADL retraining. Pt tolerated session well, dtr will purchase tall shower chair and install grab bar in shower. Plans to assist pt w/ bathing upon HH d/c.    Plan for next visit: upgrade HEP as indicated, walker safety, functional transfers, ADL skills.

## 2022-04-08 ENCOUNTER — HOME CARE VISIT (OUTPATIENT)
Dept: HOME HEALTH SERVICES | Facility: HOME HEALTHCARE | Age: 87
End: 2022-04-08

## 2022-04-10 NOTE — HOME HEALTH
Physical therapy addressed transfer training w/ patient and daughter, standing balance activities, and education with daughter regarding bed mobility today.  Plan to continue w/ standing balance exercises as well as transfers next visit.

## 2022-04-11 ENCOUNTER — HOME CARE VISIT (OUTPATIENT)
Dept: HOME HEALTH SERVICES | Facility: HOME HEALTHCARE | Age: 87
End: 2022-04-11

## 2022-04-11 VITALS
DIASTOLIC BLOOD PRESSURE: 54 MMHG | RESPIRATION RATE: 22 BRPM | HEART RATE: 81 BPM | TEMPERATURE: 94.3 F | SYSTOLIC BLOOD PRESSURE: 114 MMHG

## 2022-04-11 PROCEDURE — G0156 HHCP-SVS OF AIDE,EA 15 MIN: HCPCS

## 2022-04-14 ENCOUNTER — HOME CARE VISIT (OUTPATIENT)
Dept: HOME HEALTH SERVICES | Facility: HOME HEALTHCARE | Age: 87
End: 2022-04-14

## 2022-04-14 VITALS
SYSTOLIC BLOOD PRESSURE: 102 MMHG | RESPIRATION RATE: 16 BRPM | HEART RATE: 78 BPM | OXYGEN SATURATION: 97 % | DIASTOLIC BLOOD PRESSURE: 56 MMHG

## 2022-04-14 VITALS
DIASTOLIC BLOOD PRESSURE: 80 MMHG | SYSTOLIC BLOOD PRESSURE: 124 MMHG | OXYGEN SATURATION: 99 % | TEMPERATURE: 96 F | HEART RATE: 95 BPM

## 2022-04-14 PROCEDURE — G0153 HHCP-SVS OF S/L PATH,EA 15MN: HCPCS

## 2022-04-14 PROCEDURE — G0157 HHC PT ASSISTANT EA 15: HCPCS

## 2022-04-14 PROCEDURE — G0152 HHCP-SERV OF OT,EA 15 MIN: HCPCS

## 2022-04-14 NOTE — HOME HEALTH
Therapy interventions focused on improving the pt's overall speech intelligibility at the phrase level through use of over-exaggeration of the articulators, slowing the overall speech rate, and use of oral-motor exercises targeting lingual strength to increase pt's ability to communicate wants/needs to family/friends and be understood in all communicative contexts. Interventions also focused on word-retrieval tasks at the unstructured conversational level to reduce moments of anomia and increase pt's independence in expressing his wants/needs to family/friends. Interventions focused on decreasing overt s/s of aspiration when consuming thin liquids through use of the Hard Effortful Swallow to improve pt's independence in consuming the least restrictive diet as well as education in regards to s/s of aspiration. Family verbalizes understanding, however, will need further education and training.  Next session to focus on improving speech intelligibility at the phrase level through overexaggeration of articulators, slowing the speech rate, and incorporation of oral-motor lingual strengthening exercises. Next session to also focus on increasing pt's safety when consuming pureed foods and thin liquids using the Hard Effortful Swallow to reduce overt s/s of aspiration.

## 2022-04-15 ENCOUNTER — HOME CARE VISIT (OUTPATIENT)
Dept: HOME HEALTH SERVICES | Facility: HOME HEALTHCARE | Age: 87
End: 2022-04-15

## 2022-04-15 VITALS
RESPIRATION RATE: 14 BRPM | OXYGEN SATURATION: 95 % | DIASTOLIC BLOOD PRESSURE: 72 MMHG | TEMPERATURE: 98.9 F | SYSTOLIC BLOOD PRESSURE: 128 MMHG | HEART RATE: 69 BPM

## 2022-04-15 PROCEDURE — G0495 RN CARE TRAIN/EDU IN HH: HCPCS

## 2022-04-15 NOTE — HOME HEALTH
Pt participated in OT services w/ focus on functional transfer, HEP and ADL retraining- long shoe horn to verena shoes and shaving task. Pt tolerated session well.  Plan for next visit: d/c planning

## 2022-04-18 ENCOUNTER — HOME CARE VISIT (OUTPATIENT)
Dept: HOME HEALTH SERVICES | Facility: HOME HEALTHCARE | Age: 87
End: 2022-04-18

## 2022-04-18 VITALS
TEMPERATURE: 96.8 F | SYSTOLIC BLOOD PRESSURE: 118 MMHG | OXYGEN SATURATION: 92 % | RESPIRATION RATE: 16 BRPM | HEART RATE: 88 BPM | DIASTOLIC BLOOD PRESSURE: 74 MMHG

## 2022-04-18 PROCEDURE — G0151 HHCP-SERV OF PT,EA 15 MIN: HCPCS

## 2022-04-18 PROCEDURE — G0156 HHCP-SVS OF AIDE,EA 15 MIN: HCPCS

## 2022-04-18 NOTE — HOME HEALTH
Patient see today for PT to address bed mobility, transfer training and exercise tolerance.  Plan to reassess patient by PT next visit.

## 2022-04-19 ENCOUNTER — HOME CARE VISIT (OUTPATIENT)
Dept: HOME HEALTH SERVICES | Facility: HOME HEALTHCARE | Age: 87
End: 2022-04-19

## 2022-04-19 VITALS
SYSTOLIC BLOOD PRESSURE: 114 MMHG | RESPIRATION RATE: 22 BRPM | HEART RATE: 72 BPM | TEMPERATURE: 96 F | DIASTOLIC BLOOD PRESSURE: 64 MMHG

## 2022-04-19 NOTE — HOME HEALTH
"Pt and daughter, Addie, report pt is \"moving around better\"; pt does tend to stay in bed more than he used to.  Pt participated with PT in gait training, ther ex, balance training, PT completed reassessment and assessed progress toward POC goals.  Pt to be seen an additional 1wk2 for progression toward remaining goals"

## 2022-04-20 ENCOUNTER — HOME CARE VISIT (OUTPATIENT)
Dept: HOME HEALTH SERVICES | Facility: HOME HEALTHCARE | Age: 87
End: 2022-04-20

## 2022-04-20 VITALS
DIASTOLIC BLOOD PRESSURE: 90 MMHG | SYSTOLIC BLOOD PRESSURE: 124 MMHG | OXYGEN SATURATION: 97 % | RESPIRATION RATE: 16 BRPM | TEMPERATURE: 98.5 F | HEART RATE: 76 BPM

## 2022-04-20 PROCEDURE — G0153 HHCP-SVS OF S/L PATH,EA 15MN: HCPCS

## 2022-04-20 NOTE — HOME HEALTH
Therapy interventions focused on improving swallow function in order to decrease risk of aspiration with po intake.  Therapy also focused on improving speech intelligbility in order to improve communication partner's comprehension.  Therapy targeted using hard/effortful swallow with trials of thin liquids and education and training on double swallow.  Therapy also focused on slowing rate of speech in order to improve pt's overall speech intelligibility.    Next session to focus on education and training of swallow strategies.

## 2022-04-25 ENCOUNTER — HOME CARE VISIT (OUTPATIENT)
Dept: HOME HEALTH SERVICES | Facility: HOME HEALTHCARE | Age: 87
End: 2022-04-25

## 2022-04-26 ENCOUNTER — HOSPITAL ENCOUNTER (OUTPATIENT)
Facility: HOSPITAL | Age: 87
Setting detail: OBSERVATION
LOS: 2 days | Discharge: HOME | End: 2022-04-29
Attending: EMERGENCY MEDICINE | Admitting: FAMILY MEDICINE

## 2022-04-26 DIAGNOSIS — A41.9 SEPSIS SECONDARY TO UTI: Primary | ICD-10-CM

## 2022-04-26 DIAGNOSIS — R13.12 OROPHARYNGEAL DYSPHAGIA: ICD-10-CM

## 2022-04-26 DIAGNOSIS — R53.1 GENERALIZED WEAKNESS: ICD-10-CM

## 2022-04-26 DIAGNOSIS — N39.0 SEPSIS SECONDARY TO UTI: Primary | ICD-10-CM

## 2022-04-26 DIAGNOSIS — Z86.59 HISTORY OF DEMENTIA: ICD-10-CM

## 2022-04-26 DIAGNOSIS — J18.9 PNEUMONIA OF RIGHT LOWER LOBE DUE TO INFECTIOUS ORGANISM: ICD-10-CM

## 2022-04-26 PROCEDURE — 80053 COMPREHEN METABOLIC PANEL: CPT | Performed by: PHYSICIAN ASSISTANT

## 2022-04-26 PROCEDURE — 99284 EMERGENCY DEPT VISIT MOD MDM: CPT

## 2022-04-26 PROCEDURE — 83880 ASSAY OF NATRIURETIC PEPTIDE: CPT | Performed by: PHYSICIAN ASSISTANT

## 2022-04-26 PROCEDURE — 83605 ASSAY OF LACTIC ACID: CPT | Performed by: PHYSICIAN ASSISTANT

## 2022-04-26 PROCEDURE — 85025 COMPLETE CBC W/AUTO DIFF WBC: CPT | Performed by: PHYSICIAN ASSISTANT

## 2022-04-26 PROCEDURE — 84145 PROCALCITONIN (PCT): CPT | Performed by: NURSE PRACTITIONER

## 2022-04-26 PROCEDURE — 84484 ASSAY OF TROPONIN QUANT: CPT | Performed by: PHYSICIAN ASSISTANT

## 2022-04-26 PROCEDURE — 83690 ASSAY OF LIPASE: CPT | Performed by: PHYSICIAN ASSISTANT

## 2022-04-26 PROCEDURE — 36415 COLL VENOUS BLD VENIPUNCTURE: CPT

## 2022-04-26 RX ORDER — SODIUM CHLORIDE 0.9 % (FLUSH) 0.9 %
10 SYRINGE (ML) INJECTION AS NEEDED
Status: DISCONTINUED | OUTPATIENT
Start: 2022-04-26 | End: 2022-04-29 | Stop reason: HOSPADM

## 2022-04-27 ENCOUNTER — APPOINTMENT (OUTPATIENT)
Dept: GENERAL RADIOLOGY | Facility: HOSPITAL | Age: 87
End: 2022-04-27

## 2022-04-27 ENCOUNTER — TELEPHONE (OUTPATIENT)
Dept: INTERNAL MEDICINE | Facility: CLINIC | Age: 87
End: 2022-04-27

## 2022-04-27 ENCOUNTER — HOME CARE VISIT (OUTPATIENT)
Dept: HOME HEALTH SERVICES | Facility: HOME HEALTHCARE | Age: 87
End: 2022-04-27

## 2022-04-27 ENCOUNTER — APPOINTMENT (OUTPATIENT)
Dept: CT IMAGING | Facility: HOSPITAL | Age: 87
End: 2022-04-27

## 2022-04-27 PROBLEM — J18.9 PNEUMONIA: Status: ACTIVE | Noted: 2022-04-27

## 2022-04-27 PROBLEM — N40.0 BPH (BENIGN PROSTATIC HYPERPLASIA): Status: ACTIVE | Noted: 2022-04-27

## 2022-04-27 PROBLEM — A41.9 SEPSIS SECONDARY TO UTI: Status: ACTIVE | Noted: 2022-04-27

## 2022-04-27 PROBLEM — E11.9 T2DM (TYPE 2 DIABETES MELLITUS) (HCC): Status: ACTIVE | Noted: 2022-04-27

## 2022-04-27 PROBLEM — N39.0 SEPSIS SECONDARY TO UTI: Status: ACTIVE | Noted: 2022-04-27

## 2022-04-27 LAB
ALBUMIN SERPL-MCNC: 2.9 G/DL (ref 3.5–5.2)
ALBUMIN SERPL-MCNC: 3.1 G/DL (ref 3.5–5.2)
ALBUMIN/GLOB SERPL: 0.9 G/DL
ALBUMIN/GLOB SERPL: 1 G/DL
ALP SERPL-CCNC: 131 U/L (ref 39–117)
ALP SERPL-CCNC: 132 U/L (ref 39–117)
ALT SERPL W P-5'-P-CCNC: 17 U/L (ref 1–41)
ALT SERPL W P-5'-P-CCNC: 46 U/L (ref 1–41)
ANION GAP SERPL CALCULATED.3IONS-SCNC: 10 MMOL/L (ref 5–15)
ANION GAP SERPL CALCULATED.3IONS-SCNC: 9 MMOL/L (ref 5–15)
AST SERPL-CCNC: 30 U/L (ref 1–40)
AST SERPL-CCNC: 68 U/L (ref 1–40)
BACTERIA UR QL AUTO: ABNORMAL /HPF
BASOPHILS # BLD AUTO: 0.04 10*3/MM3 (ref 0–0.2)
BASOPHILS # BLD AUTO: 0.05 10*3/MM3 (ref 0–0.2)
BASOPHILS NFR BLD AUTO: 0.2 % (ref 0–1.5)
BASOPHILS NFR BLD AUTO: 0.3 % (ref 0–1.5)
BILIRUB SERPL-MCNC: 0.3 MG/DL (ref 0–1.2)
BILIRUB SERPL-MCNC: 0.4 MG/DL (ref 0–1.2)
BILIRUB UR QL STRIP: NEGATIVE
BUN SERPL-MCNC: 19 MG/DL (ref 8–23)
BUN SERPL-MCNC: 22 MG/DL (ref 8–23)
BUN/CREAT SERPL: 33.3 (ref 7–25)
BUN/CREAT SERPL: 33.9 (ref 7–25)
CALCIUM SPEC-SCNC: 8.4 MG/DL (ref 8.6–10.5)
CALCIUM SPEC-SCNC: 8.8 MG/DL (ref 8.6–10.5)
CHLORIDE SERPL-SCNC: 106 MMOL/L (ref 98–107)
CHLORIDE SERPL-SCNC: 108 MMOL/L (ref 98–107)
CLARITY UR: ABNORMAL
CO2 SERPL-SCNC: 24 MMOL/L (ref 22–29)
CO2 SERPL-SCNC: 26 MMOL/L (ref 22–29)
COLOR UR: YELLOW
CREAT SERPL-MCNC: 0.56 MG/DL (ref 0.76–1.27)
CREAT SERPL-MCNC: 0.66 MG/DL (ref 0.76–1.27)
D-LACTATE SERPL-SCNC: 1.9 MMOL/L (ref 0.5–2)
DEPRECATED RDW RBC AUTO: 49.1 FL (ref 37–54)
DEPRECATED RDW RBC AUTO: 50.7 FL (ref 37–54)
EGFRCR SERPLBLD CKD-EPI 2021: 90.8 ML/MIN/1.73
EGFRCR SERPLBLD CKD-EPI 2021: 95.4 ML/MIN/1.73
EOSINOPHIL # BLD AUTO: 0.04 10*3/MM3 (ref 0–0.4)
EOSINOPHIL # BLD AUTO: 0.14 10*3/MM3 (ref 0–0.4)
EOSINOPHIL NFR BLD AUTO: 0.2 % (ref 0.3–6.2)
EOSINOPHIL NFR BLD AUTO: 0.7 % (ref 0.3–6.2)
ERYTHROCYTE [DISTWIDTH] IN BLOOD BY AUTOMATED COUNT: 15.5 % (ref 12.3–15.4)
ERYTHROCYTE [DISTWIDTH] IN BLOOD BY AUTOMATED COUNT: 15.5 % (ref 12.3–15.4)
FLUAV RNA RESP QL NAA+PROBE: NOT DETECTED
FLUBV RNA RESP QL NAA+PROBE: NOT DETECTED
GLOBULIN UR ELPH-MCNC: 3 GM/DL
GLOBULIN UR ELPH-MCNC: 3.3 GM/DL
GLUCOSE BLDC GLUCOMTR-MCNC: 104 MG/DL (ref 70–130)
GLUCOSE BLDC GLUCOMTR-MCNC: 135 MG/DL (ref 70–130)
GLUCOSE SERPL-MCNC: 122 MG/DL (ref 65–99)
GLUCOSE SERPL-MCNC: 199 MG/DL (ref 65–99)
GLUCOSE UR STRIP-MCNC: NEGATIVE MG/DL
HBA1C MFR BLD: 5.5 % (ref 4.8–5.6)
HCT VFR BLD AUTO: 30.4 % (ref 37.5–51)
HCT VFR BLD AUTO: 33.8 % (ref 37.5–51)
HGB BLD-MCNC: 10.9 G/DL (ref 13–17.7)
HGB BLD-MCNC: 9.9 G/DL (ref 13–17.7)
HGB UR QL STRIP.AUTO: ABNORMAL
HOLD SPECIMEN: NORMAL
HYALINE CASTS UR QL AUTO: ABNORMAL /LPF
IMM GRANULOCYTES # BLD AUTO: 0.09 10*3/MM3 (ref 0–0.05)
IMM GRANULOCYTES # BLD AUTO: 0.16 10*3/MM3 (ref 0–0.05)
IMM GRANULOCYTES NFR BLD AUTO: 0.5 % (ref 0–0.5)
IMM GRANULOCYTES NFR BLD AUTO: 0.8 % (ref 0–0.5)
KETONES UR QL STRIP: NEGATIVE
L PNEUMO1 AG UR QL IA: NEGATIVE
LEUKOCYTE ESTERASE UR QL STRIP.AUTO: ABNORMAL
LIPASE SERPL-CCNC: 17 U/L (ref 13–60)
LYMPHOCYTES # BLD AUTO: 1.27 10*3/MM3 (ref 0.7–3.1)
LYMPHOCYTES # BLD AUTO: 1.76 10*3/MM3 (ref 0.7–3.1)
LYMPHOCYTES NFR BLD AUTO: 6.4 % (ref 19.6–45.3)
LYMPHOCYTES NFR BLD AUTO: 9.4 % (ref 19.6–45.3)
MAGNESIUM SERPL-MCNC: 1.6 MG/DL (ref 1.6–2.4)
MCH RBC QN AUTO: 28.1 PG (ref 26.6–33)
MCH RBC QN AUTO: 29.1 PG (ref 26.6–33)
MCHC RBC AUTO-ENTMCNC: 32.2 G/DL (ref 31.5–35.7)
MCHC RBC AUTO-ENTMCNC: 32.6 G/DL (ref 31.5–35.7)
MCV RBC AUTO: 87.1 FL (ref 79–97)
MCV RBC AUTO: 89.4 FL (ref 79–97)
MONOCYTES # BLD AUTO: 0.76 10*3/MM3 (ref 0.1–0.9)
MONOCYTES # BLD AUTO: 0.87 10*3/MM3 (ref 0.1–0.9)
MONOCYTES NFR BLD AUTO: 3.8 % (ref 5–12)
MONOCYTES NFR BLD AUTO: 4.6 % (ref 5–12)
MRSA DNA SPEC QL NAA+PROBE: NEGATIVE
NEUTROPHILS NFR BLD AUTO: 15.86 10*3/MM3 (ref 1.7–7)
NEUTROPHILS NFR BLD AUTO: 17.53 10*3/MM3 (ref 1.7–7)
NEUTROPHILS NFR BLD AUTO: 84.6 % (ref 42.7–76)
NEUTROPHILS NFR BLD AUTO: 88.5 % (ref 42.7–76)
NITRITE UR QL STRIP: NEGATIVE
NRBC BLD AUTO-RTO: 0 /100 WBC (ref 0–0.2)
NRBC BLD AUTO-RTO: 0 /100 WBC (ref 0–0.2)
NT-PROBNP SERPL-MCNC: 962.4 PG/ML (ref 0–1800)
PH UR STRIP.AUTO: 5.5 [PH] (ref 5–8)
PLATELET # BLD AUTO: 258 10*3/MM3 (ref 140–450)
PLATELET # BLD AUTO: 301 10*3/MM3 (ref 140–450)
PMV BLD AUTO: 10.7 FL (ref 6–12)
PMV BLD AUTO: 10.8 FL (ref 6–12)
POTASSIUM SERPL-SCNC: 3.6 MMOL/L (ref 3.5–5.2)
POTASSIUM SERPL-SCNC: 4.3 MMOL/L (ref 3.5–5.2)
PROCALCITONIN SERPL-MCNC: 0.09 NG/ML (ref 0–0.25)
PROT SERPL-MCNC: 5.9 G/DL (ref 6–8.5)
PROT SERPL-MCNC: 6.4 G/DL (ref 6–8.5)
PROT UR QL STRIP: ABNORMAL
RBC # BLD AUTO: 3.4 10*6/MM3 (ref 4.14–5.8)
RBC # BLD AUTO: 3.88 10*6/MM3 (ref 4.14–5.8)
RBC # UR STRIP: ABNORMAL /HPF
REF LAB TEST METHOD: ABNORMAL
S PNEUM AG SPEC QL LA: NEGATIVE
SARS-COV-2 RNA RESP QL NAA+PROBE: NOT DETECTED
SODIUM SERPL-SCNC: 141 MMOL/L (ref 136–145)
SODIUM SERPL-SCNC: 142 MMOL/L (ref 136–145)
SP GR UR STRIP: 1.02 (ref 1–1.03)
SQUAMOUS #/AREA URNS HPF: ABNORMAL /HPF
TROPONIN T SERPL-MCNC: <0.01 NG/ML (ref 0–0.03)
UROBILINOGEN UR QL STRIP: ABNORMAL
WBC # UR STRIP: ABNORMAL /HPF
WBC NRBC COR # BLD: 18.76 10*3/MM3 (ref 3.4–10.8)
WBC NRBC COR # BLD: 19.81 10*3/MM3 (ref 3.4–10.8)
WHOLE BLOOD HOLD SPECIMEN: NORMAL
WHOLE BLOOD HOLD SPECIMEN: NORMAL

## 2022-04-27 PROCEDURE — 97162 PT EVAL MOD COMPLEX 30 MIN: CPT

## 2022-04-27 PROCEDURE — 96366 THER/PROPH/DIAG IV INF ADDON: CPT

## 2022-04-27 PROCEDURE — 87899 AGENT NOS ASSAY W/OPTIC: CPT | Performed by: NURSE PRACTITIONER

## 2022-04-27 PROCEDURE — 0 MAGNESIUM SULFATE 4 GM/100ML SOLUTION: Performed by: FAMILY MEDICINE

## 2022-04-27 PROCEDURE — 71045 X-RAY EXAM CHEST 1 VIEW: CPT

## 2022-04-27 PROCEDURE — 82962 GLUCOSE BLOOD TEST: CPT

## 2022-04-27 PROCEDURE — 99220 PR INITIAL OBSERVATION CARE/DAY 70 MINUTES: CPT | Performed by: INTERNAL MEDICINE

## 2022-04-27 PROCEDURE — 87040 BLOOD CULTURE FOR BACTERIA: CPT | Performed by: PHYSICIAN ASSISTANT

## 2022-04-27 PROCEDURE — 83735 ASSAY OF MAGNESIUM: CPT | Performed by: NURSE PRACTITIONER

## 2022-04-27 PROCEDURE — 25010000002 PIPERACILLIN SOD-TAZOBACTAM PER 1 G: Performed by: NURSE PRACTITIONER

## 2022-04-27 PROCEDURE — 87636 SARSCOV2 & INF A&B AMP PRB: CPT | Performed by: PHYSICIAN ASSISTANT

## 2022-04-27 PROCEDURE — 25010000002 PIPERACILLIN SOD-TAZOBACTAM PER 1 G: Performed by: PHYSICIAN ASSISTANT

## 2022-04-27 PROCEDURE — 74230 X-RAY XM SWLNG FUNCJ C+: CPT

## 2022-04-27 PROCEDURE — 80053 COMPREHEN METABOLIC PANEL: CPT | Performed by: NURSE PRACTITIONER

## 2022-04-27 PROCEDURE — 92611 MOTION FLUOROSCOPY/SWALLOW: CPT

## 2022-04-27 PROCEDURE — 96361 HYDRATE IV INFUSION ADD-ON: CPT

## 2022-04-27 PROCEDURE — 94761 N-INVAS EAR/PLS OXIMETRY MLT: CPT

## 2022-04-27 PROCEDURE — 81001 URINALYSIS AUTO W/SCOPE: CPT | Performed by: PHYSICIAN ASSISTANT

## 2022-04-27 PROCEDURE — 87086 URINE CULTURE/COLONY COUNT: CPT | Performed by: PHYSICIAN ASSISTANT

## 2022-04-27 PROCEDURE — 83036 HEMOGLOBIN GLYCOSYLATED A1C: CPT | Performed by: NURSE PRACTITIONER

## 2022-04-27 PROCEDURE — 96365 THER/PROPH/DIAG IV INF INIT: CPT

## 2022-04-27 PROCEDURE — 25010000002 HEPARIN (PORCINE) PER 1000 UNITS: Performed by: NURSE PRACTITIONER

## 2022-04-27 PROCEDURE — 96367 TX/PROPH/DG ADDL SEQ IV INF: CPT

## 2022-04-27 PROCEDURE — 96372 THER/PROPH/DIAG INJ SC/IM: CPT

## 2022-04-27 PROCEDURE — 70450 CT HEAD/BRAIN W/O DYE: CPT

## 2022-04-27 PROCEDURE — 25010000002 VANCOMYCIN 10 G RECONSTITUTED SOLUTION: Performed by: PHYSICIAN ASSISTANT

## 2022-04-27 PROCEDURE — 94799 UNLISTED PULMONARY SVC/PX: CPT

## 2022-04-27 PROCEDURE — 85025 COMPLETE CBC W/AUTO DIFF WBC: CPT | Performed by: NURSE PRACTITIONER

## 2022-04-27 PROCEDURE — 94640 AIRWAY INHALATION TREATMENT: CPT

## 2022-04-27 PROCEDURE — 87641 MR-STAPH DNA AMP PROBE: CPT | Performed by: NURSE PRACTITIONER

## 2022-04-27 PROCEDURE — 97165 OT EVAL LOW COMPLEX 30 MIN: CPT

## 2022-04-27 PROCEDURE — 92610 EVALUATE SWALLOWING FUNCTION: CPT

## 2022-04-27 RX ORDER — MAGNESIUM SULFATE HEPTAHYDRATE 40 MG/ML
2 INJECTION, SOLUTION INTRAVENOUS AS NEEDED
Status: DISCONTINUED | OUTPATIENT
Start: 2022-04-27 | End: 2022-04-29 | Stop reason: HOSPADM

## 2022-04-27 RX ORDER — TAMSULOSIN HYDROCHLORIDE 0.4 MG/1
0.4 CAPSULE ORAL DAILY
Status: DISCONTINUED | OUTPATIENT
Start: 2022-04-27 | End: 2022-04-29 | Stop reason: HOSPADM

## 2022-04-27 RX ORDER — POTASSIUM CHLORIDE 750 MG/1
40 CAPSULE, EXTENDED RELEASE ORAL AS NEEDED
Status: DISCONTINUED | OUTPATIENT
Start: 2022-04-27 | End: 2022-04-29 | Stop reason: HOSPADM

## 2022-04-27 RX ORDER — IPRATROPIUM BROMIDE AND ALBUTEROL SULFATE 2.5; .5 MG/3ML; MG/3ML
3 SOLUTION RESPIRATORY (INHALATION)
Status: DISCONTINUED | OUTPATIENT
Start: 2022-04-27 | End: 2022-04-29

## 2022-04-27 RX ORDER — POTASSIUM CHLORIDE 1.5 G/1.77G
40 POWDER, FOR SOLUTION ORAL AS NEEDED
Status: DISCONTINUED | OUTPATIENT
Start: 2022-04-27 | End: 2022-04-29 | Stop reason: HOSPADM

## 2022-04-27 RX ORDER — SODIUM CHLORIDE 0.9 % (FLUSH) 0.9 %
10 SYRINGE (ML) INJECTION AS NEEDED
Status: DISCONTINUED | OUTPATIENT
Start: 2022-04-27 | End: 2022-04-29 | Stop reason: HOSPADM

## 2022-04-27 RX ORDER — MIRTAZAPINE 15 MG/1
30 TABLET, FILM COATED ORAL NIGHTLY
Status: DISCONTINUED | OUTPATIENT
Start: 2022-04-27 | End: 2022-04-29 | Stop reason: HOSPADM

## 2022-04-27 RX ORDER — FAMOTIDINE 20 MG/1
20 TABLET, FILM COATED ORAL 2 TIMES DAILY
Status: DISCONTINUED | OUTPATIENT
Start: 2022-04-27 | End: 2022-04-29 | Stop reason: HOSPADM

## 2022-04-27 RX ORDER — SODIUM CHLORIDE 0.9 % (FLUSH) 0.9 %
10 SYRINGE (ML) INJECTION EVERY 12 HOURS SCHEDULED
Status: DISCONTINUED | OUTPATIENT
Start: 2022-04-27 | End: 2022-04-29 | Stop reason: HOSPADM

## 2022-04-27 RX ORDER — ACETAMINOPHEN 325 MG/1
650 TABLET ORAL EVERY 4 HOURS PRN
Status: DISCONTINUED | OUTPATIENT
Start: 2022-04-27 | End: 2022-04-29 | Stop reason: HOSPADM

## 2022-04-27 RX ORDER — NICOTINE POLACRILEX 4 MG
15 LOZENGE BUCCAL
Status: DISCONTINUED | OUTPATIENT
Start: 2022-04-27 | End: 2022-04-29 | Stop reason: HOSPADM

## 2022-04-27 RX ORDER — ASPIRIN 81 MG/1
81 TABLET, CHEWABLE ORAL DAILY
Status: DISCONTINUED | OUTPATIENT
Start: 2022-04-27 | End: 2022-04-29 | Stop reason: HOSPADM

## 2022-04-27 RX ORDER — SODIUM CHLORIDE, SODIUM LACTATE, POTASSIUM CHLORIDE, CALCIUM CHLORIDE 600; 310; 30; 20 MG/100ML; MG/100ML; MG/100ML; MG/100ML
75 INJECTION, SOLUTION INTRAVENOUS CONTINUOUS
Status: DISCONTINUED | OUTPATIENT
Start: 2022-04-27 | End: 2022-04-27

## 2022-04-27 RX ORDER — SODIUM CHLORIDE 9 MG/ML
75 INJECTION, SOLUTION INTRAVENOUS CONTINUOUS
Status: DISCONTINUED | OUTPATIENT
Start: 2022-04-27 | End: 2022-04-28

## 2022-04-27 RX ORDER — INSULIN LISPRO 100 [IU]/ML
0-7 INJECTION, SOLUTION INTRAVENOUS; SUBCUTANEOUS
Status: DISCONTINUED | OUTPATIENT
Start: 2022-04-27 | End: 2022-04-28

## 2022-04-27 RX ORDER — HEPARIN SODIUM 5000 [USP'U]/ML
5000 INJECTION, SOLUTION INTRAVENOUS; SUBCUTANEOUS EVERY 8 HOURS SCHEDULED
Status: DISCONTINUED | OUTPATIENT
Start: 2022-04-27 | End: 2022-04-29 | Stop reason: HOSPADM

## 2022-04-27 RX ORDER — MAGNESIUM SULFATE HEPTAHYDRATE 40 MG/ML
4 INJECTION, SOLUTION INTRAVENOUS AS NEEDED
Status: DISCONTINUED | OUTPATIENT
Start: 2022-04-27 | End: 2022-04-29 | Stop reason: HOSPADM

## 2022-04-27 RX ORDER — IPRATROPIUM BROMIDE AND ALBUTEROL SULFATE 2.5; .5 MG/3ML; MG/3ML
3 SOLUTION RESPIRATORY (INHALATION) EVERY 4 HOURS PRN
Status: DISCONTINUED | OUTPATIENT
Start: 2022-04-27 | End: 2022-04-29 | Stop reason: SDUPTHER

## 2022-04-27 RX ORDER — POTASSIUM CHLORIDE 7.45 MG/ML
10 INJECTION INTRAVENOUS
Status: DISCONTINUED | OUTPATIENT
Start: 2022-04-27 | End: 2022-04-29 | Stop reason: HOSPADM

## 2022-04-27 RX ORDER — DONEPEZIL HYDROCHLORIDE 10 MG/1
10 TABLET, FILM COATED ORAL DAILY
Status: DISCONTINUED | OUTPATIENT
Start: 2022-04-27 | End: 2022-04-27

## 2022-04-27 RX ORDER — DEXTROSE MONOHYDRATE 25 G/50ML
25 INJECTION, SOLUTION INTRAVENOUS
Status: DISCONTINUED | OUTPATIENT
Start: 2022-04-27 | End: 2022-04-29 | Stop reason: HOSPADM

## 2022-04-27 RX ADMIN — VANCOMYCIN HYDROCHLORIDE 1250 MG: 10 INJECTION, POWDER, LYOPHILIZED, FOR SOLUTION INTRAVENOUS at 03:34

## 2022-04-27 RX ADMIN — TAZOBACTAM SODIUM AND PIPERACILLIN SODIUM 3.38 G: 375; 3 INJECTION, SOLUTION INTRAVENOUS at 00:53

## 2022-04-27 RX ADMIN — BARIUM SULFATE 100 ML: 0.81 POWDER, FOR SUSPENSION ORAL at 12:07

## 2022-04-27 RX ADMIN — BARIUM SULFATE 50 ML: 400 SUSPENSION ORAL at 12:07

## 2022-04-27 RX ADMIN — BARIUM SULFATE 20 ML: 400 PASTE ORAL at 12:07

## 2022-04-27 RX ADMIN — TAZOBACTAM SODIUM AND PIPERACILLIN SODIUM 3.38 G: 375; 3 INJECTION, SOLUTION INTRAVENOUS at 13:08

## 2022-04-27 RX ADMIN — FAMOTIDINE 20 MG: 20 TABLET ORAL at 20:19

## 2022-04-27 RX ADMIN — DOXYCYCLINE 100 MG: 100 INJECTION, POWDER, LYOPHILIZED, FOR SOLUTION INTRAVENOUS at 19:26

## 2022-04-27 RX ADMIN — Medication 10 ML: at 10:56

## 2022-04-27 RX ADMIN — HEPARIN SODIUM 5000 UNITS: 5000 INJECTION INTRAVENOUS; SUBCUTANEOUS at 15:32

## 2022-04-27 RX ADMIN — HEPARIN SODIUM 5000 UNITS: 5000 INJECTION INTRAVENOUS; SUBCUTANEOUS at 20:19

## 2022-04-27 RX ADMIN — TAZOBACTAM SODIUM AND PIPERACILLIN SODIUM 3.38 G: 375; 3 INJECTION, SOLUTION INTRAVENOUS at 20:23

## 2022-04-27 RX ADMIN — SODIUM CHLORIDE, POTASSIUM CHLORIDE, SODIUM LACTATE AND CALCIUM CHLORIDE 75 ML/HR: 600; 310; 30; 20 INJECTION, SOLUTION INTRAVENOUS at 07:51

## 2022-04-27 RX ADMIN — SODIUM CHLORIDE 75 ML/HR: 9 INJECTION, SOLUTION INTRAVENOUS at 10:52

## 2022-04-27 RX ADMIN — IPRATROPIUM BROMIDE AND ALBUTEROL SULFATE 3 ML: .5; 3 SOLUTION RESPIRATORY (INHALATION) at 15:07

## 2022-04-27 RX ADMIN — POTASSIUM CHLORIDE 40 MEQ: 750 CAPSULE, EXTENDED RELEASE ORAL at 20:18

## 2022-04-27 RX ADMIN — IPRATROPIUM BROMIDE AND ALBUTEROL SULFATE 3 ML: .5; 3 SOLUTION RESPIRATORY (INHALATION) at 19:51

## 2022-04-27 RX ADMIN — BARIUM SULFATE 10 ML: 400 SUSPENSION ORAL at 12:07

## 2022-04-27 RX ADMIN — TAMSULOSIN HYDROCHLORIDE 0.4 MG: 0.4 CAPSULE ORAL at 15:31

## 2022-04-27 RX ADMIN — DOXYCYCLINE 100 MG: 100 INJECTION, POWDER, LYOPHILIZED, FOR SOLUTION INTRAVENOUS at 05:02

## 2022-04-27 RX ADMIN — Medication 10 ML: at 20:23

## 2022-04-27 RX ADMIN — IPRATROPIUM BROMIDE AND ALBUTEROL SULFATE 3 ML: .5; 3 SOLUTION RESPIRATORY (INHALATION) at 08:02

## 2022-04-27 RX ADMIN — SODIUM CHLORIDE 1000 ML: 9 INJECTION, SOLUTION INTRAVENOUS at 00:53

## 2022-04-27 RX ADMIN — MAGNESIUM SULFATE 4 G: 4 INJECTION INTRAVENOUS at 20:18

## 2022-04-27 RX ADMIN — ASPIRIN 81 MG 81 MG: 81 TABLET ORAL at 15:31

## 2022-04-27 RX ADMIN — MIRTAZAPINE 30 MG: 15 TABLET, FILM COATED ORAL at 20:19

## 2022-04-27 NOTE — TELEPHONE ENCOUNTER
Caller: Addie Araujo     Relationship: DAUGHTER    Best call back number: [phone]    What is your medical concern? SEVERAL, PATIENT IS IN [facility] NOW. SHE WANTS TO SPEAK TO YOU ABOUT HIS MEDICATION AND HIS HEALTH.    How long has this issue been going on? FEW DAYS    Is your provider already aware of this issue? NO SEVERAL HEALTH ISSUES HE NEEDED TREATMENT FOR    Have you been treated for this issue?

## 2022-04-28 LAB
ALBUMIN SERPL-MCNC: 2.8 G/DL (ref 3.5–5.2)
ALBUMIN/GLOB SERPL: 0.9 G/DL
ALP SERPL-CCNC: 113 U/L (ref 39–117)
ALT SERPL W P-5'-P-CCNC: 34 U/L (ref 1–41)
ANION GAP SERPL CALCULATED.3IONS-SCNC: 6 MMOL/L (ref 5–15)
AST SERPL-CCNC: 44 U/L (ref 1–40)
BACTERIA SPEC AEROBE CULT: NORMAL
BASOPHILS # BLD AUTO: 0.05 10*3/MM3 (ref 0–0.2)
BASOPHILS NFR BLD AUTO: 0.6 % (ref 0–1.5)
BILIRUB SERPL-MCNC: 0.3 MG/DL (ref 0–1.2)
BUN SERPL-MCNC: 15 MG/DL (ref 8–23)
BUN/CREAT SERPL: 24.2 (ref 7–25)
CALCIUM SPEC-SCNC: 8.3 MG/DL (ref 8.6–10.5)
CHLORIDE SERPL-SCNC: 108 MMOL/L (ref 98–107)
CO2 SERPL-SCNC: 24 MMOL/L (ref 22–29)
CREAT SERPL-MCNC: 0.62 MG/DL (ref 0.76–1.27)
DEPRECATED RDW RBC AUTO: 48.8 FL (ref 37–54)
EGFRCR SERPLBLD CKD-EPI 2021: 92.5 ML/MIN/1.73
EOSINOPHIL # BLD AUTO: 0.55 10*3/MM3 (ref 0–0.4)
EOSINOPHIL NFR BLD AUTO: 6.5 % (ref 0.3–6.2)
ERYTHROCYTE [DISTWIDTH] IN BLOOD BY AUTOMATED COUNT: 15.4 % (ref 12.3–15.4)
GLOBULIN UR ELPH-MCNC: 3.2 GM/DL
GLUCOSE BLDC GLUCOMTR-MCNC: 94 MG/DL (ref 70–130)
GLUCOSE SERPL-MCNC: 94 MG/DL (ref 65–99)
HCT VFR BLD AUTO: 29.9 % (ref 37.5–51)
HGB BLD-MCNC: 9.6 G/DL (ref 13–17.7)
IMM GRANULOCYTES # BLD AUTO: 0.02 10*3/MM3 (ref 0–0.05)
IMM GRANULOCYTES NFR BLD AUTO: 0.2 % (ref 0–0.5)
LYMPHOCYTES # BLD AUTO: 1.8 10*3/MM3 (ref 0.7–3.1)
LYMPHOCYTES NFR BLD AUTO: 21.4 % (ref 19.6–45.3)
MAGNESIUM SERPL-MCNC: 2.2 MG/DL (ref 1.6–2.4)
MCH RBC QN AUTO: 28.1 PG (ref 26.6–33)
MCHC RBC AUTO-ENTMCNC: 32.1 G/DL (ref 31.5–35.7)
MCV RBC AUTO: 87.4 FL (ref 79–97)
MONOCYTES # BLD AUTO: 0.56 10*3/MM3 (ref 0.1–0.9)
MONOCYTES NFR BLD AUTO: 6.6 % (ref 5–12)
NEUTROPHILS NFR BLD AUTO: 5.45 10*3/MM3 (ref 1.7–7)
NEUTROPHILS NFR BLD AUTO: 64.7 % (ref 42.7–76)
NRBC BLD AUTO-RTO: 0 /100 WBC (ref 0–0.2)
PLATELET # BLD AUTO: 237 10*3/MM3 (ref 140–450)
PMV BLD AUTO: 10.6 FL (ref 6–12)
POTASSIUM SERPL-SCNC: 4.3 MMOL/L (ref 3.5–5.2)
PROT SERPL-MCNC: 6 G/DL (ref 6–8.5)
RBC # BLD AUTO: 3.42 10*6/MM3 (ref 4.14–5.8)
SODIUM SERPL-SCNC: 138 MMOL/L (ref 136–145)
WBC NRBC COR # BLD: 8.43 10*3/MM3 (ref 3.4–10.8)

## 2022-04-28 PROCEDURE — 25010000002 PIPERACILLIN SOD-TAZOBACTAM PER 1 G: Performed by: NURSE PRACTITIONER

## 2022-04-28 PROCEDURE — 25010000002 HEPARIN (PORCINE) PER 1000 UNITS: Performed by: NURSE PRACTITIONER

## 2022-04-28 PROCEDURE — 92526 ORAL FUNCTION THERAPY: CPT

## 2022-04-28 PROCEDURE — 99225 PR SBSQ OBSERVATION CARE/DAY 25 MINUTES: CPT | Performed by: FAMILY MEDICINE

## 2022-04-28 PROCEDURE — 25010000002 VANCOMYCIN 10 G RECONSTITUTED SOLUTION

## 2022-04-28 PROCEDURE — 80053 COMPREHEN METABOLIC PANEL: CPT | Performed by: FAMILY MEDICINE

## 2022-04-28 PROCEDURE — 82962 GLUCOSE BLOOD TEST: CPT

## 2022-04-28 PROCEDURE — 94799 UNLISTED PULMONARY SVC/PX: CPT

## 2022-04-28 PROCEDURE — 25010000002 HALOPERIDOL LACTATE PER 5 MG: Performed by: NURSE PRACTITIONER

## 2022-04-28 PROCEDURE — 83735 ASSAY OF MAGNESIUM: CPT | Performed by: FAMILY MEDICINE

## 2022-04-28 PROCEDURE — 96375 TX/PRO/DX INJ NEW DRUG ADDON: CPT

## 2022-04-28 PROCEDURE — 94664 DEMO&/EVAL PT USE INHALER: CPT

## 2022-04-28 PROCEDURE — 85025 COMPLETE CBC W/AUTO DIFF WBC: CPT | Performed by: FAMILY MEDICINE

## 2022-04-28 PROCEDURE — 96372 THER/PROPH/DIAG INJ SC/IM: CPT

## 2022-04-28 RX ORDER — HALOPERIDOL 5 MG/ML
1 INJECTION INTRAMUSCULAR ONCE
Status: COMPLETED | OUTPATIENT
Start: 2022-04-28 | End: 2022-04-28

## 2022-04-28 RX ADMIN — FAMOTIDINE 20 MG: 20 TABLET ORAL at 10:08

## 2022-04-28 RX ADMIN — HEPARIN SODIUM 5000 UNITS: 5000 INJECTION INTRAVENOUS; SUBCUTANEOUS at 15:41

## 2022-04-28 RX ADMIN — TAMSULOSIN HYDROCHLORIDE 0.4 MG: 0.4 CAPSULE ORAL at 10:08

## 2022-04-28 RX ADMIN — FAMOTIDINE 20 MG: 20 TABLET ORAL at 20:26

## 2022-04-28 RX ADMIN — Medication 10 ML: at 20:27

## 2022-04-28 RX ADMIN — ASPIRIN 81 MG 81 MG: 81 TABLET ORAL at 10:08

## 2022-04-28 RX ADMIN — HEPARIN SODIUM 5000 UNITS: 5000 INJECTION INTRAVENOUS; SUBCUTANEOUS at 05:03

## 2022-04-28 RX ADMIN — TAZOBACTAM SODIUM AND PIPERACILLIN SODIUM 3.38 G: 375; 3 INJECTION, SOLUTION INTRAVENOUS at 22:57

## 2022-04-28 RX ADMIN — TAZOBACTAM SODIUM AND PIPERACILLIN SODIUM 3.38 G: 375; 3 INJECTION, SOLUTION INTRAVENOUS at 12:27

## 2022-04-28 RX ADMIN — DOXYCYCLINE 100 MG: 100 INJECTION, POWDER, LYOPHILIZED, FOR SOLUTION INTRAVENOUS at 05:04

## 2022-04-28 RX ADMIN — DOXYCYCLINE 100 MG: 100 INJECTION, POWDER, LYOPHILIZED, FOR SOLUTION INTRAVENOUS at 18:12

## 2022-04-28 RX ADMIN — VANCOMYCIN HYDROCHLORIDE 1250 MG: 10 INJECTION, POWDER, LYOPHILIZED, FOR SOLUTION INTRAVENOUS at 06:09

## 2022-04-28 RX ADMIN — HALOPERIDOL LACTATE 1 MG: 5 INJECTION, SOLUTION INTRAMUSCULAR at 20:27

## 2022-04-28 RX ADMIN — MIRTAZAPINE 30 MG: 15 TABLET, FILM COATED ORAL at 20:26

## 2022-04-28 RX ADMIN — IPRATROPIUM BROMIDE AND ALBUTEROL SULFATE 3 ML: .5; 3 SOLUTION RESPIRATORY (INHALATION) at 17:07

## 2022-04-28 RX ADMIN — ACETAMINOPHEN 650 MG: 325 TABLET ORAL at 20:26

## 2022-04-28 RX ADMIN — TAZOBACTAM SODIUM AND PIPERACILLIN SODIUM 3.38 G: 375; 3 INJECTION, SOLUTION INTRAVENOUS at 04:21

## 2022-04-28 RX ADMIN — HEPARIN SODIUM 5000 UNITS: 5000 INJECTION INTRAVENOUS; SUBCUTANEOUS at 23:32

## 2022-04-28 RX ADMIN — SODIUM CHLORIDE 75 ML/HR: 9 INJECTION, SOLUTION INTRAVENOUS at 03:35

## 2022-04-28 RX ADMIN — Medication 10 ML: at 10:09

## 2022-04-28 RX ADMIN — IPRATROPIUM BROMIDE AND ALBUTEROL SULFATE 3 ML: .5; 3 SOLUTION RESPIRATORY (INHALATION) at 12:39

## 2022-04-29 ENCOUNTER — HOME CARE VISIT (OUTPATIENT)
Dept: HOME HEALTH SERVICES | Facility: HOME HEALTHCARE | Age: 87
End: 2022-04-29

## 2022-04-29 ENCOUNTER — READMISSION MANAGEMENT (OUTPATIENT)
Dept: CALL CENTER | Facility: HOSPITAL | Age: 87
End: 2022-04-29

## 2022-04-29 VITALS
OXYGEN SATURATION: 97 % | WEIGHT: 150 LBS | TEMPERATURE: 98.3 F | BODY MASS INDEX: 21.47 KG/M2 | SYSTOLIC BLOOD PRESSURE: 143 MMHG | HEIGHT: 70 IN | DIASTOLIC BLOOD PRESSURE: 84 MMHG | HEART RATE: 77 BPM | RESPIRATION RATE: 14 BRPM

## 2022-04-29 PROBLEM — A41.9 SEPSIS SECONDARY TO UTI: Status: RESOLVED | Noted: 2022-04-27 | Resolved: 2022-04-29

## 2022-04-29 PROBLEM — G93.41 METABOLIC ENCEPHALOPATHY: Status: RESOLVED | Noted: 2018-04-24 | Resolved: 2022-04-29

## 2022-04-29 PROBLEM — J18.9 PNEUMONIA: Status: RESOLVED | Noted: 2022-04-27 | Resolved: 2022-04-29

## 2022-04-29 PROBLEM — N39.0 SEPSIS SECONDARY TO UTI: Status: RESOLVED | Noted: 2022-04-27 | Resolved: 2022-04-29

## 2022-04-29 LAB
ANION GAP SERPL CALCULATED.3IONS-SCNC: 9 MMOL/L (ref 5–15)
BUN SERPL-MCNC: 10 MG/DL (ref 8–23)
BUN/CREAT SERPL: 16.1 (ref 7–25)
CALCIUM SPEC-SCNC: 8.5 MG/DL (ref 8.6–10.5)
CHLORIDE SERPL-SCNC: 111 MMOL/L (ref 98–107)
CO2 SERPL-SCNC: 24 MMOL/L (ref 22–29)
CREAT SERPL-MCNC: 0.62 MG/DL (ref 0.76–1.27)
DEPRECATED RDW RBC AUTO: 48.7 FL (ref 37–54)
EGFRCR SERPLBLD CKD-EPI 2021: 92.5 ML/MIN/1.73
ERYTHROCYTE [DISTWIDTH] IN BLOOD BY AUTOMATED COUNT: 15.5 % (ref 12.3–15.4)
GLUCOSE BLDC GLUCOMTR-MCNC: 101 MG/DL (ref 70–130)
GLUCOSE BLDC GLUCOMTR-MCNC: 138 MG/DL (ref 70–130)
GLUCOSE SERPL-MCNC: 116 MG/DL (ref 65–99)
HCT VFR BLD AUTO: 29.4 % (ref 37.5–51)
HGB BLD-MCNC: 9.6 G/DL (ref 13–17.7)
MCH RBC QN AUTO: 28 PG (ref 26.6–33)
MCHC RBC AUTO-ENTMCNC: 32.7 G/DL (ref 31.5–35.7)
MCV RBC AUTO: 85.7 FL (ref 79–97)
PLATELET # BLD AUTO: 259 10*3/MM3 (ref 140–450)
PMV BLD AUTO: 10.7 FL (ref 6–12)
POTASSIUM SERPL-SCNC: 4.7 MMOL/L (ref 3.5–5.2)
RBC # BLD AUTO: 3.43 10*6/MM3 (ref 4.14–5.8)
SODIUM SERPL-SCNC: 144 MMOL/L (ref 136–145)
WBC NRBC COR # BLD: 9.1 10*3/MM3 (ref 3.4–10.8)

## 2022-04-29 PROCEDURE — G0378 HOSPITAL OBSERVATION PER HR: HCPCS

## 2022-04-29 PROCEDURE — 94664 DEMO&/EVAL PT USE INHALER: CPT

## 2022-04-29 PROCEDURE — 80048 BASIC METABOLIC PNL TOTAL CA: CPT | Performed by: FAMILY MEDICINE

## 2022-04-29 PROCEDURE — 97535 SELF CARE MNGMENT TRAINING: CPT

## 2022-04-29 PROCEDURE — 82962 GLUCOSE BLOOD TEST: CPT

## 2022-04-29 PROCEDURE — 99217 PR OBSERVATION CARE DISCHARGE MANAGEMENT: CPT | Performed by: FAMILY MEDICINE

## 2022-04-29 PROCEDURE — 25010000002 PIPERACILLIN SOD-TAZOBACTAM PER 1 G: Performed by: NURSE PRACTITIONER

## 2022-04-29 PROCEDURE — 94799 UNLISTED PULMONARY SVC/PX: CPT

## 2022-04-29 PROCEDURE — 85027 COMPLETE CBC AUTOMATED: CPT | Performed by: FAMILY MEDICINE

## 2022-04-29 RX ORDER — DOXYCYCLINE 100 MG/1
100 CAPSULE ORAL EVERY 12 HOURS SCHEDULED
Status: DISCONTINUED | OUTPATIENT
Start: 2022-04-29 | End: 2022-04-29 | Stop reason: HOSPADM

## 2022-04-29 RX ORDER — DOXYCYCLINE 100 MG/1
100 CAPSULE ORAL EVERY 12 HOURS SCHEDULED
Qty: 5 CAPSULE | Refills: 0 | Status: SHIPPED | OUTPATIENT
Start: 2022-04-29 | End: 2022-05-02

## 2022-04-29 RX ORDER — IPRATROPIUM BROMIDE AND ALBUTEROL SULFATE 2.5; .5 MG/3ML; MG/3ML
3 SOLUTION RESPIRATORY (INHALATION) EVERY 4 HOURS PRN
Status: DISCONTINUED | OUTPATIENT
Start: 2022-04-29 | End: 2022-04-29 | Stop reason: HOSPADM

## 2022-04-29 RX ADMIN — TAZOBACTAM SODIUM AND PIPERACILLIN SODIUM 3.38 G: 375; 3 INJECTION, SOLUTION INTRAVENOUS at 11:27

## 2022-04-29 RX ADMIN — ASPIRIN 81 MG 81 MG: 81 TABLET ORAL at 08:30

## 2022-04-29 RX ADMIN — FAMOTIDINE 20 MG: 20 TABLET ORAL at 08:31

## 2022-04-29 RX ADMIN — TAZOBACTAM SODIUM AND PIPERACILLIN SODIUM 3.38 G: 375; 3 INJECTION, SOLUTION INTRAVENOUS at 04:38

## 2022-04-29 RX ADMIN — IPRATROPIUM BROMIDE AND ALBUTEROL SULFATE 3 ML: .5; 3 SOLUTION RESPIRATORY (INHALATION) at 07:45

## 2022-04-29 RX ADMIN — DOXYCYCLINE 100 MG: 100 INJECTION, POWDER, LYOPHILIZED, FOR SOLUTION INTRAVENOUS at 06:46

## 2022-04-29 RX ADMIN — TAMSULOSIN HYDROCHLORIDE 0.4 MG: 0.4 CAPSULE ORAL at 08:30

## 2022-04-30 ENCOUNTER — HOME CARE VISIT (OUTPATIENT)
Dept: HOME HEALTH SERVICES | Facility: HOME HEALTHCARE | Age: 87
End: 2022-04-30

## 2022-04-30 VITALS
TEMPERATURE: 97.9 F | HEART RATE: 88 BPM | OXYGEN SATURATION: 96 % | BODY MASS INDEX: 22.51 KG/M2 | WEIGHT: 152 LBS | DIASTOLIC BLOOD PRESSURE: 72 MMHG | HEIGHT: 69 IN | RESPIRATION RATE: 16 BRPM | SYSTOLIC BLOOD PRESSURE: 140 MMHG

## 2022-04-30 PROCEDURE — G0299 HHS/HOSPICE OF RN EA 15 MIN: HCPCS

## 2022-04-30 NOTE — HOME HEALTH
Pt is an 86 yo white male Resumed for  services-SN/PT/OT due to hospitalization from 4/26-4/29/22 with Sepsis secondary to UTI, and pt also had Pneumonia. Pt has a hx: Dysphagia, Dementia with worsening confusion, HTN, HLD, T2DM, and BPH. Pt lives with his daughter, Addie and is ambulating at home with a walker.

## 2022-04-30 NOTE — OUTREACH NOTE
Prep Survey    Flowsheet Row Responses   [facility] patient discharged from? [facility]   Is LACE score < 7 ? No   Emergency Room discharge w/ pulse ox? No   Eligibility [facility]   Date of Admission 04/26/22   Date of Discharge 04/29/22   Discharge Disposition Home-Health Care [facility]   Discharge diagnosis sepsis, PNA, UTI, worsening confusion with hx dementia, T2DM   Does the patient have one of the following disease processes/diagnoses(primary or secondary)? Sepsis   Does the patient have Home health ordered? Yes   What is the Home health agency?  [facility]   Is there a DME ordered? No   Prep survey completed? Yes          LISETTE GARCIA - Registered Nurse

## 2022-05-02 ENCOUNTER — TRANSITIONAL CARE MANAGEMENT TELEPHONE ENCOUNTER (OUTPATIENT)
Dept: CALL CENTER | Facility: HOSPITAL | Age: 87
End: 2022-05-02

## 2022-05-02 LAB
BACTERIA SPEC AEROBE CULT: NORMAL
BACTERIA SPEC AEROBE CULT: NORMAL

## 2022-05-02 NOTE — OUTREACH NOTE
Call Center TCM Note    Flowsheet Row Responses   [facility] patient discharged from? [place]   Does the patient have one of the following disease processes/diagnoses(primary or secondary)? Sepsis   TCM attempt successful? Yes   Call start time 0911   Call end time 0914   Discharge diagnosis sepsis, PNA, UTI, worsening confusion with hx dementia, T2DM   Person spoke with today (if not patient) and relationship Daughter Addie Al reviewed with patient/caregiver? Yes   Is the patient having any side effects they believe may be caused by any medication additions or changes? No   Does the patient have all medications related to this admission filled (includes all antibiotics, inhalers, nebulizers,steroids,etc.) Yes   Is the patient taking all medications as directed (includes completed medication regime)? Yes   Does the patient have a primary care provider?  Yes   Does the patient have an appointment with their PCP within 7 days of discharge? Yes   Has the patient kept scheduled appointments due by today? N/A   Comments States she does not want him to see PA and wants him to see Dr. Goodman.  Will message office to cancel appt with PA and set up an appt with Dr. Goodman.   What is the Home health agency?  [facility]   Has home health visited the patient within 72 hours of discharge? Call prior to 72 hours   Psychosocial issues? No   Did the patient receive a copy of their discharge instructions? Yes   Nursing interventions Reviewed instructions with patient   What is the patient's perception of their health status since discharge? New symptoms unrelated to diagnosis   Is patient/caregiver able to teach back steps to recovery at home? Set small, achievable goals for return to baseline health, Rest and regain strength   If the patient is a current smoker, are they able to teach back resources for cessation? Not a smoker   Is the patient/caregiver able to teach back the hierarchy of who to call/visit for  "symptoms/problems? PCP, Specialist, Home health nurse, Urgent Care, ED, 911 Yes   Additional teach back comments States he is doing \"good\" but did have a fall on Sat with complaints of hip and back pain.  He is able to ambulate.  He did not hit his head or loose consciousness.    TCM call completed? Yes   Wrap up additional comments Will message PCP office regarding canceling appt and making appt with Dr. Goodman.           Ammy Palmer LPN    5/2/2022, 09:18 EDT      "

## 2022-05-03 ENCOUNTER — HOME CARE VISIT (OUTPATIENT)
Dept: HOME HEALTH SERVICES | Facility: HOME HEALTHCARE | Age: 87
End: 2022-05-03

## 2022-05-03 VITALS
TEMPERATURE: 96.9 F | DIASTOLIC BLOOD PRESSURE: 74 MMHG | RESPIRATION RATE: 22 BRPM | SYSTOLIC BLOOD PRESSURE: 124 MMHG | HEART RATE: 78 BPM

## 2022-05-03 PROCEDURE — G0156 HHCP-SVS OF AIDE,EA 15 MIN: HCPCS

## 2022-05-04 NOTE — PROGRESS NOTES
Called daughter Addie and scheduled a video visit for hospital follow up for May 9, 2022 at 4pm *elisha* [phone]

## 2022-05-05 ENCOUNTER — HOME CARE VISIT (OUTPATIENT)
Dept: HOME HEALTH SERVICES | Facility: HOME HEALTHCARE | Age: 87
End: 2022-05-05

## 2022-05-05 VITALS
OXYGEN SATURATION: 99 % | DIASTOLIC BLOOD PRESSURE: 82 MMHG | HEART RATE: 67 BPM | RESPIRATION RATE: 16 BRPM | TEMPERATURE: 95.6 F | SYSTOLIC BLOOD PRESSURE: 122 MMHG

## 2022-05-05 VITALS — DIASTOLIC BLOOD PRESSURE: 62 MMHG | TEMPERATURE: 98.7 F | RESPIRATION RATE: 14 BRPM | SYSTOLIC BLOOD PRESSURE: 120 MMHG

## 2022-05-05 PROCEDURE — G0152 HHCP-SERV OF OT,EA 15 MIN: HCPCS

## 2022-05-05 PROCEDURE — G0300 HHS/HOSPICE OF LPN EA 15 MIN: HCPCS

## 2022-05-05 NOTE — CASE COMMUNICATION
Routine Visit Note: LPN    Skill/education provided: cardiac/lung/ urinary and fall assessment    Patient/caregiver response: Pt states he will push up from bed to stand  take a second to determine balance then ambulate if no dizziness.    Plan for next visit: Sergey [facility]    Other pertinent info: one fall reported since last release from hospital. patient c/o back pain 3/10 no noticable bruising noted. Daughter refused to follow fall at MD

## 2022-05-05 NOTE — HOME HEALTH
Routine Visit Note: LPN    Skill/education provided: Cardiac, /lung/ urinary and falls assessment.    Patient/caregiver response: 1 fall reported after hospital release. patient c/o back pain 3/10 when standing. Daughter refused to take to MD for follow up.    Plan for next visit: Recert    Other pertinent info: no issues with urinary no s/s of UTI noted.

## 2022-05-06 ENCOUNTER — TELEMEDICINE (OUTPATIENT)
Dept: INTERNAL MEDICINE | Facility: CLINIC | Age: 87
End: 2022-05-06

## 2022-05-06 ENCOUNTER — HOME CARE VISIT (OUTPATIENT)
Dept: HOME HEALTH SERVICES | Facility: HOME HEALTHCARE | Age: 87
End: 2022-05-06

## 2022-05-06 DIAGNOSIS — R26.81 UNSTEADY GAIT: ICD-10-CM

## 2022-05-06 DIAGNOSIS — F33.41 RECURRENT MAJOR DEPRESSIVE DISORDER, IN PARTIAL REMISSION: ICD-10-CM

## 2022-05-06 DIAGNOSIS — G30.1 LATE ONSET ALZHEIMER'S DEMENTIA WITHOUT BEHAVIORAL DISTURBANCE: Chronic | ICD-10-CM

## 2022-05-06 DIAGNOSIS — F02.80 LATE ONSET ALZHEIMER'S DEMENTIA WITHOUT BEHAVIORAL DISTURBANCE: Chronic | ICD-10-CM

## 2022-05-06 DIAGNOSIS — N40.1 BPH WITH URINARY OBSTRUCTION: ICD-10-CM

## 2022-05-06 DIAGNOSIS — N39.0 COMPLICATED UTI (URINARY TRACT INFECTION): Primary | ICD-10-CM

## 2022-05-06 DIAGNOSIS — M62.81 GENERALIZED MUSCLE WEAKNESS: ICD-10-CM

## 2022-05-06 DIAGNOSIS — N13.8 BPH WITH URINARY OBSTRUCTION: ICD-10-CM

## 2022-05-06 PROCEDURE — 99495 TRANSJ CARE MGMT MOD F2F 14D: CPT | Performed by: INTERNAL MEDICINE

## 2022-05-06 PROCEDURE — 1111F DSCHRG MED/CURRENT MED MERGE: CPT | Performed by: INTERNAL MEDICINE

## 2022-05-06 RX ORDER — MIRTAZAPINE 30 MG/1
TABLET, FILM COATED ORAL
Qty: 30 TABLET | Refills: 11
Start: 2022-05-06 | End: 2023-01-31

## 2022-05-06 NOTE — CASE COMMUNICATION
Patient was seen 5/3 on Tue. this week. His daughter wants to cancel today because they only want once a week for his personal care.

## 2022-05-06 NOTE — ASSESSMENT & PLAN NOTE
Encourage to get up slowly and get bearings before taking first step. Keep home well lit with clear floors to avoid tripping. Use assist devices for all walking especially from bed to bathroom. DO PT and encouraged to use walker

## 2022-05-06 NOTE — PROGRESS NOTES
Chief Complaint   Patient presents with   • Hospital Follow Up Visit     This was an audio and video enabled telemedicine encounter.he and his daughter agreed yes to consent for doximity video visit. It was carried out without issues   History of Present Illness  87 y.o. male presents for follow up of [facility] from 4/26 to 2/29/2022 for urosepsis and weakness and confusion. He was treated with aggressive antibiotics and therapy and was sent home with [facility] for PT, OT and nursing. I have gone over all this with he and his daughter as well as his medications and they voiced understanding. He has had a fall but denies pain     Review of Systems   Constitutional: Negative for chills and diaphoresis.   Respiratory: Negative for shortness of breath.    Cardiovascular: Negative for chest pain.   Musculoskeletal: Positive for gait problem.   Neurological: Positive for weakness.   Psychiatric/Behavioral: Positive for decreased concentration (memory loss somewhat better ) and dysphoric mood (mood improved ). Negative for sleep disturbance (sleep improved ).     .    PMSFH:  The following portions of the patient's history were reviewed and updated as appropriate: allergies, current medications, past family history, past medical history, past social history, past surgical history and problem list.      Current Outpatient Medications:   •  mirtazapine (Remeron) 30 MG tablet, 1/2 tab in the evening, Disp: 30 tablet, Rfl: 11  •  acetaminophen (TYLENOL) 325 MG tablet, Take 2 tablets by mouth Every 4 (Four) Hours As Needed for Mild Pain ., Disp: , Rfl:   •  aspirin 81 MG chewable tablet, Chew 81 mg Daily., Disp: , Rfl:   •  donepezil (ARICEPT) 10 MG tablet, TAKE 1 TABLET EVERY DAY, Disp: 90 tablet, Rfl: 1  •  famotidine (PEPCID) 20 MG tablet, TAKE 1 TABLET TWICE DAILY, Disp: 180 tablet, Rfl: 1  •  Misc. Devices (Commode Bedside) misc, He needs bedside commode due to fall risk and episodes urinary  urgency, Disp: 1 each, Rfl: 0  •  tamsulosin (FLOMAX) 0.4 MG capsule 24 hr capsule, TAKE 1 CAPSULE EVERY DAY, Disp: 90 capsule, Rfl: 1    VITALS:  There were no vitals taken for this visit.    Physical Exam  Neurological:      Mental Status: He is alert.   Psychiatric:         Mood and Affect: Mood normal.         Behavior: Behavior normal.         LABS:      Procedures       Current outpatient and discharge medications have been reconciled for the patient.  Reviewed by: Edy Goodman MD  ASSESSMENT/PLAN:  Problems Addressed this Visit        Genitourinary and Reproductive     BPH with urinary obstruction     Take flomax and doing ok            Complicated UTI (urinary tract infection) - Primary     87 y.o. male presents for follow up of [facility] from 4/26 to 2/29/2022 for urosepsis and weakness and confusion. He was treated with aggressive antibiotics and therapy and was sent home with [facility] for PT, OT and nursing. I have gone over all this with he and his daughter as well as his medications and they voiced understanding. He has had a fall but denies pain. He is highly complicated and with his bladder issues and fall risk and progressively worsening memory loss certainly at high risk for another ER visit or hospitalization. However with his daughter and care givers and Home Health hopefully that will help reduce risk.               Mental Health    Recurrent major depressive disorder (HCC)     Patient's depression is recurrent and is mild without psychosis. Their depression is currently in partial remission and the condition is improving with treatment. This will be reassessed in 3 months. F/U as described:patient will continue current medication therapy however at 15 mg dose of remeron in the evening.            Relevant Medications    mirtazapine (Remeron) 30 MG tablet       Musculoskeletal and Injuries    Generalized muscle weakness     Do Home Health and keep pushing  nutrition.               Neuro    Dementia without behavioral disturbance (HCC) (Chronic)     Psychological condition is unchanged.  Continue current treatment regimen.  Regular aerobic exercise.  Psychological condition  will be reassessed in 3 months.           Relevant Medications    mirtazapine (Remeron) 30 MG tablet       Symptoms and Signs    Unsteady gait     Encourage to get up slowly and get bearings before taking first step. Keep home well lit with clear floors to avoid tripping. Use assist devices for all walking especially from bed to bathroom. DO PT and encouraged to use walker             Diagnoses       Codes Comments    Complicated UTI (urinary tract infection)    -  Primary ICD-10-CM: N39.0  ICD-9-CM: 599.0     Late onset Alzheimer's dementia without behavioral disturbance (HCC)     ICD-10-CM: G30.1, F02.80  ICD-9-CM: 331.0, 294.10     Generalized muscle weakness     ICD-10-CM: M62.81  ICD-9-CM: 728.87     Recurrent major depressive disorder, in partial remission (HCC)     ICD-10-CM: F33.41  ICD-9-CM: 296.35     BPH with urinary obstruction     ICD-10-CM: N40.1, N13.8  ICD-9-CM: 600.01, 599.69     Unsteady gait     ICD-10-CM: R26.81  ICD-9-CM: 781.2           FOLLOW-UP:  No follow-ups on file.      Electronically signed by:    Edy Goodman MD

## 2022-05-06 NOTE — ASSESSMENT & PLAN NOTE
87 y.o. male presents for follow up of [facility] from 4/26 to 2/29/2022 for urosepsis and weakness and confusion. He was treated with aggressive antibiotics and therapy and was sent home with [facility] for PT, OT and nursing. I have gone over all this with he and his daughter as well as his medications and they voiced understanding. He has had a fall but denies pain. He is highly complicated and with his bladder issues and fall risk and progressively worsening memory loss certainly at high risk for another ER visit or hospitalization. However with his daughter and care givers and Home Health hopefully that will help reduce risk.

## 2022-05-06 NOTE — ASSESSMENT & PLAN NOTE
Patient's depression is recurrent and is mild without psychosis. Their depression is currently in partial remission and the condition is improving with treatment. This will be reassessed in 3 months. F/U as described:patient will continue current medication therapy however at 15 mg dose of remeron in the evening.

## 2022-05-09 ENCOUNTER — HOME CARE VISIT (OUTPATIENT)
Dept: HOME HEALTH SERVICES | Facility: HOME HEALTHCARE | Age: 87
End: 2022-05-09

## 2022-05-09 ENCOUNTER — READMISSION MANAGEMENT (OUTPATIENT)
Dept: CALL CENTER | Facility: HOSPITAL | Age: 87
End: 2022-05-09

## 2022-05-09 VITALS
HEART RATE: 84 BPM | RESPIRATION RATE: 22 BRPM | SYSTOLIC BLOOD PRESSURE: 128 MMHG | DIASTOLIC BLOOD PRESSURE: 58 MMHG | TEMPERATURE: 96 F

## 2022-05-09 PROCEDURE — G0156 HHCP-SVS OF AIDE,EA 15 MIN: HCPCS

## 2022-05-09 PROCEDURE — G0152 HHCP-SERV OF OT,EA 15 MIN: HCPCS

## 2022-05-09 NOTE — OUTREACH NOTE
Sepsis Week 2 Survey    Flowsheet Row Responses   [facility] patient discharged from? [place]   Does the patient have one of the following disease processes/diagnoses(primary or secondary)? Sepsis   Week 2 attempt successful? Yes   Call start time 1155   Call end time 1159   Discharge diagnosis sepsis, PNA, UTI, worsening confusion with hx dementia, T2DM   Is patient permission given to speak with other caregiver? Yes   List who call center can speak with Daughter   Person spoke with today (if not patient) and relationship Daughter Addie   Meds reviewed with patient/caregiver? Yes   Is the patient taking all medications as directed (includes completed medication regime)? Yes   Medication comments Finished antibx   Has the patient kept scheduled appointments due by today? Yes   Comments Telemedicine   Home health comments HH coming in to see him   Psychosocial issues? No   What is the patient's perception of their health status since discharge? Improving   Nursing interventions Nurse provided patient education   Is patient/caregiver able to teach back steps to recovery at home? Set small, achievable goals for return to baseline health   Is the patient/caregiver able to teach back signs and symptoms of worsening condition: Fever, Altered mental status(confusion/coma)   Is the patient/caregiver able to teach back the hierarchy of who to call/visit for symptoms/problems? PCP, Specialist, Home health nurse, Urgent Care, ED, 911 Yes   Additional teach back comments Doing well from fall and have enc fluids. Daughter states stay was denied for insurance covering the stay and she will call the insurance company and financial office at [facility] for further information.    Week 2 call completed? Yes          SARAH ARIZMENDI - Registered Nurse

## 2022-05-11 VITALS — SYSTOLIC BLOOD PRESSURE: 130 MMHG | RESPIRATION RATE: 16 BRPM | DIASTOLIC BLOOD PRESSURE: 80 MMHG

## 2022-05-11 NOTE — HOME HEALTH
Pt seen for OT reassessment: pt in bed resting from shower this morning. Pt agreed to OT session, pt tired and increase assist needed w/ functional transfers- bed, commode, & chair. Pt sat on EOB for LB dressing-max A, rest break x 1 during shaving task at sink and mod A to complete task. Fall risk needing SBA for walker safety- walks outside of RW and recent fall 4/30. Recommend cont OT to increase ind w/ BADL skills w/ increase in endurance/strength, external memory cues for walker safety and family ed for alert/call system.    Plan for Next Visit: ADL training w/ RW safety, possible alarm system

## 2022-05-13 ENCOUNTER — HOME CARE VISIT (OUTPATIENT)
Dept: HOME HEALTH SERVICES | Facility: HOME HEALTHCARE | Age: 87
End: 2022-05-13

## 2022-05-13 VITALS
OXYGEN SATURATION: 96 % | TEMPERATURE: 97.7 F | DIASTOLIC BLOOD PRESSURE: 77 MMHG | RESPIRATION RATE: 16 BRPM | SYSTOLIC BLOOD PRESSURE: 127 MMHG | HEART RATE: 77 BPM

## 2022-05-13 PROCEDURE — G0299 HHS/HOSPICE OF RN EA 15 MIN: HCPCS

## 2022-05-18 ENCOUNTER — READMISSION MANAGEMENT (OUTPATIENT)
Dept: CALL CENTER | Facility: HOSPITAL | Age: 87
End: 2022-05-18

## 2022-05-18 ENCOUNTER — TELEPHONE (OUTPATIENT)
Dept: INTERNAL MEDICINE | Facility: CLINIC | Age: 87
End: 2022-05-18

## 2022-05-18 ENCOUNTER — HOME CARE VISIT (OUTPATIENT)
Dept: HOME HEALTH SERVICES | Facility: HOME HEALTHCARE | Age: 87
End: 2022-05-18

## 2022-05-18 PROCEDURE — G0152 HHCP-SERV OF OT,EA 15 MIN: HCPCS

## 2022-05-18 NOTE — TELEPHONE ENCOUNTER
PATIENT HAS NOT BEEN TAKING MEMANTINE 10 MG.  IS IT OKAY TO GIVE THIS TO HIM?    PLEASE CALL [phone]

## 2022-05-18 NOTE — OUTREACH NOTE
Sepsis Week 3 Survey    Flowsheet Row Responses   [facility] patient discharged from? [place]   Does the patient have one of the following disease processes/diagnoses(primary or secondary)? Sepsis   Week 3 attempt successful? Yes   Call start time 1524   Call end time 1526   Discharge diagnosis sepsis, PNA, UTI, worsening confusion with hx dementia, T2DM   Person spoke with today (if not patient) and relationship Daughter Addie Al reviewed with patient/caregiver? Yes   Is the patient having any side effects they believe may be caused by any medication additions or changes? No   Is the patient taking all medications as directed (includes completed medication regime)? Yes   Does the patient have a primary care provider?  Yes   Has the patient kept scheduled appointments due by today? Yes   What is the Home health agency?  [facility]   Has home health visited the patient within 72 hours of discharge? Yes   Psychosocial issues? No   What is the patient's perception of their health status since discharge? Same  [weak]   Nursing interventions Nurse provided patient education   Is the patient/caregiver able to teach back signs and symptoms of worsening condition: Edema, Altered mental status(confusion/coma), Fever   Week 3 call completed? Yes          DEAN Boone Registered Nurse

## 2022-05-19 ENCOUNTER — HOME CARE VISIT (OUTPATIENT)
Dept: HOME HEALTH SERVICES | Facility: HOME HEALTHCARE | Age: 87
End: 2022-05-19

## 2022-05-19 VITALS
SYSTOLIC BLOOD PRESSURE: 110 MMHG | OXYGEN SATURATION: 93 % | DIASTOLIC BLOOD PRESSURE: 65 MMHG | HEART RATE: 71 BPM | TEMPERATURE: 96 F

## 2022-05-19 VITALS
TEMPERATURE: 96.3 F | SYSTOLIC BLOOD PRESSURE: 110 MMHG | RESPIRATION RATE: 12 BRPM | HEART RATE: 72 BPM | OXYGEN SATURATION: 97 % | DIASTOLIC BLOOD PRESSURE: 62 MMHG

## 2022-05-19 PROCEDURE — G0300 HHS/HOSPICE OF LPN EA 15 MIN: HCPCS

## 2022-05-19 NOTE — CASE COMMUNICATION
Routine Visit Note: LPN    Skill/education provided: Diabetic foot care, hyper/hypo glycemi s/s , daily logs., instructed caregiver to make sure pt is t& r every 2 hours if stayin in bed for long periods of time. pt ambulates with walker, however noted weakness apparent, no skin breakdown noted. pt state no pain or discomfort at all, eats small meals but eats and loves fruit. Family ambulates to patio at times and takes pt on rides to [place] to view family propeties.     Patient/caregiver response: Caregiver states she will call [facility] care if any s/s of uti such pain on urination, change in urine color, odor or increasesed confusion.    Plan for next visit: Skilled assessment of all systems, to include diabetic foot care assessment, pressure wounds.    Other pertinent info: none

## 2022-05-19 NOTE — HOME HEALTH
Focus: Pt/dtr ed for allowing pt to assist as much as possible w/ functional transfers, toileting, showering, dressing tasks. Pt needs increase time to complete task at min/mod A vs max assist. Dtr reports pt cont to get up from bed w/o her knowledge, baby monitor not working. Dtr will look into purchasing new monitor.     Plan for next visit:  functional transfer training, activity tolerance w/ ADL skills, safety in the home

## 2022-05-19 NOTE — HOME HEALTH
Routine Visit Note: LPN    Skill/education provided: Diabetic foot care, hyper/hypo glycemi s/s , daily logs., instructed caregiver to make sure pt is t& r every 2 hours if stayin in bed for long periods of time. pt ambulates with walker, however noted weakness apparent, no skin breakdown noted. pt state no pain or discomfort at all, eats small meals but eats and loves fruit. Family ambulates to patio at times and takes pt on rides to [place] to view family propeties.     Patient/caregiver response: Caregiver states she will call [facility] if any s/s of uti such pain on urination, change in urine color, odor or increasesed confusion.    Plan for next visit: Skilled assessment of all systems, to include diabetic foot care assessment, pressure wounds.    Other pertinent info: none

## 2022-05-24 ENCOUNTER — HOME CARE VISIT (OUTPATIENT)
Dept: HOME HEALTH SERVICES | Facility: HOME HEALTHCARE | Age: 87
End: 2022-05-24

## 2022-05-24 VITALS
HEART RATE: 78 BPM | SYSTOLIC BLOOD PRESSURE: 132 MMHG | RESPIRATION RATE: 22 BRPM | DIASTOLIC BLOOD PRESSURE: 68 MMHG | TEMPERATURE: 96.9 F

## 2022-05-24 VITALS
DIASTOLIC BLOOD PRESSURE: 78 MMHG | SYSTOLIC BLOOD PRESSURE: 132 MMHG | RESPIRATION RATE: 12 BRPM | OXYGEN SATURATION: 95 % | HEART RATE: 78 BPM

## 2022-05-24 PROCEDURE — G0156 HHCP-SVS OF AIDE,EA 15 MIN: HCPCS

## 2022-05-24 PROCEDURE — G0300 HHS/HOSPICE OF LPN EA 15 MIN: HCPCS

## 2022-05-24 NOTE — HOME HEALTH
Routine Visit Note: LPN    Skill/education provided: GI//Falls/ Pressure injury assessment. Pt shows no s/s of UTI on visit no falls reported, no skin issues noted. pt continues to eat small but multiple meals daily . weight on visit was 133lbs. Pt ambulates with walker but show safty concerns upon rising from a seated position. Family assist pt to ambulate throughout home.    Patient/caregiver response: Caregiver will call [facility] if s/s of uti are noted, pt falls, or if any skin breakdown is noted.    Plan for next visit: GI/ , falls, integumentary assessment    Other pertinent info: none

## 2022-05-24 NOTE — CASE COMMUNICATION
Routine Visit Note: LPN    Skill/education provided: GI//Falls/ Pressure injury assessment. Pt shows no s/s of UTI on visit no falls reported, no skin issues noted. pt continues to eat small but multiple meals daily . weight on visit was 133lbs. Pt ambulates with walker but show safty concerns upon rising from a seated position. Family assist pt to ambulate throughout home.    Patient/caregiver response: Caregiver will call [facility] if s/s of uti are noted, pt falls, or if any skin breakdown is noted.    Plan for next visit: GI/ , falls, integumentary assessment    Other pertinent info: none

## 2022-05-27 ENCOUNTER — HOME CARE VISIT (OUTPATIENT)
Dept: HOME HEALTH SERVICES | Facility: HOME HEALTHCARE | Age: 87
End: 2022-05-27

## 2022-05-30 ENCOUNTER — READMISSION MANAGEMENT (OUTPATIENT)
Dept: CALL CENTER | Facility: HOSPITAL | Age: 87
End: 2022-05-30

## 2022-05-30 NOTE — OUTREACH NOTE
Sepsis Week 4 Survey    Flowsheet Row Responses   [facility] patient discharged from? [place]   Does the patient have one of the following disease processes/diagnoses(primary or secondary)? Sepsis   Week 4 attempt successful? Yes   Call start time 1155   Call end time 1157   Discharge diagnosis sepsis, PNA, UTI, worsening confusion with hx dementia, T2DM   Person spoke with today (if not patient) and relationship Daughter Addie Al reviewed with patient/caregiver? Yes   Is the patient taking all medications as directed (includes completed medication regime)? Yes   Has the patient kept scheduled appointments due by today? Yes   Is the patient still receiving Home Health Services? Yes   Psychosocial issues? No   Comments Appetite has improved some, still weak/fatigue, no desire to get up and move around. Pt is incontinent of urine but not stool. No fever. No cough or SOA.   What is the patient's perception of their health status since discharge? Improving   Nursing interventions Nurse provided patient education   Is the patient/caregiver able to teach back Sepsis? S - Shivering,fever or very cold   Is patient/caregiver able to teach back steps to recovery at home? Rest and regain strength, Set small, achievable goals for return to baseline health   Week 4 call completed? Yes   Would the patient like one additional call? No   Graduated Yes   Is the patient interested in additional calls from an ambulatory ?  NOTE:  applies to high risk patients requiring additional follow-up. No   Did the patient feel the follow up calls were helpful during their recovery period? Yes   Was the number of calls appropriate? Yes          CLOTILDE MERIDA - Registered Nurse

## 2022-06-01 ENCOUNTER — HOME CARE VISIT (OUTPATIENT)
Dept: HOME HEALTH SERVICES | Facility: HOME HEALTHCARE | Age: 87
End: 2022-06-01

## 2022-06-01 NOTE — HOME HEALTH
Routine Visit Note:  LPN Disipline discharge    Skill/education provided: cardiac/ lungs/ /GI assessment    Patient/caregiver response: Pt/cargiver will call provider if illness noted    Plan for next visit: none    Other pertinent info: none

## 2022-06-02 ENCOUNTER — PATIENT OUTREACH (OUTPATIENT)
Dept: CASE MANAGEMENT | Facility: OTHER | Age: 87
End: 2022-06-02

## 2022-06-02 ENCOUNTER — OUTSIDE FACILITY SERVICE (OUTPATIENT)
Dept: INTERNAL MEDICINE | Facility: CLINIC | Age: 87
End: 2022-06-02

## 2022-06-02 VITALS
OXYGEN SATURATION: 93 % | DIASTOLIC BLOOD PRESSURE: 80 MMHG | HEART RATE: 67 BPM | TEMPERATURE: 96.7 F | SYSTOLIC BLOOD PRESSURE: 112 MMHG | RESPIRATION RATE: 16 BRPM

## 2022-06-02 PROCEDURE — G0179 MD RECERTIFICATION HHA PT: HCPCS | Performed by: INTERNAL MEDICINE

## 2022-06-02 PROCEDURE — OUTSIDEPOS PR OUTSIDE POS PLACEHOLDER: Performed by: INTERNAL MEDICINE

## 2022-06-02 NOTE — OUTREACH NOTE
"AMBULATORY CASE MANAGEMENT NOTE    Name and Relationship of Patient/Support Person: Addie Araujo - Emergency Contact    Patient Outreach    [facility] admission 4/26-4/29/22.  Was contacting pt/daughter, Addie to assess for any case management needs.  Addie answered phone and stated \"he is doing fine.\"  He is still living with her, however could not hear most of conversation due to bad connection. RN-ACM did hear her say, \"we do have bad connection out here.\" Will call her back at later date.      EUNICE CANCHOLA  Ambulatory Case Management    6/2/2022, 16:27 EDT  "

## 2022-06-08 ENCOUNTER — PATIENT OUTREACH (OUTPATIENT)
Dept: CASE MANAGEMENT | Facility: OTHER | Age: 87
End: 2022-06-08

## 2022-06-08 NOTE — OUTREACH NOTE
AMBULATORY CASE MANAGEMENT NOTE    Name and Relationship of Patient/Support Person: Addie Araujo - Emergency Contact    Patient Outreach    F/u with daughterAddie.  [facility] [place] [facility].  He is ambulating with walker and occas can ambulate without, although she tried to get him to always use.  [facility] [place] [facility], but did not feel he qualified.  Daughter is his caregiver, but they do have caregiver to come in prn to assist and if daughter is going to be gone.  Offered to assist in scheduling sooner appt with PCP, but declined and will keep the Aug appt.  [facility] 24/7 Nurse Call Center explained and number provided. States he has his medicines and he is compliant.  Also, reports he has all the equipment needed.    She denies any needs at present.  Does know to contact RN-ACM for any needs in the future.      Adult Patient Profile  Questions/Answers    Flowsheet Row Most Recent Value   Hearing Difficulty or Deaf yes   Concentrating, Remembering or Making Decisions Difficulty yes   Difficulty Communicating yes   Communication difficulty speaking  [daughter reports they do OK with communication ]   Walking or Climbing Stairs Difficulty yes   Walking or Climbing Stairs ambulation difficulty, requires equipment   Dressing/Bathing Difficulty yes   Doing Errands Independently Difficulty (such as shopping) no   Equipment Currently Used at Home hospital bed, walker, rolling, walker, standard, wheelchair, shower chair, bp cuff          Social Work Assessment  Questions/Answers    Flowsheet Row Most Recent Value   People in Home child(juan ramon), adult   Current Living Arrangements home   Primary Care Provided by child(juan ramon)  [caregiver PRN]   Quality of Family Relationships supportive   Equipment Currently Used at Home hospital bed, walker, rolling, walker, standard, wheelchair, shower chair, bp cuff        Send Education  Questions/Answers    Flowsheet Row Most Recent Value   Annual Wellness Visit:   --  [scheduled for Aug 2022]   Other Patient Education/Resources  24/7 [facility] Nurse Call Line   24/7 Nurse Call Line Education Method Verbal   Advanced Directives: Patient Has          EUNICE CANCHOLA  Ambulatory Case Management    6/8/2022, 09:36 EDT

## 2022-07-12 ENCOUNTER — TELEPHONE (OUTPATIENT)
Dept: INTERNAL MEDICINE | Facility: CLINIC | Age: 87
End: 2022-07-12

## 2022-07-12 NOTE — TELEPHONE ENCOUNTER
If the pain is moderately bad ER best otherwise would be ok to come to office for evaluation and get xrays

## 2022-07-12 NOTE — TELEPHONE ENCOUNTER
Caller: Cooper Araujo    Relationship: Emergency Contact    Best call back number:     What is the best time to reach you: ANYTIME    Who are you requesting to speak with (clinical staff, provider,  specific staff member): CLINICAL STAFF/NURSE    Do you know the name of the person who called: COOPER    What was the call regarding: PATIENT SLID ON THE FLOOR LAST NIGHT AND HURT HIS BACK; SHE WAS ASKING IF SOMETHING MAY BE CALLED IN OR NEEDING GUIDANCE ON WHAT SHE SHOULD BE DOING    Do you require a callback:YES     SHE ADVISED SHE REALLY DOESN'T WANT TO TAKE HIM TO THE EMERGENCY ROOM AS WELL

## 2022-08-08 ENCOUNTER — TELEPHONE (OUTPATIENT)
Dept: INTERNAL MEDICINE | Facility: CLINIC | Age: 87
End: 2022-08-08

## 2022-08-08 NOTE — TELEPHONE ENCOUNTER
Patients daughter called as the facilitator for the appointment but she will have to leave at 5 PM, with Dr Goodman running behind with patients this appointment would not be able to be kept    Patient rescheduled telehealth visit for Friday 08/12/2022 at 11:00 PM

## 2022-08-12 ENCOUNTER — TELEMEDICINE (OUTPATIENT)
Dept: INTERNAL MEDICINE | Facility: CLINIC | Age: 87
End: 2022-08-12

## 2022-08-12 VITALS — WEIGHT: 131 LBS | BODY MASS INDEX: 19.35 KG/M2

## 2022-08-12 DIAGNOSIS — E44.1 MILD PROTEIN-CALORIE MALNUTRITION: ICD-10-CM

## 2022-08-12 DIAGNOSIS — I10 BENIGN ESSENTIAL HYPERTENSION: Primary | ICD-10-CM

## 2022-08-12 DIAGNOSIS — M54.50 ACUTE MIDLINE LOW BACK PAIN WITHOUT SCIATICA: ICD-10-CM

## 2022-08-12 DIAGNOSIS — E11.59 TYPE 2 DIABETES MELLITUS WITH OTHER CIRCULATORY COMPLICATION, WITHOUT LONG-TERM CURRENT USE OF INSULIN: ICD-10-CM

## 2022-08-12 PROCEDURE — 99214 OFFICE O/P EST MOD 30 MIN: CPT | Performed by: INTERNAL MEDICINE

## 2022-08-12 RX ORDER — TRAMADOL HYDROCHLORIDE 50 MG/1
50 TABLET ORAL EVERY 12 HOURS PRN
Qty: 30 TABLET | Refills: 0 | Status: SHIPPED | OUTPATIENT
Start: 2022-08-12 | End: 2022-08-12 | Stop reason: SDUPTHER

## 2022-08-12 RX ORDER — TRAMADOL HYDROCHLORIDE 50 MG/1
50 TABLET ORAL EVERY 12 HOURS PRN
Qty: 30 TABLET | Refills: 0 | Status: SHIPPED | OUTPATIENT
Start: 2022-08-12 | End: 2023-01-31

## 2022-08-12 NOTE — TELEPHONE ENCOUNTER
Caller: L[place] [id number] [facility] [address] - [phone] [facility] [phone] FX    Relationship: Pharmacy    Best call back number: [phone]    Requested Prescriptions:   Requested Prescriptions     Pending Prescriptions Disp Refills   • traMADol (Ultram) 50 MG tablet 30 tablet 0     Sig: Take 1 tablet by mouth Every 12 (Twelve) Hours As Needed for Moderate Pain .        Pharmacy where request should be sent: [facility] - [place], [place] - [address] - [phone] [facility] [phone] FX     Additional details provided by patient: PATIENTS' REFILL REQUEST WAS SENT INTO [facility] WHICH HE DOES NOT USE AND NEEDED TO BE REFILLED AT [facility]. A NEW REQUEST NEEDS TO BE SENT IN AND PATIENT IS OUT OF THIS.    Does the patient have less than a 3 day supply:  [x] Yes  [] No    Ryann Hall Rep   08/12/22 11:58 EDT

## 2022-08-12 NOTE — ASSESSMENT & PLAN NOTE
Diabetes is improving with lifestyle modifications.   he is holding meds   Diabetes will be reassessed in 3 months.

## 2022-08-12 NOTE — ASSESSMENT & PLAN NOTE
Hypertension is improving with lifestyle modifications.  he is holding all meds   Blood pressure will be reassessed at the next regular appointment.

## 2022-10-24 ENCOUNTER — PATIENT OUTREACH (OUTPATIENT)
Dept: CASE MANAGEMENT | Facility: OTHER | Age: 87
End: 2022-10-24

## 2022-10-24 NOTE — OUTREACH NOTE
"AMBULATORY CASE MANAGEMENT NOTE    Name and Relationship of Patient/Support Person: Addie Araujo - Emergency Contact    Patient Outreach    F/u RN-ACM outreach.  Spoke with Addie.  States he is doing good at present.  She states he is off all his medicines and \"he is doing better than he has done in a long time.\"  Offered to rescheduled PCP appt that was missed, however she declined at present.  She continues to care for him. States he has all the equipment he needs and denies any needs that RN-ACM could assist with today.  Reminded of [facility] [place] 24/7 Nurse Call Center. Also, encouraged to contact RN-ACM for any future needs.      EUNICE CANCHOLA  Ambulatory Case Management    10/24/2022, 11:50 EDT  "

## 2022-12-15 NOTE — TELEPHONE ENCOUNTER
Called [facility] and requested 6 mom compliance report.  Advised Pt would be bringing in machine.    Assistance with ambulation/Assistance OOB with selected safe patient handling equipment/Communicate Risk of Fall with Harm to all staff/Discuss with provider need for PT consult/Monitor gait and stability/Reinforce activity limits and safety measures with patient and family/Tailored Fall Risk Interventions/Visual Cue: Yellow wristband and red socks/Bed in lowest position, wheels locked, appropriate side rails in place/Call bell, personal items and telephone in reach/Instruct patient to call for assistance before getting out of bed or chair/Non-slip footwear when patient is out of bed/[place] to call system/Physically safe environment - no spills, clutter or unnecessary equipment/Purposeful Proactive Rounding/Room/bathroom lighting operational, light cord in reach

## 2023-01-01 ENCOUNTER — TELEPHONE (OUTPATIENT)
Dept: INTERNAL MEDICINE | Facility: CLINIC | Age: 88
End: 2023-01-01

## 2023-01-18 ENCOUNTER — TELEPHONE (OUTPATIENT)
Dept: INTERNAL MEDICINE | Facility: CLINIC | Age: 88
End: 2023-01-18

## 2023-01-18 ENCOUNTER — OUTSIDE FACILITY SERVICE (OUTPATIENT)
Dept: INTERNAL MEDICINE | Facility: CLINIC | Age: 88
End: 2023-01-18
Payer: MEDICARE

## 2023-01-18 PROCEDURE — G0180 MD CERTIFICATION HHA PATIENT: HCPCS | Performed by: INTERNAL MEDICINE

## 2023-01-27 ENCOUNTER — TELEPHONE (OUTPATIENT)
Dept: INTERNAL MEDICINE | Facility: CLINIC | Age: 88
End: 2023-01-27

## 2023-01-27 NOTE — TELEPHONE ENCOUNTER
Patient's daughter Addie Araujo called to inform Dr. Goodman that she checked with [facility] and they do not have any required forms that need to be completed.  They just need a written normal physical and historical background notes for his entry to [facility] for respite care.    She said this was just a FYI for Dr. Goodman prior to patient's appointment for next week. She will have more details to relay during the visit.

## 2023-01-31 ENCOUNTER — OFFICE VISIT (OUTPATIENT)
Dept: INTERNAL MEDICINE | Facility: CLINIC | Age: 88
End: 2023-01-31
Payer: MEDICARE

## 2023-01-31 VITALS
HEIGHT: 69 IN | WEIGHT: 149 LBS | TEMPERATURE: 97.1 F | BODY MASS INDEX: 22.07 KG/M2 | HEART RATE: 78 BPM | SYSTOLIC BLOOD PRESSURE: 118 MMHG | DIASTOLIC BLOOD PRESSURE: 82 MMHG

## 2023-01-31 DIAGNOSIS — F33.41 RECURRENT MAJOR DEPRESSIVE DISORDER, IN PARTIAL REMISSION: ICD-10-CM

## 2023-01-31 DIAGNOSIS — C67.9 MALIGNANT NEOPLASM OF URINARY BLADDER, UNSPECIFIED SITE: ICD-10-CM

## 2023-01-31 DIAGNOSIS — I48.0 PAROXYSMAL ATRIAL FIBRILLATION: ICD-10-CM

## 2023-01-31 DIAGNOSIS — I10 BENIGN ESSENTIAL HYPERTENSION: ICD-10-CM

## 2023-01-31 DIAGNOSIS — F03.90 DEMENTIA WITHOUT BEHAVIORAL DISTURBANCE: Chronic | ICD-10-CM

## 2023-01-31 DIAGNOSIS — E44.1 MILD PROTEIN-CALORIE MALNUTRITION: ICD-10-CM

## 2023-01-31 DIAGNOSIS — E11.59 TYPE 2 DIABETES MELLITUS WITH OTHER CIRCULATORY COMPLICATIONS: Primary | ICD-10-CM

## 2023-01-31 DIAGNOSIS — R26.81 UNSTEADY GAIT: ICD-10-CM

## 2023-01-31 DIAGNOSIS — I73.9 PERIPHERAL VASCULAR DISEASE: ICD-10-CM

## 2023-01-31 PROCEDURE — 99215 OFFICE O/P EST HI 40 MIN: CPT | Performed by: INTERNAL MEDICINE

## 2023-01-31 NOTE — PROGRESS NOTES
Chief Complaint   Patient presents with   • Dementia     He needs to do respite care      Counseling was given to patient and family for the following topics: diagnostic results, instructions for management, risk factor reductions, prognosis, patient and family education, impressions, risks and benefits of treatment options and importance of treatment compliance . Total time of the encounter was 44 minutes   He is a good candidate to proceed with [facility] Respite care at this time.  History of Present Illness  88 y.o. male [facility]. He has overall been doing well. He is managed by his daughter and [facility] navigators for pain. He and his daughter state his pain is under good control with the 1/2 hydrocodone twice a day per Care navigators. He denies pain now.     Review of Systems   HENT: Positive for hearing loss.    Respiratory: Negative for cough and shortness of breath.    Cardiovascular: Negative for chest pain and palpitations.   Gastrointestinal: Negative for abdominal pain.   Musculoskeletal: Positive for gait problem.   Neurological: Positive for weakness.   Psychiatric/Behavioral: Positive for decreased concentration (he has had stable memory loss with stable decreased communication ). Negative for dysphoric mood.     .    PMSFH:  The following portions of the patient's history were reviewed and updated as appropriate: allergies, current medications, past family history, past medical history, past social history, past surgical history and problem list.      Current Outpatient Medications:   •  acetaminophen (TYLENOL) 325 MG tablet, Take 2 tablets by mouth Every 4 (Four) Hours As Needed for Mild Pain ., Disp: , Rfl:   •  aspirin 81 MG chewable tablet, Chew 81 mg Daily., Disp: , Rfl:   •  famotidine (PEPCID) 20 MG tablet, TAKE 1 TABLET TWICE DAILY, Disp: 180 tablet, Rfl: 1  •  Misc. Devices (Commode Bedside) misc, He needs bedside commode due to fall risk and episodes  "urinary urgency, Disp: 1 each, Rfl: 0  •  tamsulosin (FLOMAX) 0.4 MG capsule 24 hr capsule, TAKE 1 CAPSULE EVERY DAY, Disp: 90 capsule, Rfl: 1  •  HYDROcodone-acetaminophen (NORCO) 5-325 MG per tablet, 1/2 tab by mouth twice a day, Disp: , Rfl: 0    VITALS:  /82   Pulse 78   Temp 97.1 °F (36.2 °C)   Ht 175.3 cm (69.02\")   Wt 67.6 kg (149 lb)   BMI 21.99 kg/m²     Physical Exam  Constitutional:       Appearance: He is ill-appearing (chronically ill in no acute distress ).   Eyes:      Conjunctiva/sclera: Conjunctivae normal.   Cardiovascular:      Rate and Rhythm: Normal rate. Rhythm irregular.      Heart sounds: Murmur (II/VI SM ) heard.   Pulmonary:      Effort: Pulmonary effort is normal.      Breath sounds: No wheezing.   Abdominal:      General: Bowel sounds are normal.      Palpations: Abdomen is soft.      Tenderness: There is no abdominal tenderness.   Musculoskeletal:         General: No tenderness (no back tenderness on exam ).   Neurological:      Mental Status: He is alert.      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 [employer] in awhile he does ambulate ok with the walker but has some difficulty with getting up but no focal weakness    Psychiatric:      Comments: His mood is good overall with less affect          LABS:    CMP:  Lab Results   Component Value Date    BUN 10 04/29/2022    CREATININE 0.62 (L) 04/29/2022    EGFRIFNONA 120 01/29/2022    EGFRIFAFRI 108 01/17/2022     04/29/2022    K 4.7 04/29/2022     (H) 04/29/2022    CALCIUM 8.5 (L) 04/29/2022    PROTENTOTREF 7.0 01/17/2022    ALBUMIN 2.80 (L) 04/28/2022    LABGLOBREF 3.4 01/17/2022    BILITOT 0.3 04/28/2022    ALKPHOS 113 04/28/2022    AST 44 (H) 04/28/2022    ALT 34 04/28/2022     CBC:  Lab Results   Component Value Date    WBC 9.10 04/29/2022    RBC 3.43 (L) 04/29/2022    HGB 9.6 " (L) 04/29/2022    HCT 29.4 (L) 04/29/2022    MCV 85.7 04/29/2022    MCH 28.0 04/29/2022    MCHC 32.7 04/29/2022    RDW 15.5 (H) 04/29/2022     04/29/2022     Procedures         ASSESSMENT/PLAN:  Problems Addressed this Visit        Cardiac and Vasculature    Benign essential hypertension     Hypertension is improving with lifestyle modifications.  Regular aerobic exercise.  Blood pressure will be reassessed in 3 months.         Paroxysmal atrial fibrillation (HCC)     Likely in atrial fib now but already on baby aspirin and not having palpitations or shortness of breath or chest pain. 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   Discussed foot care.  Diabetes will be reassessed in 3 months His blood sugar is doing much better off medications and with major weight loss. .            Hematology and Neoplasia    Malignant tumor of urinary bladder (HCC)     Patient and family continue to declined further treatment or evaluation. His urination is ok.             Mental Health    Recurrent major depressive disorder (HCC)     Patient's depression is recurrent and is mild without psychosis. Their depression is currently in partial remission and the condition is improving with lifestyle modifications. This will be reassessed in 3 months. F/U as described:family helps mood and Care navigators.            Neuro    Dementia without behavioral disturbance (HCC) (Chronic)     He has progressive memory loss but is relatively stable. He is actually doing somewhat better in respects without medications for memory loss. He is a good candidate to proceed with [facility] Respite care at this time. He will continue  his medications and [facility] Navigators 1/2 tab of oxycodone will be given twice a day as it is working ok for him He needs assistance with his meals and medicines             Symptoms and Signs    Unsteady gait     He is stable with walker He is a good candidate to proceed with [facility] Respite care at this time.        Other Visit Diagnoses     Mild protein-calorie malnutrition (HCC)        Encouraged trying to push foods as tolerated and consdier labs in 3 months and do respite care for 2 weeks at [facility]       Diagnoses       Codes Comments    Type 2 diabetes mellitus with other circulatory complications (HCC)    -  Primary ICD-10-CM: E11.59  ICD-9-CM: 250.70     Peripheral vascular disease (HCC)     ICD-10-CM: I73.9  ICD-9-CM: 443.9     Benign essential hypertension     ICD-10-CM: I10  ICD-9-CM: 401.1     Recurrent major depressive disorder, in partial remission (HCC)     ICD-10-CM: F33.41  ICD-9-CM: 296.35     Mild protein-calorie malnutrition (HCC)     ICD-10-CM: E44.1  ICD-9-CM: 263.1 Encouraged trying to push foods as tolerated and consdier labs in 3 months and do respite care for 2 weeks at [facility]     Malignant neoplasm of urinary bladder, unspecified site (HCC)     ICD-10-CM: C67.9  ICD-9-CM: 188.9     Paroxysmal atrial fibrillation (HCC)     ICD-10-CM: I48.0  ICD-9-CM: 427.31     Dementia without behavioral disturbance (HCC)     ICD-10-CM: F03.90  ICD-9-CM: 294.20     Unsteady gait     ICD-10-CM: R26.81  ICD-9-CM: 781.2           FOLLOW-UP:  Return in about 3 months (around 4/30/2023).      Electronically signed by:    Edy Goodman MD

## 2023-02-01 RX ORDER — HYDROCODONE BITARTRATE AND ACETAMINOPHEN 5; 325 MG/1; MG/1
TABLET ORAL
Refills: 0
Start: 2023-02-01

## 2023-02-01 NOTE — ASSESSMENT & PLAN NOTE
Patient's depression is recurrent and is mild without psychosis. Their depression is currently in partial remission and the condition is improving with lifestyle modifications. This will be reassessed in 3 months. F/U as described:family helps mood and Care navigators.

## 2023-02-01 NOTE — ASSESSMENT & PLAN NOTE
Likely in atrial fib now but already on baby aspirin and not having palpitations or shortness of breath or chest pain. Family and patient prefer to keep current management and declined labs now or additional medications

## 2023-02-01 NOTE — ASSESSMENT & PLAN NOTE
He has progressive memory loss but is relatively stable. He is actually doing somewhat better in respects without medications for memory loss. He is a good candidate to proceed with [facility] Respite care at this time. He will [facility] his [facility] tab of oxycodone will be given twice a day as it is working ok for him He needs assistance with his meals and medicines

## 2023-02-01 NOTE — ASSESSMENT & PLAN NOTE
Hypertension is improving with lifestyle modifications.  Regular aerobic exercise.  Blood pressure will be reassessed in 3 months.

## 2023-02-01 NOTE — TELEPHONE ENCOUNTER
SONA Oconnell to return call. I need to know what exactly I need to do to get him established there for respit care.

## 2023-02-01 NOTE — TELEPHONE ENCOUNTER
"Caller: Addie Araujo    Relationship: Emergency Contact    Best call back number: [phone]    Requested Prescriptions:   HYDROCODONE 5MG      ADDITIONAL DETAILS: PATIENTS DAUGHTE CALLED STATED A MESSAGE NEEDS TO BE SENT TO . STATED \" TELL HIM HYDROCODONE 5MG... HE'LL KNOW WHAT IT MEANS\". DECLINED REFILL OR CALLBACK OFFERS    EDY Santos   02/01/23 10:07 EST   "

## 2023-02-01 NOTE — ASSESSMENT & PLAN NOTE
Diabetes is improving with lifestyle modifications.   Discussed foot care.  Diabetes will be reassessed in 3 months His blood sugar is doing much better off medications and with major weight loss. .

## 2023-02-01 NOTE — ASSESSMENT & PLAN NOTE
He is stable with walker He is a good candidate to proceed with [facility] Respite care at this time.

## 2023-02-02 ENCOUNTER — TELEPHONE (OUTPATIENT)
Dept: INTERNAL MEDICINE | Facility: CLINIC | Age: 88
End: 2023-02-02
Payer: MEDICARE

## 2023-02-02 NOTE — TELEPHONE ENCOUNTER
Caller: Addie Araujo    Relationship: Emergency Contact    Best call back number: [phone]    What form or medical record are you requesting: PATIENTS HISTORY AND PHYSICAL     Who is requesting this form or medical record from you: [facility]    How would you like to receive the form or medical records (pick-up, mail, fax)FAX TO [facility] [phone]  Timeframe paperwork needed: AS SOON AS POSSIBLE     Additional notes: THE CALLER STATES THAT THE PATIENT IS GOING TO [facility] Emergency Department BUT THEY NEED HIS HISTORY AND PHYSICAL RECORDS BEFORE THEY WILL ADMIT THE PATIENT PLEASE CALL PATIENTS DAUGHTER TO LET HER KNOW IF THAT CAN BE DONE

## 2023-02-02 NOTE — TELEPHONE ENCOUNTER
"    Caller: Addie Araujo    Relationship: Emergency Contact    Best call back number: [phone]    What form or medical record are you requesting: PAPERWORK-HUB ASKED WHAT TYPE OF PAPERWORK AND DAUGHTER STATED \"HE KNOWS EXACTLY WHAT IT IS\"    Who is requesting this form or medical record from you: LURDES    Timeframe paperwork needed: ASAP    Additional notes: CALL DISCONNECTED        "

## 2023-02-02 NOTE — TELEPHONE ENCOUNTER
Most recent note 01/31/23 sent to [facility] -[phone]    Called and let Addie know that we sent that record as requested

## 2023-02-06 ENCOUNTER — DOCUMENTATION (OUTPATIENT)
Dept: FAMILY MEDICINE CLINIC | Facility: CLINIC | Age: 88
End: 2023-02-06
Payer: MEDICARE

## 2023-02-06 RX ORDER — OXYCODONE HYDROCHLORIDE 5 MG/1
2.5 CAPSULE ORAL 2 TIMES DAILY PRN
COMMUNITY

## 2023-02-09 ENCOUNTER — NURSING HOME (OUTPATIENT)
Dept: INTERNAL MEDICINE | Facility: CLINIC | Age: 88
End: 2023-02-09
Payer: MEDICARE

## 2023-02-09 VITALS
HEART RATE: 87 BPM | TEMPERATURE: 97.8 F | BODY MASS INDEX: 21.85 KG/M2 | OXYGEN SATURATION: 96 % | DIASTOLIC BLOOD PRESSURE: 77 MMHG | SYSTOLIC BLOOD PRESSURE: 106 MMHG | WEIGHT: 148 LBS | RESPIRATION RATE: 18 BRPM

## 2023-02-09 DIAGNOSIS — I73.9 PERIPHERAL VASCULAR DISEASE: ICD-10-CM

## 2023-02-09 DIAGNOSIS — N40.1 BPH WITH URINARY OBSTRUCTION: ICD-10-CM

## 2023-02-09 DIAGNOSIS — R26.81 UNSTEADY GAIT: ICD-10-CM

## 2023-02-09 DIAGNOSIS — N13.8 BPH WITH URINARY OBSTRUCTION: ICD-10-CM

## 2023-02-09 DIAGNOSIS — E11.59 TYPE 2 DIABETES MELLITUS WITH OTHER CIRCULATORY COMPLICATIONS: Primary | ICD-10-CM

## 2023-02-09 DIAGNOSIS — I48.0 PAROXYSMAL ATRIAL FIBRILLATION: ICD-10-CM

## 2023-02-09 DIAGNOSIS — F02.80 LATE ONSET ALZHEIMER'S DEMENTIA WITHOUT BEHAVIORAL DISTURBANCE: ICD-10-CM

## 2023-02-09 DIAGNOSIS — G30.1 LATE ONSET ALZHEIMER'S DEMENTIA WITHOUT BEHAVIORAL DISTURBANCE: ICD-10-CM

## 2023-02-09 DIAGNOSIS — I10 BENIGN ESSENTIAL HYPERTENSION: ICD-10-CM

## 2023-02-09 DIAGNOSIS — E44.1 MILD PROTEIN-CALORIE MALNUTRITION: ICD-10-CM

## 2023-02-09 DIAGNOSIS — F33.41 RECURRENT MAJOR DEPRESSIVE DISORDER, IN PARTIAL REMISSION: ICD-10-CM

## 2023-02-09 DIAGNOSIS — E03.9 HYPOTHYROIDISM, UNSPECIFIED TYPE: ICD-10-CM

## 2023-02-09 PROCEDURE — 99305 1ST NF CARE MODERATE MDM 35: CPT | Performed by: INTERNAL MEDICINE

## 2023-02-12 ENCOUNTER — READMISSION MANAGEMENT (OUTPATIENT)
Dept: CALL CENTER | Facility: HOSPITAL | Age: 88
End: 2023-02-12
Payer: MEDICARE

## 2023-02-13 NOTE — OUTREACH NOTE
Prep Survey    Flowsheet Row Responses   [facility] facility patient discharged from? Non-BH   Is LACE score < 7 ? Non-BH Discharge   Eligibility [facility]   [facility]    Date of Discharge 02/12/23   Discharge Disposition Home or Self Care   Discharge diagnosis Unspecified dementia without behavioral disturbance   Does the patient have one of the following disease processes/diagnoses(primary or secondary)? Other   Prep survey completed? Yes          Sarah ARIZMENDI - Registered Nurse

## 2023-02-14 ENCOUNTER — TRANSITIONAL CARE MANAGEMENT TELEPHONE ENCOUNTER (OUTPATIENT)
Dept: CALL CENTER | Facility: HOSPITAL | Age: 88
End: 2023-02-14
Payer: MEDICARE

## 2023-02-14 NOTE — OUTREACH NOTE
Call Center TCM Note    Flowsheet Row Responses   [facility] patient discharged from? Non-   Does the patient have one of the following disease processes/diagnoses(primary or secondary)? Other   TCM attempt successful? Yes   Call start time 0937   Call end time 0941   Discharge diagnosis Unspecified dementia without behavioral disturbance   Person spoke with today (if not patient) and relationship daughterAddie reviewed with patient/caregiver? Yes   Is the patient having any side effects they believe may be caused by any medication additions or changes? No   Does the patient have all medications ordered at discharge? Yes   Is the patient taking all medications as directed (includes completed medication regime)? Yes   Comments Daughter prefers to make own appt if needs or concerns arise.    Does the patient have an appointment with their PCP within 7 days of discharge? No   Psychosocial issues? No   Did the patient receive a copy of their discharge instructions? Yes   Nursing interventions Reviewed instructions with patient   What is the patient's perception of their health status since discharge? Improving   Is the patient/caregiver able to teach back signs and symptoms related to disease process for when to call PCP? Yes   Is the patient/caregiver able to teach back signs and symptoms related to disease process for when to call 911? Yes   Is the patient/caregiver able to teach back the hierarchy of who to call/visit for symptoms/problems? PCP, Specialist, Home health nurse, Urgent Care, ED, 911 Yes   If the patient is a current smoker, are they able to teach back resources for cessation? Not a smoker   TCM call completed? Yes   Wrap up additional comments Quick call. Daughter denies needs or concerns.    Call end time 0941   Would this patient benefit from a Referral to Amb Social Work? No   Is the patient interested in additional calls from an ambulatory ?  NOTE:  applies to high risk  patients requiring additional follow-up. No          Eduar Weston RN    2/14/2023, 09:41 EST

## 2023-02-15 NOTE — PROGRESS NOTES
Nursing Home History and Physical       Cj Smith DO  [address] [place] [address] Phone: [phone]  Fax: [phone]     PATIENT NAME: Leobardo Araujo                                                                          YOB: 1934           DATE OF SERVICE: 02/09/2023  FACILITY:  [facility], [place]    CHIEF COMPLAINT:  Nursing facility admission      HISTORY OF PRESENT ILLNESS:   Patient is a pleasant 88-year-old white male with a history of atrial fibrillation, bladder cancer, aortic valve stenosis, sleep apnea, Alzheimer's dementia, and progressive functional decline who has been transferred to this facility from home due to inability to care for self and for respite care.  Patient initially was admitted to the rehab side of the facility however he was then transferred to the [facility] for continued hospice care.    On exam today, patient was nonverbal he opened his eyes and looked at me however was not able to provide any significant history.  He appeared very frail and weak.  He had not eaten anything from his food tray and seemed uninterested.        PAST MEDICAL & SURGICAL HISTORY:   Past Medical History:   Diagnosis Date   • Abnormal findings on diagnostic imaging of lung    • Arthritis    • Atrial fibrillation (HCC)     Atrial fibrillation, October 2010; CHADS2-VASc = 3: Amiodarone therapy, discontinued, November 2013. Elke Sultana left atrial appendage closure, Dr. Edson Roque, 07/23/2012.     • B12 deficiency    • Basal cell carcinoma    • Bladder cancer (HCC)    • Central sleep apnea in conditions classified elsewhere    • Delirium 2/13/2019   • Depression    • Diarrhea 4/24/2018   • History of anxiety    • History of aortic valve stenosis    • History of coronary atherosclerosis    • History of sexual dysfunction in male    • Hypercholesteremia    • Hyperlipidemia LDL goal <100    • Hypertension 1/6/2017   • Memory loss    • Nausea, vomiting, and diarrhea 4/24/2018   •  Obstructive sleep apnea    • Peripheral vascular disease (HCC)    • Renal insufficiency 4/24/2018   • Sepsis (HCC)     from UTI   • Stroke (HCC)    • Testicular mass    • Tobacco abuse     Remote tobacco abuse.   • Type 2 diabetes mellitus with other circulatory complications (HCC)    • UTI (urinary tract infection) 04/2018   • Valvular heart disease 4/24/2018      Past Surgical History:   Procedure Laterality Date   • AORTIC VALVE REPAIR/REPLACEMENT  2012   • BLADDER RESECTION LAPAROSCOPIC  2000    Bladder cancer resection, 2000.   • CATARACT EXTRACTION     • CYSTOSCOPY W/ LITHOLAPAXY / EHL     • EPIDIDYMECTOMY Right    • KIDNEY SURGERY     • ORCHIECTOMY      Orchiectomy, benign.         MEDICATIONS:  I have reviewed and reconciled the patients medication list in the patients chart at the [facility] on 02/09/2023.      ALLERGIES:  No Known Allergies      SOCIAL HISTORY:  Social History     Socioeconomic History   • Marital status:    Tobacco Use   • Smoking status: Never   • Smokeless tobacco: Never   Vaping Use   • Vaping Use: Never used   Substance and Sexual Activity   • Alcohol use: No   • Drug use: No   • Sexual activity: Defer       FAMILY HISTORY:  Family History   Problem Relation Age of Onset   • Hypertension Mother    • Heart disease Mother    • Stroke Mother    • Hypertension Father    • Heart disease Father    • Alcohol abuse Brother    • Stroke Other         Stroke syndrome   • Mental illness Other    • Hypertension Other    • Diabetes Other    • Alcohol abuse Other    • Coronary artery disease Other    • Hyperlipidemia Other         REVIEW OF SYSTEMS:  Review of Systems   Constitutional: Negative for chills, fatigue and fever.   HENT: Negative for congestion, ear pain, rhinorrhea, sinus pressure and sore throat.    Eyes: Negative for visual disturbance.   Respiratory: Negative for cough, chest tightness, shortness of breath and wheezing.    Cardiovascular: Negative for chest  pain, palpitations and leg swelling.   Gastrointestinal: Negative for abdominal pain, blood in stool, constipation, diarrhea, nausea and vomiting.   Endocrine: Negative for polydipsia and polyuria.   Genitourinary: Negative for dysuria and hematuria.   Musculoskeletal: Negative for arthralgias and back pain.   Skin: Negative for rash.   Neurological: Negative for dizziness, light-headedness, numbness and headaches.   Psychiatric/Behavioral: Negative for dysphoric mood and sleep disturbance. The patient is not nervous/anxious.          PHYSICAL EXAMINATION:   VITAL SIGNS: /77   Pulse 87   Temp 97.8 °F (36.6 °C)   Resp 18   Wt 67.1 kg (148 lb)   SpO2 96%   BMI 21.85 kg/m²     Physical Exam  Vitals and nursing note reviewed.   Constitutional:       Appearance: Normal appearance. He is well-developed.      Comments: Frail lethargic elderly male in no acute distress   HENT:      Head: Normocephalic and atraumatic.      Nose: Nose normal.      Mouth/Throat:      Mouth: Mucous membranes are moist.      Pharynx: No oropharyngeal exudate.   Eyes:      General: No scleral icterus.     Conjunctiva/sclera: Conjunctivae normal.      Pupils: Pupils are equal, round, and reactive to light.   Neck:      Thyroid: No thyromegaly.   Cardiovascular:      Rate and Rhythm: Normal rate and regular rhythm.      Heart sounds: Normal heart sounds. No murmur heard.    No friction rub. No gallop.   Pulmonary:      Effort: Pulmonary effort is normal. No respiratory distress.      Breath sounds: Normal breath sounds. No wheezing.   Abdominal:      General: Bowel sounds are normal. There is no distension.      Palpations: Abdomen is soft.      Tenderness: There is no abdominal tenderness.   Musculoskeletal:         General: No deformity or signs of injury.      Cervical back: Normal range of motion and neck supple.   Lymphadenopathy:      Cervical: No cervical adenopathy.   Skin:     General: Skin is warm and dry.      Findings: No  rash.   Neurological:      General: No focal deficit present.      Mental Status: He is alert. He is disoriented.   Psychiatric:      Comments: Confused, nonverbal         RECORDS REVIEW:   Primary care notes 1/31/2023, Dr. Goodman.    ASSESSMENT   Diagnoses and all orders for this visit:    1. Type 2 diabetes mellitus with other circulatory complications (HCC) (Primary)    2. Benign essential hypertension    3. Peripheral vascular disease (HCC)    4. Mild protein-calorie malnutrition (HCC)    5. Recurrent major depressive disorder, in partial remission (HCC)    6. Paroxysmal atrial fibrillation (HCC)    7. Unsteady gait    8. Late onset Alzheimer's dementia without behavioral disturbance (HCC)    9. Hypothyroidism, unspecified type    10. BPH with urinary obstruction        PLAN  Alzheimer's dementia without behavioral disturbances  - Dementia is very significant with patient needing assistance for all ADLs.  - Continue supportive care in the assisted living facility.  Hospice care is also following for symptomatic management.  - Patient does require assistance for all meals.    Malignant bladder cancer  - Urination function is reasonable.  Continue conservative management.    Recurrent major depression  - Stable on current medications.     Chronic pain  - Continue half tablet oxycodone twice daily.    Paroxysmal atrial fibrillation  - Continue aspirin.  Due to hospice care, avoid adding additional medications.    Hypertension  - Blood pressure has been better but this is a result of for p.o. intake.        [x]  Discussed Patient in detail with nursing/staff, addressed all needs today.     [x]  Plan of Care Reviewed   [x]  PT/OT Reviewed   []  Order Changes  []  Discharge Plans Reviewed  [x]  Advance Directive on file with Nursing Home.   [x]  POA on file with Nursing Home.    [x]  Code Status listed and reviewed.       Cj Smith DO.  2/15/2023      **Part of this note may be an electronic  transcription/translation of spoken language to printed text using the Dragon Dictation System.**

## 2023-03-22 ENCOUNTER — TELEPHONE (OUTPATIENT)
Dept: INTERNAL MEDICINE | Facility: CLINIC | Age: 88
End: 2023-03-22
Payer: MEDICARE

## 2023-03-22 NOTE — TELEPHONE ENCOUNTER
Patient's daughter Addie called to gain access to the patient's MyChart, she states that the patient lives with her, she is the POA, and his caregiver. She requested Fadumo's information be taken off the patient's chart as she does not provide care for him, email address change to her email, and help logging into the patient's MyChart.    Requested changes to patient contacts and email address made. Walked Addie through changing the password and she now has access to the portal

## 2023-03-22 NOTE — TELEPHONE ENCOUNTER
Caller: Addie Araujo    Relationship: Emergency Contact    Best call back number: [phone]    What is the best time to reach you: ANYTIME    Who are you requesting to speak with (clinical staff, provider,  specific staff member): CLINICAL    What was the call regarding: TO EXPLAIN THE UPCOMING COGNITIVE ASSESMENT APPOINTMENT    Do you require a callback: YES

## 2023-03-27 ENCOUNTER — TELEMEDICINE (OUTPATIENT)
Dept: INTERNAL MEDICINE | Facility: CLINIC | Age: 88
End: 2023-03-27
Payer: MEDICARE

## 2023-03-27 DIAGNOSIS — G30.1 LATE ONSET ALZHEIMER'S DISEASE WITH BEHAVIORAL DISTURBANCE: Primary | ICD-10-CM

## 2023-03-27 DIAGNOSIS — F02.818 LATE ONSET ALZHEIMER'S DISEASE WITH BEHAVIORAL DISTURBANCE: Primary | ICD-10-CM

## 2023-03-27 NOTE — ASSESSMENT & PLAN NOTE
.I performed with aid of his daughter MMSE. He had lots of difficulty with clock with inability to get numbers in and hands his score was 6/30 on the MMSE  He has severe memory loss and unable to carry out affairs for himself and needs his daughter to help him with decisions and activities of daily living. He was  in the hospital a month ago and is home now at his new baseline Per his power of  is protecting his best interest and making his decisions for him

## 2023-03-27 NOTE — PROGRESS NOTES
Chief Complaint   Patient presents with   • Alzheimer's Disease   This was an audio and video enabled telemedicine encounter.this visit was started from patient home on zoom but difficulty for me hearing he and his daughter and changed to doximity video visit and they gave consent to performing it without issue and I was in my office   Counseling was given to patient and family for the following topics: diagnostic results, instructions for management, risk factor reductions, impressions and risks and benefits of treatment options . Total time of the encounter was 42 minutes .    History of Present Illness  88 y.o. male presents for follow up of dementia. His daughter is taking good care of him and he needs an up to date diagnosis of his memory.     Review of Systems   Cardiovascular: Negative for chest pain.   Neurological: Positive for weakness.   Psychiatric/Behavioral: Positive for decreased concentration.     .    PMSFH:  The following portions of the patient's history were reviewed and updated as appropriate: allergies, current medications, past family history, past medical history, past social history, past surgical history and problem list.      Current Outpatient Medications:   •  acetaminophen (TYLENOL) 325 MG tablet, Take 2 tablets by mouth Every 4 (Four) Hours As Needed for Mild Pain ., Disp: , Rfl:   •  aspirin 81 MG chewable tablet, Chew 81 mg Daily., Disp: , Rfl:   •  famotidine (PEPCID) 20 MG tablet, TAKE 1 TABLET TWICE DAILY, Disp: 180 tablet, Rfl: 1  •  HYDROcodone-acetaminophen (NORCO) 5-325 MG per tablet, 1/2 tab by mouth twice a day, Disp: , Rfl: 0  •  Misc. Devices (Commode Bedside) misc, He needs bedside commode due to fall risk and episodes urinary urgency, Disp: 1 each, Rfl: 0  •  oxyCODONE (OXY-IR) 5 MG capsule, Take 2.5 mg by mouth 2 (Two) Times a Day As Needed for Moderate Pain. Half to one tablet po bid prn, Disp: , Rfl:   •  tamsulosin (FLOMAX) 0.4 MG capsule 24 hr capsule, TAKE 1  CAPSULE EVERY DAY, Disp: 90 capsule, Rfl: 1    VITALS:  There were no vitals taken for this visit.    Physical Exam  Constitutional:       Comments: Chronically ill   Neurological:      Mental Status: He is alert.      Comments: I performed with aid of his daughter MMSE. He had lots of difficulty with clock with inability to get numbers in and hands his score was 6/30 on the MMSE     Psychiatric:         Mood and Affect: Mood normal.       Current outpatient and discharge medications have been reconciled for the patient.  Reviewed by: Edy Goodman MD  LABS:    CMP:  Lab Results   Component Value Date    BUN 10 04/29/2022    CREATININE 0.62 (L) 04/29/2022    EGFRIFNONA 120 01/29/2022    EGFRIFAFRI 108 01/17/2022     04/29/2022    K 4.7 04/29/2022     (H) 04/29/2022    CALCIUM 8.5 (L) 04/29/2022    PROTENTOTREF 7.0 01/17/2022    ALBUMIN 2.80 (L) 04/28/2022    LABGLOBREF 3.4 01/17/2022    BILITOT 0.3 04/28/2022    ALKPHOS 113 04/28/2022    AST 44 (H) 04/28/2022    ALT 34 04/28/2022     CBC:  Lab Results   Component Value Date    WBC 9.10 04/29/2022    RBC 3.43 (L) 04/29/2022    HGB 9.6 (L) 04/29/2022    HCT 29.4 (L) 04/29/2022    MCV 85.7 04/29/2022    MCH 28.0 04/29/2022    MCHC 32.7 04/29/2022    RDW 15.5 (H) 04/29/2022     04/29/2022     HGBA1C(MOST RECENT):  Lab Results   Component Value Date    HGBA1C 5.50 04/27/2022     Procedures         ASSESSMENT/PLAN:  Problems Addressed this Visit        Neuro    Late onset Alzheimer's disease with behavioral disturbance (HCC) - Primary     .I performed with aid of his daughter MMSE. He had lots of difficulty with clock with inability to get numbers in and hands his score was 6/30 on the MMSE  He has severe memory loss and unable to carry out affairs for himself and needs his daughter to help him with decisions and activities of daily living. He was  in the hospital a month ago and is home now at his new baseline Per his power of  is  protecting his best interest and making his decisions for him         Diagnoses       Codes Comments    Late onset Alzheimer's disease with behavioral disturbance (HCC)    -  Primary ICD-10-CM: G30.1, F02.818  ICD-9-CM: 331.0, 294.11           FOLLOW-UP:  No follow-ups on file.      Electronically signed by:    Edy Goodman MD

## 2023-03-27 NOTE — ASSESSMENT & PLAN NOTE
.I performed with aid of his daughter MMSE. He had lots of difficulty with clock with inability to get numbers in and hands his score was 6/30 on the MMSE  He has severe memory loss and unable to carry out affairs for himself and needs his daughter to help him with decisions and activities of daily living.

## 2023-05-15 ENCOUNTER — TELEMEDICINE (OUTPATIENT)
Dept: INTERNAL MEDICINE | Facility: CLINIC | Age: 88
End: 2023-05-15
Payer: MEDICARE

## 2023-05-15 VITALS — WEIGHT: 140 LBS | BODY MASS INDEX: 20.66 KG/M2 | RESPIRATION RATE: 12 BRPM

## 2023-05-15 DIAGNOSIS — E11.59 TYPE 2 DIABETES MELLITUS WITH OTHER CIRCULATORY COMPLICATIONS: Primary | ICD-10-CM

## 2023-05-15 DIAGNOSIS — R62.7 FAILURE TO THRIVE IN ADULT: ICD-10-CM

## 2023-05-15 DIAGNOSIS — E44.1 MILD PROTEIN-CALORIE MALNUTRITION: ICD-10-CM

## 2023-05-15 DIAGNOSIS — R26.81 UNSTEADY GAIT: ICD-10-CM

## 2023-05-15 RX ORDER — TAMSULOSIN HYDROCHLORIDE 0.4 MG/1
1 CAPSULE ORAL DAILY
Qty: 90 CAPSULE | Refills: 1 | Status: SHIPPED | OUTPATIENT
Start: 2023-05-15

## 2023-05-15 NOTE — ASSESSMENT & PLAN NOTE
He is worsening but will nothing eminent. He will likely be released from hospice soon. Do Home Health when family decides ready

## 2023-05-15 NOTE — PROGRESS NOTES
Chief Complaint   Patient presents with   • Extremity Weakness       History of Present Illness  88 y.o. This was an audio and video enabled telemedicine encounter. presents for doximity video visit and was carried out without issue with he and his daughter from his home and they gave consent and I performed it from my office he is weaker and essentially bedridden and losing weight    Review of Systems   Constitutional: Positive for unexpected weight change (weight loss ).   Musculoskeletal: Positive for back pain.   Neurological: Positive for weakness.   Psychiatric/Behavioral: Positive for decreased concentration.     .    PMSFH:  The following portions of the patient's history were reviewed and updated as appropriate: allergies, current medications, past family history, past medical history, past social history, past surgical history and problem list.      Current Outpatient Medications:   •  tamsulosin (FLOMAX) 0.4 MG capsule 24 hr capsule, Take 1 capsule by mouth Daily., Disp: 90 capsule, Rfl: 1  •  acetaminophen (TYLENOL) 325 MG tablet, Take 2 tablets by mouth Every 4 (Four) Hours As Needed for Mild Pain ., Disp: , Rfl:   •  famotidine (PEPCID) 20 MG tablet, TAKE 1 TABLET TWICE DAILY, Disp: 180 tablet, Rfl: 1  •  Misc. Devices (Commode Bedside) misc, He needs bedside commode due to fall risk and episodes urinary urgency, Disp: 1 each, Rfl: 0    VITALS:  Resp 12   Wt 63.5 kg (140 lb)   BMI 20.66 kg/m²     Physical Exam  Constitutional:       Appearance: He is ill-appearing (chronically ill in no acute distress ).   Neurological:      Comments: Awake and alert and oriented to person          LABS:    CMP:  Lab Results   Component Value Date    BUN 10 04/29/2022    CREATININE 0.62 (L) 04/29/2022    EGFRIFNONA 120 01/29/2022    EGFRIFAFRI 108 01/17/2022     04/29/2022    K 4.7 04/29/2022     (H) 04/29/2022    CALCIUM 8.5 (L) 04/29/2022    PROTENTOTREF 7.0 01/17/2022    ALBUMIN 2.80 (L) 04/28/2022     LABGLOBREF 3.4 01/17/2022    BILITOT 0.3 04/28/2022    ALKPHOS 113 04/28/2022    AST 44 (H) 04/28/2022    ALT 34 04/28/2022     CBC w/ diff:   Lab Results   Component Value Date    WBC 9.10 04/29/2022    RBC 3.43 (L) 04/29/2022    HGB 9.6 (L) 04/29/2022    HCT 29.4 (L) 04/29/2022    MCV 85.7 04/29/2022    MCH 28.0 04/29/2022    MCHC 32.7 04/29/2022    RDW 15.5 (H) 04/29/2022     04/29/2022    NEUTRORELPCT 64.7 04/28/2022    AUTOIGPER 0.2 04/28/2022    LYMPHORELPCT 21.4 04/28/2022    MONORELPCT 6.6 04/28/2022    EOSRELPCT 6.5 (H) 04/28/2022    BASORELPCT 0.6 04/28/2022     HGBA1C(MOST RECENT):  Lab Results   Component Value Date    HGBA1C 5.50 04/27/2022     Procedures         ASSESSMENT/PLAN:  Problems Addressed this Visit        Endocrine and Metabolic    Mild protein-calorie malnutrition     Patient's Body mass index is 20.66 kg/m².  BMI indicates mild protein-calorie malnutrition with health conditions related to an underlying condition that include dementia  . Weight issue is worsening. BMI is is below average; BMI management plan is completed.  BMI management for patient includes the following: keep comfortable. .         Type 2 diabetes mellitus with other circulatory complications (HCC) - Primary     Diabetes is improving with lifestyle modifications.   Continue current treatment regimen.  Diabetes will be reassessed in 3 months.            Symptoms and Signs    Failure to thrive in adult     He is worsening but will nothing eminent. He will likely be released from hospice soon. Do Home Health when family decides ready          Unsteady gait     He is worsening but will nothing eminent. He will likely be released from hospice soon. Do Home Health when family decides ready         Diagnoses       Codes Comments    Type 2 diabetes mellitus with other circulatory complications (HCC)    -  Primary ICD-10-CM: E11.59  ICD-9-CM: 250.70     Mild protein-calorie malnutrition     ICD-10-CM: E44.1  ICD-9-CM:  263.1     Unsteady gait     ICD-10-CM: R26.81  ICD-9-CM: 781.2     Failure to thrive in adult     ICD-10-CM: R62.7  ICD-9-CM: 783.7           FOLLOW-UP:  Return in about 4 months (around 9/15/2023) for Medicare Wellness.      Electronically signed by:    Edy Goodman MD

## 2023-05-15 NOTE — ASSESSMENT & PLAN NOTE
Patient's Body mass index is 20.66 kg/m².  BMI indicates mild protein-calorie malnutrition with health conditions related to an underlying condition that include dementia  . Weight issue is worsening. BMI is is below average; BMI management plan is completed.  BMI management for patient includes the following: keep comfortable. .

## 2023-09-18 ENCOUNTER — TELEPHONE (OUTPATIENT)
Dept: INTERNAL MEDICINE | Facility: CLINIC | Age: 88
End: 2023-09-18

## 2023-09-18 NOTE — TELEPHONE ENCOUNTER
"“Please be informed that patient has passed. Patient has been marked  in the system. The date of death is: 2023\".    Caller: Addie Araujo    Relationship: Emergency Contact    Best call back number: [phone]   "

## 2024-11-22 NOTE — CASE COMMUNICATION
[facility]    Subjective:  pt alert conversive      Current Facility-Administered Medications:     aspirin EC tablet 81 mg, 81 mg, Oral, Daily, Denny Lopes DO, 81 mg at 11/21/24 0925    docusate sodium (COLACE) capsule 100 mg, 100 mg, Oral, BID, Denny Lopes DO, 100 mg at 11/21/24 2056    latanoprost (XALATAN) 0.005 % ophthalmic solution 1 drop, 1 drop, Both Eyes, Nightly, Denny Lopes DO, 1 drop at 11/21/24 2056    metoprolol succinate (TOPROL XL) extended release tablet 50 mg, 50 mg, Oral, QAM, Denny Lopes, , 50 mg at 11/21/24 0925    atorvastatin (LIPITOR) tablet 20 mg, 20 mg, Oral, Nightly, Denny Lopes DO, 20 mg at 11/21/24 2055    sodium chloride flush 0.9 % injection 5-40 mL, 5-40 mL, IntraVENous, 2 times per day, Denny Lopes DO, 10 mL at 11/21/24 2056    sodium chloride flush 0.9 % injection 5-40 mL, 5-40 mL, IntraVENous, PRN, Denny Lopes DO    0.9 % sodium chloride infusion, , IntraVENous, PRN, Denny Lopes DO    potassium chloride (KLOR-CON M) extended release tablet 40 mEq, 40 mEq, Oral, PRN **OR** potassium bicarb-citric acid (EFFER-K) effervescent tablet 40 mEq, 40 mEq, Oral, PRN **OR** potassium chloride 10 mEq/100 mL IVPB (Peripheral Line), 10 mEq, IntraVENous, PRN, Denny Lopes DO    magnesium sulfate 2000 mg in 50 mL IVPB premix, 2,000 mg, IntraVENous, PRN, Denny Lopes,     ondansetron (ZOFRAN-ODT) disintegrating tablet 4 mg, 4 mg, Oral, Q8H PRN **OR** ondansetron (ZOFRAN) injection 4 mg, 4 mg, IntraVENous, Q6H PRN, Denny Lopes,     polyethylene glycol (GLYCOLAX) packet 17 g, 17 g, Oral, Daily PRN, Denny Lopes DO    acetaminophen (TYLENOL) tablet 650 mg, 650 mg, Oral, Q6H PRN **OR** acetaminophen (TYLENOL) suppository 650 mg, 650 mg, Rectal, Q6H PRN, Denny Lopes,     warfarin placeholder: dosing by pharmacy, , Other, RX Placeholder, Denny Lopes,     Objective:    BP (!) 118/54   Pulse 65      Patient was in hospital should come home today.